# Patient Record
Sex: FEMALE | Race: WHITE | NOT HISPANIC OR LATINO | Employment: OTHER | URBAN - METROPOLITAN AREA
[De-identification: names, ages, dates, MRNs, and addresses within clinical notes are randomized per-mention and may not be internally consistent; named-entity substitution may affect disease eponyms.]

---

## 2017-12-12 ENCOUNTER — HOSPITAL ENCOUNTER (OUTPATIENT)
Dept: LAB | Facility: MEDICAL CENTER | Age: 67
End: 2017-12-12
Attending: PHYSICIAN ASSISTANT
Payer: MEDICARE

## 2017-12-12 PROCEDURE — 36415 COLL VENOUS BLD VENIPUNCTURE: CPT

## 2017-12-12 PROCEDURE — 86480 TB TEST CELL IMMUN MEASURE: CPT | Mod: GY

## 2017-12-13 LAB
M TB TUBERC IFN-G BLD QL: NEGATIVE
M TB TUBERC IFN-G/MITOGEN IGNF BLD: 0
M TB TUBERC IGNF/MITOGEN IGNF CONTROL: 41.81 [IU]/ML
MITOGEN IGNF BCKGRD COR BLD-ACNC: 0.05 [IU]/ML

## 2018-12-10 ENCOUNTER — HOSPITAL ENCOUNTER (OUTPATIENT)
Dept: LAB | Facility: MEDICAL CENTER | Age: 68
End: 2018-12-10
Attending: PHYSICIAN ASSISTANT
Payer: MEDICARE

## 2018-12-13 ENCOUNTER — HOSPITAL ENCOUNTER (OUTPATIENT)
Dept: LAB | Facility: MEDICAL CENTER | Age: 68
End: 2018-12-13
Attending: PHYSICIAN ASSISTANT
Payer: MEDICARE

## 2018-12-13 PROCEDURE — 86480 TB TEST CELL IMMUN MEASURE: CPT | Mod: GY

## 2018-12-13 PROCEDURE — 36415 COLL VENOUS BLD VENIPUNCTURE: CPT

## 2018-12-14 LAB
GAMMA INTERFERON BACKGROUND BLD IA-ACNC: 0.03 IU/ML
M TB IFN-G BLD-IMP: NEGATIVE
M TB IFN-G CD4+ BCKGRND COR BLD-ACNC: 0 IU/ML
MITOGEN IGNF BCKGRD COR BLD-ACNC: >10 IU/ML
QFT TB2 - NIL TBQ2: 0 IU/ML

## 2020-01-15 ENCOUNTER — HOSPITAL ENCOUNTER (OUTPATIENT)
Dept: LAB | Facility: MEDICAL CENTER | Age: 70
End: 2020-01-15
Attending: PHYSICIAN ASSISTANT
Payer: MEDICARE

## 2020-01-15 PROCEDURE — 36415 COLL VENOUS BLD VENIPUNCTURE: CPT

## 2020-01-15 PROCEDURE — 86480 TB TEST CELL IMMUN MEASURE: CPT

## 2020-01-17 LAB
GAMMA INTERFERON BACKGROUND BLD IA-ACNC: 0.03 IU/ML
M TB IFN-G BLD-IMP: NEGATIVE
M TB IFN-G CD4+ BCKGRND COR BLD-ACNC: 0 IU/ML
MITOGEN IGNF BCKGRD COR BLD-ACNC: >10 IU/ML
QFT TB2 - NIL TBQ2: -0.01 IU/ML

## 2020-10-31 ENCOUNTER — APPOINTMENT (OUTPATIENT)
Dept: RADIOLOGY | Facility: MEDICAL CENTER | Age: 70
End: 2020-10-31
Attending: EMERGENCY MEDICINE
Payer: MEDICARE

## 2020-10-31 ENCOUNTER — HOSPITAL ENCOUNTER (EMERGENCY)
Facility: MEDICAL CENTER | Age: 70
End: 2020-11-01
Attending: EMERGENCY MEDICINE
Payer: MEDICARE

## 2020-10-31 VITALS
RESPIRATION RATE: 16 BRPM | OXYGEN SATURATION: 93 % | WEIGHT: 149 LBS | SYSTOLIC BLOOD PRESSURE: 123 MMHG | BODY MASS INDEX: 22.07 KG/M2 | TEMPERATURE: 98.2 F | DIASTOLIC BLOOD PRESSURE: 71 MMHG | HEART RATE: 92 BPM | HEIGHT: 69 IN

## 2020-10-31 DIAGNOSIS — Z00.8 MEDICAL CLEARANCE FOR PSYCHIATRIC ADMISSION: ICD-10-CM

## 2020-10-31 DIAGNOSIS — F29 PSYCHOSIS, UNSPECIFIED PSYCHOSIS TYPE (HCC): ICD-10-CM

## 2020-10-31 DIAGNOSIS — E03.9 HYPOTHYROIDISM, UNSPECIFIED TYPE: ICD-10-CM

## 2020-10-31 LAB
ALBUMIN SERPL BCP-MCNC: 4.1 G/DL (ref 3.2–4.9)
ALBUMIN/GLOB SERPL: 1.3 G/DL
ALP SERPL-CCNC: 71 U/L (ref 30–99)
ALT SERPL-CCNC: 22 U/L (ref 2–50)
AMPHET UR QL SCN: NEGATIVE
ANION GAP SERPL CALC-SCNC: 10 MMOL/L (ref 7–16)
APPEARANCE UR: CLEAR
AST SERPL-CCNC: 21 U/L (ref 12–45)
BARBITURATES UR QL SCN: NEGATIVE
BASOPHILS # BLD AUTO: 0.4 % (ref 0–1.8)
BASOPHILS # BLD: 0.04 K/UL (ref 0–0.12)
BENZODIAZ UR QL SCN: NEGATIVE
BILIRUB SERPL-MCNC: 0.2 MG/DL (ref 0.1–1.5)
BILIRUB UR QL STRIP.AUTO: NEGATIVE
BUN SERPL-MCNC: 15 MG/DL (ref 8–22)
BZE UR QL SCN: NEGATIVE
CALCIUM SERPL-MCNC: 9.3 MG/DL (ref 8.5–10.5)
CANNABINOIDS UR QL SCN: NEGATIVE
CHLORIDE SERPL-SCNC: 103 MMOL/L (ref 96–112)
CO2 SERPL-SCNC: 24 MMOL/L (ref 20–33)
COLOR UR: YELLOW
COVID ORDER STATUS COVID19: NORMAL
CREAT SERPL-MCNC: 0.85 MG/DL (ref 0.5–1.4)
EOSINOPHIL # BLD AUTO: 0.02 K/UL (ref 0–0.51)
EOSINOPHIL NFR BLD: 0.2 % (ref 0–6.9)
ERYTHROCYTE [DISTWIDTH] IN BLOOD BY AUTOMATED COUNT: 44.9 FL (ref 35.9–50)
GLOBULIN SER CALC-MCNC: 3.2 G/DL (ref 1.9–3.5)
GLUCOSE SERPL-MCNC: 138 MG/DL (ref 65–99)
GLUCOSE UR STRIP.AUTO-MCNC: NEGATIVE MG/DL
HCT VFR BLD AUTO: 44.9 % (ref 37–47)
HGB BLD-MCNC: 14.5 G/DL (ref 12–16)
IMM GRANULOCYTES # BLD AUTO: 0.07 K/UL (ref 0–0.11)
IMM GRANULOCYTES NFR BLD AUTO: 0.7 % (ref 0–0.9)
KETONES UR STRIP.AUTO-MCNC: NEGATIVE MG/DL
LEUKOCYTE ESTERASE UR QL STRIP.AUTO: NEGATIVE
LYMPHOCYTES # BLD AUTO: 0.83 K/UL (ref 1–4.8)
LYMPHOCYTES NFR BLD: 7.9 % (ref 22–41)
MCH RBC QN AUTO: 30.3 PG (ref 27–33)
MCHC RBC AUTO-ENTMCNC: 32.3 G/DL (ref 33.6–35)
MCV RBC AUTO: 93.7 FL (ref 81.4–97.8)
METHADONE UR QL SCN: NEGATIVE
MICRO URNS: NORMAL
MONOCYTES # BLD AUTO: 0.64 K/UL (ref 0–0.85)
MONOCYTES NFR BLD AUTO: 6.1 % (ref 0–13.4)
NEUTROPHILS # BLD AUTO: 8.93 K/UL (ref 2–7.15)
NEUTROPHILS NFR BLD: 84.7 % (ref 44–72)
NITRITE UR QL STRIP.AUTO: NEGATIVE
NRBC # BLD AUTO: 0 K/UL
NRBC BLD-RTO: 0 /100 WBC
OPIATES UR QL SCN: NEGATIVE
OXYCODONE UR QL SCN: NEGATIVE
PCP UR QL SCN: NEGATIVE
PH UR STRIP.AUTO: 5.5 [PH] (ref 5–8)
PLATELET # BLD AUTO: 224 K/UL (ref 164–446)
PMV BLD AUTO: 11 FL (ref 9–12.9)
POC BREATHALIZER: 0 PERCENT (ref 0–0.01)
POTASSIUM SERPL-SCNC: 4 MMOL/L (ref 3.6–5.5)
PROPOXYPH UR QL SCN: NEGATIVE
PROT SERPL-MCNC: 7.3 G/DL (ref 6–8.2)
PROT UR QL STRIP: NEGATIVE MG/DL
RBC # BLD AUTO: 4.79 M/UL (ref 4.2–5.4)
RBC UR QL AUTO: NEGATIVE
SODIUM SERPL-SCNC: 137 MMOL/L (ref 135–145)
SP GR UR STRIP.AUTO: 1.01
TSH SERPL DL<=0.005 MIU/L-ACNC: 11.2 UIU/ML (ref 0.38–5.33)
UROBILINOGEN UR STRIP.AUTO-MCNC: 0.2 MG/DL
WBC # BLD AUTO: 10.5 K/UL (ref 4.8–10.8)

## 2020-10-31 PROCEDURE — 80307 DRUG TEST PRSMV CHEM ANLYZR: CPT

## 2020-10-31 PROCEDURE — 80053 COMPREHEN METABOLIC PANEL: CPT

## 2020-10-31 PROCEDURE — 85025 COMPLETE CBC W/AUTO DIFF WBC: CPT

## 2020-10-31 PROCEDURE — 700102 HCHG RX REV CODE 250 W/ 637 OVERRIDE(OP): Performed by: EMERGENCY MEDICINE

## 2020-10-31 PROCEDURE — U0003 INFECTIOUS AGENT DETECTION BY NUCLEIC ACID (DNA OR RNA); SEVERE ACUTE RESPIRATORY SYNDROME CORONAVIRUS 2 (SARS-COV-2) (CORONAVIRUS DISEASE [COVID-19]), AMPLIFIED PROBE TECHNIQUE, MAKING USE OF HIGH THROUGHPUT TECHNOLOGIES AS DESCRIBED BY CMS-2020-01-R: HCPCS

## 2020-10-31 PROCEDURE — 70450 CT HEAD/BRAIN W/O DYE: CPT

## 2020-10-31 PROCEDURE — 99285 EMERGENCY DEPT VISIT HI MDM: CPT

## 2020-10-31 PROCEDURE — 90791 PSYCH DIAGNOSTIC EVALUATION: CPT

## 2020-10-31 PROCEDURE — 302970 POC BREATHALIZER: Performed by: EMERGENCY MEDICINE

## 2020-10-31 PROCEDURE — 81003 URINALYSIS AUTO W/O SCOPE: CPT | Mod: XU

## 2020-10-31 PROCEDURE — C9803 HOPD COVID-19 SPEC COLLECT: HCPCS | Performed by: EMERGENCY MEDICINE

## 2020-10-31 PROCEDURE — A9270 NON-COVERED ITEM OR SERVICE: HCPCS | Performed by: EMERGENCY MEDICINE

## 2020-10-31 PROCEDURE — 84443 ASSAY THYROID STIM HORMONE: CPT

## 2020-10-31 PROCEDURE — 36415 COLL VENOUS BLD VENIPUNCTURE: CPT

## 2020-10-31 RX ORDER — LEVOTHYROXINE SODIUM 0.05 MG/1
50 TABLET ORAL DAILY
Status: DISCONTINUED | OUTPATIENT
Start: 2020-10-31 | End: 2020-11-01 | Stop reason: HOSPADM

## 2020-10-31 RX ADMIN — LEVOTHYROXINE SODIUM 50 MCG: 0.05 TABLET ORAL at 15:09

## 2020-10-31 ASSESSMENT — LIFESTYLE VARIABLES: DO YOU DRINK ALCOHOL: NO
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
## 2020-10-31 NOTE — ED PROVIDER NOTES
ED Provider Note    CHIEF COMPLAINT  Chief Complaint   Patient presents with   • Behavioral Problem       HPI  Zully Lin is a 70 y.o. female who presents with some agitation.  The patient apparently lives in a assisted living facility which has been closed down.  She was given notice to leave but did not end up bleeding.  After talking to her, it sounds as if she received the eviction notice and thought that in fact she now on the property.  She states that people came in today to make her leave, and she took offense of this as she believed that it was her property.  She apparently has a history of schizophrenia and has not taken medications for quite some time.  Is not clear why.  It sounds like things have been worsening of late, according to social work she just fired her rep payee because she did not want anyone else managing her money, which she states is in the range of $48 million.  She denies any headache, neck pain.  No recent cough or cold symptoms.  No chest pain or belly pain.  No fever chills.  From the chart, she appears to have a history of Synthroid use.  I see no recent laboratories to evaluate for this although I do see an elevated TSH 6 years ago.    PAST MEDICAL HISTORY  Past Medical History:   Diagnosis Date   • Psychiatric disorder    Hypo-thyroidism    FAMILY HISTORY  No family history on file.    SOCIAL HISTORY  Social History     Tobacco Use   • Smoking status: Never Smoker   Substance Use Topics   • Alcohol use: No   • Drug use: No         SURGICAL HISTORY  No past surgical history on file.    CURRENT MEDICATIONS  Home Medications     Reviewed by Jovon Rivera on 10/31/20 at 1249  Med List Status: Complete   Medication Last Dose Status   ascorbic acid (VITAMIN C) 500 MG tablet Not Taking Active   cyanocobalamin (VITAMIN B12) 1000 MCG TABS Not Taking Active   metoprolol (LOPRESSOR) 25 MG TABS Not Taking Active   therapeutic multivitamin-minerals (THERAGRAN-M) TABS Not Taking  "Active   trihexyphenidyl (ARTANE) 2 MG TABS Not Taking Active                I have reviewed the nurses notes and/or the list brought with the patient.    ALLERGIES  No Known Allergies    REVIEW OF SYSTEMS  See HPI for further details. Review of systems as above, otherwise all other systems are negative.     PHYSICAL EXAM  VITAL SIGNS: /80   Pulse (!) 112   Temp 36.3 °C (97.4 °F) (Temporal)   Resp 16   Ht 1.753 m (5' 9\")   Wt 67.6 kg (149 lb)   SpO2 98%   BMI 22.00 kg/m²     Constitutional: Well appearing patient in no acute distress.  Not toxic, nor ill in appearance.  HENT: Mucus membranes moist.  Oropharynx is clear.  Eyes: Pupils equally round.  No scleral icterus.   Neck: Full nontender range of motion.  Lymphatic: No cervical lymphadenopathy noted.   Cardiovascular: Regular heart rate and rhythm.  No murmurs, rubs, nor gallop appreciated.   Thorax & Lungs: Chest is nontender.  Lungs are clear to auscultation with good air movement bilaterally.  No wheeze, rhonchi, nor rales.   Abdomen: Soft, with no tenderness, rebound nor guarding.  No mass, pulsatile mass, nor hepatosplenomegaly appreciated.  Skin: No purpura nor petechia noted.  Extremities/Musculoskeletal: No sign of trauma.  Calves are nontender with no cords nor edema.  No Nato's sign.  Pulses are intact all around.   Neurologic: Alert & oriented.  Strength and sensation is intact all around.  Gait is normal.  Psychiatric: Affect is normal.  However she exhibits some grandiose thinking, but appear to be some fixed delusions, and certainly paranoid thinking she describes the events of today    LABS  Labs Reviewed   CBC WITH DIFFERENTIAL - Abnormal; Notable for the following components:       Result Value    MCHC 32.3 (*)     Neutrophils-Polys 84.70 (*)     Lymphocytes 7.90 (*)     Neutrophils (Absolute) 8.93 (*)     Lymphs (Absolute) 0.83 (*)     All other components within normal limits   COMP METABOLIC PANEL - Abnormal; Notable for the " following components:    Glucose 138 (*)     All other components within normal limits   TSH - Abnormal; Notable for the following components:    TSH 11.200 (*)     All other components within normal limits   POC BREATHALIZER - Normal   URINALYSIS,CULTURE IF INDICATED   ESTIMATED GFR   COVID/SARS COV-2   URINE DRUG SCREEN         RADIOLOGY/PROCEDURES  I have reviewed the patient's film interpretations myself, and they are read out by the radiologist as:   CT-HEAD W/O   Final Result      No acute intracranial abnormality        .    MEDICAL RECORD  I have reviewed patient's medical record and pertinent results are listed above.    COURSE & MEDICAL DECISION MAKING  I have reviewed any medical record information, laboratory studies and radiographic results as noted above.  This patient presents after situation today where she is being evicted from her assisted living facility.  She has a history of what sounds to be chronic mental illness off of her medications.  Right now she seems quite fine.  However, it certainly does seem that she has some delusions, and paranoia, consistent with some mild psychosis.  Isolated nothing that she can care for herself at this time.  Therefore I completed the involuntary hold with which she arrived on.  I did go ahead and check some basic laboratories which were unrevealing.  She is a mild hyperglycemia which will need to be followed up.  She has a elevated TSH.  I gone ahead and restarted the dose that she was on previously of her Synthroid, 50 mcg daily.  I did want to exclude intracranial tumor as the etiology of the symptoms, therefore obtain a CT scan which is negative.  I have talked with both social work as well of life skills, and at this point we will keep her here and to have a spot to send her for stabilization.  Ideally, I suspect that a geriatric psych unit would be most useful for her, [facility].  She is given the following instructions:Follow mental health  recommendations.  I have restarted that Synthroid 50 mcg daily which she had been on previously.  This will need to be followed up as an outpatient.  As well, your blood sugar is mildly elevated today as is your blood pressure, both of these will need to be followed up as well.    FINAL IMPRESSION  1. Psychosis, unspecified psychosis type (HCC)    2. Hypothyroidism, unspecified type    3. Medical clearance for psychiatric admission           This dictation was created using voice recognition software.    Electronically signed by: Todd Brown M.D., 10/31/2020 2:04 PM

## 2020-10-31 NOTE — ED NOTES
Med rec updated and complete. Pt has been refusing to take medications for > 1 year, according to the transferring facility.

## 2020-10-31 NOTE — DISCHARGE INSTRUCTIONS
Follow mental health recommendations.  I have restarted that Synthroid 50 mcg daily which she had been on previously.  This will need to be followed up as an outpatient.  As well, your blood sugar is mildly elevated today as is your blood pressure, both of these will need to be followed up as well.
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
## 2020-10-31 NOTE — ED TRIAGE NOTES
EMS reports pts assisted living facility is closing down and they attempted to move pt and she became upset because she did not understand why she had to move. Pt has a history of dementia and is oriented x 2 on arrival. Pt calm and cooperative here

## 2020-11-01 LAB
SARS-COV-2 RNA RESP QL NAA+PROBE: NOTDETECTED
SPECIMEN SOURCE: NORMAL

## 2020-11-01 NOTE — DISCHARGE PLANNING
Medical Social Work    Referral: Legal Hold    Intervention: Legal Hold Paperwork given to SW by Life Skills RN Mihir    Legal Hold Initiated: Date: 10/31/20 Time: 1040am    Patient’s Insurance Listed on Face Sheet: Medicare    Referrals sent to: .[facility] ,[facility]    This referral contains the following information:  1) Face sheet __X__  2) Page 1 and Page 2 of Legal Hold ___X_  3) Alert Team Assessment/Psych Assessment _X___  4) Head to toe physical exam _X___  5) Urine Drug Screen ___X_  6) Blood Alcohol __X__  7) Vital signs __X__  8) Pregnancy test when applicable _N/A__  9) Medications list _A___    Plan: Patient will transfer to mental health facility once acceptance is obtained    At this time pt is refusing COVID test at this time. Bedside RN will try again. Both [facility]  and [facility] ( 2 COVID Tests) requires them for admission.

## 2020-11-01 NOTE — DISCHARGE PLANNING
Medical Social Work     Pt has been accepted to [facility] by Dr. Hurt.  Sw called [facility] and set up transport through [facility] for 0000.  Sw updated  bedside rn, and completed transfer packet.

## 2020-11-01 NOTE — ED NOTES
Pt resting quietly in room with lights turned down. Pt lying right side, appears asleep at this time. Respirations even and unlabored. Pt  at bedside, pt in full view of nurses station.

## 2020-11-01 NOTE — ED NOTES
DOMINGUEZ arrived to take patient to [facility]. Vitals repeated and charted. Pt ambulated to ambulance bay. Pt's belongings taken from locker 14. Pt left with belongings and L2K paperwork.     Report called to Sandra ZENG at [facility].

## 2020-11-01 NOTE — ED NOTES
Report received from Zully Joshua RN. Assumed care of patient at this time. Pt calm and cooperative, sitting [place]. Sitter outside patient's room for safety. L2K paperwork in remains in chart.

## 2020-11-01 NOTE — DISCHARGE PLANNING
GOMEZ asked by YURI Gan to follow up with Yandy ([facility]) and let her know where this Pt was moved.  GOMEZ placed call to Yandy and provided her with an update. Yandy appreciative for the call and will follow up with Buddy Russell.

## 2020-11-01 NOTE — ED NOTES
Pt up to restroom with steady gait, no assistance required, back to room. Pt continues to deny SI/HI, still with delusions of owning nursing home and being kicked out of her property today. Sitter remains outside patients room for safety.

## 2021-03-03 DIAGNOSIS — Z23 NEED FOR VACCINATION: ICD-10-CM

## 2021-06-09 PROBLEM — F22 DELUSIONAL DISORDER (HCC): Status: ACTIVE | Noted: 2021-06-09

## 2021-06-09 PROBLEM — Z91.199 NONCOMPLIANCE WITH TREATMENT PLAN: Status: ACTIVE | Noted: 2021-06-09

## 2021-06-09 PROBLEM — E78.5 HYPERLIPIDEMIA: Status: ACTIVE | Noted: 2021-06-09

## 2021-06-11 ENCOUNTER — APPOINTMENT (OUTPATIENT)
Dept: RADIOLOGY | Facility: MEDICAL CENTER | Age: 71
End: 2021-06-11
Attending: EMERGENCY MEDICINE
Payer: MEDICARE

## 2021-06-11 ENCOUNTER — HOSPITAL ENCOUNTER (OUTPATIENT)
Facility: MEDICAL CENTER | Age: 71
End: 2022-03-15
Attending: EMERGENCY MEDICINE | Admitting: HOSPITALIST
Payer: MEDICARE

## 2021-06-11 DIAGNOSIS — F25.0 SCHIZOAFFECTIVE DISORDER, BIPOLAR TYPE (HCC): ICD-10-CM

## 2021-06-11 DIAGNOSIS — F22 DELUSIONAL DISORDER (HCC): Chronic | ICD-10-CM

## 2021-06-11 DIAGNOSIS — F03.911 AGITATION DUE TO DEMENTIA (HCC): ICD-10-CM

## 2021-06-11 DIAGNOSIS — F03.90 DEMENTIA WITHOUT BEHAVIORAL DISTURBANCE, UNSPECIFIED DEMENTIA TYPE: ICD-10-CM

## 2021-06-11 DIAGNOSIS — Z86.59 HISTORY OF DEMENTIA: ICD-10-CM

## 2021-06-11 LAB
ANION GAP SERPL CALC-SCNC: 10 MMOL/L (ref 7–16)
APPEARANCE UR: CLEAR
BASOPHILS # BLD AUTO: 0.9 % (ref 0–1.8)
BASOPHILS # BLD: 0.05 K/UL (ref 0–0.12)
BILIRUB UR QL STRIP.AUTO: NEGATIVE
BUN SERPL-MCNC: 16 MG/DL (ref 8–22)
CALCIUM SERPL-MCNC: 9.5 MG/DL (ref 8.5–10.5)
CHLORIDE SERPL-SCNC: 106 MMOL/L (ref 96–112)
CO2 SERPL-SCNC: 24 MMOL/L (ref 20–33)
COLOR UR: YELLOW
CREAT SERPL-MCNC: 0.81 MG/DL (ref 0.5–1.4)
EOSINOPHIL # BLD AUTO: 0.21 K/UL (ref 0–0.51)
EOSINOPHIL NFR BLD: 3.9 % (ref 0–6.9)
ERYTHROCYTE [DISTWIDTH] IN BLOOD BY AUTOMATED COUNT: 45.6 FL (ref 35.9–50)
GLUCOSE SERPL-MCNC: 138 MG/DL (ref 65–99)
GLUCOSE UR STRIP.AUTO-MCNC: NEGATIVE MG/DL
HCT VFR BLD AUTO: 44.7 % (ref 37–47)
HGB BLD-MCNC: 14.6 G/DL (ref 12–16)
IMM GRANULOCYTES # BLD AUTO: 0.03 K/UL (ref 0–0.11)
IMM GRANULOCYTES NFR BLD AUTO: 0.6 % (ref 0–0.9)
KETONES UR STRIP.AUTO-MCNC: NEGATIVE MG/DL
LEUKOCYTE ESTERASE UR QL STRIP.AUTO: NEGATIVE
LYMPHOCYTES # BLD AUTO: 1.54 K/UL (ref 1–4.8)
LYMPHOCYTES NFR BLD: 28.8 % (ref 22–41)
MCH RBC QN AUTO: 30.9 PG (ref 27–33)
MCHC RBC AUTO-ENTMCNC: 32.7 G/DL (ref 33.6–35)
MCV RBC AUTO: 94.5 FL (ref 81.4–97.8)
MICRO URNS: NORMAL
MONOCYTES # BLD AUTO: 0.58 K/UL (ref 0–0.85)
MONOCYTES NFR BLD AUTO: 10.8 % (ref 0–13.4)
NEUTROPHILS # BLD AUTO: 2.94 K/UL (ref 2–7.15)
NEUTROPHILS NFR BLD: 55 % (ref 44–72)
NITRITE UR QL STRIP.AUTO: NEGATIVE
NRBC # BLD AUTO: 0 K/UL
NRBC BLD-RTO: 0 /100 WBC
PH UR STRIP.AUTO: 6.5 [PH] (ref 5–8)
PLATELET # BLD AUTO: 205 K/UL (ref 164–446)
PMV BLD AUTO: 11.1 FL (ref 9–12.9)
POTASSIUM SERPL-SCNC: 3.9 MMOL/L (ref 3.6–5.5)
PROT UR QL STRIP: NEGATIVE MG/DL
RBC # BLD AUTO: 4.73 M/UL (ref 4.2–5.4)
RBC UR QL AUTO: NEGATIVE
SODIUM SERPL-SCNC: 140 MMOL/L (ref 135–145)
SP GR UR STRIP.AUTO: 1
UROBILINOGEN UR STRIP.AUTO-MCNC: 0.2 MG/DL
WBC # BLD AUTO: 5.4 K/UL (ref 4.8–10.8)

## 2021-06-11 PROCEDURE — 85025 COMPLETE CBC W/AUTO DIFF WBC: CPT

## 2021-06-11 PROCEDURE — 84443 ASSAY THYROID STIM HORMONE: CPT

## 2021-06-11 PROCEDURE — 80048 BASIC METABOLIC PNL TOTAL CA: CPT

## 2021-06-11 PROCEDURE — 99285 EMERGENCY DEPT VISIT HI MDM: CPT

## 2021-06-11 PROCEDURE — 81003 URINALYSIS AUTO W/O SCOPE: CPT

## 2021-06-11 PROCEDURE — 71045 X-RAY EXAM CHEST 1 VIEW: CPT

## 2021-06-11 ASSESSMENT — FIBROSIS 4 INDEX: FIB4 SCORE: 1.38

## 2021-06-12 PROBLEM — F03.90 DEMENTIA (HCC): Status: ACTIVE | Noted: 2021-06-12

## 2021-06-12 PROBLEM — E03.9 HYPOTHYROIDISM: Status: ACTIVE | Noted: 2021-06-12

## 2021-06-12 LAB — TSH SERPL DL<=0.005 MIU/L-ACNC: 15.6 UIU/ML (ref 0.38–5.33)

## 2021-06-12 PROCEDURE — U0003 INFECTIOUS AGENT DETECTION BY NUCLEIC ACID (DNA OR RNA); SEVERE ACUTE RESPIRATORY SYNDROME CORONAVIRUS 2 (SARS-COV-2) (CORONAVIRUS DISEASE [COVID-19]), AMPLIFIED PROBE TECHNIQUE, MAKING USE OF HIGH THROUGHPUT TECHNOLOGIES AS DESCRIBED BY CMS-2020-01-R: HCPCS

## 2021-06-12 PROCEDURE — 90791 PSYCH DIAGNOSTIC EVALUATION: CPT | Performed by: SOCIAL WORKER

## 2021-06-12 PROCEDURE — U0005 INFEC AGEN DETEC AMPLI PROBE: HCPCS

## 2021-06-12 PROCEDURE — G0378 HOSPITAL OBSERVATION PER HR: HCPCS

## 2021-06-12 PROCEDURE — 96372 THER/PROPH/DIAG INJ SC/IM: CPT

## 2021-06-12 PROCEDURE — 700111 HCHG RX REV CODE 636 W/ 250 OVERRIDE (IP): Performed by: STUDENT IN AN ORGANIZED HEALTH CARE EDUCATION/TRAINING PROGRAM

## 2021-06-12 PROCEDURE — 99218 PR INITIAL OBSERVATION CARE,LEVL I: CPT | Performed by: HOSPITALIST

## 2021-06-12 RX ORDER — RISPERIDONE 1 MG/1
1 TABLET ORAL NIGHTLY
Status: DISCONTINUED | OUTPATIENT
Start: 2021-06-12 | End: 2021-06-21

## 2021-06-12 RX ORDER — AMOXICILLIN 250 MG
2 CAPSULE ORAL 2 TIMES DAILY
Status: DISCONTINUED | OUTPATIENT
Start: 2021-06-12 | End: 2021-06-25

## 2021-06-12 RX ORDER — ACETAMINOPHEN 325 MG/1
650 TABLET ORAL EVERY 6 HOURS PRN
Status: DISCONTINUED | OUTPATIENT
Start: 2021-06-12 | End: 2021-07-15

## 2021-06-12 RX ORDER — POLYETHYLENE GLYCOL 3350 17 G/17G
1 POWDER, FOR SOLUTION ORAL
Status: DISCONTINUED | OUTPATIENT
Start: 2021-06-12 | End: 2021-06-25

## 2021-06-12 RX ORDER — LEVOTHYROXINE SODIUM 0.05 MG/1
50 TABLET ORAL
Status: DISCONTINUED | OUTPATIENT
Start: 2021-06-12 | End: 2021-12-03

## 2021-06-12 RX ORDER — BISACODYL 10 MG
10 SUPPOSITORY, RECTAL RECTAL
Status: DISCONTINUED | OUTPATIENT
Start: 2021-06-12 | End: 2021-06-25

## 2021-06-12 RX ADMIN — ENOXAPARIN SODIUM 40 MG: 40 INJECTION SUBCUTANEOUS at 15:00

## 2021-06-12 ASSESSMENT — ENCOUNTER SYMPTOMS
DOUBLE VISION: 0
FLANK PAIN: 0
WHEEZING: 0
SHORTNESS OF BREATH: 0
CONSTIPATION: 0
NAUSEA: 0
DIZZINESS: 0
DEPRESSION: 0
BLOOD IN STOOL: 0
CHILLS: 0
COUGH: 0
FALLS: 0
NECK PAIN: 0
PHOTOPHOBIA: 0
ABDOMINAL PAIN: 0
SPUTUM PRODUCTION: 0
DIARRHEA: 0
EYE PAIN: 0
MYALGIAS: 0
HALLUCINATIONS: 0
FEVER: 0
TINGLING: 0
WEIGHT LOSS: 0
BLURRED VISION: 0
BACK PAIN: 0
VOMITING: 0
HEADACHES: 0
HEMOPTYSIS: 0

## 2021-06-12 ASSESSMENT — PAIN DESCRIPTION - PAIN TYPE
TYPE: ACUTE PAIN
TYPE: ACUTE PAIN

## 2021-06-12 ASSESSMENT — LIFESTYLE VARIABLES: SUBSTANCE_ABUSE: 0

## 2021-06-12 NOTE — ED NOTES
Received report from Rasheeda ZENG. [place]; respirations equal bilateral and not labored. Pt has no needs at this time. RN will continue to monitor.

## 2021-06-12 NOTE — ED NOTES
Med rec complete per [facility] ([phone]). Per facility, Pt does not take any medications; Pt refuses all medications.

## 2021-06-12 NOTE — ASSESSMENT & PLAN NOTE
Patient brought here by EMS because the facility states that she was experiencing worsening agitation. On my exam patient appears cooperative and pleasant. No signs of agitation. Vitals stable. No leukocytosis. No electrolyte abnormalities. UA negative. Facility would like the patient to get evaluated by psychiatry before they will accept her back.   Unclear diagnosis, pt appears to be cognitively intact on assessment, score 30

## 2021-06-12 NOTE — ED NOTES
Report given to Deshawn SIMPSON RN. Upon shift change pt is sleeping in bed with even and unlabored breaths, in no apparent distress.

## 2021-06-12 NOTE — H&P
"History & Physical Note    Date of Admission: 6/12/2021  Admission Status: Observation-Outpatient  [facility] Team: [facility]  Attending: Zackary Haji M.D.   Senior Resident: Dr. Lan  Intern: Dr. Frias  Contact Number: [phone]    Chief Complaint: Agitation      History of Present Illness (HPI):     Patient is a 70 y.o female w/ PMH of dementia, who is a resident at [facility] for memory care, who was brought here by EMS. Her facility sent her here for presumed worsening \"agitation\" and behavioral changes, and requested that she be evaluated by a psychiatrist before they will allow for her to be admitted back to the facility. Patient was brought in by EMS on 6/11/2021 and has been in the ED since then. She has been cooperative, calm and pleasant the entire time. Patient is alert and oriented (time, place, self), however she is not clear on why she was brought to the hospital. Patient believes that she is the owner of the facility that she is in. On my exam, patient appeared calm and pleasant. She denies any psychiatric history. NO history of depression, schizophrenia, bipolar disorder. She describes herself as a \"nicanor person\" when I asked her about any history of agitation. Patient will be admitted her for placement reasons and for psychiatry evaluation as per request of the facility.     Review of Systems: Review of Systems   Constitutional: Negative for chills, fever, malaise/fatigue and weight loss.   HENT: Negative for hearing loss.    Eyes: Negative for blurred vision, double vision, photophobia and pain.   Respiratory: Negative for cough, hemoptysis, sputum production, shortness of breath and wheezing.    Gastrointestinal: Negative for abdominal pain, blood in stool, constipation, diarrhea, melena, nausea and vomiting.   Genitourinary: Negative for dysuria, flank pain, frequency, hematuria and urgency.   Musculoskeletal: Negative for back pain, falls, joint pain, myalgias and neck pain. "   Neurological: Negative for dizziness, tingling and headaches.   Psychiatric/Behavioral: Negative for depression, hallucinations, substance abuse and suicidal ideas.         Past Medical History:   Past Medical History was reviewed with patient.   has a past medical history of Dehydration (2/17/2014), Failure to thrive (2/28/2014), Leukocytosis (2/17/2014), Pneumonia (3/1/2014), Psychiatric disorder, and Sepsis (HCC) (3/4/2014).    Past Surgical History: Past Surgical History was reviewed with patient.   has no past surgical history on file.    Medications: Medications have been reviewed with patient.  None        Allergies: Allergies have been reviewed with patient.  No Known Allergies    Family History:   family history is not on file.     Social History:   Tobacco: None   Alcohol: None   Recreational drugs (illegal and prescription):  None   Employment: Unemployed   Activity Level: able to ambulate on her own    Living situation:  Lives in facility   Recent travel:  None  Primary Care Provider: reviewed No primary care provider on file.  Other (stressors, spirituality, exposures):  None  Physical Exam: Vitals:  Temp:  [36.4 °C (97.6 °F)-36.7 °C (98.1 °F)] 36.7 °C (98.1 °F)  Pulse:  [] 74  Resp:  [14-18] 17  BP: (122-172)/(61-83) 139/83  SpO2:  [91 %-96 %] 95 %    Physical Exam  Constitutional:       General: She is not in acute distress.     Appearance: Normal appearance. She is normal weight. She is not ill-appearing, toxic-appearing or diaphoretic.   HENT:      Head: Normocephalic and atraumatic.      Mouth/Throat:      Mouth: Mucous membranes are moist.   Eyes:      Extraocular Movements: Extraocular movements intact.      Pupils: Pupils are equal, round, and reactive to light.   Cardiovascular:      Rate and Rhythm: Normal rate and regular rhythm.      Pulses: Normal pulses.      Heart sounds: Normal heart sounds.   Pulmonary:      Effort: Pulmonary effort is normal. No respiratory distress.       Breath sounds: Normal breath sounds. No stridor. No wheezing, rhonchi or rales.   Chest:      Chest wall: No tenderness.   Abdominal:      General: There is no distension.      Palpations: There is no mass.      Tenderness: There is no abdominal tenderness. There is no right CVA tenderness, left CVA tenderness, guarding or rebound.      Hernia: No hernia is present.   Musculoskeletal:         General: No swelling, tenderness, deformity or signs of injury.      Right lower leg: No edema.      Left lower leg: No edema.   Skin:     Coloration: Skin is not jaundiced or pale.      Findings: No bruising, erythema, lesion or rash.   Neurological:      General: No focal deficit present.      Mental Status: She is alert.      Cranial Nerves: No cranial nerve deficit.      Sensory: No sensory deficit.      Motor: No weakness.      Coordination: Coordination normal.         Labs:   Reviewed     Imaging:   Reviewed     Previous Data Review: reviewed    Problem Representation:     * Dementia (HCC)  Assessment & Plan  Patient brought here by EMS because the facility states that she was experiencing worsening agitation. On my exam patient appears cooperative and pleasant. No signs of agitation. Vitals stable. No leukocytosis. No electrolyte abnormalities. UA negative. Facility would like the patient to get evaluated by psychiatry before they will accept her back.     Hypothyroidism  Assessment & Plan  Resume home dose synthroid  Previous TSH: 11  Follow up repeat TSH

## 2021-06-12 NOTE — ED NOTES
Pt sleeping in bed with even and unlabored breaths, in no acute distress. Will continue to monitor.

## 2021-06-12 NOTE — ED TRIAGE NOTES
"Chief Complaint   Patient presents with   • Agitation     pt resident of [facility] for memory care. pt has hx of demntia. per facility pt was more agitated and had acute behavioral changes. unknown baseline        Pt BIB EMS from above. Pt has been resident of this place for 8 months, not taking her meds, unknown what meds she takes. She is awarded of the state. Full code. Pt knows her name, place, stated it was 2011. Pt thinks people were taking things out of her room. Per EMS pt was \"threatening people to leave her room\". Pt was calm and cooperative once they left her facility. Pt denies any pain  "

## 2021-06-12 NOTE — ED NOTES
Pt sleeping in bed in view of RN station with even and unlabored breaths. No distress noted, will continue to monitor.

## 2021-06-12 NOTE — ED NOTES
"Patient on call light requesting \"something to eat other than crackers.\"  Warmed up left over meal in refrigerator, patient sitting up in bed to eat  "

## 2021-06-12 NOTE — ED PROVIDER NOTES
ED Provider Note    Patient resented last night for agitation, was sent in from her memory care facility.  The facility is unable to take her back.  Social work and staff has been trying to contact the facility to get the patient taken back to the facility but they have declined to take her back.  There is possibility she can go there after a formal psychiatry consultation.    This is not available for least 2 days through the emergency department, and given that she does not appear to be able to care for herself, and may need other placement even in spite of this consultation she will be admitted for further care    [place]

## 2021-06-12 NOTE — ED PROVIDER NOTES
"ED Provider Note    Scribed for Jatinder Johnson M.D. by Jatinder Johnson M.D.. 6/11/2021,  7:13 PM.    CHIEF COMPLAINT  Chief Complaint   Patient presents with   • Agitation     pt resident of [facility] for [facility] care. pt has hx of demntia. per facility pt was more agitated and had acute behavioral changes. unknown baseline        HPI  Zully Lin is a pleasant, calm and cooperative 70 y.o. female who presents to the Emergency Department brought in by EMS, with normal vital signs, because her memory care facility reports that she was more agitated and had acute behavioral changes.  When I greeted her at the bedside and asked what happened at her facility she reports \"they do not like me there.\"  She answers questions appropriately.  She is a coffey of the [facility], knows her name and where she is, but is disoriented to date.  She denies any recent illness including fevers or chills, nausea vomiting, chest pain or cough or shortness of breath.  EMS reported to us that she was yelling at people to leave her room, but once they left her facility, she became quickly calm and cooperative and pleasant for EMS.  She has no complaints on arrival.      REVIEW OF SYSTEMS  See HPI for further details. All other systems are negative.     PAST MEDICAL HISTORY   has a past medical history of Dehydration (2/17/2014), Failure to thrive (2/28/2014), Leukocytosis (2/17/2014), Pneumonia (3/1/2014), Psychiatric disorder, and Sepsis (HCC) (3/4/2014).    SOCIAL HISTORY  Social History     Tobacco Use   • Smoking status: Never Smoker   Substance and Sexual Activity   • Alcohol use: No   • Drug use: No   • Sexual activity: Not on file     Social History     Substance and Sexual Activity   Drug Use No       SURGICAL HISTORY  patient denies any surgical history    CURRENT MEDICATIONS  Home Medications    **Home medications have not yet been reviewed for this encounter**         ALLERGIES  No Known Allergies    PHYSICAL " "EXAM  VITAL SIGNS: /79   Pulse 83   Temp 36.4 °C (97.6 °F) (Temporal)   Resp 18   Ht 1.651 m (5' 5\")   Wt 65.8 kg (145 lb)   SpO2 94%   BMI 24.13 kg/m²   Pulse ox interpretation: I interpret this pulse ox as normal.  Constitutional: Alert in no apparent distress.  HENT: No signs of trauma, Bilateral external ears normal, Nose normal.   Eyes: Conjunctiva normal, Non-icteric.   Neck: Normal range of motion, Supple, No stridor.   Lymphatic: No lymphadenopathy noted.   Cardiovascular: Regular rate and rhythm, no murmurs.   Thorax & Lungs: Normal breath sounds, No respiratory distress, No wheezing, No chest tenderness.   Abdomen: Bowel sounds normal, Soft, No tenderness, No masses, No pulsatile masses. No peritoneal signs.  Skin: Warm, Dry, No erythema, No rash.   Extremities: Intact distal pulses, No edema, No cyanosis.  Musculoskeletal: Good range of motion in all major joints. No or major deformities noted.   Neurologic: Alert , Normal motor function, Normal sensory function, No focal deficits noted.   Psychiatric: Affect normal, Judgment normal, Mood normal.     DIAGNOSTIC STUDIES / PROCEDURES    LABS  Labs Reviewed   CBC WITH DIFFERENTIAL - Abnormal; Notable for the following components:       Result Value    MCHC 32.7 (*)     All other components within normal limits   BASIC METABOLIC PANEL - Abnormal; Notable for the following components:    Glucose 138 (*)     All other components within normal limits   URINALYSIS   ESTIMATED GFR     All labs reviewed by me.    RADIOLOGY  DX-CHEST-LIMITED (1 VIEW)   Final Result      Left basilar atelectasis. No focal consolidation. No effusions.        The radiologist's interpretation of all radiological studies have been reviewed by me.    COURSE & MEDICAL DECISION MAKING  Nursing notes, Sujey VU reviewed in chart.     7:13 PM Patient seen and examined at bedside.  This patient has normal vital signs, and is calm and pleasant and cooperative, oriented except to " date.  She has a known history of dementia, and because of this, is a coffey of the [facility].  She shows no signs of agitation or delirium.  Thus, her brief period of reported agitation is most likely a social issue, as a metabolic or infectious cause of that behavior would not spontaneously resolve.  Nonetheless, as it can be difficult to return these patients in their facilities without objective testing, she will be screened for any infection or metabolic derangement with a chest x-ray, urinalysis, CBC and a BMP.    8:36 PM this patient's tests were all reassuring.  No infectious or metabolic cause of any agitation at her facility were discovered.  Furthermore, no metabolic or infectious cause would have resolved spontaneously, and this patient was calm and cooperative from the time she encountered EMS throughout her emergency department visit.  She has been resting comfortably with normal vital signs and reassuring tests here.  She is safe and appropriate for discharge back to her facility, with a diagnosis of agitation likely due to dementia, common for this condition.    12:39 AM despite the unremarkable work-up in the patient's completely calm behavior from the moment she left her facility, [facility] refused to take this patient back.  I called and spoke with the on-, Felipe Baca, who gave me the name of the , G[facility] Pino.  My call to the  was not answered or returned.  This patient meets no criteria for a legal hold, and demonstrates no need of an emergent medical work-up, and in my opinion is simply being abandoned by her facility.  I have involved social work, and our emergency department medical director.  Since the patient will be here overnight, I have ordered her nighttime and morning medications.  She is now been here for well over 5 hours with absolutely no sign of any behavioral disturbance.     The patient will return for new or worsening symptoms and is  stable at the time of discharge.    The patient is referred to a primary physician for blood pressure management, diabetic screening, and for all other preventative health concerns.      DISPOSITION:  Patient will be discharged home in stable condition.    FOLLOW UP:  DEMETRI Espinoza  [address] [facility] [address]  [phone]    Schedule an appointment as soon as possible for a visit         OUTPATIENT MEDICATIONS:  New Prescriptions    No medications on file           FINAL IMPRESSION  1. Agitation due to dementia (HCC)

## 2021-06-12 NOTE — DISCHARGE PLANNING
Medical Social Work     SW called [facility] at [phone], and spoke with Felipe Baca. Felipe advised this MSW that they will not take the pt back until the pt gets a psych evaluation. Felipe Baca advised SW that the pt has had violent behavior towards other residents and staff. Felipe stated per G[facility] Hughes , the pt is not allowed back at there facility till the pt gets a psych evaluation by our psych MD due to behaviors.     SW updated the ERP on this information provided by Felipe Baca. The ERP also called Felipe Baca and Felipe Baca advised the ERP with the same information he provided SW with.     Felipe Baca [phone]    [facility] Hughes () [phone]    [facility]    [address] [place] [address]

## 2021-06-12 NOTE — ED NOTES
Pt sleeping in bed in no acute distress. Pt's breaths are even and unlabored. Will continue to monitor.

## 2021-06-12 NOTE — PROGRESS NOTES
"Pt arrived to unit via gurney at 1530. Pt oriented to room, unit, and plan of care.  Pt is cooperative and pleasant. When this RN asked pt why she was here pt stated \"I own that place I was staying at, they didn't want me there\". Pt was reoriented. Wanderguard placed on Left ankle. Pt is up self, steady and tolerates activity well. All questions answered at this time. Call light within reach; fall precautions in place.   "

## 2021-06-12 NOTE — ED NOTES
Pt resting in bed with eyes closed with even and unlabored breaths. No distress noted at this time. Will continue to monitor.

## 2021-06-12 NOTE — ED NOTES
"Attempted to give pt her nightly Risperdal but pt refused to take it. She said \"I'd really rather not take any of those schizophrenia medicines.\" Asked pt about her levothyroxine and pt stated \"I don't really want to take that either, my levels were high one time and they weren't really that high so I don't think I really need to take it.\"  "

## 2021-06-12 NOTE — ED NOTES
Assumed care of patient. Pt provided with breakfast tray. Resting comfortably in gurney. No other needs right now. Within view from nurses station.

## 2021-06-12 NOTE — ED NOTES
Pt resting comfortably in bed watching TV with even and unlabored breaths. No apparent distress noted at this time, will continue to monitor.

## 2021-06-12 NOTE — PROGRESS NOTES
4 Eyes Skin Assessment Completed by KARRI Lion and KARRI Erickson.    Head WDL  Ears WDL  Nose WDL  Mouth WDL  Neck WDL  Breast/Chest WDL  Shoulder Blades WDL  Spine WDL  (R) Arm/Elbow/Hand WDL  (L) Arm/Elbow/Hand WDL  Abdomen WDL  Groin WDL  Scrotum/Coccyx/Buttocks WDL  (R) Leg WDL  (L) Leg WDL  (R) Heel/Foot/Toe Redness and Blanching  (L) Heel/Foot/Toe Redness and Blanching          Devices In Places PIV Right wrist      Interventions In Place Pillows    Possible Skin Injury No    Pictures Uploaded Into Epic N/A  Wound Consult Placed N/A  RN Wound Prevention Protocol Ordered No

## 2021-06-12 NOTE — ED NOTES
Received report from KARRI Bartholomew. Pt currently resting in bed watching TV with even and unlabored breaths, in no apparent distress. Will continue to monitor pt while awaiting further information regarding disposition from [facility], [facility], and/or ALERT Team.

## 2021-06-12 NOTE — ED NOTES
[facility] is refusing to accept pt back to their facility until pt has a psych eval. ERP notified.

## 2021-06-12 NOTE — ED NOTES
Patient's home medications have been reviewed by the pharmacy team.     Past Medical History:   Diagnosis Date   • Dehydration 2/17/2014   • Failure to thrive 2/28/2014   • Leukocytosis 2/17/2014   • Pneumonia 3/1/2014   • Psychiatric disorder    • Sepsis (HCC) 3/4/2014       There are no discharge medications for this patient.      A:  Medications do not appear to be contributing to current complaints.     P:    No recommendations at this time.    Opal Oliver, PharmD, BCPS, BCCCP

## 2021-06-12 NOTE — ED NOTES
Pt ambulated up to bathroom with steady gait. Pt in good spirits. Pt offered snack and water; provided. No other needs at this time.

## 2021-06-13 PROBLEM — E03.9 HYPOTHYROIDISM: Chronic | Status: ACTIVE | Noted: 2021-06-12

## 2021-06-13 PROBLEM — Z91.199 NONCOMPLIANCE WITH TREATMENT PLAN: Chronic | Status: ACTIVE | Noted: 2021-06-09

## 2021-06-13 PROBLEM — F22 DELUSIONAL DISORDER (HCC): Chronic | Status: ACTIVE | Noted: 2021-06-09

## 2021-06-13 PROBLEM — E78.5 HYPERLIPIDEMIA: Chronic | Status: ACTIVE | Noted: 2021-06-09

## 2021-06-13 LAB
SARS-COV-2 RNA RESP QL NAA+PROBE: NOTDETECTED
SPECIMEN SOURCE: NORMAL

## 2021-06-13 PROCEDURE — G0378 HOSPITAL OBSERVATION PER HR: HCPCS

## 2021-06-13 PROCEDURE — 99225 PR SUBSEQUENT OBSERVATION CARE,LEVEL II: CPT | Performed by: HOSPITALIST

## 2021-06-13 ASSESSMENT — PAIN DESCRIPTION - PAIN TYPE
TYPE: ACUTE PAIN
TYPE: ACUTE PAIN

## 2021-06-13 NOTE — CONSULTS
"PSYCHOLOGICAL FOLLOW-UP:  Reason for admission: Dementia associated with other underlying disease with behavioral disturbance (HCC) [F02.81]  Length of Visit: 20min    Legal status: Legal Status: N/A    Chief Complaint: \"I own it!\"    HPI: Met with the patient to assess risk level and perform a capacity evaluation. Patient presented with a euthymic affect and reported a \"good\" mood. She was only oriented to name, year, and place. She was not oriented to why she is in the hospital or her diagnoses. Thought process was very tangential. Thought content was delusional and paranoid. She stated multiple times her belief that she owns the hospital and care facilities and that people are trying to take all her money. She also believes she is friends with Vicente Olivas Jr. The patient does not have insight into her fixed delusions nor does she know why she was living at the memory care facility. She does feel safe at [facility] as she believes she owns the hospital.     Per  note dated 10/2020: \"MSW spoke to Director at [facility] Homa ([phone]) and Yandy with the [facility] office ([phone]). They have arranged for pt to move into [facility] but pt refused this morning. According to both Homa and Yandy-pt has no documented diagnosis of dementia. Pt does have schizophrenia and has been off her medications for 1 year.Pt's psychiatrist was Dr. Deluca.  Pt was a pt of [facility].\"    Psychiatric Examination:  Vitals: Blood pressure 133/82, pulse 64, temperature 36 °C (96.8 °F), temperature source Temporal, resp. rate 16, height 1.651 m (5' 5\"), weight 65.8 kg (145 lb), SpO2 98 %, not currently breastfeeding.  Musculoskeletal: normal psychomotor activity, no tics or unusual mannerisms noted  Appearance and Eye Contact: appropriate dress and grooming. Behavior is calm, cooperative,  appropriate eye contact  Attention/Alertness: Alert  Thought Process: Tangential    Thought Content: delusions of " "grandeur and paranoia  Speech: Clear with normal rate and rhythm  Mood: \"good\"            Affect: euthymic         SI/HI: Denies    Memory: Recent and remote memory appear impaired   Orientation: alert, only oriented to:, person, place, time, not oriented to reason for hospitalization  Insight into symptoms: very poor  Judgement into symptoms:very poor    ASSESSMENT: The patient was previously living in a memory care unit of an assisted living facility indicating that she might have memory impairments. She does not have insight into her delusions. Apparently, the patient carries a diagnosis of Schizophrenia from [facility] and in 10/2020 did not have a documented diagnosis of dementia. Its unclear how she qualified for a memory care unit without some documentation of cognitive impairments. Therefore, will order a cognitive evaluation here in the hospital to evaluate possible cognitive impairments.     Nevertheless, the patient does not have capacity right now to make medical or discharge decisions including the decision to leave AMA.    If it is determined that the patient does not have cognitive impairments and her current presentation is primarily psychiatric, will consider placing her back on a legal hold for inability to care for self.      Will also refer the patient to team psychiatrist for medication evaluation.     DSM5 Diagnostic Considerations:   Schizophrenia     Major Neurocognitive Disorder (Provisional)    PLAN:  Legal status: N/A  Patient is INCAPACITATED to make medical and discharge decisions including the decision to leave AMA.  Ordering cognitive evaluation from SLP to determine any underlying cognitive impairments especially given her time spent in a memory care unit  Records reviewed: yes  Discussed patient with other provider: yes  Will continue to follow to assess cognitive impairments with SLP  Thank you for the consult.    Gabi Phan, Ph.D.      "

## 2021-06-13 NOTE — CONSULTS
BRIEF PSYCHIATRIC CONSULT NOTE: patient seen and assessed, Alert. Only oriented to name, year, and place. Not oriented to anything else. Thought process tangential and very delusional. Diagnosis of dementia however no cognitive evaluation can be found to support the diagnosis. Unclear given she did come from a memory care unit.     Legal hold discontinued as she does not meet criteria for it.    Patient is INCAPACITATED to make medical and discharge decisions including the decision to leave AMA.     Full note to follow.    -Legal Hold:dc'd     -tele-sitter for elopement risk as she does not know why she is in the hospital. She also has a recent history of aggression per memory care.     -Patient is INCAPACITATED to make medical and discharge decisions including the decision to leave AMA.     -Schizophrenia. Provisional diagnosis of Major Neurocognitive Disorder. Will order SLP cognitive evaluation.     -Will refer patient to team psychiatrist for management of psychosis     -Will follow to follow up on cognitive evaluation and referring to team psychiatrist

## 2021-06-13 NOTE — PROGRESS NOTES
Bedside report received. Pt is A&Ox2 to self and place only. Pt was reoriented. Pt is in a pleasant and cooperative mood. Pt is resting in bed watching T.V. Pt has wanderguard on Left ankle. Telesitter is in place since pt is on a legal hold. Pt is not a fall risk, pt is up self, steady and tolerates activity well. POC discussed with pt; all questions answered at this time.

## 2021-06-13 NOTE — PROGRESS NOTES
"Pt is resting in bed. Denied pain. Refused evening medication despite education by stating, 'No, I don't take any medications\". Legal hold initiated. Telesitter in place. Pt ambulates steady and independently.   "
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
## 2021-06-14 LAB
EKG IMPRESSION: NORMAL
T4 FREE SERPL-MCNC: 0.93 NG/DL (ref 0.93–1.7)

## 2021-06-14 PROCEDURE — G0378 HOSPITAL OBSERVATION PER HR: HCPCS

## 2021-06-14 PROCEDURE — 92523 SPEECH SOUND LANG COMPREHEN: CPT

## 2021-06-14 PROCEDURE — 36415 COLL VENOUS BLD VENIPUNCTURE: CPT

## 2021-06-14 PROCEDURE — 84439 ASSAY OF FREE THYROXINE: CPT

## 2021-06-14 PROCEDURE — 99225 PR SUBSEQUENT OBSERVATION CARE,LEVEL II: CPT | Mod: GC | Performed by: HOSPITALIST

## 2021-06-14 PROCEDURE — 99255 IP/OBS CONSLTJ NEW/EST HI 80: CPT | Performed by: PSYCHIATRY & NEUROLOGY

## 2021-06-14 PROCEDURE — 93005 ELECTROCARDIOGRAM TRACING: CPT | Performed by: PSYCHIATRY & NEUROLOGY

## 2021-06-14 PROCEDURE — 93010 ELECTROCARDIOGRAM REPORT: CPT | Performed by: INTERNAL MEDICINE

## 2021-06-14 RX ORDER — RISPERIDONE 0.5 MG/1
0.5 TABLET ORAL EVERY MORNING
Status: DISCONTINUED | OUTPATIENT
Start: 2021-06-14 | End: 2021-06-21

## 2021-06-14 ASSESSMENT — ENCOUNTER SYMPTOMS
VOMITING: 0
CONSTITUTIONAL NEGATIVE: 1
COUGH: 0
PHOTOPHOBIA: 0
ABDOMINAL PAIN: 0
NEUROLOGICAL NEGATIVE: 1
HALLUCINATIONS: 0
TINGLING: 0
BLURRED VISION: 0
HEMOPTYSIS: 0
DEPRESSION: 0
DOUBLE VISION: 0
CONSTIPATION: 0
DIARRHEA: 0
MYALGIAS: 0
NECK PAIN: 0
PSYCHIATRIC NEGATIVE: 1
FEVER: 0
WHEEZING: 0
DIZZINESS: 0
MUSCULOSKELETAL NEGATIVE: 1
WEIGHT LOSS: 0
CHILLS: 0
BLOOD IN STOOL: 0
NAUSEA: 0
EYE PAIN: 0
BACK PAIN: 0
SPUTUM PRODUCTION: 0
RESPIRATORY NEGATIVE: 1
CARDIOVASCULAR NEGATIVE: 1
GASTROINTESTINAL NEGATIVE: 1
SHORTNESS OF BREATH: 0
FALLS: 0
FLANK PAIN: 0
HEADACHES: 0

## 2021-06-14 ASSESSMENT — FIBROSIS 4 INDEX: FIB4 SCORE: 1.51

## 2021-06-14 ASSESSMENT — LIFESTYLE VARIABLES: SUBSTANCE_ABUSE: 0

## 2021-06-14 NOTE — PROGRESS NOTES
Assessment/description of ears? Intact  Which preventative measures are in place for the ears? N/A    Assessment/description of elbows? Pink, blanching, dry, intact  Which preventative measures are in place for the elbows? Pt turns self    Assessment/description of sacrum? Intact  Which preventative measures are in place for the sacrum? Pt turns self    Assessment/description of heels? Dry, intact, pink, blanching  Which preventative measures are in place for the heels? Pt turns self    Which devices are in place? PIV  Description of skin under devices: CDI  Which preventative measures are in place under devices?    Other:
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
## 2021-06-14 NOTE — PROGRESS NOTES
"Pt is resting in bed. Denied pain. Refused evening medication despite education, pt stated, \"No, I don't take any medications\". Pt denied further needs. Pt ambulates self and steadily.  "

## 2021-06-14 NOTE — PROGRESS NOTES
Received report from NOC RN and assumed care of pt. Pt is A&Ox3, disoriented to situation. Pt is resting in bed on room air. Pt reports no pain or discomfort. POC discussed with pt; all questions answered. No other needs at this time. Bed locked in lowest position, call light within reach, wanderguard to L ankle.

## 2021-06-14 NOTE — PROGRESS NOTES
ID:  Patient is a 71 yo female history of dementia unclear baseline, hypothyroidism (on synthroid 50mg), schizophrenia ( on trihexyphenidyl per  2014 chart) sent here from [facility] for behavioral changes on 6/11, who is medically cleared and pending psych clearance.     S:  No overnight events  Patient is calm and cooperative. Denies any complaints including headache, fever, chills, night sweats, abdominal pain, chest pain and palpitations.    O:  Vitals stable  Focused exam  Neuro: CN intact, 5/5 throughout, normal muscle tone. Normal reflexes. Sensation intact, no cerebellar signs.  Mental status: oriented to self and location. 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    Plan:  In summary, patient is medically cleared, no signs of infection including cough/sob to suggest pneumonia, no dysuria considering UTI, patient has no skin breakdown either, denies headahce and neuro exam is non focal. She appears well nourished and hydrated. Do note history of TSH elevation in the past to 11, so repeat TSH pending. At this point I do not have list of medication and she mentions she does not take any medications.     Addendum: TSH found to be elevated to 15. Will keep same dose of medication 50mcg since its unclear if patient is taking this med or any med at facility.     She was seen by Psych and is currently in a legal hold.    Zackary Haji MD  [facility] [employer]

## 2021-06-14 NOTE — H&P
"History & Physical Note    Date of Admission: 6/12/2021  Admission Status: Observation-Outpatient  [facility] Team: [facility] IM Yellow Team  Attending: Dr. Blake   Senior Resident: Dr. Cotto  Contact Number: [phone]    Chief Complaint: Agitation      Brief ID:  \"Patient is a 70 y.o female w/ PMH of dementia, who is a resident at [facility] for memory care, who was brought here by EMS. Her facility sent her here for presumed worsening \"agitation\" and behavioral changes, and requested that she be evaluated by a psychiatrist before they will allow for her to be admitted back to the facility.\"     Interval Update:  Pt resting in bed, A/Ox3. Able to tell relationship between two fruit, watch and rule, and explain what two birds with one stone means. Able to follow 2 step commands and recall past events. Main abnormality noted is her claiming to own the hospital and own other things in [place]. No reported hallucinations visual or auditory. She has been refusing her medications. Main lab abnormality is elevated thyroid, unclear if she has been compliant with her synthroid. Hypothyroidism could possibly explain the current behavioral changes. Psychiatry has been notified and will be seeing the patient for further recommendations. She is off legal hold.     Review of Systems: Review of Systems   Constitutional: Negative for chills, fever, malaise/fatigue and weight loss.   HENT: Negative for hearing loss.    Eyes: Negative for blurred vision, double vision, photophobia and pain.   Respiratory: Negative for cough, hemoptysis, sputum production, shortness of breath and wheezing.    Gastrointestinal: Negative for abdominal pain, blood in stool, constipation, diarrhea, melena, nausea and vomiting.   Genitourinary: Negative for dysuria, flank pain, frequency, hematuria and urgency.   Musculoskeletal: Negative for back pain, falls, joint pain, myalgias and neck pain.   Neurological: Negative for dizziness, tingling and headaches. "   Psychiatric/Behavioral: Negative for depression, hallucinations, substance abuse and suicidal ideas.       Physical Exam  Constitutional:       General: She is not in acute distress.     Appearance: Normal appearance. She is normal weight. She is not ill-appearing, toxic-appearing or diaphoretic.   HENT:      Head: Normocephalic and atraumatic.      Mouth/Throat:      Mouth: Mucous membranes are moist.   Eyes:      Extraocular Movements: Extraocular movements intact.      Pupils: Pupils are equal, round, and reactive to light.   Cardiovascular:      Rate and Rhythm: Normal rate and regular rhythm.      Pulses: Normal pulses.      Heart sounds: Normal heart sounds.   Pulmonary:      Effort: Pulmonary effort is normal. No respiratory distress.      Breath sounds: Normal breath sounds. No stridor. No wheezing, rhonchi or rales.   Chest:      Chest wall: No tenderness.   Abdominal:      General: There is no distension.      Palpations: There is no mass.      Tenderness: There is no abdominal tenderness. There is no right CVA tenderness, left CVA tenderness, guarding or rebound.      Hernia: No hernia is present.   Musculoskeletal:         General: No swelling, tenderness, deformity or signs of injury.      Right lower leg: No edema.      Left lower leg: No edema.   Skin:     Coloration: Skin is not jaundiced or pale.      Findings: No bruising, erythema, lesion or rash.   Neurological:      General: No focal deficit present.      Mental Status: She is alert.      Cranial Nerves: No cranial nerve deficit.      Sensory: No sensory deficit.      Motor: No weakness.      Coordination: Coordination normal.   Psychiatric:         Mood and Affect: Mood normal.         Behavior: Behavior normal.      Comments: A/Ox3, pleasant, she does show some delusional behavior claiming she is the owner of [employer] upon other places        Vitals:    06/14/21 0734   BP: 140/79   Pulse: 62   Resp: 16   Temp: 36.6 °C (97.9 °F)   SpO2: 99%        Labs:   Reviewed     Imaging:   Reviewed     Previous Data Review: reviewed    Problem Representation:     * Dementia (HCC)  Assessment & Plan  Patient brought here by EMS because the facility states that she was experiencing worsening agitation. On my exam patient appears cooperative and pleasant. No signs of agitation. Vitals stable. No leukocytosis. No electrolyte abnormalities. UA negative. She is hypothyroid and may not be compliant with medication. Facility would like the patient to get evaluated by psychiatry before they will accept her back.     Plan:  Check free T4  Educate on importance of medication compliance   TSH will need to be checked outpatient in 6-8 weeks   Psychiatry has been notified and their eval is pending further disposition     Hypothyroidism  Assessment & Plan  Resume home dose synthroid (refusing medications)  Previous TSH: 11, now 15.6  Will obtain a free T4  Follow up repeat TSH outpatient 6-8 weeks and medication compliance education

## 2021-06-14 NOTE — DISCHARGE PLANNING
Medical Social Work  PC to Cholo at [facility], stated he did work with patient from 8/20 until he closed her case out in 1/21.  Where the patient's rep-payee at one time, unsure if they still are as they closed her case out.  GOMEZ asked why her case was closed.  Cholo stated patient was very difficult to work with, delusional.  Cholo stated he attempted to assist patient in getting her stimulus check, but patient refused to work with him.  Initially called patient when [facility] was closing to offer her  Assistance in house.  Cholo stated patient told him that she isn't leaving and the police will need to drag her out. GOMEZ asked for patient's last diagnosis, told last psych assement is from 12/13/19.  Schizoaffective, Bipolar type F25.0  GOMEZ asked Cholo if he is aware if the patient has a guardian, Cholo stated unless she was obtained once after 1/21 then no.  GOMEZ asked if their records list an emergency contacts, GOMEZ told no    Cholo will call GOMEZ back with information on rep-payee once he obtains it.      PC to Felipe Hughes [phone]: message left to call GOMEZ

## 2021-06-14 NOTE — THERAPY
"Speech Language Pathology   Initial Assessment     Patient Name: Zully Lin  AGE:  70 y.o., SEX:  female  Medical Record #: [id number]  Today's Date: 6/14/2021     Precautions  Precautions: Fall Risk    Assessment    \"Patient is a 70 y.o female w/ PMH of dementia, who is a resident at [facility] for memory care, who was brought here by EMS. Her facility sent her here for presumed worsening \"agitation\" and behavioral changes, and requested that she be evaluated by a psychiatrist before they will allow for her to be admitted back to the facility.\"  She does have history of schizophrenia, but is not on any medications.  Currently, she has been deemed to incapacitated to make medical and discharge decisions per psych.    Patient seen for cognitive evaluation at the request of Psychiatrist Order read: \"Not sure if the patient actually has dementia given in 10/2020 SW was told that she was never formally diagnosed with it. Yet, she has been in a memory care unit? We need clarity on whether or not she actually has cognitive deficits. If she does, then I can make the dementia diagnosis official.\"    Patient awake, alert and oriented in all spheres with the exception of why she is in the hospital.  She states that she is here because she owns the place and had to kick the other people out. She was noted to be confabulatory and gave me a long story about how she had to kick a bunch of people out of here last night because they were trying to kick her out of here, and she owns the place.  She indicated that she finally got them to leave.  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  She was insistent that this was the case and that she is only here because she was the Governor and they sent her here.  Furthermore, there were multiple times during my evaluation that she referred to her job as Governor (\"I don't typically write my name, I use a " "stamp as the Governor\" and \"I haven't driven in years as I was the Governor.\").  When asked if she had family, she stated no, and then in the next sentence, when asked if she had children, she stated she has 8 of them, but could not tell me where they live.  The Cognistat as well as informal assessments were given.  On the Cognistat, results were as follows: Orientation:  (WFL), Attention / (mild), Language: Comprehension , Repetition , and Namin/8 (all WFL), Construction 3/ (Mild), Memory  (mild) and Similarities / (Mild) but when over the age of 65, the average range for construction, memory and similarities extends to the \"mild impairment\" range.  Judgement was 6/6 WFL.  During informal assessment, she was able to draw a clock with the correct number placement and time within less than 1 minute.  She was able to complete functional reading tasks, provide solutions to problems related to ADLS/IADLs with 100% accuracy and was able to complete generative naming tasks with >20 items per category.  She had some issues with complex reasoning, but with min cues, she was able to expand on her responses, and provide more solid answers.  She indicated that she does not take any medications and when she did, \"they were all wrong, and I should not have been on them.\"  When asked how she remembered to take them, she stated she was in a facility and they gave them to her. She did not want to discuss how to manage medications as she does not take them and does not need them.  She was able to write sentences to dictation without issue and writing was legible.      Given that she came from a memory facility, it is difficult to determine what her true baseline is.  Spoke with Gabi Phan, PhD. following my evaluation, as there is question of a diagnosis of dementia.  Discussed that SLP is not able to provide a dementia diagnosis which she indicated she knew, but wanted further assessment of cognitive " abilities.  Discussed with her that given findings above, patient has very mild deficits in memory and more complex attention and reasoning; however, it was difficult to fully discern as to what extent this is baseline as she was indeed in a memory care facility which indicates that there had to be some deficit for her to qualify.  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  Given her lack of capacity per psych and her current status, will defer POC and DC disposition to Psych and the primary medical team.  SLP can reassess if patient is treated with medications and there is a change in her overall functioning, but at this present time, do not feel as though she would benefit from therapy to address her very mild deficits in the areas mentioned above.  Dr. Phan was in agreement and will follow up with patient and Dr. Dacosta.  Also spoke with Dr. Blake who was in agreement with SLP's assessment.     Plan    SLP evaluation completed.  Patient is not being actively followed for therapy services at this time, however may be seen if requested by attending provider for 1 more visit within 30 days to address any discharge needs or if the patient has a change in status.  Anticipate that this is her baseline given the fact that she came from Memory Care Facility.  Will reassess if asked.      Discharge Recommendations:  (will defer to Psych--as patient's current issues appear to be driven more by underlying psych issues).      Objective     06/14/21 1548   Vitals   O2 (LPM) 0   O2 Delivery Device None - Room Air   Pain 0 - 10 Group   Therapist Pain Assessment 0;Nurse Notified;Post Activity Pain Same as Prior to Activity   Prior Living Situation   Prior Services    (memory care home--[facility])   Housing / Facility   (Memory Care)   Lives with - Patient's Self Care Capacity Unrelated Adult   Prior Level Of Function   Communication Within Functional Limits   Swallow Within Functional Limits   Dentition Intact   Dentures None   Hearing Within Functional Limits for Evaluation   Hearing Aid None   Vision Within Functional Limits for Evaluation   Patient's Primary Language English   Education Some College or Trade School   Education Years Completed 14  (reports some college)   Occupation (Pre-Hospital Vocational)   (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)   Verbal Expression   Vocal Quality Clear   Verbal Output Automatic Within Functional Limits (6-7)   Verbal Output: Phrases Within Functional Limits (6-7)   Verbal Output Conversation Within Functional Limits (6-7)   Verbal Output Functional Within Functional Limits (6-7)   Repetition: Single Words Within Functional Limits (6-7)   Repetition: Phrases Within Functional Limits (6-7)   Repetition: Sentences Within Functional Limits (6-7)   Naming Within Functional Limits (6-7)  (generative and confrontation)   Dysarthria Within Functional Limits (6-7)   Apraxia: Oral Within Functional Limits (6-7)   Apraxia: Verbal Within Functional Limits (6-7)   Jargon Within Functional Limits (6-7)   Confabulations Moderate (3)   Perseverations Minimal (4)   Word Finding Deficits Within Functional Limits (6-7)   Paraphasias Within Functional Limits (6-7)   Skilled Intervention Verbal Cueing   Comments tangential at times   Auditory Comprehension   Yes / No Questions: Personal Information Within Functional Limits (6-7)   Yes / No Questions: General Information Within Functional Limits (6-7)   Yes / No Questions: Abstract Within Functional Limits (6-7)   Identifies Objects Within Functional Limits (6-7)   Identifies Pictures Within Functional Limits (6-7)   Identifies Body Parts Within Functional Limits (6-7)   Follows One Unit Commands  Within Functional Limits (6-7)   Follows Two Unit Commands Within Functional Limits (6-7)   Follows Three Unit Commands Within Functional Limits (6-7)   Understands Paragraph Supervision (5)   Understands Simple, Structured Conversation  Within Functional Limits (6-7)   Understands Complex Conversation Minimal (4)  (due to memory vs psych issues)   Skilled Intervention Verbal Cueing   Reading Comprehension   Matches Letters Within Functional Limits (6-7)   Verbally or Gesturally Identifies Letters Within Functional Limits (6-7)   Matches Words Within Functional Limits (6-7)   Matches Words to Pictures / Objects Within Functional Limits (6-7)   Reading Words Within Functional Limits (6-7)   Reading Phrases Within Functional Limits (6-7)   Reading Sentences Within Functional Limits (6-7)   Reading Short Paragraphs  Within Functional Limits (6-7)   Reading Long / Multi Paragraph Material Within Functional Limits (6-7)   Skilled Intervention Verbal Cueing   Written Expression   Comments able to write name and sentence to dictation   Cognitive-Linguistic   Level of Consciousness Alert   Orientation Level Not Oriented to Reason   Sustained Attention Within Functional Limits (6-7)   Alternating Attention Within Functional Limits (6-7)   Divided Attention Supervision (5)   Selective Attention Within Functional Limits (6-7)   Visual Scanning / Cancellation Skills Within Functional Limits (6-7)   Short Term Memory Minimal (4)   Immediate Memory Minimal (4)   Long Term Memory / Reminiscing   (difficult to assess in the face of current psych issues)   Simple Reasoning / Problem Solving Within Functional Limits (6-7)   Complex Reasoning  / Problem Solving Supervision (5)   Leakey Reasoning Within Functional Limits (6-7)   Abstract Reasoning Minimal (4)   Insight into Deficits Moderate (3)   Executive Functioning / Organization Moderate (3)   Verbal Sequencing (Simple) Within Functional Limits (6-7)   Auditory Math Within  "Functional Limits (6-7)   Functional Math / Financial Management To Be Assessed   Medication Management  To Be Assessed  (states she does not take any and someone will give if needed)   Clock Drawing Within Functional Limits   Social / Pragmatic Communication   Comments tangential, but very nice and cooperative   Outcome Measures   Outcome Measures Utilized   (Cognistat)   Patient / Family Goals   Patient / Family Goal #1 \"I had to kick those people out as I own this place.\"   Anticipated Discharge Needs   Discharge Recommendations   (will defer to Psych--)   Therapy Recommendations Upon DC   (do not anticipate need for SLP services following DC)     "

## 2021-06-15 LAB
ANION GAP SERPL CALC-SCNC: 8 MMOL/L (ref 7–16)
BUN SERPL-MCNC: 15 MG/DL (ref 8–22)
CALCIUM SERPL-MCNC: 9 MG/DL (ref 8.5–10.5)
CHLORIDE SERPL-SCNC: 105 MMOL/L (ref 96–112)
CO2 SERPL-SCNC: 26 MMOL/L (ref 20–33)
CREAT SERPL-MCNC: 0.68 MG/DL (ref 0.5–1.4)
GLUCOSE SERPL-MCNC: 90 MG/DL (ref 65–99)
POTASSIUM SERPL-SCNC: 4.9 MMOL/L (ref 3.6–5.5)
SODIUM SERPL-SCNC: 139 MMOL/L (ref 135–145)

## 2021-06-15 PROCEDURE — 99233 SBSQ HOSP IP/OBS HIGH 50: CPT | Performed by: PSYCHIATRY & NEUROLOGY

## 2021-06-15 PROCEDURE — 80048 BASIC METABOLIC PNL TOTAL CA: CPT

## 2021-06-15 PROCEDURE — 99224 PR SUBSEQUENT OBSERVATION CARE,LEVEL I: CPT | Mod: GC | Performed by: HOSPITALIST

## 2021-06-15 PROCEDURE — G0378 HOSPITAL OBSERVATION PER HR: HCPCS

## 2021-06-15 PROCEDURE — 36415 COLL VENOUS BLD VENIPUNCTURE: CPT

## 2021-06-15 ASSESSMENT — ENCOUNTER SYMPTOMS
MYALGIAS: 0
BACK PAIN: 0
VOMITING: 0
TINGLING: 0
NECK PAIN: 0
SPUTUM PRODUCTION: 0
CONSTIPATION: 0
COUGH: 0
BLURRED VISION: 0
HEADACHES: 0
HALLUCINATIONS: 0
DEPRESSION: 0
SHORTNESS OF BREATH: 0
FEVER: 0
DOUBLE VISION: 0
FALLS: 0
NAUSEA: 0
DIZZINESS: 0
WHEEZING: 0
BLOOD IN STOOL: 0
ABDOMINAL PAIN: 0
DIARRHEA: 0
PHOTOPHOBIA: 0
WEIGHT LOSS: 0
FLANK PAIN: 0
EYE PAIN: 0
CHILLS: 0
HEMOPTYSIS: 0

## 2021-06-15 ASSESSMENT — LIFESTYLE VARIABLES: SUBSTANCE_ABUSE: 0

## 2021-06-15 NOTE — PROGRESS NOTES
"Received bedside report and assume pt care. Pt is A&Ox3; disorientated to event and has grandiose delusions such as being governor of [place] and owning the hospital. VSS on Ra. Pt denies any pain and shows no signs of discomfort at this time. Assessment complete. Pt is upself with no issues. Pt was cooperative and pleasant during interaction. 0940 Ade Osborne came to assess pt who responded with agitation and delusions when explained why she was admitted to the hospital. Legal hold was placed on pt which pt responded \"you cant keep me prisoned\". Pt unable to understand reason behind legal hold. POC discussed with pt. Pt request to be left alone to rest. Safety precautions in place. Hourly rounding in place.   "

## 2021-06-15 NOTE — PROGRESS NOTES
Assessment/description of ears? Intact and blanching  Which preventative measures are in place for the ears?  Qshift assessment     Assessment/description of elbows? Intact and blanching   Which preventative measures are in place for the elbows?  Pt turns self frequently, pt able to ambulate independently, and pillows available for positioning and support    Assessment/description of sacrum?  Intact and blanching   Which preventative measures are in place for the sacrum?  Pt turns self frequently, pt able to ambulate independently, pillows available for positioning and support, and pt is cont x2    Assessment/description of heels?  Intact and blanching   Which preventative measures are in place for the heels?  Pt turns self frequently, pt able to ambulate independently, and pillows available for positioning and support    Which devices are in place? PIV  Description of skin under devices: Intact and blanching   Which preventative measures are in place under devices?  qshift assessment     Other:  N/a

## 2021-06-15 NOTE — H&P
"History & Physical Note    Date of Admission: 6/12/2021  Admission Status: Observation-Outpatient  [facility] Team: [facility] IM Yellow Team  Attending: Dr. Blake   Senior Resident: Dr. Cotto  Contact Number: [phone]    Chief Complaint: Agitation      Brief ID:  \"Patient is a 70 y.o female w/ PMH of dementia, who is a resident at [facility] for memory care, who was brought here by EMS. Her facility sent her here for presumed worsening \"agitation\" and behavioral changes, and requested that she be evaluated by a psychiatrist before they will allow for her to be admitted back to the facility.\"     Interval Update:  Pt resting in bed, A/Ox3. Having grandiose delusions, refusing all medications despite repeated education on the reason they have been recommended. Spoke with Dr. Osborne from psychiatry and she recommended reinitiating a legal hold and working with case management for disposition plan for inpatient psychiatric facility. Patient denied any acute complaints. Having regular daily bowel movements, no issues with urination, and denied CP and SOB.     Review of Systems: Review of Systems   Constitutional: Negative for chills, fever, malaise/fatigue and weight loss.   HENT: Negative for hearing loss.    Eyes: Negative for blurred vision, double vision, photophobia and pain.   Respiratory: Negative for cough, hemoptysis, sputum production, shortness of breath and wheezing.    Gastrointestinal: Negative for abdominal pain, blood in stool, constipation, diarrhea, melena, nausea and vomiting.   Genitourinary: Negative for dysuria, flank pain, frequency, hematuria and urgency.   Musculoskeletal: Negative for back pain, falls, joint pain, myalgias and neck pain.   Neurological: Negative for dizziness, tingling and headaches.   Psychiatric/Behavioral: Negative for depression, hallucinations, substance abuse and suicidal ideas.       Physical Exam  Constitutional:       General: She is not in acute distress.     " Appearance: Normal appearance. She is normal weight. She is not ill-appearing, toxic-appearing or diaphoretic.   HENT:      Head: Normocephalic and atraumatic.      Mouth/Throat:      Mouth: Mucous membranes are moist.   Eyes:      Extraocular Movements: Extraocular movements intact.      Pupils: Pupils are equal, round, and reactive to light.   Cardiovascular:      Rate and Rhythm: Normal rate and regular rhythm.      Pulses: Normal pulses.      Heart sounds: Normal heart sounds.   Pulmonary:      Effort: Pulmonary effort is normal. No respiratory distress.      Breath sounds: Normal breath sounds. No stridor. No wheezing, rhonchi or rales.   Chest:      Chest wall: No tenderness.   Abdominal:      General: There is no distension.      Palpations: There is no mass.      Tenderness: There is no abdominal tenderness. There is no right CVA tenderness, left CVA tenderness, guarding or rebound.      Hernia: No hernia is present.   Musculoskeletal:         General: No swelling, tenderness, deformity or signs of injury.      Right lower leg: No edema.      Left lower leg: No edema.   Skin:     Coloration: Skin is not jaundiced or pale.      Findings: No bruising, erythema, lesion or rash.   Neurological:      General: No focal deficit present.      Mental Status: She is alert.      Cranial Nerves: No cranial nerve deficit.      Sensory: No sensory deficit.      Motor: No weakness.      Coordination: Coordination normal.   Psychiatric:         Mood and Affect: Mood normal.         Behavior: Behavior normal.      Comments: A/Ox3, pleasant, she does show some delusional behavior claiming she is the owner of [employer] upon other places        Vitals:    06/15/21 0400   BP: 110/74   Pulse: 70   Resp: 16   Temp: 36.4 °C (97.5 °F)   SpO2: 95%       Labs:   Reviewed     Imaging:   Reviewed     Previous Data Review: reviewed    Problem Representation:     * Dementia (HCC)  Assessment & Plan  Patient brought here by EMS because the  facility stated that she was experiencing worsening agitation. Vitals stable. No leukocytosis. No electrolyte abnormalities. UA negative. She is hypothyroid and not compliant with medication. Psychiatry evaluated and recommended an anti-psychotic regiment, but patient continues to refuse all meds     Plan:  Pt on legal hold per psychiatry and pending placement to a psych facility   She is refusing all medications, which includes an antipsychotic   Psychiatry following, appreciate recs     Hypothyroidism  Assessment & Plan  Resumed home dose synthroid, but patient refusing all medications despite continuous education   Previous TSH: 11, now 15.6   free T4 0.93  Follow up repeat TSH outpatient 6-8 weeks and continue medication compliance education

## 2021-06-15 NOTE — PROGRESS NOTES
Pt refused to take due morning medications. Pt educated about importance of taking medications, still, patient refused. Encouraged to sleep and rest.

## 2021-06-15 NOTE — PROGRESS NOTES
Ears: intact  Which preventative measures are in place for the ears?  n/a    Elbows: pink and blanching  Which preventative measures are in place for the elbows?  n/a    Sacrum: intact  Which preventative measures are in place for the sacrum?  Encouraged to turn to sides    Heels: pink and blanching  Which preventative measures are in place for the heels?  n/a    Which devices are in place? PIV  Description of skin under devices: intact  Which preventative measures are in place under devices? n/a  Other:

## 2021-06-15 NOTE — CONSULTS
"PSYCHIATRIC FOLLOW-UP:(established)  *Reason for admission:        *Legal Hold Status on Admission:            Chart reviewed.        Cognitive evaluation reviewed. \"Given that she came from a memory facility, it is difficult to determine what her true baseline is.  Spoke with Gabi Phan, PhD. following my evaluation, as there is question of a diagnosis of dementia.  Discussed that SLP is not able to provide a dementia diagnosis which she indicated she knew, but wanted further assessment of cognitive abilities.  Discussed with her that given findings above, patient has very mild deficits in memory and more complex attention and reasoning; however, it was difficult to fully discern as to what extent this is baseline as she was indeed in a memory care facility which indicates that there had to be some deficit for her to qualify.  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  Given her lack of capacity per psych and her current status, will defer POC and DC disposition to Psych and the primary medical team.  SLP can reassess if patient is treated with medications and there is a change in her overall functioning, but at this present time, do not feel as though she would benefit from therapy to address her very mild deficits in the areas mentioned above.\"    Patient has been refusing medications.    *HPI: Upon approach, patient was watching TV.  She was calm and cooperative initially.  Stated sleeping well and having a good appetite today.  Patient continues to have grandiose delusions, and is very preoccupied with them.  She is not able to rationally think or to participate in discussion of a safe discharge.  She denies having any psychiatric condition, or being " agitated or verbally abusive prior to admission.  She refused to take medication.  Stated that she will not go to another hospital because she owns [facility].  I informed patient that a legal hold was being started and that she will be transferred to a psychiatric hospital when accepted.          Medical ROS (as pertinent):     ROS unable to obtain      *Psychiatric Examination:  Vitals:   Vitals:    06/15/21 0800   BP: 148/74   Pulse: 72   Resp: 17   Temp: 36.3 °C (97.3 °F)   SpO2: 99%       General Appearance: Initially calm and cooperative with evaluation.  Became agitated once she was informed about legal hold  Abnormal Movements: None  Gait and Posture: Normal lying bed  Speech: Initially normal volume, became loud once she was informed about legal hold  Thought processes: Disorganized  Abnormal or Psychotic Thoughts: Grandiose delusions, very preoccupied with them  Judgement and Insight: Poor/poor  Orientation: Oriented to person and place, but not to situation  Recent and Remote Memory: Impaired   Attention Span and Concentration: Intact  Language: Fluid  Fund of Knowledge: Not tested  Mood and Affect: Irritable  SI/HI: Unable to assess         Assessment: Patient is grossly psychotic and unable to care for herself.  She was agitated and had acute change behavior prior to admission.  She is not compliant with psychotropic medications.  She needs further psychiatric stabilization for safe discharge.      Dx:  Schizoaffective disorder, bipolar type  History of dementia  Rule out psychotic disorder due to other medical condition (hypothyroidism)    Medical:  Hypothyroidism      Plan:  1- Legal hold: Initiated and extended  2- Psychotropic medications: Continue risperidone 0.5 mg p.o. every morning and 1 mg p.o. every afternoon to target psychosis  3- Please transfer pt to inpatient psychiatric hospital when bed is available  4- Discussed the case with: Elliott Cotto MD  5- Psychiatry will follow up.     Thank you  for the consult.       Sitter:no  Phone:yes  Visitors:yes  Personal belongings: yes    This note was created using voice recognition software (Dragon). The accuracy of the dictation is limited by the abilities of the software. I have reviewed the note prior to signing. However, error related to voice recognition software and /or scribes may still exist and should be interpreted within the appropriate context.

## 2021-06-15 NOTE — DISCHARGE PLANNING
Anticipated Discharge Disposition: Inpatient psychiatric hospital.     Action: This RN,  was informed that patient is back on a legal hold by psychiatry. Legal hold documents need to be signed by either psychiatry or attending that patient is either medically cleared or not. This RN,  asked CRN to have Provider sign. Cannot send to inpatient psychiatric hospital until Legal Hold docs are signed that patient is medically cleared.     Copy of extended Legal Hold e-mailed to Edna.     Barriers to Discharge: Provider to sign Legal Hold docs, stating patient is medically cleared, accepting Inpatient psychiatric hospital.    Plan: HCM to make a copy of signed Legal Document that patient is medically cleared, scan into , ask DPA to send to Inpatient Psychiatric Hospital.     Ladan Asif RN,

## 2021-06-16 PROCEDURE — G0378 HOSPITAL OBSERVATION PER HR: HCPCS

## 2021-06-16 PROCEDURE — 99224 PR SUBSEQUENT OBSERVATION CARE,LEVEL I: CPT | Mod: GC | Performed by: HOSPITALIST

## 2021-06-16 ASSESSMENT — PAIN DESCRIPTION - PAIN TYPE: TYPE: ACUTE PAIN

## 2021-06-16 NOTE — PROGRESS NOTES
"Daily Progress Note:     Date of Service: 6/16/2021  Primary Team: [facility] IM Yellow Team   Attending: Vignesh Blake M.D.   Senior Resident: Dr. Cotto  Contact:  [phone]    Chief Complaint:   Agitation     Brief ID:  \"Patient is a 70 y.o female w/ PMH of dementia, who is a resident at [facility] for memory care, who was brought here by EMS. Her facility sent her here for presumed worsening \"agitation\" and behavioral changes, and requested that she be evaluated by a psychiatrist before they will allow for her to be admitted back to the facility.\"     Interval update:  No events overnight  Patient has no new medical complaints or exam findings   Refused her medications, does not think she needs them   On legal hold per psychiatry  Medically cleared for transfer to an inpatient psych facility once accepted     Consultants/Specialty:  Psychiatry     Review of Systems:    Constitutional: Negative for chills, fever, malaise/fatigue and weight loss.   HENT: Negative for hearing loss.    Eyes: Negative for blurred vision, double vision, photophobia and pain.   Respiratory: Negative for cough, hemoptysis, sputum production, shortness of breath and wheezing.    Gastrointestinal: Negative for abdominal pain, blood in stool, constipation, diarrhea, melena, nausea and vomiting.   Genitourinary: Negative for dysuria, flank pain, frequency, hematuria and urgency.   Musculoskeletal: Negative for back pain, falls, joint pain, myalgias and neck pain.   Neurological: Negative for dizziness, tingling and headaches.   Psychiatric/Behavioral: Negative for depression, hallucinations, substance abuse     Objective Data:   Physical Exam:   Vitals:   Temp:  [36 °C (96.8 °F)-36.2 °C (97.1 °F)] 36 °C (96.8 °F)  Pulse:  [68-85] 70  Resp:  [17-18] 18  BP: (127-144)/(74-80) 139/80  SpO2:  [93 %-96 %] 93 %    Physical Exam     Constitutional:       General: She is not in acute distress.     Appearance: Normal appearance. She is normal " weight. She is not ill-appearing, toxic-appearing or diaphoretic.   HENT:      Head: Normocephalic and atraumatic.      Mouth/Throat:      Mouth: Mucous membranes are moist.   Eyes:      Extraocular Movements: Extraocular movements intact.      Pupils: Pupils are equal, round, and reactive to light.   Cardiovascular:      Rate and Rhythm: Normal rate and regular rhythm.      Pulses: Normal pulses.      Heart sounds: Normal heart sounds.   Pulmonary:      Effort: Pulmonary effort is normal. No respiratory distress.      Breath sounds: Normal breath sounds. No stridor. No wheezing, rhonchi or rales.   Chest:      Chest wall: No tenderness.   Abdominal:      General: There is no distension.      Palpations: There is no mass.      Tenderness: There is no abdominal tenderness. There is no right CVA tenderness, left CVA tenderness, guarding or rebound.      Hernia: No hernia is present.   Musculoskeletal:         General: No swelling, tenderness, deformity or signs of injury.      Right lower leg: No edema.      Left lower leg: No edema.   Skin:     Coloration: Skin is not jaundiced or pale.      Findings: No bruising, erythema, lesion or rash.   Neurological:      General: No focal deficit present.      Mental Status: She is alert.      Cranial Nerves: No cranial nerve deficit.      Sensory: No sensory deficit.      Motor: No weakness.      Coordination: Coordination normal.   Psychiatric:         Mood and Affect: Mood normal.         Behavior: Behavior normal.      Comments: A/Ox3, pleasant with grandiose delusions, no hallucinations     Labs:   No results for input(s): WBC, RBC, HEMOGLOBIN, HEMATOCRIT, MCV, MCH, RDW, PLATELETCT, MPV, NEUTSPOLYS, LYMPHOCYTES, MONOCYTES, EOSINOPHILS, BASOPHILS, RBCMORPHOLO in the last 72 hours.  Recent Labs     06/15/21  0501   SODIUM 139   POTASSIUM 4.9   CHLORIDE 105   CO2 26   GLUCOSE 90   BUN 15       Imaging:   DX-CHEST-LIMITED (1 VIEW)   Final Result      Left basilar atelectasis.  No focal consolidation. No effusions.          Problem Representation:     Dementia (HCC)  Assessment & Plan  Patient brought here by EMS because the facility stated that she was experiencing worsening agitation. Vitals stable. No leukocytosis. No electrolyte abnormalities. UA negative. She is hypothyroid and not compliant with medication. Psychiatry evaluated and recommended an anti-psychotic regiment, but patient continues to refuse all meds      Plan:  Pt on legal hold per psychiatry and cleared medically  pending placement to a psych facility   She is refusing all medications, which includes an antipsychotic   Psychiatry following, appreciate recs      Hypothyroidism  Assessment & Plan  Resumed home dose synthroid, but patient refusing all medications despite continuous education   Previous TSH: 11, now 15.6   free T4 0.93  Follow up repeat TSH outpatient 6-8 weeks if patient agrees to take synthroid and continue medication compliance education

## 2021-06-16 NOTE — DISCHARGE PLANNING
Agency/Facility Name: [facility]  Spoke To: Earnestine  Outcome: Declined.  Pt has dementia and they can't treat the pt.    Agency/Facility Name: [facility]  Outcome: Left a message for Katie.  Awaiting a call back.    Agency/Facility Name: [facility]  Spoke To: Riri  Outcome: Declined.  Dementia

## 2021-06-16 NOTE — DISCHARGE PLANNING
Anticipated Discharge Disposition: Inpatient psychiatric hospital    Action: Inpatient psychiatric hospital referral was faxed to [facility] as patient has dementia.     This RN,  spoke with  Cholo from [facility], Cholo stated he was assigned to patient for a short while, her award letter states that her monthly income is (1,768.66), 1,624.00 after her Medicaid premium of 144.60 is paid out. Per Cholo, patient's stimulus check of 2,600.00 has arrived at [facility]. Cholo is holding for patient.     Barriers to Discharge: Accepting inpatient psychiatric hospital, patient has dementia, referral sent to [facility].    Plan: HCM to follow up with referral for inpatient psychiatric hospital, [facility].    Ladan Asif RN,

## 2021-06-16 NOTE — PROGRESS NOTES
Assumed pt care at shift change.  Pt alert/oriented 3, disoriented to situation, resting in bed.  Pt is on RA, with no signs of distress or discomfort.  Discussed POC with pt; answered questions.   Safety precautions in place, bed locked and in lowest position, call light and personal belongings within reach.  No further needs at this time.      Assessment/description of ears? Bilateral pink, intact, and blanching  Which preventative measures are in place for the ears? n/a    Assessment/description of elbows? Bilateral pink, intact, and blanching     Which preventative measures are in place for the elbows?  Pressure redistribution mattress, encourage pt to reposition frequently and ambulate, pillows for support/positioning    Assessment/description of sacrum?  Pink, intact, and blanching    Which preventative measures are in place for the sacrum? Pressure redistribution mattress, encourage pt to reposition frequently and ambulate, pillows for support/positioning     Assessment/description of heels?  Bilateral pink, intact, and blanching    Which preventative measures are in place for the heels? Pressure redistribution mattress, encourage pt to reposition frequently and ambulate, pillows for support/positioning     Which devices are in place?  PIV  Description of skin under devices: CDI  Which preventative measures are in place under devices? Q shift assessment    Other:

## 2021-06-16 NOTE — DISCHARGE PLANNING
Filed petition to the court via [employer]. Waiting on verified petition.    Received verified petition from the court. Scanned copy of legal hold extension into pt's chart, sent copy to KARRI Pickering.

## 2021-06-16 NOTE — DISCHARGE PLANNING
Agency/Facility Name: [facility]  Spoke To: Katie  Outcome: Referral was not received.  Re-faxed at [phone].

## 2021-06-16 NOTE — DISCHARGE PLANNING
Behavioral Health referrals were sent to [facility], [facility], [facility], [facility], [facility], [facility] @6900.    @[phone]  Legal hold medical clearance faxed to the above facilities.

## 2021-06-17 LAB — VIT B12 SERPL-MCNC: 670 PG/ML (ref 211–911)

## 2021-06-17 PROCEDURE — 82607 VITAMIN B-12: CPT

## 2021-06-17 PROCEDURE — 36415 COLL VENOUS BLD VENIPUNCTURE: CPT

## 2021-06-17 PROCEDURE — 99224 PR SUBSEQUENT OBSERVATION CARE,LEVEL I: CPT | Mod: GC | Performed by: HOSPITALIST

## 2021-06-17 PROCEDURE — G0378 HOSPITAL OBSERVATION PER HR: HCPCS

## 2021-06-17 NOTE — DISCHARGE PLANNING
Anticipated Discharge Disposition: TBD    Action: SW spoke to patient about discharge planning.  Patient believes she was brought to [facility] as the ambulance drivers were concerned for her health.  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  When asked about extended family, patient stated she has about 3,000 of them.  That SW needs to go to the first light take a left and go about 41 miles.  Will find a long row of houses where he family lives.  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     [facility]  Swapnil Nassar [phone]; not in service   Celsa Beavers       [phone]: not in service  Chance Lin  [phone]: GOMEZ introduced himself to Chance and the purpose of the call.  Initially Chance was reluctant to give any information.  When he did open up, he stated the following.  That his aunt took off some time ago and no one knew where she was.  Patient had owned a home and was seeing things.  Patient has a brother, sister and one child  Chance requested GOMEZ name/number and will have his dad call GOMEZ.          Barriers to Discharge: placement    Plan: continue to monitor for discharge barriers

## 2021-06-17 NOTE — DISCHARGE PLANNING
Medical Social Work  Patient does not have Medicare B, only part A which does not pay for mental health facilities.

## 2021-06-17 NOTE — PROGRESS NOTES
"Daily Progress Note:     Date of Service: 6/17/2021  Primary Team: [facility] IM Yellow Team   Attending: Vignesh Blake M.D.   Senior Resident: Dr. Cotto  Contact:  [phone]    Chief Complaint:   Agitation     Brief ID:  \"Patient is a 70 y.o female w/ PMH of dementia, who is a resident at [facility] for memory care, who was brought here by EMS. Her facility sent her here for presumed worsening \"agitation\" and behavioral changes, and requested that she be evaluated by a psychiatrist before they will allow for her to be admitted back to the facility.\"     Interval update:  No events overnight  Patient has no new medical complaints or exam findings   Refused her medications  On legal hold per psychiatry  Will check B12 level as it has been low in the past   Medically cleared for transfer to an inpatient psych facility once accepted   Pt has limited insurance so placement has been difficult     Consultants/Specialty:  Psychiatry     Review of Systems:    Constitutional: Negative for chills, fever, malaise/fatigue and weight loss.   HENT: Negative for hearing loss.    Eyes: Negative for blurred vision, double vision, photophobia and pain.   Respiratory: Negative for cough, hemoptysis, sputum production, shortness of breath and wheezing.    Gastrointestinal: Negative for abdominal pain, blood in stool, constipation, diarrhea, melena, nausea and vomiting.   Genitourinary: Negative for dysuria, flank pain, frequency, hematuria and urgency.   Musculoskeletal: Negative for back pain, falls, joint pain, myalgias and neck pain.   Neurological: Negative for dizziness, tingling and headaches.   Psychiatric/Behavioral: Negative for depression, hallucinations, substance abuse     Objective Data:   Physical Exam:   Vitals:   Temp:  [36 °C (96.8 °F)-36.5 °C (97.7 °F)] 36.4 °C (97.6 °F)  Pulse:  [67-99] 68  Resp:  [17-18] 18  BP: (120-129)/(65-84) 120/65  SpO2:  [93 %-96 %] 95 %    Physical Exam     Constitutional:       General: " She is not in acute distress.     Appearance: Normal appearance. She is normal weight. She is not ill-appearing, toxic-appearing or diaphoretic.   HENT:      Head: Normocephalic and atraumatic.      Mouth/Throat:      Mouth: Mucous membranes are moist.   Eyes:      Extraocular Movements: Extraocular movements intact.      Pupils: Pupils are equal, round, and reactive to light.   Cardiovascular:      Rate and Rhythm: Normal rate and regular rhythm.      Pulses: Normal pulses.      Heart sounds: Normal heart sounds.   Pulmonary:      Effort: Pulmonary effort is normal. No respiratory distress.      Breath sounds: Normal breath sounds. No stridor. No wheezing, rhonchi or rales.   Chest:      Chest wall: No tenderness.   Abdominal:      General: There is no distension.      Palpations: There is no mass.      Tenderness: There is no abdominal tenderness. There is no right CVA tenderness, left CVA tenderness, guarding or rebound.      Hernia: No hernia is present.   Musculoskeletal:         General: No swelling, tenderness, deformity or signs of injury.      Right lower leg: No edema.      Left lower leg: No edema.   Skin:     Coloration: Skin is not jaundiced or pale.      Findings: No bruising, erythema, lesion or rash.   Neurological:      General: No focal deficit present.      Mental Status: She is alert.      Cranial Nerves: No cranial nerve deficit.      Sensory: No sensory deficit.      Motor: No weakness.      Coordination: Coordination normal.   Psychiatric:         Mood and Affect: Mood normal.         Behavior: Behavior normal.      Comments: A/Ox3, pleasant with grandiose delusions, no hallucinations     Labs:   No results for input(s): WBC, RBC, HEMOGLOBIN, HEMATOCRIT, MCV, MCH, RDW, PLATELETCT, MPV, NEUTSPOLYS, LYMPHOCYTES, MONOCYTES, EOSINOPHILS, BASOPHILS, RBCMORPHOLO in the last 72 hours.  Recent Labs     06/15/21  0501   SODIUM 139   POTASSIUM 4.9   CHLORIDE 105   CO2 26   GLUCOSE 90   BUN 15        Imaging:   DX-CHEST-LIMITED (1 VIEW)   Final Result      Left basilar atelectasis. No focal consolidation. No effusions.          Problem Representation:     Schizoaffective disorder, bipolar type- present on admission   Dementia (HCC)   Assessment & Plan  Patient brought here by EMS because the facility stated that she was experiencing worsening agitation. Vitals stable. No leukocytosis. No electrolyte abnormalities. UA negative. She is hypothyroid and not compliant with medication. Psychiatry evaluated and recommended an anti-psychotic regiment, but patient continues to refuse all meds      Plan:  Pt on legal hold per psychiatry and cleared medically  pending placement to a psych facility   She is refusing all medications, which includes an antipsychotic   Psychiatry following, appreciate recs   Placement difficult as she has limited insurance      Hypothyroidism  Assessment & Plan  Resumed home dose synthroid, but patient refusing all medications despite continuous education   Previous TSH: 11, now 15.6   free T4 0.93  Follow up repeat TSH outpatient 6-8 weeks if patient agrees to take synthroid and continue medication compliance education

## 2021-06-17 NOTE — DISCHARGE PLANNING
Medical Social Work  PC to [facility], [phone], message left for Felipe Baca to call  back, requesting background information/family etc on patient.

## 2021-06-17 NOTE — DISCHARGE PLANNING
Agency/Facility Name: [facility]  Outcome: Declined.  Faxed response.  Pt has Medicare Part A only and [facility] unable to provide letter of agreement to accept patient back.    Agency/Facility Name: [facility]  Spoke To:   Outcome: DPA received a vmail from Katie.  Decline.  Medicare Part A only.

## 2021-06-17 NOTE — PROGRESS NOTES
Assessment/description of ears? Intact/blanching  Which preventative measures are in place for the ears? n/a    Assessment/description of elbows? Intact/pink/blanching  Which preventative measures are in place for the elbows? Pillows in use for support/positioning, encourage repositioning    Assessment/description of sacrum? Intact/blanching  Which preventative measures are in place for the sacrum? Pillows in use for support/positioning, encourage repositioning    Assessment/description of heels? Intact/pink/blanching/slightly boggy  Which preventative measures are in place for the heels? Pillows in use for support/positioning, encourage repositioning    Which devices are in place? PIV  Description of skin under devices: CDI  Which preventative measures are in place under devices? Dressing changes per protocol/PRN

## 2021-06-17 NOTE — PROGRESS NOTES
Assessment/description of ears? Pink, intact   Which preventative measures are in place for the ears?  N/A  Assessment/description of elbows? Bilateral pink, intact   Which preventative measures are in place for the elbows?  Support pillows, encourage self turn .ambulation   Assessment/description of sacrum? Pink, intact   Which preventative measures are in place for the sacrum?   support pillows, encourage self turn .ambulation   Assessment/description of heels?  Pink. Intact, mild boggy  Which preventative measures are in place for the heels?   support pillows, encourage self turn .ambulation   Which devices are in place? PIV  Description of skin under devices: clean dry  Which preventative measures are in place under devices?  q shift assessment, dressing change weekly   Other:

## 2021-06-17 NOTE — PROGRESS NOTES
Pt axo x3, disoriented to situation.  Pt denied pain.  Pt on RA no s/s of SOB.  Pt pleasant and cooperative, resting in bed.  wanderguard to L ankle.   Awaiting placement.  Pt is on a legal hold until 0953 on 6/18/21, hourly rounding in place, provider OK'd pt to have personal belongings/phone at bedside.  Bed locked and in lowest position, treaded socks on pt.

## 2021-06-17 NOTE — PROGRESS NOTES
"Pt is A&Ox3, disoriented to situation. Pt is resting in bed, no signs of labored breathing or pain. Pt on RA. Call light & personal belongings within reach, bed in lowest position & locked. Pt is independent and calls appropriately. Pt updated on plan of care for the shift. Pt refused medication this evening, states \" I don't take any medication\". Pt declines any additional needs at this time.    "

## 2021-06-18 PROCEDURE — G0378 HOSPITAL OBSERVATION PER HR: HCPCS

## 2021-06-18 PROCEDURE — 99224 PR SUBSEQUENT OBSERVATION CARE,LEVEL I: CPT | Mod: GC | Performed by: HOSPITALIST

## 2021-06-18 NOTE — DISCHARGE PLANNING
Medical Social Work  Patient was admitted under Observation, due to this patient's face sheet is showing patient has no insurance. If patient is turned to inpatient, then the face sheet will show patient having Medicare

## 2021-06-18 NOTE — PROGRESS NOTES
"Daily Progress Note:     Date of Service: 6/18/2021  Primary Team: [facility] Yellow Team   Attending: Vignesh Blake M.D.   Senior Resident: Dr. Cotto  Contact:  [phone]    Chief Complaint:   Agitation     Brief ID:  \"Patient is a 70 y.o female w/ PMH of dementia, who is a resident at [facility] for memory care, who was brought here by EMS. Her facility sent her here for presumed worsening \"agitation\" and behavioral changes, and requested that she be evaluated by a psychiatrist before they will allow for her to be admitted back to the facility.\"     Interval update:  No events overnight  Patient has no new medical complaints or exam findings   Refusing her medications  On legal hold per psychiatry  B12 level within acceptable range   Medically cleared for transfer to an inpatient psych facility once accepted   Pt has limited insurance so placement has been difficult   CM is working in reaching out to family and looking into placement options vs guardianship   No new recommendations from medicine standpoint   Will reach out to long term hospital services for transfer of care while inpatient     Consultants/Specialty:  Psychiatry     Review of Systems:    Constitutional: Negative for chills, fever, malaise/fatigue and weight loss.   HENT: Negative for hearing loss.    Eyes: Negative for blurred vision, double vision, photophobia and pain.   Respiratory: Negative for cough, hemoptysis, sputum production, shortness of breath and wheezing.    Gastrointestinal: Negative for abdominal pain, blood in stool, constipation, diarrhea, melena, nausea and vomiting.   Genitourinary: Negative for dysuria, flank pain, frequency, hematuria and urgency.   Musculoskeletal: Negative for back pain, falls, joint pain, myalgias and neck pain.   Neurological: Negative for dizziness, tingling and headaches.   Psychiatric/Behavioral: Negative for depression, hallucinations, substance abuse     Objective Data:   Physical Exam:   Vitals: "   Temp:  [36.2 °C (97.1 °F)-36.6 °C (97.9 °F)] 36.2 °C (97.1 °F)  Pulse:  [74-79] 74  Resp:  [15-18] 15  BP: (110-129)/(72-82) 122/74  SpO2:  [91 %-97 %] 96 %    Physical Exam     Constitutional:       General: She is not in acute distress.     Appearance: Normal appearance. She is normal weight. She is not ill-appearing, toxic-appearing or diaphoretic.   HENT:      Head: Normocephalic and atraumatic.      Mouth/Throat:      Mouth: Mucous membranes are moist.   Eyes:      Extraocular Movements: Extraocular movements intact.      Pupils: Pupils are equal, round, and reactive to light.   Cardiovascular:      Rate and Rhythm: Normal rate and regular rhythm.      Pulses: Normal pulses.      Heart sounds: Normal heart sounds.   Pulmonary:      Effort: Pulmonary effort is normal. No respiratory distress.      Breath sounds: Normal breath sounds. No stridor. No wheezing, rhonchi or rales.   Chest:      Chest wall: No tenderness.   Abdominal:      General: There is no distension.      Palpations: There is no mass.      Tenderness: There is no abdominal tenderness. There is no right CVA tenderness, left CVA tenderness, guarding or rebound.      Hernia: No hernia is present.   Musculoskeletal:         General: No swelling, tenderness, deformity or signs of injury.      Right lower leg: No edema.      Left lower leg: No edema.   Skin:     Coloration: Skin is not jaundiced or pale.      Findings: No bruising, erythema, lesion or rash.   Neurological:      General: No focal deficit present.      Mental Status: She is alert.      Cranial Nerves: No cranial nerve deficit.      Sensory: No sensory deficit.      Motor: No weakness.      Coordination: Coordination normal.   Psychiatric:         Mood and Affect: Mood normal.         Behavior: Behavior normal.      Comments: A/Ox3, pleasant with grandiose delusions, no hallucinations     Labs:   No results for input(s): WBC, RBC, HEMOGLOBIN, HEMATOCRIT, MCV, MCH, RDW, PLATELETCT, MPV,  NEUTSPOLYS, LYMPHOCYTES, MONOCYTES, EOSINOPHILS, BASOPHILS, RBCMORPHOLO in the last 72 hours.  No results for input(s): SODIUM, POTASSIUM, CHLORIDE, CO2, GLUCOSE, BUN, CPKTOTAL in the last 72 hours.    Imaging:   DX-CHEST-LIMITED (1 VIEW)   Final Result      Left basilar atelectasis. No focal consolidation. No effusions.          Problem Representation:     Schizoaffective disorder, bipolar type- present on admission   Dementia (HCC)   Assessment & Plan  Patient brought here by EMS because the facility stated that she was experiencing worsening agitation. Vitals stable. No leukocytosis. No electrolyte abnormalities. UA negative. She is hypothyroid and not compliant with medication. Psychiatry evaluated and recommended an anti-psychotic regiment, but patient continues to refuse all meds      Plan:  Pt on legal hold per psychiatry and cleared medically  pending placement to a psych facility   She is refusing all medications, which includes an antipsychotic   Psychiatry following, appreciate recs   Placement difficult as she has limited insurance   Will speak with long term hospital services for continued inpatient care, pt does not have acute medical needs at this time (still refusing her thyroid medication)      Hypothyroidism  Assessment & Plan  Resumed home dose synthroid, but patient refusing all medications despite continuous education   Previous TSH: 11, now 15.6   free T4 0.93  Follow up repeat TSH outpatient 6-8 weeks if patient agrees to take synthroid and continue medication compliance education

## 2021-06-18 NOTE — PROGRESS NOTES
Assessment/description of ears? Intact, blanching  Which preventative measures are in place for the ears? n/a    Assessment/description of elbows? Intact, blanching  Which preventative measures are in place for the elbows? n/a    Assessment/description of sacrum? Intact, blanching  Which preventative measures are in place for the sacrum? N/a, pt turns self    Assessment/description of heels? Pink, blanching  Which preventative measures are in place for the heels? N/a, pt is ambulatory, turns appropriately    Which devices are in place? PIV  Description of skin under devices: clean, dry, intact  Which preventative measures are in place under devices? Dressing changes per protocol    Other:

## 2021-06-18 NOTE — PROGRESS NOTES
Assessment/description of ears? Intact, pink, blanching.   Which preventative measures are in place for the ears? N/A.    Assessment/description of elbows? Intact, pink, blanching.   Which preventative measures are in place for the elbows?  Patient ambulates independently, patient turns self in bed, pillows for support.    Assessment/description of sacrum? Intact, pink, blanching.   Which preventative measures are in place for the sacrum?  Patient ambulates independently, patient turns self in bed, pillows for support.     Assessment/description of heels? Intact, pink, boggy, blanching.   Which preventative measures are in place for the heels?  Patient ambulates independently, patient turns self in bed, pillows for support.    Which devices are in place? PIV.   Description of skin under devices: Clean, dry, intact.  Which preventative measures are in place under devices?  Dressing changes per protocol/PRN.

## 2021-06-18 NOTE — PROGRESS NOTES
Bedside report received at change of shift. Pt is A&Ox3, disoriented to event. Pt is up-self. Ambulated with steady gait. Non-slip socks on. All pt needs met at this time.

## 2021-06-18 NOTE — PROGRESS NOTES
"Received report from day shift RN and assumed care of patient.   Pt is resting in bed, A&Ox4, on RA, VSS.   Pt is currently on legal hold until 0953 on 6/18/21.   Wanderguard on left ankle.   Assessment completed, POC discussed.   Denies pain or discomfort at this time.  Refusing scheduled Risperidone at this time stating \"I don't take any medications, but thank you.\"  Bed is in lowest, locked position, call bell and belongings are in reach.   Hourly rounding and safety precautions in place.   No further needs at this time.  "

## 2021-06-19 PROCEDURE — G0378 HOSPITAL OBSERVATION PER HR: HCPCS

## 2021-06-19 PROCEDURE — 99224 PR SUBSEQUENT OBSERVATION CARE,LEVEL I: CPT | Mod: GC | Performed by: HOSPITALIST

## 2021-06-19 ASSESSMENT — PAIN DESCRIPTION - PAIN TYPE
TYPE: ACUTE PAIN
TYPE: ACUTE PAIN

## 2021-06-19 NOTE — PROGRESS NOTES
Received report from day shift RN and assumed care of patient.   Pt is sitting up in bed, A&Ox3 (disoriented to event), on RA, VSS.   Wanderguard on left ankle, clean, dry, intact.   Assessment completed, POC discussed.   Denies pain or discomfort at this time.    Pt refusing scheduled Risperidone this evening.   Bed is in lowest, locked position, call bell and belongings are in reach.   Hourly rounding and safety precautions in place.   No further needs at this time.

## 2021-06-19 NOTE — PROGRESS NOTES
"Received bedside report from night shift RN.   Assumed care of patient at change of shift.   Assessment complete and POC discussed.   Patient is A&Ox3 (disoriented to event), VSS, on RA   Patient denies pain, no apparent signs of distress or discomfort.   Last BM 6/16 - patient refusing medication despite education.  Patient states \"I do not take any meds, I haven't taken meds in years.\"  Offered prune juice.  Patient is sitting up in bed for breakfast.   Bed is in lowest/locked position.   Call light and belongings are within reach.   No further needs at this time.    Assessment/description of ears? Intact, pink, blanching.   Which preventative measures are in place for the ears? N/A.     Assessment/description of elbows? Intact, pink, blanching.   Which preventative measures are in place for the elbows?  Patient ambulates independently, patient turns self in bed, pillows for support.     Assessment/description of sacrum? Intact, pink, blanching.   Which preventative measures are in place for the sacrum?  Patient ambulates independently, patient turns self in bed, pillows for support.      Assessment/description of heels? Intact, pink, blanching.   Which preventative measures are in place for the heels?   Patient ambulates independently, patient turns self in bed, pillows for support.     Which devices are in place? PIV.   Description of skin under devices: Clean, dry, intact.  Which preventative measures are in place under devices?  Dressing changes per protocol and PRN.  "

## 2021-06-19 NOTE — PROGRESS NOTES
Assessment/description of ears? Intact, pink, blanching.   Which preventative measures are in place for the ears? N/A.     Assessment/description of elbows? Intact, pink, blanching.   Which preventative measures are in place for the elbows?  Patient ambulates independently, patient turns self in bed, pillows for support.     Assessment/description of sacrum? Intact, pink, blanching.   Which preventative measures are in place for the sacrum?  Patient ambulates independently, patient turns self in bed, pillows for support.      Assessment/description of heels? Intact, pink, blanching.   Which preventative measures are in place for the heels?   Patient ambulates independently, patient turns self in bed, pillows for support.     Which devices are in place? PIV.   Description of skin under devices: Clean, dry, intact.  Which preventative measures are in place under devices?  Dressing changes per protocol and PRN.

## 2021-06-19 NOTE — PROGRESS NOTES
"Daily Progress Note:     Date of Service: 6/19/2021  Primary Team: [facility] Yellow Team   Attending: Vignesh Blake M.D.   Senior Resident: Dr. Cotto  Contact:  [phone]    Chief Complaint:   Agitation     Brief ID:  \"Patient is a 70 y.o female w/ PMH of dementia, who is a resident at [facility] for memory care, who was brought here by EMS. Her facility sent her here for presumed worsening \"agitation\" and behavioral changes, and requested that she be evaluated by a psychiatrist before they will allow for her to be admitted back to the facility.\"     Interval update:  No events overnight  Patient has no new medical complaints or exam findings   Continues to refuse her medications  On legal hold per psychiatry  B12 level within acceptable range   Medically cleared for transfer to an inpatient psych facility once accepted   Pt has limited insurance so placement has been difficult   CM is working in reaching out to family and looking into placement options vs guardianship   No new recommendations from medicine standpoint   Pending on waiting list for long-term APRN service    Consultants/Specialty:  Psychiatry     Review of Systems:    Constitutional: Negative for chills, fever, malaise/fatigue and weight loss.   HENT: Negative for hearing loss.    Eyes: Negative for blurred vision, double vision, photophobia and pain.   Respiratory: Negative for cough, hemoptysis, sputum production, shortness of breath and wheezing.    Gastrointestinal: Negative for abdominal pain, blood in stool, constipation, diarrhea, melena, nausea and vomiting.   Genitourinary: Negative for dysuria, flank pain, frequency, hematuria and urgency.   Musculoskeletal: Negative for back pain, falls, joint pain, myalgias and neck pain.   Neurological: Negative for dizziness, tingling and headaches.   Psychiatric/Behavioral: Negative for depression, hallucinations, substance abuse     Objective Data:   Physical Exam:   Vitals:   Temp:  [36 °C (96.8 " °F)-36.4 °C (97.6 °F)] 36 °C (96.8 °F)  Pulse:  [70-78] 70  Resp:  [16] 16  BP: (112-137)/(69-79) 137/79  SpO2:  [94 %-95 %] 95 %    Physical Exam     Constitutional:       General: She is not in acute distress.     Appearance: Normal appearance. She is normal weight. She is not ill-appearing, toxic-appearing or diaphoretic.   HENT:      Head: Normocephalic and atraumatic.      Mouth/Throat:      Mouth: Mucous membranes are moist.   Eyes:      Extraocular Movements: Extraocular movements intact.      Pupils: Pupils are equal, round, and reactive to light.   Cardiovascular:      Rate and Rhythm: Normal rate and regular rhythm.      Pulses: Normal pulses.      Heart sounds: Normal heart sounds.   Pulmonary:      Effort: Pulmonary effort is normal. No respiratory distress.      Breath sounds: Normal breath sounds. No stridor. No wheezing, rhonchi or rales.   Chest:      Chest wall: No tenderness.   Abdominal:      General: There is no distension.      Palpations: There is no mass.      Tenderness: There is no abdominal tenderness. There is no right CVA tenderness, left CVA tenderness, guarding or rebound.      Hernia: No hernia is present.   Musculoskeletal:         General: No swelling, tenderness, deformity or signs of injury.      Right lower leg: No edema.      Left lower leg: No edema.   Skin:     Coloration: Skin is not jaundiced or pale.      Findings: No bruising, erythema, lesion or rash.   Neurological:      General: No focal deficit present.      Mental Status: She is alert.      Cranial Nerves: No cranial nerve deficit.      Sensory: No sensory deficit.      Motor: No weakness.      Coordination: Coordination normal.   Psychiatric:         Mood and Affect: Mood normal.         Behavior: Behavior normal.      Comments: A/Ox3, pleasant with grandiose delusions, no hallucinations     Labs:   No results for input(s): WBC, RBC, HEMOGLOBIN, HEMATOCRIT, MCV, MCH, RDW, PLATELETCT, MPV, NEUTSPOLYS, LYMPHOCYTES,  MONOCYTES, EOSINOPHILS, BASOPHILS, RBCMORPHOLO in the last 72 hours.  No results for input(s): SODIUM, POTASSIUM, CHLORIDE, CO2, GLUCOSE, BUN, CPKTOTAL in the last 72 hours.    Imaging:   DX-CHEST-LIMITED (1 VIEW)   Final Result      Left basilar atelectasis. No focal consolidation. No effusions.          Problem Representation:     Schizoaffective disorder, bipolar type- present on admission   Dementia (HCC)   Assessment & Plan  Patient brought here by EMS because the facility stated that she was experiencing worsening agitation. Vitals stable. No leukocytosis. No electrolyte abnormalities. UA negative. She is hypothyroid and not compliant with medication. Psychiatry evaluated and recommended an anti-psychotic regiment, but patient continues to refuse all meds      Plan:  Pt on legal hold per psychiatry and stable medically  pending placement to a psych facility   She is refusing all medications, which includes an antipsychotic   Psychiatry following, appreciate recs   Placement difficult as she has limited insurance   Will speak with long term hospital services for continued inpatient care, pt does not have acute medical needs at this time (still refusing her thyroid medication)      Hypothyroidism  Assessment & Plan  Resumed home dose synthroid, but patient refusing all medications despite continuous education   Previous TSH: 11, now 15.6   free T4 0.93  Follow up repeat TSH outpatient 6-8 weeks if patient agrees to take synthroid and continue medication compliance education

## 2021-06-20 PROCEDURE — G0378 HOSPITAL OBSERVATION PER HR: HCPCS

## 2021-06-20 PROCEDURE — 99224 PR SUBSEQUENT OBSERVATION CARE,LEVEL I: CPT | Mod: GC | Performed by: HOSPITALIST

## 2021-06-20 RX ORDER — MIDAZOLAM HYDROCHLORIDE 1 MG/ML
INJECTION INTRAMUSCULAR; INTRAVENOUS
Status: COMPLETED
Start: 2021-06-20 | End: 2021-06-20

## 2021-06-20 ASSESSMENT — PAIN DESCRIPTION - PAIN TYPE: TYPE: ACUTE PAIN

## 2021-06-20 NOTE — PROGRESS NOTES
"Daily Progress Note:     Date of Service: 6/20/2021  Primary Team: [facility] Yellow Team   Attending: Vignesh Blake M.D.   Senior Resident: Dr. Cotto  Contact:  [phone]    Chief Complaint:   Agitation     Brief ID:  \"Patient is a 70 y.o female w/ PMH of dementia, who is a resident at [facility] for memory care, who was brought here by EMS. Her facility sent her here for presumed worsening \"agitation\" and behavioral changes, and requested that she be evaluated by a psychiatrist before they will allow for her to be admitted back to the facility.\"     Interval update:  No events overnight  Patient has no new medical complaints or exam findings   Continues to refuse her medications  On legal hold per psychiatry  B12 level within acceptable range   Medically cleared for transfer to an inpatient psych facility once accepted   Pt has limited insurance so placement has been difficult   CM is working in reaching out to family and looking into placement options vs guardianship   No new recommendations from medicine standpoint   Pending on waiting list for long-term APRN service  Please ambulate patient daily and have her up for meals with daily skin checks     Consultants/Specialty:  Psychiatry     Review of Systems:    Constitutional: Negative for chills, fever, malaise/fatigue and weight loss.   HENT: Negative for hearing loss.    Eyes: Negative for blurred vision, double vision, photophobia and pain.   Respiratory: Negative for cough, hemoptysis, sputum production, shortness of breath and wheezing.    Gastrointestinal: Negative for abdominal pain, blood in stool, constipation, diarrhea, melena, nausea and vomiting.   Genitourinary: Negative for dysuria, flank pain, frequency, hematuria and urgency.   Musculoskeletal: Negative for back pain, falls, joint pain, myalgias and neck pain.   Neurological: Negative for dizziness, tingling and headaches.   Psychiatric/Behavioral: Negative for depression, hallucinations, " substance abuse     Objective Data:   Physical Exam:   Vitals:   Temp:  [36 °C (96.8 °F)-36.4 °C (97.6 °F)] 36.4 °C (97.6 °F)  Pulse:  [60-80] 60  Resp:  [16] 16  BP: (108-137)/(52-79) 108/52  SpO2:  [94 %-96 %] 96 %    Physical Exam     Constitutional:       General: She is not in acute distress.     Appearance: Normal appearance. She is normal weight. She is not ill-appearing, toxic-appearing or diaphoretic.   HENT:      Head: Normocephalic and atraumatic.      Mouth/Throat:      Mouth: Mucous membranes are moist.   Eyes:      Extraocular Movements: Extraocular movements intact.      Pupils: Pupils are equal, round, and reactive to light.   Cardiovascular:      Rate and Rhythm: Normal rate and regular rhythm.      Pulses: Normal pulses.      Heart sounds: Normal heart sounds.   Pulmonary:      Effort: Pulmonary effort is normal. No respiratory distress.      Breath sounds: Normal breath sounds. No stridor. No wheezing, rhonchi or rales.   Chest:      Chest wall: No tenderness.   Abdominal:      General: There is no distension.      Palpations: There is no mass.      Tenderness: There is no abdominal tenderness. There is no right CVA tenderness, left CVA tenderness, guarding or rebound.      Hernia: No hernia is present.   Musculoskeletal:         General: No swelling, tenderness, deformity or signs of injury.      Right lower leg: No edema.      Left lower leg: No edema.   Skin:     Coloration: Skin is not jaundiced or pale.      Findings: No bruising, erythema, lesion or rash.   Neurological:      General: No focal deficit present.      Mental Status: She is alert.      Cranial Nerves: No cranial nerve deficit.      Sensory: No sensory deficit.      Motor: No weakness.      Coordination: Coordination normal.   Psychiatric:         Mood and Affect: Mood normal.         Behavior: Behavior normal.      Comments: A/Ox3, pleasant with grandiose delusions, no hallucinations     Labs:   No results for input(s): WBC,  RBC, HEMOGLOBIN, HEMATOCRIT, MCV, MCH, RDW, PLATELETCT, MPV, NEUTSPOLYS, LYMPHOCYTES, MONOCYTES, EOSINOPHILS, BASOPHILS, RBCMORPHOLO in the last 72 hours.  No results for input(s): SODIUM, POTASSIUM, CHLORIDE, CO2, GLUCOSE, BUN, CPKTOTAL in the last 72 hours.    Imaging:   DX-CHEST-LIMITED (1 VIEW)   Final Result      Left basilar atelectasis. No focal consolidation. No effusions.          Problem Representation:     Schizoaffective disorder, bipolar type- present on admission   Dementia (HCC)   Assessment & Plan  Patient brought here by EMS because the facility stated that she was experiencing worsening agitation. Vitals stable. No leukocytosis. No electrolyte abnormalities. UA negative. She is hypothyroid and not compliant with medication. Psychiatry evaluated and recommended an anti-psychotic regiment, but patient continues to refuse all meds      Plan:  Pt on legal hold per psychiatry and stable medically  pending placement to a psych facility   She is refusing all medications, which includes an antipsychotic   Psychiatry following, appreciate recs   Placement difficult as she has limited insurance   Will speak with long term hospital services for continued inpatient care, pt does not have acute medical needs at this time (still refusing her thyroid medication)   Please ambulate patient daily and have her up for meals with daily skin checks      Hypothyroidism  Assessment & Plan  Resumed home dose synthroid, but patient refusing all medications despite continuous education   Previous TSH: 11, now 15.6   free T4 0.93  Follow up repeat TSH outpatient 6-8 weeks if patient agrees to take synthroid and continue medication compliance education

## 2021-06-20 NOTE — PROGRESS NOTES
Pt is A&O x1, oriented to self.  Pt is pleasant but delusional, stating  She owns this place and they can't keep her locked up in here. Pt is on RA, VSS. Pt is able to ambulate by self. She has a wander guard on left ankle. Hourly rounds are in place,

## 2021-06-20 NOTE — PROGRESS NOTES
"Received bedside report from night shift RN.   Assumed care of patient at change of shift.   Assessment complete and POC discussed.   Patient is A&Ox3 (disoriented to event), VSS, on RA   Patient denies pain, no apparent signs of distress or discomfort.   Patient is sitting up in bed for breakfast.   Bed is in lowest/locked position.   Call light and belongings are within reach.   No further needs at this time.    Orders in to ambulate patient 3x per shift. Offered to walk with patient and encouraged to ambulate. Patient refused, states \"I'm not going anywhere. I'm not leaving my belongings in here.\" Encouraged patient to at least sit in chair for meals.    Assessment/description of ears? Intact, pink, blanching.   Which preventative measures are in place for the ears? N/A.     Assessment/description of elbows? Intact, blanching.   Which preventative measures are in place for the elbows?  Patient ambulates independently, patient turns self in bed, pillows for support.     Assessment/description of sacrum? Intact, blanching.   Which preventative measures are in place for the sacrum?  Patient ambulates independently, patient turns self in bed, pillows for support.      Assessment/description of heels? Intact, pink, blanching, boggy  Which preventative measures are in place for the heels?   Patient ambulates independently, patient turns self in bed, pillows for support.     Which devices are in place? PIV, WG L ankle  Description of skin under devices: Clean, dry, intact.  Which preventative measures are in place under devices?  Dressing changes per protocol and PRN  "

## 2021-06-20 NOTE — PROGRESS NOTES
Assessment/description of ears? Pink, blanching, intact  Which preventative measures are in place for the ears?  Q shift skin check    Assessment/description of elbows? Pink, blanching, intact  Which preventative measures are in place for the elbows?  Pr repositions frequently, ambulates frequently    Assessment/description of sacrum? Pink, blanching, intact  Which preventative measures are in place for the sacrum?  Pr repositions frequently, ambulates frequently    Assessment/description of heels? Pink, blanching, intact  Which preventative measures are in place for the heels?  Pr repositions frequently, ambulates frequently    Which devices are in place? PIV  Description of skin under devices: CDI  Which preventative measures are in place under devices?    Other:

## 2021-06-21 PROBLEM — F03.90 DEMENTIA (HCC): Status: RESOLVED | Noted: 2021-06-12 | Resolved: 2021-06-21

## 2021-06-21 PROCEDURE — 99225 PR SUBSEQUENT OBSERVATION CARE,LEVEL II: CPT | Mod: GC | Performed by: HOSPITALIST

## 2021-06-21 PROCEDURE — G0378 HOSPITAL OBSERVATION PER HR: HCPCS

## 2021-06-21 ASSESSMENT — ENCOUNTER SYMPTOMS
CHILLS: 0
ABDOMINAL PAIN: 0
BACK PAIN: 0
HEADACHES: 0
DIZZINESS: 0
COUGH: 0
NECK PAIN: 0
ORTHOPNEA: 0
MYALGIAS: 0
DEPRESSION: 0
WEIGHT LOSS: 0
VOMITING: 0
SPUTUM PRODUCTION: 0
PALPITATIONS: 0
FEVER: 0
HEMOPTYSIS: 0
DOUBLE VISION: 0
NAUSEA: 0
TINGLING: 0
PHOTOPHOBIA: 0
BLURRED VISION: 0

## 2021-06-21 ASSESSMENT — PAIN DESCRIPTION - PAIN TYPE
TYPE: ACUTE PAIN

## 2021-06-21 NOTE — PROGRESS NOTES
Bedside report received at change of shift. Pt is A&Ox3, disoriented to situation. Pt is refusing labs this AM. MD aware. Pt is up-self. Wander guard on L ankle. All pt needs met at this time.

## 2021-06-21 NOTE — PROGRESS NOTES
Assessment/description of ears? Intact, blanching  Which preventative measures are in place for the ears? n/a    Assessment/description of elbows? Intact, blanching  Which preventative measures are in place for the elbows? n/a    Assessment/description of sacrum? Intact, blanching  Which preventative measures are in place for the sacrum? N/a, pt turns self appropriately    Assessment/description of heels? Pink, intact, blanching  Which preventative measures are in place for the heels? n/a    Which devices are in place? PIV, wanderguard- L ankle  Description of skin under devices: intact, blanching  Which preventative measures are in place under devices? Skin assessments    Other:
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
## 2021-06-21 NOTE — PROGRESS NOTES
Assumed care of patient at 1845. Bedside report received. Assessment complete.  AA&Ox3, disoriented to situation. Denies CP/SOB.  Reporting 0/10 pain. Declined intervention at this time.  Skin intact.  Wanderguard on L ankle  Tolerating regular diet. Denies N/V.  + void. Last BM: 5/19  Pt ambulates independently, gait is steady.  All needs met at this time. Call light within reach. Pt calls appropriately. Bed low and locked, non skid socks in place. Hourly rounding in place.      1 RN Skin Assessment  Assessment/description of ears? Intact, pink, blanching.   Which preventative measures are in place for the ears? N/A.     Assessment/description of elbows? Intact, blanching, pink  Which preventative measures are in place for the elbows?  Patient ambulates independently, patient turns self in bed independently, extra pillows and blankets for support.     Assessment/description of sacrum? Intact, blanching.   Which preventative measures are in place for the sacrum?  Patient ambulates independently, patient turns self in bed independently, extra pillows and blankets for support.     Assessment/description of heels? Intact, pink, blanching  Which preventative measures are in place for the heels?   Patient ambulates independently, patient turns self in bed independently, extra pillows and blankets for support.     Which devices are in place? PIV, Wanderguard L ankle  Description of skin under devices: Clean, dry, intact.  Which preventative measures are in place under devices?  Dressing changes per protocol and PRN

## 2021-06-21 NOTE — ASSESSMENT & PLAN NOTE
-Patient was evaluated by psychiatry, her medications were adjusted, patient was deemed incapacitated to make medical decisions. Patient cleared for discharge 6/2021.    -Patient currently has guardian, has an accepting group home. Plan to D/C on Tues 3/15.  -Patient compliant with PO Abilify, and now taking medications with mixed with food/drinks. Continue abilify to 5mg daily, may need to further increase depending on persistence of delusions. Can transition to Abilify ER IM once stable on oral medications for 2 weeks.

## 2021-06-21 NOTE — DISCHARGE PLANNING
Medical Social Work  PC from patient's daughter Sharonda Rodriguez, [phone].  Stated that she got SW number from [facility].  Sharonda stated she is patient's only child, patient has two sisters and one brother, all but a sister live in [place].  Sharonda stated he last contact with patient was about three years ago, by phone.  Sharonda stated that she is aware of her mother's behaviors/actions.  That she was a good parent to her. Did not see behaviors until late 1998 to early 2000.  That to her knowledge her mother has never been consistent on taking any kind of medications.  That she has tried to get her mother into facilities in [place]. That if she went it was for a short time, and would leave the facility.  Sharonda stated that her mother will want to have involvement with her and then stop.  That this is why she hasn't had any contact with her for so long.  Sharonda described her mother as being paranoid and having mood swings.  When SW went over why patient was admitted, Sharonda stated that she can see her mother having violent behaviors.     SW went over issues related to discharging patient.  That she will need guardian.  Sharonda stated that she would like to be her mothers guardian, provided SW her email address to forward link to [place] [employer].

## 2021-06-22 PROCEDURE — 99231 SBSQ HOSP IP/OBS SF/LOW 25: CPT | Performed by: PSYCHIATRY & NEUROLOGY

## 2021-06-22 PROCEDURE — G0378 HOSPITAL OBSERVATION PER HR: HCPCS

## 2021-06-22 PROCEDURE — 99224 PR SUBSEQUENT OBSERVATION CARE,LEVEL I: CPT | Mod: GC | Performed by: HOSPITALIST

## 2021-06-22 ASSESSMENT — COGNITIVE AND FUNCTIONAL STATUS - GENERAL
DAILY ACTIVITIY SCORE: 24
SUGGESTED CMS G CODE MODIFIER DAILY ACTIVITY: CH
SUGGESTED CMS G CODE MODIFIER MOBILITY: CH
MOBILITY SCORE: 24

## 2021-06-22 ASSESSMENT — ENCOUNTER SYMPTOMS
HEMOPTYSIS: 0
RESPIRATORY NEGATIVE: 1
CHILLS: 0
CARDIOVASCULAR NEGATIVE: 1
ABDOMINAL PAIN: 0
COUGH: 0
HALLUCINATIONS: 0
NERVOUS/ANXIOUS: 0
PHOTOPHOBIA: 0
MUSCULOSKELETAL NEGATIVE: 1
NECK PAIN: 0
NAUSEA: 0
BLURRED VISION: 0
ORTHOPNEA: 0
HEADACHES: 0
EYES NEGATIVE: 1
CONSTITUTIONAL NEGATIVE: 1
SPUTUM PRODUCTION: 0
DOUBLE VISION: 0
BACK PAIN: 0
FEVER: 0
GASTROINTESTINAL NEGATIVE: 1
WEIGHT LOSS: 0
INSOMNIA: 0
NEUROLOGICAL NEGATIVE: 1
MYALGIAS: 0
DIZZINESS: 0
PALPITATIONS: 0
DEPRESSION: 0
VOMITING: 0

## 2021-06-22 ASSESSMENT — PAIN DESCRIPTION - PAIN TYPE
TYPE: ACUTE PAIN

## 2021-06-22 NOTE — PROGRESS NOTES
Pt is A&Ox3, disoriented to situation. Pt is resting in bed, no signs of labored breathing or pain. Pt on RA. Call light & personal belongings within reach, bed in lowest position & locked. Fall precautions in place and education provided on how to use call light. Pt updated on plan of care for the shift. Pt declines any additional needs at this time.

## 2021-06-22 NOTE — DISCHARGE PLANNING
Legal Hold    Referral: Legal Hold Court     Intervention: Pt presented for legal hold meeting with  via video conferencing.  advised pt will meet with court MD's via telemedicine monitor to contest the legal hold.      Plan: Pt will present to telemedicine mental health to meet with court physicians 6/23. Will call bedside RN once time has been determined.

## 2021-06-22 NOTE — PROGRESS NOTES
Daily Progress Note:     Date of Service: 6/22/2021  Primary Team: [facility] Yellow Team   Attending: Treasure Abad M.D.   Senior Resident: Dr. Argueta  Contact:  [phone]    ID: 70 year old female admitted from outside facility for agitation and behavioral changes with delusional disorder.    Subjective:   Patient remains stable and pleasant with no acute concerns.   She is calm and not agitated.     Consultants/Specialty:  Psychiatry    Review of Systems:    Review of Systems   Constitutional: Negative for chills, fever and weight loss.   HENT: Negative for ear pain, hearing loss and tinnitus.    Eyes: Negative for blurred vision, double vision and photophobia.   Respiratory: Negative for cough, hemoptysis and sputum production.    Cardiovascular: Negative for chest pain, palpitations and orthopnea.   Gastrointestinal: Negative for abdominal pain, nausea and vomiting.   Genitourinary: Negative for dysuria, frequency and urgency.   Musculoskeletal: Negative for back pain, myalgias and neck pain.   Skin: Negative for rash.   Neurological: Negative for dizziness and headaches.   Psychiatric/Behavioral: Negative for depression and suicidal ideas.       Objective Data:   Physical Exam:   Vitals:   Temp:  [36 °C (96.8 °F)-36.7 °C (98.1 °F)] 36 °C (96.8 °F)  Pulse:  [63-83] 74  Resp:  [15-18] 15  BP: (118-127)/(72-82) 127/74  SpO2:  [94 %-98 %] 96 %    Physical Exam  Constitutional:       Appearance: Normal appearance.   HENT:      Head: Normocephalic and atraumatic.      Nose: Nose normal.      Mouth/Throat:      Mouth: Mucous membranes are moist.   Eyes:      Extraocular Movements: Extraocular movements intact.      Pupils: Pupils are equal, round, and reactive to light.   Cardiovascular:      Rate and Rhythm: Normal rate and regular rhythm.      Pulses: Normal pulses.   Pulmonary:      Effort: Pulmonary effort is normal.      Breath sounds: Normal breath sounds.   Abdominal:      General: Bowel sounds are normal.  There is no distension.      Palpations: Abdomen is soft.      Tenderness: There is no abdominal tenderness.   Musculoskeletal:         General: Normal range of motion.      Cervical back: Normal range of motion.   Skin:     General: Skin is warm.   Neurological:      General: No focal deficit present.      Mental Status: She is alert and oriented to person, place, and time.   Psychiatric:         Mood and Affect: Mood normal.         Behavior: Behavior normal.           Labs:   None     Imaging:   None     Delusional disorder (HCC)  Assessment & Plan  -Patient was brought from memory care facility for agitation and behavioral changes.   -Psychiatry evaluated the patient and put her on legal hold as she does not have capacity to make medical decisions   -Neurocognitive eval done on 06/14 by speech showed mild deficits  -Psych recommended Risperidone daily, however patient refuses it   -work up from medical standpoint did not show any reversible cause  -Chart review indicates H/o dementia. MMSE test done today showed score of 30 indicating less likely dementia  -Patient has been pleasant and has not showed any agitation since admission  -Psych re evaluated the patient and discontinued legal hold and felt safe for discharge  -Will talk to  in terms of disposition      Hypothyroidism  Assessment & Plan  -chronic history   -Repeat TSH is 15   -On levothyroxine 50 mcg. However patient refuses to take the medication

## 2021-06-22 NOTE — CONSULTS
"PSYCHIATRIC FOLLOW-UP:(established)  *Reason for admission:      Agitation  *Legal Hold Status on Admission:   On hold         Chart reviewed.         *HPI:   Patient was found having breakfast.  She was calm and cooperative.  Patient is very disoriented to situation, and remains delusional.  Delusional themes are fixated.  Patient reports good mood, denies depressed mood or anhedonia.  Denied acute anxiety.  No hallucinations.  Reports good sleep and appetite and energy.        Per nurse, patient has been calm.  No aggression    Per chart, patient has been refusing medications.  Risperidone was discontinued.    Medical ROS (as pertinent):     Review of Systems   Constitutional: Negative.    HENT: Negative.    Eyes: Negative.    Respiratory: Negative.    Cardiovascular: Negative.    Gastrointestinal: Negative.    Genitourinary: Negative.    Musculoskeletal: Negative.    Skin: Negative.    Neurological: Negative.    Psychiatric/Behavioral: Negative for depression, hallucinations and suicidal ideas. The patient is not nervous/anxious and does not have insomnia.            *Psychiatric Examination:  Vitals:   Vitals:    06/22/21 0722   BP: 127/74   Pulse: 74   Resp: 15   Temp: 36 °C (96.8 °F)   SpO2: 96%     Appearance: appears stated age, fair grooming and hygiene, calm, cooperative, good eye contact  Abnormal movements: none  Gait/posture: normal  Speech: normal volume, tone and rhythm  Though process: linear and goal oriented  Associations: no loose associations  Thought content: denies AVH, grandiose delusions  Judgement and Insight: Poor/poor  Orientation:oriented to person, and place only  Recent and Remote Memory: Impaired  Attention Span and Concentration: intact  Language: fluid   Fund of Knowledge: not tested   Mood and Affect:\" Good\" euthymic, appropriate   SI/HI:denies any active or passive SI/HI                Assessment: Patient has fixated delusions.  Although she has delusions, she has remained calm, " without any aggressive behavior since admission.  She has not required as PRNs agitation/aggression.  She has been refusing medications.  Patient denies any SI/HI.  At this time, appears that she is at her baseline.  Patient psychiatrically cleared for discharge      Dx:  Schizoaffective disorder, bipolar type  History of dementia  Rule out psychotic disorder due to other medical condition (hypothyroidism)        Plan:  1- Legal hold: Discontinued  2- Psychotropic medications: Patient refusing medications  3- Psychiatry signing off.     Thank you for the consult.     This note was created using voice recognition software (Dragon). The accuracy of the dictation is limited by the abilities of the software. I have reviewed the note prior to signing. However, error related to voice recognition software and /or scribes may still exist and should be interpreted within the appropriate context.

## 2021-06-22 NOTE — PROGRESS NOTES
Assumed care of patient at 1845. Bedside report received. Assessment complete.  AA&Ox3, disoriented to situation. Denies CP/SOB.  Reporting 0/10 pain. Declined intervention at this time.  Skin intact.  Wanderguard on left ankle  Tolerating regular diet. Denies N/V.  + void  Pt ambulates independently, gait is steady.  All needs met at this time. Call light within reach. Pt calls appropriately. Bed low and locked, non skid socks in place. Hourly rounding in place.    1 RN Skin Assessment   Assessment/description of ears? Intact, blanching.   Which preventative measures are in place for the ears? N/A.     Assessment/description of elbows? Intact, blanching, pink  Which preventative measures are in place for the elbows?  Patient ambulates independently, patient turns self in bed independently, extra pillows and blankets for support.     Assessment/description of sacrum? Intact, blanching.   Which preventative measures are in place for the sacrum?  Patient ambulates independently, patient turns self in bed independently, extra pillows and blankets for support.     Assessment/description of heels? Intact, blanching  Which preventative measures are in place for the heels?   Patient ambulates independently, patient turns self in bed independently, extra pillows and blankets for support.     Which devices are in place? PIV, Wanderguard left ankle  Description of skin under devices: Clean, dry, intact.  Which preventative measures are in place under devices?  Dressing changes per protocol and PRN, qshift assessment under devices

## 2021-06-22 NOTE — PROGRESS NOTES
Assessment/description of ears? Intact blanching  Which preventative measures are in place for the ears? n/a    Assessment/description of elbows? Intact, blanching  Which preventative measures are in place for the elbows? Pt turns self     Assessment/description of sacrum? Intact blanching  Which preventative measures are in place for the sacrum? Pt turns self side to side, offloads appropriately     Assessment/description of heels? Pink, intact, blanching  Which preventative measures are in place for the heels? Offloading, pt ambulates     Which devices are in place? wandermichaelard L ankle  Description of skin under devices: intact, blanching  Which preventative measures are in place under devices? Assessment q shift    Other:

## 2021-06-23 PROCEDURE — G0378 HOSPITAL OBSERVATION PER HR: HCPCS

## 2021-06-23 PROCEDURE — 99224 PR SUBSEQUENT OBSERVATION CARE,LEVEL I: CPT | Mod: GC | Performed by: HOSPITALIST

## 2021-06-23 ASSESSMENT — ENCOUNTER SYMPTOMS
NAUSEA: 0
FEVER: 0
WEIGHT LOSS: 0
ABDOMINAL PAIN: 0
PALPITATIONS: 0
CHILLS: 0
PHOTOPHOBIA: 0
MYALGIAS: 0
BACK PAIN: 0
DOUBLE VISION: 0
NECK PAIN: 0
DEPRESSION: 0
COUGH: 0
SPUTUM PRODUCTION: 0
DIZZINESS: 0
VOMITING: 0
HEADACHES: 0
ORTHOPNEA: 0
TINGLING: 0
BLURRED VISION: 0
HEMOPTYSIS: 0

## 2021-06-23 ASSESSMENT — PAIN DESCRIPTION - PAIN TYPE
TYPE: ACUTE PAIN
TYPE: ACUTE PAIN
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
## 2021-06-23 NOTE — PROGRESS NOTES
Daily Progress Note:     Date of Service: 6/23/2021  Primary Team: [facility] Yellow Team   Attending: Treasure Abad M.D.   Senior Resident: Dr. Argueta  Contact:  [phone]    ID: 70 year old female admitted from outside facility for agitation and behavioral changes with delusional disorder    Subjective:   Patient has no acute concerns, is pleasant and calm   She is medically stable    Consultants/Specialty:  Psychiatry     Review of Systems:    Review of Systems   Constitutional: Negative for chills, fever and weight loss.   HENT: Negative for ear pain, hearing loss and tinnitus.    Eyes: Negative for blurred vision, double vision and photophobia.   Respiratory: Negative for cough, hemoptysis and sputum production.    Cardiovascular: Negative for chest pain, palpitations and orthopnea.   Gastrointestinal: Negative for abdominal pain, nausea and vomiting.   Genitourinary: Negative for dysuria, frequency and urgency.   Musculoskeletal: Negative for back pain, myalgias and neck pain.   Skin: Negative for itching and rash.   Neurological: Negative for dizziness, tingling and headaches.   Psychiatric/Behavioral: Negative for depression and suicidal ideas.       Objective Data:   Physical Exam:   Vitals:   Temp:  [36.1 °C (96.9 °F)-36.7 °C (98.1 °F)] 36.1 °C (96.9 °F)  Pulse:  [68-78] 71  Resp:  [15-18] 17  BP: (123-129)/(68-77) 127/77  SpO2:  [94 %-96 %] 94 %     Physical Exam  Constitutional:       Appearance: Normal appearance.   HENT:      Head: Normocephalic and atraumatic.      Nose: Nose normal.      Mouth/Throat:      Mouth: Mucous membranes are moist.   Eyes:      Extraocular Movements: Extraocular movements intact.      Pupils: Pupils are equal, round, and reactive to light.   Cardiovascular:      Rate and Rhythm: Normal rate and regular rhythm.      Pulses: Normal pulses.      Heart sounds: Normal heart sounds.   Pulmonary:      Effort: Pulmonary effort is normal.      Breath sounds: Normal breath sounds.    Abdominal:      General: Bowel sounds are normal. There is no distension.      Palpations: Abdomen is soft.      Tenderness: There is no abdominal tenderness.   Musculoskeletal:         General: Normal range of motion.      Cervical back: Normal range of motion.   Skin:     General: Skin is warm.   Neurological:      General: No focal deficit present.      Mental Status: She is alert and oriented to person, place, and time.   Psychiatric:         Mood and Affect: Mood normal.         Behavior: Behavior normal.           Labs:   None     Imaging:   None       Delusional disorder (HCC)  Assessment & Plan  -Patient was brought from memory care facility for agitation and behavioral changes.   -Psychiatry evaluated the patient and put her on legal hold as she does not have capacity to make medical decisions   -Neurocognitive eval done on 06/14 by speech showed mild deficits  -Psych recommended Risperidone daily, however patient refuses it   -work up from medical standpoint did not show any reversible cause  -Chart review indicates H/o dementia. MMSE test done today showed score of 30 indicating less likely dementia  -Patient has been pleasant and has not showed any agitation since admission  -Psych re evaluated the patient and discontinued legal hold and felt safe for discharge  - working on disposition       Hypothyroidism  Assessment & Plan  -chronic history   -Repeat TSH is 15   -On levothyroxine 50 mcg. However patient refuses to take the medication

## 2021-06-23 NOTE — PROGRESS NOTES
Assessment/description of ears? Intact, pink.  Which preventative measures are in place for the ears? n/a    Assessment/description of elbows? Pink, intact  Which preventative measures are in place for the elbows? Encouraged turning and repositioning    Assessment/description of sacrum? Intact, blanching.  Which preventative measures are in place for the sacrum? Encouraged turning and repositioning    Assessment/description of heels? Dry, pink, intact  Which preventative measures are in place for the heels? Encouraged turning and repositioning    Which devices are in place? wanderguard to LLE  Description of skin under devices: pink, intact  Which preventative measures are in place under devices? Frequent assessment.    Other:

## 2021-06-24 PROCEDURE — G0378 HOSPITAL OBSERVATION PER HR: HCPCS

## 2021-06-24 PROCEDURE — 99225 PR SUBSEQUENT OBSERVATION CARE,LEVEL II: CPT | Mod: GC | Performed by: HOSPITALIST

## 2021-06-24 ASSESSMENT — ENCOUNTER SYMPTOMS
NAUSEA: 0
COUGH: 0
CHILLS: 0
HEMOPTYSIS: 0
DOUBLE VISION: 0
WEIGHT LOSS: 0
DEPRESSION: 0
DIZZINESS: 0
MYALGIAS: 0
HEADACHES: 0
PALPITATIONS: 0
SPUTUM PRODUCTION: 0
ORTHOPNEA: 0
NECK PAIN: 0
BLURRED VISION: 0
TINGLING: 0
PHOTOPHOBIA: 0
BACK PAIN: 0
ABDOMINAL PAIN: 0
FEVER: 0
VOMITING: 0

## 2021-06-24 ASSESSMENT — PAIN DESCRIPTION - PAIN TYPE
TYPE: ACUTE PAIN
TYPE: ACUTE PAIN

## 2021-06-24 NOTE — PROGRESS NOTES
Pt is A&Ox3, disoriented to situation. Pt is resting in bed, no signs of labored breathing or pain. Pt on RA. Call light & personal belongings within reach, bed in lowest position & locked. Fall precautions in place and education provided on how to use call light. Pt updated on plan of care for the shift. Pt declines any additional needs at this time.       Assessment/description of ears? Intact blanching  Which preventative measures are in place for the ears? n/a     Assessment/description of elbows? Intact, blanching  Which preventative measures are in place for the elbows? Pt turns self     Assessment/description of sacrum? Intact blanching  Which preventative measures are in place for the sacrum? Pt turns self side to side, offloads appropriately      Assessment/description of heels? Pink, intact, blanching  Which preventative measures are in place for the heels? Offloading, pt ambulates      Which devices are in place? wanderguard L ankle  Description of skin under devices: intact, blanching  Which preventative measures are in place under devices? Assessment q shift

## 2021-06-24 NOTE — PROGRESS NOTES
Daily Progress Note:     Date of Service: 6/24/2021  Primary Team: [facility] IM Yellow Team   Attending: Treasure Abad M.D.   Senior Resident: Dr. Manzano  Contact:  [phone]    ID: 70 year old female admitted from outside facility for agitation and behavioral changes with delusional disorder    Subjective:   Patient has no acute concerns, is pleasant and calm   She is medically stable    Patient is aware that she is in a hospital. Patient plan to go home is as stated:  She would walk out of the hospital turn left then right and then walk 47 miles. That is where her home is. She purchased it. Her cousin is keeping the house for her there for now. Her cousin name is LEATHA JUAREZ and she works for [employer] and she is Lt. Governor. She works with her for the [employer]. Patient stated that nothing would go wrong and she could be discharged to home now if she leaves.  Based on this information patient does not have capacity to leave the hospital    Consultants/Specialty:  Psychiatry     Review of Systems:    Review of Systems   Constitutional: Negative for chills, fever and weight loss.   HENT: Negative for ear pain, hearing loss and tinnitus.    Eyes: Negative for blurred vision, double vision and photophobia.   Respiratory: Negative for cough, hemoptysis and sputum production.    Cardiovascular: Negative for chest pain, palpitations and orthopnea.   Gastrointestinal: Negative for abdominal pain, nausea and vomiting.   Genitourinary: Negative for dysuria, frequency and urgency.   Musculoskeletal: Negative for back pain, myalgias and neck pain.   Skin: Negative for itching and rash.   Neurological: Negative for dizziness, tingling and headaches.   Psychiatric/Behavioral: Negative for depression and suicidal ideas.       Objective Data:   Physical Exam:   Vitals:   Temp:  [36 °C (96.8 °F)-36.3 °C (97.4 °F)] 36.3 °C (97.4 °F)  Pulse:  [61-91] 61  Resp:  [17-18] 17  BP: (123-148)/(69-84) 147/79  SpO2:  [93 %-96 %]  95 %     Physical Exam  Constitutional:       Appearance: Normal appearance.   HENT:      Head: Normocephalic and atraumatic.      Nose: Nose normal.      Mouth/Throat:      Mouth: Mucous membranes are moist.   Eyes:      Extraocular Movements: Extraocular movements intact.      Pupils: Pupils are equal, round, and reactive to light.   Cardiovascular:      Rate and Rhythm: Normal rate and regular rhythm.      Pulses: Normal pulses.      Heart sounds: Normal heart sounds.   Pulmonary:      Effort: Pulmonary effort is normal.      Breath sounds: Normal breath sounds.   Abdominal:      General: Bowel sounds are normal. There is no distension.      Palpations: Abdomen is soft.      Tenderness: There is no abdominal tenderness.   Musculoskeletal:         General: Normal range of motion.      Cervical back: Normal range of motion.   Skin:     General: Skin is warm.   Neurological:      General: No focal deficit present.      Mental Status: She is alert and oriented to person, place, and time.   Psychiatric:         Mood and Affect: Mood normal.         Behavior: Behavior normal.       Labs:   None     Imaging:   None       Hypothyroidism  Assessment & Plan  -chronic history   -Repeat TSH is 15   -On levothyroxine 50 mcg. However patient refuses to take the medication    Delusional disorder (HCC)  Assessment & Plan  -Patient was brought from memory care facility for agitation and behavioral changes.   -Psychiatry evaluated the patient and put her on legal hold as she does not have capacity to make medical decisions   -Neurocognitive eval done on 06/14 by speech showed mild deficits  -Psych recommended Risperidone daily, however patient refuses it   -work up from medical standpoint did not show any reversible cause  -Chart review indicates H/o dementia. MMSE test done today showed score of 30 indicating less likely dementia  -Patient has been pleasant and has not showed any agitation since admission  -Psych re evaluated the  patient and discontinued legal hold  - working on disposition     When asked about being discharge from hospital, patient states:  She would walk out of the hospital turn left then right and then walk 47 miles. That is where her home is. She purchased it. Her cousin is keeping the house for her there for now. Her cousin name is LEATHA JUAREZ and she works for [employer] and she is Lt. Governor. 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 Patient stated that nothing would go wrong and she could be discharged to home now if she leaves.  Based on this information patient does not have the capacity to leave the hospital

## 2021-06-24 NOTE — PROGRESS NOTES
Assessment/description of ears? Pink, intact  Which preventative measures are in place for the ears?    Assessment/description of elbows? Intact, pink  Which preventative measures are in place for the elbows?    Assessment/description of sacrum? Intact, blanching  Which preventative measures are in place for the sacrum?    Assessment/description of heels? Dryness, pink, intact  Which preventative measures are in place for the heels?    Which devices are in place? wanderguard to LLE  Description of skin under devices: Intact, pink, blanching  Which preventative measures are in place under devices? Frequent assessment.    Other:

## 2021-06-24 NOTE — PROGRESS NOTES
Assumed care of pt at shift change, report received. Pt sleeping, easy to arouse. A&Ox3, disoriented to event. Pleasant and cooperative. Denies pain or discomfort. Wanderguard to left ankle. Safety precautions in place.

## 2021-06-25 PROCEDURE — 99224 PR SUBSEQUENT OBSERVATION CARE,LEVEL I: CPT | Mod: GC | Performed by: HOSPITALIST

## 2021-06-25 PROCEDURE — G0378 HOSPITAL OBSERVATION PER HR: HCPCS

## 2021-06-25 PROCEDURE — 97129 THER IVNTJ 1ST 15 MIN: CPT

## 2021-06-25 RX ORDER — POLYETHYLENE GLYCOL 3350 17 G/17G
1 POWDER, FOR SOLUTION ORAL
Status: DISCONTINUED | OUTPATIENT
Start: 2021-06-25 | End: 2022-03-15 | Stop reason: HOSPADM

## 2021-06-25 RX ORDER — AMOXICILLIN 250 MG
2 CAPSULE ORAL 2 TIMES DAILY PRN
Status: DISCONTINUED | OUTPATIENT
Start: 2021-06-25 | End: 2022-03-15 | Stop reason: HOSPADM

## 2021-06-25 RX ORDER — BISACODYL 10 MG
10 SUPPOSITORY, RECTAL RECTAL
Status: DISCONTINUED | OUTPATIENT
Start: 2021-06-25 | End: 2022-01-25

## 2021-06-25 ASSESSMENT — ENCOUNTER SYMPTOMS
FEVER: 0
BLURRED VISION: 0
PALPITATIONS: 0
PHOTOPHOBIA: 0
WEIGHT LOSS: 0
HEARTBURN: 0
SPUTUM PRODUCTION: 0
ORTHOPNEA: 0
CHILLS: 0
COUGH: 0
DOUBLE VISION: 0
NAUSEA: 0
VOMITING: 0
HEMOPTYSIS: 0

## 2021-06-25 ASSESSMENT — PAIN DESCRIPTION - PAIN TYPE: TYPE: ACUTE PAIN

## 2021-06-25 NOTE — THERAPY
"Speech Language Pathology  Daily Treatment     Patient Name: Zully Lin  Age:  70 y.o., Sex:  female  Medical Record #: [id number]  Today's Date: 6/25/2021     Precautions  Precautions: Fall Risk    Assessment    Per MD notes-Has persistent delusions of being govenror and states will walk ~ 40 miles home. Does not have capacity to d/c. Capacity eval.     Pt seen for 15 min with rapid rate of speech with severe confabulations. Pt able to state correct place which was the main meaningful communication during the brief session. Pt briefly redirected to simple orientation, such as SLP asking what major events and celebrations are happening at then end of June and beginning of July. Pt able to state \"rodeo and the 4th\" and then followed with severely confused speech. SLP collaborated with nurs that SLP to d/c at this time. Cog eval completed on the 14th.     Plan  No further SLP cognitive tx.   Discharge secondary to no likely benefit.    Discharge Recommendations: (P)  (dependent on POC and pt status)       06/25/21 1346   Cognitive-Linguistic   Cognitive-Linguistic (WDL) X   Level of Consciousness Alert   Orientation Level Not Oriented to Month;Not Oriented to Day;Not Oriented to Reason   Sustained Attention Severe (2)   Patient / Family Goals   Patient / Family Goal #1 \"I had to kick those people out as I own this place.\"   Goal #1 Outcome Goal not met   Short Term Goals   Short Term Goal # 1 Pt will sustain attention for greater than 30 min during SLP task with min cues.   Goal Outcome # 1 Goal not met   Session Information   Priority 0  (no active tx, severe confabulatory speech)         "

## 2021-06-25 NOTE — PROGRESS NOTES
Assumed care of patient this am, pt a/ox2-3. Pt calm and cooperative in bed at this time. Denies pain, states no needs currently. VSS.    Assessment/description of ears? Intact, blanching   Which preventative measures are in place for the ears? N/A    Assessment/description of elbows? Intact/blanching   Which preventative measures are in place for the elbows? Encourage turning and ambulation     Assessment/description of sacrum? Pink and blanching   Which preventative measures are in place for the sacrum? Encourage ambulation     Assessment/description of heels? Pink and blanching   Which preventative measures are in place for the heels? Encourage offloading, OOB to chair for meals. Walking throughout room.     Which devices are in place? Jack on L ankle   Description of skin under devices: intact/blanching   Which preventative measures are in place under devices? N/A

## 2021-06-25 NOTE — PROGRESS NOTES
Daily Progress Note:     Date of Service: 6/25/2021  Primary Team: [facility] Yellow Team   Attending: Treasure Abad M.D.   Senior Resident: Dr. Argueta  Contact:  [phone]    ID: 70 year old female admitted from outside facility for agitation and behavioral changes with delusional disorder    Subjective:   Patient has no acute concerns, medically stable   However she does not have capacity to make medical decision or leave hospital     Consultants/Specialty:  Psychiatry    Review of Systems:    Review of Systems   Constitutional: Negative for chills, fever and weight loss.   HENT: Negative for ear pain, hearing loss and tinnitus.    Eyes: Negative for blurred vision, double vision and photophobia.   Respiratory: Negative for cough, hemoptysis and sputum production.    Cardiovascular: Negative for chest pain, palpitations and orthopnea.   Gastrointestinal: Negative for heartburn, nausea and vomiting.   Genitourinary: Negative for dysuria, frequency and urgency.   Skin: Negative for itching and rash.       Objective Data:   Physical Exam:   Vitals:   Temp:  [36.1 °C (96.9 °F)-36.5 °C (97.7 °F)] 36.4 °C (97.6 °F)  Pulse:  [70-80] 70  Resp:  [16-17] 16  BP: (122-140)/(65-84) 140/76  SpO2:  [93 %-97 %] 96 %     Physical Exam  Constitutional:       Appearance: Normal appearance.   HENT:      Head: Normocephalic and atraumatic.      Right Ear: Tympanic membrane normal.      Left Ear: Tympanic membrane normal.      Nose: Nose normal.      Mouth/Throat:      Mouth: Mucous membranes are moist.   Eyes:      Extraocular Movements: Extraocular movements intact.      Pupils: Pupils are equal, round, and reactive to light.   Cardiovascular:      Rate and Rhythm: Normal rate and regular rhythm.      Pulses: Normal pulses.      Heart sounds: Normal heart sounds.   Pulmonary:      Effort: Pulmonary effort is normal.      Breath sounds: Normal breath sounds.   Abdominal:      General: Bowel sounds are normal.      Palpations: Abdomen  is soft.   Musculoskeletal:         General: Normal range of motion.      Cervical back: Normal range of motion.   Skin:     General: Skin is warm.   Neurological:      General: No focal deficit present.      Mental Status: She is alert and oriented to person, place, and time.           Labs:   None     Imaging:   None       Delusional disorder (HCC)  Assessment & Plan  -Patient was brought from memory care facility for agitation and behavioral changes.   -Psychiatry evaluated the patient and put her on legal hold as she does not have capacity to make medical decisions   -Neurocognitive eval done on 06/14 by speech showed mild deficits  -Psych recommended Risperidone daily, however patient refuses it   -work up from medical standpoint did not show any reversible cause  -Chart review indicates H/o dementia. MMSE test done today showed score of 30 indicating less likely dementia  -Patient has been pleasant and has not showed any agitation since admission  -Psych re evaluated the patient and discontinued legal hold    When asked about being discharge from hospital, patient states:  She would walk out of the hospital turn left then right and then walk 47 miles. That is where her home is. She purchased it. Her cousin is keeping the house for her there for now. Her cousin name is LEATHA JUAREZ and she works for [employer] and she is Lt. Governor. 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 Patient stated that nothing would go wrong and she could be discharged to home now if she leaves.  Based on this information patient does not have the capacity to leave the hospital  - working on sending her to memory care facility vs transferring her to [facility] service as guardianship takes long time     Hypothyroidism  Assessment & Plan  -chronic history   -Repeat TSH is 15   -On levothyroxine 50 mcg. However patient refuses to take the medication

## 2021-06-25 NOTE — PROGRESS NOTES
Pt is lying in bed A&Ox3, disoriented to event. Pt denies any pain at this time. Wanderguard on left ankle.

## 2021-06-26 PROCEDURE — 99224 PR SUBSEQUENT OBSERVATION CARE,LEVEL I: CPT | Mod: GC | Performed by: HOSPITALIST

## 2021-06-26 PROCEDURE — G0378 HOSPITAL OBSERVATION PER HR: HCPCS

## 2021-06-26 ASSESSMENT — ENCOUNTER SYMPTOMS
ORTHOPNEA: 0
ABDOMINAL PAIN: 0
DOUBLE VISION: 0
NECK PAIN: 0
FEVER: 0
BLURRED VISION: 0
DEPRESSION: 0
TINGLING: 0
PALPITATIONS: 0
VOMITING: 0
MYALGIAS: 0
CHILLS: 0
WEIGHT LOSS: 0
COUGH: 0
SPUTUM PRODUCTION: 0
NAUSEA: 0
BACK PAIN: 0
HEMOPTYSIS: 0
HEADACHES: 0
PHOTOPHOBIA: 0
DIZZINESS: 0

## 2021-06-26 ASSESSMENT — FIBROSIS 4 INDEX: FIB4 SCORE: 1.51

## 2021-06-26 NOTE — PROGRESS NOTES
"Assumed care of patient this am, pt a/ox2, pleasant, states \"everything is great\". Resting in bed. Encourage ambulation. VSS.   "

## 2021-06-26 NOTE — PROGRESS NOTES
Report received by dayshift  RN. Assumed care of pt. Assessment complete. Pt A&Ox3 to event, VSS and on RA. Pt in no apparent signs of distress, denies pain. No signs of agitation observed this shift, in a pleasant mood. Pt is upself and independent, able to verbalize needs. Wanderguard remains on L ankle for safety. Plan of care discussed. Call light within reach, bed locked and in lowest position, and pt has no further questions at this time.         Assessment/description of ears? Intact, blanching   Which preventative measures are in place for the ears? N/A     Assessment/description of elbows? Intact/blanching   Which preventative measures are in place for the elbows? Encourage turning and ambulation      Assessment/description of sacrum? Pink and blanching   Which preventative measures are in place for the sacrum? Encourage ambulation      Assessment/description of heels? Pink and blanching   Which preventative measures are in place for the heels? Encourage frequent turns and ambulation.      Which devices are in place? Jack on L ankle   Description of skin under devices: intact/blanching   Which preventative measures are in place under devices? N/A

## 2021-06-26 NOTE — PROGRESS NOTES
Daily Progress Note:     Date of Service: 6/26/2021  Primary Team: [facility] Yellow Team   Attending: Treasure Abad M.D.   Senior Resident: Dr. Argueta  Contact:  [phone]    ID: 70 year old female admitted from outside facility for agitation and behavioral changes with delusional disorder    Subjective:   No acute concerns, patient does not have capacity to make medical decision, pending placement.     Consultants/Specialty:  Psychiatry     Review of Systems:    Review of Systems   Constitutional: Negative for chills, fever and weight loss.   HENT: Negative for ear pain, hearing loss and tinnitus.    Eyes: Negative for blurred vision, double vision and photophobia.   Respiratory: Negative for cough, hemoptysis and sputum production.    Cardiovascular: Negative for chest pain, palpitations and orthopnea.   Gastrointestinal: Negative for abdominal pain, nausea and vomiting.   Genitourinary: Negative for dysuria, frequency and urgency.   Musculoskeletal: Negative for back pain, myalgias and neck pain.   Skin: Negative for itching and rash.   Neurological: Negative for dizziness, tingling and headaches.   Psychiatric/Behavioral: Negative for depression and suicidal ideas.       Objective Data:   Physical Exam:   Vitals:   Temp:  [36.2 °C (97.1 °F)-36.5 °C (97.7 °F)] 36.2 °C (97.2 °F)  Pulse:  [64-76] 64  Resp:  [16-18] 16  BP: (119-146)/(70-78) 146/78  SpO2:  [95 %-96 %] 96 %     Physical Exam  Constitutional:       Appearance: Normal appearance.   HENT:      Head: Normocephalic and atraumatic.      Nose: Nose normal.      Mouth/Throat:      Mouth: Mucous membranes are moist.   Eyes:      Extraocular Movements: Extraocular movements intact.      Pupils: Pupils are equal, round, and reactive to light.   Cardiovascular:      Rate and Rhythm: Normal rate and regular rhythm.      Pulses: Normal pulses.      Heart sounds: Normal heart sounds.   Pulmonary:      Effort: Pulmonary effort is normal.      Breath sounds: Normal  breath sounds.   Abdominal:      General: Bowel sounds are normal.      Palpations: Abdomen is soft.   Musculoskeletal:         General: Normal range of motion.      Cervical back: Normal range of motion.   Skin:     General: Skin is warm.   Neurological:      General: No focal deficit present.      Mental Status: She is alert and oriented to person, place, and time.   Psychiatric:         Mood and Affect: Mood normal.         Behavior: Behavior normal.           Labs:   None     Imaging:   None       Delusional disorder (HCC)  Assessment & Plan  -Patient was brought from memory care facility for agitation and behavioral changes.   -Psychiatry evaluated the patient and put her on legal hold as she does not have capacity to make medical decisions   -Neurocognitive eval done on 06/14 by speech showed mild deficits  -Psych recommended Risperidone daily, however patient refuses it   -work up from medical standpoint did not show any reversible cause  -Chart review indicates H/o dementia. MMSE test done today showed score of 30 indicating less likely dementia  -Patient has been pleasant and has not showed any agitation since admission  -Psych re evaluated the patient and discontinued legal hold    When asked about being discharge from hospital, patient states:  She would walk out of the hospital turn left then right and then walk 47 miles. That is where her home is. She purchased it. Her cousin is keeping the house for her there for now. Her cousin name is LEATHA JUAREZ and she works for [employer] and she is Lt. Governor. 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 Patient stated that nothing would go wrong and she could be discharged to home now if she leaves.  Based on this information patient does not have the capacity to leave the hospital  - working on sending her to memory care facility vs transferring her to [facility] service as guardianship takes long time      Hypothyroidism  Assessment & Plan  -chronic history   -Repeat TSH is 15   -On levothyroxine 50 mcg. However patient refuses to take the medication

## 2021-06-27 PROCEDURE — 99224 PR SUBSEQUENT OBSERVATION CARE,LEVEL I: CPT | Mod: GC | Performed by: HOSPITALIST

## 2021-06-27 PROCEDURE — G0378 HOSPITAL OBSERVATION PER HR: HCPCS

## 2021-06-27 ASSESSMENT — ENCOUNTER SYMPTOMS
BACK PAIN: 0
COUGH: 0
WEIGHT LOSS: 0
BLURRED VISION: 0
HEMOPTYSIS: 0
TINGLING: 0
ORTHOPNEA: 0
VOMITING: 0
NAUSEA: 0
DEPRESSION: 0
HEADACHES: 0
CHILLS: 0
ABDOMINAL PAIN: 0
SPUTUM PRODUCTION: 0
DIZZINESS: 0
MYALGIAS: 0
PALPITATIONS: 0
FEVER: 0
DOUBLE VISION: 0
PHOTOPHOBIA: 0
NECK PAIN: 0

## 2021-06-27 NOTE — PROGRESS NOTES
Assumed care of pt. Pt is A&Ox4, disoriented to event. VSS and on RA. Pt denies pain or discomfort. Pleasant, but still refusing to take medications. Wanderguard remains on L ankle for safety. All needs met at this time. All safety precautions in place. Call light within reach, bed locked and low, and hourly rounding in place.       Assessment/description of ears? Intact, blanching   Which preventative measures are in place for the ears? N/A     Assessment/description of elbows? Intact/blanching   Which preventative measures are in place for the elbows? Encourage turning and ambulation      Assessment/description of sacrum? Pink and blanching   Which preventative measures are in place for the sacrum? Encourage ambulation      Assessment/description of heels? Pink and blanching   Which preventative measures are in place for the heels? Encourage frequent turns and ambulation.      Which devices are in place? Jack on L ankle   Description of skin under devices: intact/blanching   Which preventative measures are in place under devices? N/A

## 2021-06-27 NOTE — PROGRESS NOTES
Pt is A&Ox3, VSS on RA.  Pt is able to ambulate with SBA.  Pt reports 0/10 pain, medicated per MAR.       Assessment/description of ears? Intact, blanching   Which preventative measures are in place for the ears? N/A     Assessment/description of elbows? Intact/blanching   Which preventative measures are in place for the elbows? Encourage turning and ambulation      Assessment/description of sacrum? Pink and blanching   Which preventative measures are in place for the sacrum? Encourage ambulation      Assessment/description of heels? Pink and blanching   Which preventative measures are in place for the heels? Encourage offloading, OOB to chair for meals. Walking throughout room.      Which devices are in place? Jack on L ankle   Description of skin under devices: intact/blanching   Which preventative measures are in place under devices? N/A

## 2021-06-27 NOTE — PROGRESS NOTES
Daily Progress Note:     Date of Service: 6/27/2021  Primary Team: [facility] IM Yellow Team   Attending: Treasure Abad M.D.   Senior Resident: Dr. Argueta  Contact:  [phone]    ID: 70 year old female admitted from outside facility for agitation and behavioral changes with delusional disorder    Subjective:   Patient is medically stable, pending placement   No acute concerns    Consultants/Specialty:  Psychiatry     Review of Systems:    Review of Systems   Constitutional: Negative for chills, fever and weight loss.   HENT: Negative for ear pain, hearing loss and tinnitus.    Eyes: Negative for blurred vision, double vision and photophobia.   Respiratory: Negative for cough, hemoptysis and sputum production.    Cardiovascular: Negative for chest pain, palpitations and orthopnea.   Gastrointestinal: Negative for abdominal pain, nausea and vomiting.   Genitourinary: Negative for dysuria, frequency and urgency.   Musculoskeletal: Negative for back pain, myalgias and neck pain.   Skin: Negative for rash.   Neurological: Negative for dizziness, tingling and headaches.   Psychiatric/Behavioral: Negative for depression and suicidal ideas.       Objective Data:   Physical Exam:   Vitals:   Temp:  [36 °C (96.8 °F)-36.3 °C (97.4 °F)] 36.3 °C (97.4 °F)  Pulse:  [62-76] 67  Resp:  [16-18] 16  BP: (120-134)/(64-77) 126/64  SpO2:  [93 %-97 %] 96 %     Physical Exam  Constitutional:       Appearance: Normal appearance.   HENT:      Head: Normocephalic and atraumatic.      Nose: Nose normal.      Mouth/Throat:      Mouth: Mucous membranes are moist.   Eyes:      Extraocular Movements: Extraocular movements intact.      Pupils: Pupils are equal, round, and reactive to light.   Cardiovascular:      Rate and Rhythm: Normal rate and regular rhythm.      Pulses: Normal pulses.      Heart sounds: Normal heart sounds.   Pulmonary:      Effort: Pulmonary effort is normal.      Breath sounds: Normal breath sounds.   Abdominal:      General:  Bowel sounds are normal. There is no distension.      Palpations: Abdomen is soft.      Tenderness: There is no abdominal tenderness.   Musculoskeletal:         General: Normal range of motion.      Cervical back: Normal range of motion.   Skin:     General: Skin is warm.   Neurological:      General: No focal deficit present.      Mental Status: She is alert and oriented to person, place, and time.   Psychiatric:         Mood and Affect: Mood normal.           Labs:   None     Imaging:   None       Delusional disorder (HCC)  Assessment & Plan  -Patient was brought from memory care facility for agitation and behavioral changes.   -Psychiatry evaluated the patient and put her on legal hold as she does not have capacity to make medical decisions   -Neurocognitive eval done on 06/14 by speech showed mild deficits  -Psych recommended Risperidone daily, however patient refuses it   -work up from medical standpoint did not show any reversible cause  -Chart review indicates H/o dementia. MMSE test done today showed score of 30 indicating less likely dementia  -Patient has been pleasant and has not showed any agitation since admission  -Psych re evaluated the patient and discontinued legal hold    When asked about being discharge from hospital, patient states:  She would walk out of the hospital turn left then right and then walk 47 miles. That is where her home is. She purchased it. Her cousin is keeping the house for her there for now. Her cousin name is LEATHA JUAREZ and she works for [employer] and she is Lt. Governor. 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 Patient stated that nothing would go wrong and she could be discharged to home now if she leaves.  Based on this information patient does not have the capacity to leave the hospital  - working on sending her to memory care facility vs transferring her to [facility] service as guardianship takes long time      Hypothyroidism  Assessment & Plan  -chronic history   -Repeat TSH is 15   -On levothyroxine 50 mcg. However patient refuses to take the medication

## 2021-06-27 NOTE — PROGRESS NOTES
Assessment/description of ears? Intact, blanching   Which preventative measures are in place for the ears? N/A     Assessment/description of elbows? Intact/blanching   Which preventative measures are in place for the elbows? Encourage turning and ambulation      Assessment/description of sacrum? Pink and blanching   Which preventative measures are in place for the sacrum? Encourage ambulation      Assessment/description of heels? Pink and blanching   Which preventative measures are in place for the heels? Encourage offloading, OOB to chair for meals. Walking throughout room.      Which devices are in place? Jack on L ankle   Description of skin under devices: intact/blanching   Which preventative measures are in place under devices? N/A

## 2021-06-28 PROCEDURE — G0378 HOSPITAL OBSERVATION PER HR: HCPCS

## 2021-06-28 PROCEDURE — 99224 PR SUBSEQUENT OBSERVATION CARE,LEVEL I: CPT | Mod: GC | Performed by: HOSPITALIST

## 2021-06-28 ASSESSMENT — ENCOUNTER SYMPTOMS
NECK PAIN: 0
BACK PAIN: 0
VOMITING: 0
PHOTOPHOBIA: 0
ORTHOPNEA: 0
WEIGHT LOSS: 0
SPUTUM PRODUCTION: 0
NAUSEA: 0
FEVER: 0
CHILLS: 0
DEPRESSION: 0
DOUBLE VISION: 0
TINGLING: 0
MYALGIAS: 0
HEMOPTYSIS: 0
PALPITATIONS: 0
DIZZINESS: 0
ABDOMINAL PAIN: 0
BLURRED VISION: 0
COUGH: 0
HEADACHES: 0

## 2021-06-28 ASSESSMENT — PAIN DESCRIPTION - PAIN TYPE: TYPE: ACUTE PAIN

## 2021-06-28 NOTE — DISCHARGE PLANNING
Per AMBER Hoff's request, DPA resent behavioral health referral to [facility].  Resent at 1210.

## 2021-06-28 NOTE — PROGRESS NOTES
Assumed care of pt. Pt is A&Ox3, disoriented to event and delusional at times, but pleasant. Refuses medications. VSS and on RA. Pt denies pain. Rigoberto remains on L ankle for safety. All needs met at this time. All safety precautions in place. Call light within reach, bed locked and low, and hourly rounding in place.         Assessment/description of ears? Intact, blanching   Which preventative measures are in place for the ears? N/A     Assessment/description of elbows? Intact/blanching   Which preventative measures are in place for the elbows? Encourage turning and ambulation      Assessment/description of sacrum? Pink and blanching   Which preventative measures are in place for the sacrum? Encourage ambulation      Assessment/description of heels? Pink and blanching   Which preventative measures are in place for the heels? Encourage frequent turns and ambulation.      Which devices are in place? Jack on L ankle   Description of skin under devices: intact/blanching   Which preventative measures are in place under devices? N/A

## 2021-06-28 NOTE — PROGRESS NOTES
[facility] [employer]   Internal Medicine Daily Progress Note      Date of Service: 6/28/2021  Primary Team: [facility] Yellow Team   Attending Physician: Treasure Abad M.D.   Resident: Mayelin Lan M.D.   Team Contact: [phone]  Code Status: Full Code      Chief Complaint  Chief Complaint   Patient presents with   • Agitation     Patient is a resident of [facility] for memory care, has hx of dementia. Per facility, patient was more agitated and had acute behavioral changes; unknown baseline        ID  70-year-old female with neuropsychiatric disorder who was sent to the ED on 6/11/2021 by her Memory Care Facility, [facility], with worsening agitation and behavioral changes for Psychiatric evaluation. Medically stable to be discharged back to a Memory Care Facility, refusing all medications, lacks capacity to leave the hospital.      Subjective  Sitting up in bed in NAD, cooperative, calm, pleasant. Oriented to person, place, and time, but states that she owns [employer]. Denies any complaints.       Review of Systems   Constitutional: Negative for chills, fever and weight loss.   HENT: Negative for ear pain, hearing loss and tinnitus.    Eyes: Negative for blurred vision, double vision and photophobia.   Respiratory: Negative for cough, hemoptysis and sputum production.    Cardiovascular: Negative for chest pain, palpitations and orthopnea.   Gastrointestinal: Negative for abdominal pain, nausea and vomiting.   Genitourinary: Negative for dysuria, frequency and urgency.   Musculoskeletal: Negative for back pain, myalgias and neck pain.   Skin: Negative for rash.   Neurological: Negative for dizziness, tingling and headaches.   Psychiatric/Behavioral: Negative for depression and suicidal ideas.       Objective    Vitals:    06/27/21 0720 06/27/21 1524 06/27/21 2000 06/28/21 0408   BP: 126/64 126/72 121/76 111/71   Pulse: 67 74 66 66   Resp: 16 17 20 20   Temp: 36.3 °C (97.4 °F) 36.3 °C (97.4 °F) 36.5 °C (97.7 °F)  36.4 °C (97.5 °F)   TempSrc: Temporal Temporal Temporal Temporal   SpO2: 96% 92% 93% 93%   Weight:       Height:         Intake/Output Summary (Last 24 hours) at 6/28/2021 0810  Last data filed at 6/27/2021 2211  Gross per 24 hour   Intake 2410 ml   Output --   Net 2410 ml     Physical Exam  Constitutional:       Appearance: Normal appearance.   HENT:      Head: Normocephalic and atraumatic.      Nose: Nose normal.      Mouth/Throat:      Mouth: Mucous membranes are moist.   Eyes:      Extraocular Movements: Extraocular movements intact.      Pupils: Pupils are equal, round, and reactive to light.   Cardiovascular:      Rate and Rhythm: Normal rate and regular rhythm.      Pulses: Normal pulses.      Heart sounds: Normal heart sounds.   Pulmonary:      Effort: Pulmonary effort is normal.      Breath sounds: Normal breath sounds.   Abdominal:      General: Bowel sounds are normal. There is no distension.      Palpations: Abdomen is soft.      Tenderness: There is no abdominal tenderness.   Musculoskeletal:         General: Normal range of motion.      Cervical back: Normal range of motion.   Skin:     General: Skin is warm.   Neurological:      General: No focal deficit present.      Mental Status: She is alert and oriented to person, place, and time.   Psychiatric:         Mood and Affect: Mood normal.        Labs    No results for input(s): SODIUM, POTASSIUM, CHLORIDE, CO2, BUN, CREATININE, MAGNESIUM, PHOSPHORUS, CALCIUM in the last 72 hours.  No results for input(s): ALTSGPT, ASTSGOT, ALKPHOSPHAT, TBILIRUBIN, DBILIRUBIN, GAMMAGT, AMYLASE, LIPASE, ALB, PREALBUMIN, GLUCOSE in the last 72 hours.  No results for input(s): RBC, HEMOGLOBIN, HEMATOCRIT, PLATELETCT, PROTHROMBTM, APTT, INR, IRON, FERRITIN, TOTIRONBC in the last 72 hours.           Imaging  No new imaging 6/28/21      Consultants  NA      Assessment and Plan  70-year-old female with schizoaffective disorder, bipolar type who was sent to the ED on 6/11/2021  by her Memory Care Facility, [facility], with worsening agitation and behavioral changes for Psychiatric evaluation. Medically stable to be discharged back to a Memory Care Facility, refusing all medications, lacks capacity to leave the hospital.    Delusional disorder  Assessment & Plan  Sent to the ED from her memory care facility for worsening agitation and behavioral changes.   Psychiatry evaluated the patient. Diagnosed by Psychiatry with schizoaffective disorder, bipolar type. The patient was initially put on a legal hold by Psych for lacking the capacity to make medical decisions.   Cognitive evaluation done by SLP on 6/14/21 showed mild deficits.  Psych recommended risperidone daily, however patient refuses it.  Medical workup did not show any organic etiology or reversible cause.  Chart review indicates history of dementia, but MMSE 30 during this hospitalization.  Patient has been pleasant and has not showed any agitation since admission, legal hold discontinued per Psych re-evaluation, last seen by Psych on 6/22/21 per chart review.     When asked about being discharged from the hospital, patient states:  She would walk out of the hospital turn left then right and then walk 47 miles. That is where her home is. She purchased it. Her cousin is keeping the house for her there for now. Her cousin's name is LEATHA JUAREZ and she works for [employer] and she is a . 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 The patient stated that nothing would go wrong and she could be discharged home now. Based on this information, the patient lacks capacity to leave the hospital.      working on sending her to a memory care facility versus transferring her to the [facility] service if guardianship becomes necessary.     Hypothyroidism  Assessment & Plan  Chronic, TSH 15.600 on 6/11/21, FT4 0.93 on 6/14/21.  Is supposed to be on levothyroxine 50 mcg daily but the patient refuses  to take the medication.

## 2021-06-28 NOTE — PROGRESS NOTES
Pt is A&Ox3, VSS on RA.  Pt is able to ambulate with SBA.  Pt reports 0/10 pain, medicated per MAR.   Wanderguard in place on pt's L ankle    Assessment/description of ears? Intact, blanching   Which preventative measures are in place for the ears? N/A     Assessment/description of elbows? Intact/blanching   Which preventative measures are in place for the elbows? Encourage turning and ambulation      Assessment/description of sacrum? Pink and blanching   Which preventative measures are in place for the sacrum? Encourage ambulation      Assessment/description of heels? Pink and blanching   Which preventative measures are in place for the heels? Encourage offloading, OOB to chair for meals. Walking throughout room.      Which devices are in place? Jack on L ankle   Description of skin under devices: intact/blanching   Which preventative measures are in place under devices? N/A

## 2021-06-29 PROCEDURE — 99224 PR SUBSEQUENT OBSERVATION CARE,LEVEL I: CPT | Mod: GC | Performed by: HOSPITALIST

## 2021-06-29 PROCEDURE — G0378 HOSPITAL OBSERVATION PER HR: HCPCS

## 2021-06-29 ASSESSMENT — ENCOUNTER SYMPTOMS
FEVER: 0
NECK PAIN: 0
SPUTUM PRODUCTION: 0
NAUSEA: 0
CHILLS: 0
MYALGIAS: 0
DIZZINESS: 0
PHOTOPHOBIA: 0
HEADACHES: 0
BLURRED VISION: 0
TINGLING: 0
PALPITATIONS: 0
BACK PAIN: 0
DEPRESSION: 0
COUGH: 0
DOUBLE VISION: 0
WEIGHT LOSS: 0
HEMOPTYSIS: 0
ORTHOPNEA: 0
ABDOMINAL PAIN: 0
VOMITING: 0

## 2021-06-29 ASSESSMENT — PAIN DESCRIPTION - PAIN TYPE
TYPE: ACUTE PAIN
TYPE: ACUTE PAIN

## 2021-06-29 NOTE — PROGRESS NOTES
Assessment/description of ears? Intact, pink, blanching   Which preventative measures are in place for the ears? N/A    Assessment/description of elbows? Intact, pink, blanching   Which preventative measures are in place for the elbows?  - pt turns self side to side, pillows for support     Assessment/description of sacrum? Intact, pink, blanching   Which preventative measures are in place for the sacrum?  -pt repositions frequently, pt ambulatory     Assessment/description of heels? Intact, pink, blanching   Which preventative measures are in place for the heels?  -encouraged use of pillows for offloading     Which devices are in place? wanderguard to left ankle  Description of skin under devices: CDI  Which preventative measures are in place under devices? Site assessment

## 2021-06-29 NOTE — PROGRESS NOTES
Pt is A&Ox2, disoriented to time and place. Wanderguard in place to pt's left ankle. Pt is resting in bed, no signs of labored breathing or pain. Pt on RA. Call light & personal belongings within reach, bed in lowest position & locked. Pt refuses bed alarm.  Fall precautions in place and education provided on how to use call light. Pt updated on plan of care for the shift. Pt declines any additional needs at this time. Hourly rounding in place.

## 2021-06-29 NOTE — PROGRESS NOTES
Bedside report received. Pt is A&Ox2, disoriented to time and place. Pt was reoriented. Pt is resting in bed this morning. Pt has no complaints of pain. Wanderguard in place on Left ankle. POC discussed with pt; all questions answered at this time.

## 2021-06-29 NOTE — PROGRESS NOTES
Daily Progress Note:     Date of Service: 6/29/2021  Primary Team: [facility] IM Yellow Team   Attending: Treasure Abad M.D.   Senior Resident: Dr. Argueta  Contact:  [phone]    ID: 70 year old female admitted from outside facility for agitation and behavioral changes with delusional disorder    Subjective:   No acute concerns, patient is medically stable   Pending placement     Consultants/Specialty:  Psychiatry     Review of Systems:    Review of Systems   Constitutional: Negative for chills, fever and weight loss.   HENT: Negative for ear pain, hearing loss and tinnitus.    Eyes: Negative for blurred vision, double vision and photophobia.   Respiratory: Negative for cough, hemoptysis and sputum production.    Cardiovascular: Negative for chest pain, palpitations and orthopnea.   Gastrointestinal: Negative for abdominal pain, nausea and vomiting.   Genitourinary: Negative for dysuria, frequency and urgency.   Musculoskeletal: Negative for back pain, myalgias and neck pain.   Skin: Negative for itching and rash.   Neurological: Negative for dizziness, tingling and headaches.   Psychiatric/Behavioral: Negative for depression and suicidal ideas.       Objective Data:   Physical Exam:   Vitals:   Temp:  [36 °C (96.8 °F)-36.6 °C (97.8 °F)] 36 °C (96.8 °F)  Pulse:  [63-75] 75  Resp:  [18-19] 18  BP: (128-148)/(71-86) 132/71  SpO2:  [93 %-97 %] 95 %    Physical Exam  Constitutional:       Appearance: Normal appearance.   HENT:      Head: Normocephalic.      Nose: Nose normal.      Mouth/Throat:      Mouth: Mucous membranes are moist.   Eyes:      Extraocular Movements: Extraocular movements intact.      Pupils: Pupils are equal, round, and reactive to light.   Cardiovascular:      Rate and Rhythm: Normal rate and regular rhythm.      Pulses: Normal pulses.      Heart sounds: Normal heart sounds.   Pulmonary:      Effort: Pulmonary effort is normal.      Breath sounds: Normal breath sounds.   Abdominal:      General: Bowel  sounds are normal. There is no distension.      Palpations: Abdomen is soft.      Tenderness: There is no abdominal tenderness.   Musculoskeletal:         General: Normal range of motion.      Cervical back: Normal range of motion.   Neurological:      General: No focal deficit present.      Mental Status: She is alert and oriented to person, place, and time.           Labs:   None     Imaging:   None       Delusional disorder- (present on admission)  Assessment & Plan  Sent to the ED from her memory care facility for worsening agitation and behavioral changes.   Psychiatry evaluated the patient. Diagnosed by Psychiatry with schizoaffective disorder, bipolar type. The patient was initially put on a legal hold by Psych for lacking the capacity to make medical decisions.   Cognitive evaluation done by SLP on 6/14/21 showed mild deficits.  Psych recommended risperidone daily, however patient refuses it.  Medical workup did not show any organic etiology or reversible cause.  Chart review indicates history of dementia, but MMSE 30 during this hospitalization.  Patient has been pleasant and has not showed any agitation since admission, legal hold discontinued per Psych re-evaluation, last seen by Psych on 6/22/21 per chart review.     When asked about being discharged from the hospital, patient states:  She would walk out of the hospital turn left then right and then walk 47 miles. That is where her home is. She purchased it. Her cousin is keeping the house for her there for now. Her cousin's name is LEATHA JUAREZ and she works for [employer] and she is a . 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 The patient stated that nothing would go wrong and she could be discharged home now. Based on this information, the patient lacks capacity to leave the hospital.      working on sending her to a memory care facility versus transferring her to the [facility] service if guardianship  becomes necessary.     Hypothyroidism  Assessment & Plan  Chronic, TSH 15.600 on 6/11/21, FT4 0.93 on 6/14/21.  Is supposed to be on levothyroxine 50 mcg daily but the patient refuses to take the medication.

## 2021-06-30 PROCEDURE — 99224 PR SUBSEQUENT OBSERVATION CARE,LEVEL I: CPT | Mod: GC | Performed by: HOSPITALIST

## 2021-06-30 PROCEDURE — G0378 HOSPITAL OBSERVATION PER HR: HCPCS

## 2021-06-30 ASSESSMENT — ENCOUNTER SYMPTOMS
NECK PAIN: 0
TINGLING: 0
DOUBLE VISION: 0
COUGH: 0
VOMITING: 0
MYALGIAS: 0
NAUSEA: 0
PALPITATIONS: 0
BACK PAIN: 0
PHOTOPHOBIA: 0
BLURRED VISION: 0
HEMOPTYSIS: 0
CHILLS: 0
SPUTUM PRODUCTION: 0
ORTHOPNEA: 0
ABDOMINAL PAIN: 0
HEADACHES: 0
WEIGHT LOSS: 0
DIZZINESS: 0
FEVER: 0
DEPRESSION: 0

## 2021-06-30 ASSESSMENT — LIFESTYLE VARIABLES: SUBSTANCE_ABUSE: 0

## 2021-06-30 ASSESSMENT — PAIN DESCRIPTION - PAIN TYPE
TYPE: ACUTE PAIN
TYPE: ACUTE PAIN

## 2021-06-30 NOTE — PROGRESS NOTES
Daily Progress Note:     Date of Service: 6/30/2021  Primary Team: [facility] IM Yellow Team   Attending: Treasure Abad M.D.   Senior Resident: Dr. Argueta  Contact:  [phone]    ID: 70 year old female admitted from outside facility for agitation and behavioral changes with delusional disorder    Subjective:  No acute concerns, patient is medically stable   Pending placement     Consultants/Specialty:  Psychiatry     Review of Systems:    Review of Systems   Constitutional: Negative for chills, fever and weight loss.   HENT: Negative for ear pain, hearing loss and tinnitus.    Eyes: Negative for blurred vision, double vision and photophobia.   Respiratory: Negative for cough, hemoptysis and sputum production.    Cardiovascular: Negative for chest pain, palpitations and orthopnea.   Gastrointestinal: Negative for abdominal pain, nausea and vomiting.   Genitourinary: Negative for dysuria, frequency and urgency.   Musculoskeletal: Negative for back pain, myalgias and neck pain.   Skin: Negative for itching and rash.   Neurological: Negative for dizziness, tingling and headaches.   Psychiatric/Behavioral: Negative for depression, substance abuse and suicidal ideas.       Objective Data:   Physical Exam:   Vitals:   Temp:  [36 °C (96.8 °F)-36.3 °C (97.4 °F)] 36.1 °C (97 °F)  Pulse:  [65-75] 65  Resp:  [16-18] 18  BP: (116-137)/(65-95) 137/95  SpO2:  [94 %-95 %] 94 %     Physical Exam  HENT:      Head: Normocephalic.      Right Ear: Tympanic membrane normal.      Nose: Nose normal.      Mouth/Throat:      Mouth: Mucous membranes are moist.   Eyes:      Extraocular Movements: Extraocular movements intact.      Pupils: Pupils are equal, round, and reactive to light.   Cardiovascular:      Rate and Rhythm: Normal rate and regular rhythm.      Pulses: Normal pulses.      Heart sounds: Normal heart sounds.   Pulmonary:      Effort: Pulmonary effort is normal.      Breath sounds: Normal breath sounds.   Abdominal:      General:  Bowel sounds are normal.      Palpations: Abdomen is soft.   Musculoskeletal:         General: Normal range of motion.      Cervical back: Normal range of motion.   Skin:     General: Skin is warm.   Neurological:      General: No focal deficit present.      Mental Status: She is alert and oriented to person, place, and time.   Psychiatric:         Mood and Affect: Mood normal.         Behavior: Behavior normal.           Labs:   None     Imaging:   None       Delusional disorder- (present on admission)  Assessment & Plan  Sent to the ED from her memory care facility for worsening agitation and behavioral changes.   Psychiatry evaluated the patient. Diagnosed by Psychiatry with schizoaffective disorder, bipolar type. The patient was initially put on a legal hold by Psych for lacking the capacity to make medical decisions.   Cognitive evaluation done by SLP on 6/14/21 showed mild deficits.  Psych recommended risperidone daily, however patient refuses it.  Medical workup did not show any organic etiology or reversible cause.  Chart review indicates history of dementia, but MMSE 30 during this hospitalization.  Patient has been pleasant and has not showed any agitation since admission, legal hold discontinued per Psych re-evaluation, last seen by Psych on 6/22/21 per chart review.     When asked about being discharged from the hospital, patient states:  She would walk out of the hospital turn left then right and then walk 47 miles. That is where her home is. She purchased it. Her cousin is keeping the house for her there for now. Her cousin's name is LEATHA JUAREZ and she works for [employer] and she is a . 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 The patient stated that nothing would go wrong and she could be discharged home now. Based on this information, the patient lacks capacity to leave the hospital.      working on sending her to a memory care facility versus  transferring her to the [facility] service if guardianship becomes necessary.     Hypothyroidism  Assessment & Plan  Chronic, TSH 15.600 on 6/11/21, FT4 0.93 on 6/14/21.  Is supposed to be on levothyroxine 50 mcg daily but the patient refuses to take the medication.

## 2021-06-30 NOTE — PROGRESS NOTES
Assessment/description of ears? Intact, pink, blanching   Which preventative measures are in place for the ears? N/A    Assessment/description of elbows? Intact, pink, blanching   Which preventative measures are in place for the elbows?  -pt repositions self frequently; offered preventative mepilex, pt refuses     Assessment/description of sacrum? Intact, pink, blanching   Which preventative measures are in place for the sacrum?  -pt repositions frequently     Assessment/description of heels? Intact, pink, blanching   Which preventative measures are in place for the heels?  - pillows for support; pt refuses preventative mepilex     Which devices are in place? Wanderguard to left ankle  Description of skin under devices: CDI  Which preventative measures are in place under devices?   -q shift assessment

## 2021-06-30 NOTE — PROGRESS NOTES
Assumed care of pt at 1140 from Ayad ZENG. Pt istting in bed watching tv at this time states no needs. WG in tact and working on left ankle. Updated on POC for rest of shift and pt states no questions at this time.

## 2021-06-30 NOTE — PROGRESS NOTES
Received report from Chitra ZENG and assumed care of pt.  Pt resting in bed, denied any needs, wanderguard to L ankle.  Frequent rounding in place.

## 2021-06-30 NOTE — PROGRESS NOTES
Bedside report received. Pt is A&Ox3 not to situation. Pt was reoriented. Pt is resting in bed most of the day watching T.V. Wanderguard in place on Left ankle.  POC discussed with pt; all questions answered at this time.

## 2021-07-01 PROCEDURE — 99224 PR SUBSEQUENT OBSERVATION CARE,LEVEL I: CPT | Performed by: STUDENT IN AN ORGANIZED HEALTH CARE EDUCATION/TRAINING PROGRAM

## 2021-07-01 PROCEDURE — G0378 HOSPITAL OBSERVATION PER HR: HCPCS

## 2021-07-01 ASSESSMENT — ENCOUNTER SYMPTOMS
ABDOMINAL PAIN: 0
DIARRHEA: 0
FEVER: 0
CHILLS: 0
NERVOUS/ANXIOUS: 0
VOMITING: 0
NAUSEA: 0
SHORTNESS OF BREATH: 0

## 2021-07-01 ASSESSMENT — PAIN DESCRIPTION - PAIN TYPE: TYPE: ACUTE PAIN

## 2021-07-01 NOTE — PROGRESS NOTES
Pt axo x3 at time of assessment and denied any pain.  Pt on RA no s/s of distress.  Pt up to bathroom and ambulates independently, wanderguard to L ankle.  Pt pleasant and cooperative, denied any needs.  Call light within reach, bed locked and in lowest position, treaded socks on pt.

## 2021-07-01 NOTE — PROGRESS NOTES
"[facility] Medicine Daily Progress Note    Date of Service  7/1/2021    Chief Complaint  70 y.o. female admitted 6/11/2021 with agitation and behavioral changes    Hospital Course  \"Patient is a 70 y.o female w/ PMH of dementia, who is a resident at [facility] for memory care, who was brought here by EMS. Her facility sent her here for presumed worsening \"agitation\" and behavioral changes, and requested that she be evaluated by a psychiatrist before they will allow for her to be admitted back to the facility.\"     Interval Problem Update  Patient seen examined at bedside.  No overnight events.  Pending placement.  T-max 97.9, /87.  No new labs.    Consultants/Specialty  Psychiatry    Code Status  Full Code    Disposition  Pending placement    Review of Systems  Review of Systems   Constitutional: Negative for chills and fever.   Respiratory: Negative for shortness of breath.    Cardiovascular: Negative for chest pain.   Gastrointestinal: Negative for abdominal pain, diarrhea, nausea and vomiting.   Genitourinary: Negative for dysuria.   Psychiatric/Behavioral: The patient is not nervous/anxious.    All other systems reviewed and are negative.       Physical Exam  Temp:  [35.9 °C (96.7 °F)-36.6 °C (97.9 °F)] 36.6 °C (97.9 °F)  Pulse:  [64-78] 64  Resp:  [16-18] 16  BP: (117-140)/(64-87) 133/87  SpO2:  [98 %] 98 %    Physical Exam  Vitals and nursing note reviewed.   Constitutional:       General: She is not in acute distress.     Appearance: Normal appearance.   HENT:      Head: Normocephalic and atraumatic.   Eyes:      General: No scleral icterus.        Right eye: No discharge.         Left eye: No discharge.   Cardiovascular:      Rate and Rhythm: Normal rate and regular rhythm.      Heart sounds: Normal heart sounds. No murmur heard.     Pulmonary:      Effort: No respiratory distress.      Breath sounds: No wheezing or rales.   Abdominal:      General: There is no distension.      Tenderness: There is no " abdominal tenderness. There is no guarding.   Musculoskeletal:         General: No tenderness.      Right lower leg: No edema.      Left lower leg: No edema.   Skin:     General: Skin is warm and dry.      Coloration: Skin is not jaundiced.   Neurological:      Mental Status: She is alert and oriented to person, place, and time.      Cranial Nerves: No cranial nerve deficit.   Psychiatric:         Mood and Affect: Affect is flat.         Behavior: Behavior is cooperative.         Fluids  No intake or output data in the 24 hours ending 07/01/21 1511    Laboratory                        Imaging  DX-CHEST-LIMITED (1 VIEW)   Final Result      Left basilar atelectasis. No focal consolidation. No effusions.           Assessment/Plan  Hypothyroidism  Assessment & Plan  Chronic, TSH 15.600 on 6/11/21, FT4 0.93 on 6/14/21.  Is supposed to be on levothyroxine 50 mcg daily but the patient refuses to take the medication.    Delusional disorder- (present on admission)  Assessment & Plan  Sent to the ED from her memory care facility for worsening agitation and behavioral changes.   Psychiatry evaluated the patient. Diagnosed by Psychiatry with schizoaffective disorder, bipolar type. The patient was initially put on a legal hold by Psych for lacking the capacity to make medical decisions.   Cognitive evaluation done by SLP on 6/14/21 showed mild deficits.  Psych recommended risperidone daily, however patient refuses it.  Medical workup did not show any organic etiology or reversible cause.  Chart review indicates history of dementia, but MMSE 30 during this hospitalization.  Patient has been pleasant and has not showed any agitation since admission, legal hold discontinued per Psych re-evaluation, last seen by Psych on 6/22/21 per chart review.     When asked about being discharged from the hospital, patient states:  She would walk out of the hospital turn left then right and then walk 47 miles. That is where her home is. She  purchased it. Her cousin is keeping the house for her there for now. Her cousin's name is LEATHA JUAREZ and she works for [employer] and she is a . 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 The patient stated that nothing would go wrong and she could be discharged home now. Based on this information, the patient lacks capacity to leave the hospital.      working on sending her to a memory care facility versus transferring her to the APRN service if guardianship becomes necessary.          VTE prophylaxis: Lovenox

## 2021-07-01 NOTE — PROGRESS NOTES
1 RN Skin Assessment completed.   Patient's risk of skin breakdown: Low    Devices in place and skin assessed under:   [] Pulse Ox  [] PIV [] Central Line [] SCDs []Purewick  [] Mendosa  []Condom Cath   [] BMS        []  Cortrak   []  Oxymask   [] Bipap    [] Nasal Canula   [x] N/A   [] Other(specify):     Right Ear  [x] WDL                or           [] Skin issue present (please provide assessment/description below)    Left Ear  [x] WDL                or           [] Skin issue present (please provide assessment/description below)    Right Elbow:  [x] WDL               or           [] Skin issue present (please provide assessment/description below)    Left Elbow:   [x] WDL                or           [] Skin issue present (please provide assessment/description below)    Coccyx/Buttocks:  [x] WDL                or           [] Skin issue present (please provide assessment/description below)    Right Heel/Foot/Toes:  [x] WDL                or           [] Skin issue present (please provide assessment/description below)    Left Heel/Foot/Toes:   [x] WDL              or           [] Skin issue present (please provide assessment/description below)      **Describe any other skin issues in areas not already listed from above list:   [x] N/A    Interventions In Place: Pillows    **If any new or digressing skin issues are found, fill out the selection below.    Possible Skin Injury No  Pictures Uploaded Into Epic: N/A  Wound Consult Placed: N/A  RN Wound Prevention Protocol Ordered: No    What new preventative interventions did you add? N/A

## 2021-07-01 NOTE — PROGRESS NOTES
Assessment/description of ears? Intact, pink, blanching   Which preventative measures are in place for the ears?  N/A    Assessment/description of elbows? Intact, pink, blanching   Which preventative measures are in place for the elbows?  - pt turns self side to side frequently, pillows in use for support     Assessment/description of sacrum? Pt refuses assessment at this time  Which preventative measures are in place for the sacrum?  -pt repositions self frequently, pt is ambulatory     Assessment/description of heels? Intact, pink, blanching   Which preventative measures are in place for the heels?  -encouraged use of preventative mepilex, extra pillows to float heels    Which devices are in place? Wanderguard to left ankle  Description of skin under devices: CDI  Which preventative measures are in place under devices?  q shift assessment

## 2021-07-01 NOTE — PROGRESS NOTES
Pt is A&Ox2, disoriented to place and situation. Pt is resting in bed, no signs of labored breathing on RA. Pt denies pain. Wanderguard in place to left ankle. Call light & personal belongings within reach, bed in lowest position & locked. Fall precautions in place and education provided on how to use call light. Pt updated on plan of care for the shift.Hourly rounding in place.

## 2021-07-01 NOTE — DISCHARGE PLANNING
Medical Social Work  PC to patient's daughter Sharonda, [phone], provided her email address again  Sharonda <[email or web address]>  GOMEZ emailed the following information to her  1. Guardianship packet  2. Link to [facility], forms/procedures for filling for guardianship  3. ID/Medicare copy    GOMEZ will have attending complete the physicians statement, once completed will forward to Sharonda.

## 2021-07-02 PROCEDURE — 99224 PR SUBSEQUENT OBSERVATION CARE,LEVEL I: CPT | Performed by: STUDENT IN AN ORGANIZED HEALTH CARE EDUCATION/TRAINING PROGRAM

## 2021-07-02 PROCEDURE — G0378 HOSPITAL OBSERVATION PER HR: HCPCS

## 2021-07-02 ASSESSMENT — ENCOUNTER SYMPTOMS
SHORTNESS OF BREATH: 0
NERVOUS/ANXIOUS: 0
NAUSEA: 0
CHILLS: 0
DIARRHEA: 0
VOMITING: 0
FEVER: 0
ABDOMINAL PAIN: 0

## 2021-07-02 ASSESSMENT — PAIN DESCRIPTION - PAIN TYPE: TYPE: ACUTE PAIN

## 2021-07-02 NOTE — PROGRESS NOTES
1 RN Skin Assessment completed.   Patient's risk of skin breakdown: Low     Devices in place and skin assessed under:   []? Pulse Ox  []? PIV []? Central Line []? SCDs []?Purewick  []? Mendosa  []?Condom Cath   []? BMS        []?  Cortrak   []?  Oxymask   []? Bipap    []? Nasal Canula   [x]? N/A   []? Other(specify):      Right Ear  [x]? WDL                or           []? Skin issue present (please provide assessment/description below)     Left Ear  [x]? WDL                or           []? Skin issue present (please provide assessment/description below)     Right Elbow:  [x]? WDL               or           []? Skin issue present (please provide assessment/description below)     Left Elbow:   [x]? WDL                or           []? Skin issue present (please provide assessment/description below)     Coccyx/Buttocks:  [x]? WDL                or           []? Skin issue present (please provide assessment/description below)     Right Heel/Foot/Toes:  [x]? WDL                or           []? Skin issue present (please provide assessment/description below)     Left Heel/Foot/Toes:   [x]? WDL              or           []? Skin issue present (please provide assessment/description below)        **Describe any other skin issues in areas not already listed from above list:   [x]? N/A     Interventions In Place: Pillows     **If any new or digressing skin issues are found, fill out the selection below.     Possible Skin Injury No  Pictures Uploaded Into Epic: N/A  Wound Consult Placed: N/A  RN Wound Prevention Protocol Ordered: No    What new preventative interventions did you add? N/A

## 2021-07-02 NOTE — PROGRESS NOTES
"[facility] Medicine Daily Progress Note    Date of Service  7/2/2021    Chief Complaint  70 y.o. female admitted 6/11/2021 with agitation and behavioral changes    Hospital Course  \"Patient is a 70 y.o female w/ PMH of dementia, who is a resident at [facility] for memory care, who was brought here by EMS. Her facility sent her here for presumed worsening \"agitation\" and behavioral changes, and requested that she be evaluated by a psychiatrist before they will allow for her to be admitted back to the facility.\"     Interval Problem Update  Patient seen examined at bedside.  No overnight events.  Pending placement.  T-max 97.9, /87.  No new labs.  7/2: Patient seen examined at bedside.  She has no complaints and is awake and alert watching TV.  T-max 97.6, /70.  Continue to await placement.    Consultants/Specialty  Psychiatry    Code Status  Full Code    Disposition  Pending placement    Review of Systems  Review of Systems   Constitutional: Negative for chills and fever.   Respiratory: Negative for shortness of breath.    Cardiovascular: Negative for chest pain and leg swelling.   Gastrointestinal: Negative for abdominal pain, diarrhea, nausea and vomiting.   Genitourinary: Negative for dysuria.   Psychiatric/Behavioral: The patient is not nervous/anxious.    All other systems reviewed and are negative.       Physical Exam  Temp:  [36.3 °C (97.4 °F)-36.4 °C (97.6 °F)] 36.4 °C (97.6 °F)  Pulse:  [65-75] 71  Resp:  [17-20] 20  BP: (117-140)/(58-75) 140/70  SpO2:  [93 %-97 %] 97 %    Physical Exam  Vitals and nursing note reviewed.   Constitutional:       General: She is not in acute distress.     Appearance: Normal appearance.   HENT:      Head: Normocephalic and atraumatic.   Eyes:      General: No scleral icterus.        Right eye: No discharge.         Left eye: No discharge.   Cardiovascular:      Rate and Rhythm: Normal rate and regular rhythm.      Heart sounds: Normal heart sounds. No murmur heard. "     Pulmonary:      Effort: No respiratory distress.      Breath sounds: No wheezing or rales.   Abdominal:      General: There is no distension.      Tenderness: There is no abdominal tenderness. There is no guarding.   Musculoskeletal:         General: No tenderness.      Right lower leg: No edema.      Left lower leg: No edema.   Skin:     General: Skin is warm and dry.      Coloration: Skin is not jaundiced.   Neurological:      Mental Status: She is alert and oriented to person, place, and time.      Cranial Nerves: No cranial nerve deficit.   Psychiatric:         Mood and Affect: Affect is flat.         Behavior: Behavior is cooperative.         Fluids    Intake/Output Summary (Last 24 hours) at 7/2/2021 1519  Last data filed at 7/2/2021 1115  Gross per 24 hour   Intake 240 ml   Output --   Net 240 ml       Laboratory                        Imaging  DX-CHEST-LIMITED (1 VIEW)   Final Result      Left basilar atelectasis. No focal consolidation. No effusions.           Assessment/Plan  Hypothyroidism  Assessment & Plan  Chronic, TSH 15.600 on 6/11/21, FT4 0.93 on 6/14/21.  Is supposed to be on levothyroxine 50 mcg daily but the patient refuses to take the medication.    Delusional disorder- (present on admission)  Assessment & Plan  Sent to the ED from her memory care facility for worsening agitation and behavioral changes.   Psychiatry evaluated the patient. Diagnosed by Psychiatry with schizoaffective disorder, bipolar type. The patient was initially put on a legal hold by Psych for lacking the capacity to make medical decisions.   Cognitive evaluation done by SLP on 6/14/21 showed mild deficits.  Psych recommended risperidone daily, however patient refuses it.  Medical workup did not show any organic etiology or reversible cause.  Chart review indicates history of dementia, but MMSE 30 during this hospitalization.  Patient has been pleasant and has not showed any agitation since admission, legal hold  discontinued per Psych re-evaluation, last seen by Psych on 6/22/21 per chart review.     When asked about being discharged from the hospital, patient states:  She would walk out of the hospital turn left then right and then walk 47 miles. That is where her home is. She purchased it. Her cousin is keeping the house for her there for now. Her cousin's name is LEATHA JUAREZ and she works for [employer] and she is a . 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 The patient stated that nothing would go wrong and she could be discharged home now. Based on this information, the patient lacks capacity to leave the hospital.      working on sending her to a memory care facility versus transferring her to the APRN service if guardianship becomes necessary.   7/1: Guardianship paperwork started.  No episodes of agitation last night.       VTE prophylaxis: Lovenox    I have performed a physical exam and reviewed and updated ROS and Plan today (7/2/2021). In review of yesterday's note (7/1/2021), there are no changes except as documented above.

## 2021-07-02 NOTE — PROGRESS NOTES
Pt is resting in bed. Denied pain. Pt denied further needs. Pt ambulates self and steadily. Call light and belongings within reach.      1 RN Skin Assessment completed.   Patient's risk of skin breakdown: Low     Devices in place and skin assessed under:   []? Pulse Ox  []? PIV []? Central Line []? SCDs []?Purewick  []? Mendosa  []?Condom Cath   []? BMS        []?  Cortrak   []?  Oxymask   []? Bipap    []? Nasal Canula   [x]? N/A   []? Other(specify):      Right Ear  [x]? WDL                or           []? Skin issue present (please provide assessment/description below)     Left Ear  [x]? WDL                or           []? Skin issue present (please provide assessment/description below)     Right Elbow:  [x]? WDL               or           []? Skin issue present (please provide assessment/description below)     Left Elbow:   [x]? WDL                or           []? Skin issue present (please provide assessment/description below)     Coccyx/Buttocks:  [x]? WDL                or           []? Skin issue present (please provide assessment/description below)     Right Heel/Foot/Toes:  [x]? WDL                or           []? Skin issue present (please provide assessment/description below)     Left Heel/Foot/Toes:   [x]? WDL              or           []? Skin issue present (please provide assessment/description below)        **Describe any other skin issues in areas not already listed from above list:   [x]? N/A     Interventions In Place: Pillows     **If any new or digressing skin issues are found, fill out the selection below.     Possible Skin Injury No  Pictures Uploaded Into Epic: N/A  Wound Consult Placed: N/A  RN Wound Prevention Protocol Ordered: No    What new preventative interventions did you add? N/A

## 2021-07-02 NOTE — PROGRESS NOTES
Pt axo x3 at time of assessment, disoriented to situation.  Pt denied pain, sitting up in bed watching TV.  Pt denied any needs.  Educated on use of call light, treaded socks on pt, educated on fall risk.  Wanderguard to L ankle, frequent rounding in place.

## 2021-07-03 PROCEDURE — 99224 PR SUBSEQUENT OBSERVATION CARE,LEVEL I: CPT | Performed by: STUDENT IN AN ORGANIZED HEALTH CARE EDUCATION/TRAINING PROGRAM

## 2021-07-03 PROCEDURE — G0378 HOSPITAL OBSERVATION PER HR: HCPCS

## 2021-07-03 ASSESSMENT — ENCOUNTER SYMPTOMS
FEVER: 0
ABDOMINAL PAIN: 0
NERVOUS/ANXIOUS: 0
SHORTNESS OF BREATH: 0
DIARRHEA: 0
VOMITING: 0
NAUSEA: 0
CHILLS: 0

## 2021-07-03 ASSESSMENT — PAIN DESCRIPTION - PAIN TYPE
TYPE: ACUTE PAIN
TYPE: ACUTE PAIN

## 2021-07-03 NOTE — PROGRESS NOTES
"Hospital Medicine Daily Progress Note    Date of Service  7/3/2021    Chief Complaint  70 y.o. female admitted 6/11/2021 with agitation and behavioral changes    Hospital Course  \"Patient is a 70 y.o female w/ PMH of dementia, who is a resident at [facility] for memory care, who was brought here by EMS. Her facility sent her here for presumed worsening \"agitation\" and behavioral changes, and requested that she be evaluated by a psychiatrist before they will allow for her to be admitted back to the facility.\"     Interval Problem Update  Patient seen examined at bedside.  No overnight events.  Pending placement.  T-max 97.9, /87.  No new labs.  7/2: Patient seen examined at bedside.  She has no complaints and is awake and alert watching TV.  T-max 97.6, /70.  Continue to await placement.  7/3: Patient has no complaints.  She is watching how to cook chicken today.  She denies any anxiety, depression, pain, shortness of breath, chest pain.  Vitals stable.    Consultants/Specialty  Psychiatry    Code Status  Full Code    Disposition  Pending placement    Review of Systems  Review of Systems   Constitutional: Negative for chills and fever.   Respiratory: Negative for shortness of breath.    Cardiovascular: Negative for chest pain and leg swelling.   Gastrointestinal: Negative for abdominal pain, diarrhea, nausea and vomiting.   Genitourinary: Negative for dysuria.   Psychiatric/Behavioral: The patient is not nervous/anxious.    All other systems reviewed and are negative.       Physical Exam  Temp:  [36.5 °C (97.7 °F)-36.8 °C (98.2 °F)] 36.8 °C (98.2 °F)  Pulse:  [65-90] 69  Resp:  [17-20] 17  BP: (122-144)/(72-85) 133/85  SpO2:  [95 %-96 %] 95 %    Physical Exam  Vitals and nursing note reviewed.   Constitutional:       General: She is not in acute distress.     Appearance: Normal appearance.   HENT:      Head: Normocephalic and atraumatic.   Eyes:      General: No scleral icterus.        Right eye: No " discharge.         Left eye: No discharge.   Cardiovascular:      Rate and Rhythm: Normal rate and regular rhythm.      Heart sounds: Normal heart sounds. No murmur heard.     Pulmonary:      Effort: No respiratory distress.      Breath sounds: No wheezing or rales.   Abdominal:      General: There is no distension.      Tenderness: There is no abdominal tenderness. There is no guarding.   Musculoskeletal:         General: No tenderness.      Right lower leg: No edema.      Left lower leg: No edema.   Skin:     General: Skin is warm and dry.      Coloration: Skin is not jaundiced.   Neurological:      Mental Status: She is alert and oriented to person, place, and time.      Cranial Nerves: No cranial nerve deficit.   Psychiatric:         Mood and Affect: Affect is flat.         Behavior: Behavior is cooperative.         Fluids    Intake/Output Summary (Last 24 hours) at 7/3/2021 1156  Last data filed at 7/2/2021 2030  Gross per 24 hour   Intake 150 ml   Output --   Net 150 ml       Laboratory                        Imaging  DX-CHEST-LIMITED (1 VIEW)   Final Result      Left basilar atelectasis. No focal consolidation. No effusions.           Assessment/Plan  Hypothyroidism  Assessment & Plan  Chronic, TSH 15.600 on 6/11/21, FT4 0.93 on 6/14/21.  Is supposed to be on levothyroxine 50 mcg daily but the patient refuses to take the medication.    Delusional disorder- (present on admission)  Assessment & Plan  Sent to the ED from her memory care facility for worsening agitation and behavioral changes.   Psychiatry evaluated the patient. Diagnosed by Psychiatry with schizoaffective disorder, bipolar type. The patient was initially put on a legal hold by Psych for lacking the capacity to make medical decisions.   Cognitive evaluation done by SLP on 6/14/21 showed mild deficits.  Psych recommended risperidone daily, however patient refuses it.  Medical workup did not show any organic etiology or reversible cause.  Chart  review indicates history of dementia, but MMSE 30 during this hospitalization.  Patient has been pleasant and has not showed any agitation since admission, legal hold discontinued per Psych re-evaluation, last seen by Psych on 6/22/21 per chart review.     When asked about being discharged from the hospital, patient states:  She would walk out of the hospital turn left then right and then walk 47 miles. That is where her home is. She purchased it. Her cousin is keeping the house for her there for now. Her cousin's name is LEATHA JUAREZ and she works for [employer] and she is a dscovered. 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 The patient stated that nothing would go wrong and she could be discharged home now. Based on this information, the patient lacks capacity to leave the hospital.      working on sending her to a memory care facility versus transferring her to the APRN service if guardianship becomes necessary.   7/1: Guardianship paperwork started.  No episodes of agitation last night.       VTE prophylaxis: Lovenox    I have performed a physical exam and reviewed and updated ROS and Plan today (7/3/2021). In review of yesterday's note (7/2/2021), there are no changes except as documented above.

## 2021-07-03 NOTE — PROGRESS NOTES
Pt alert and oriented x3, on room air, sitting up in bed. Dinner tray consumed 90%. Educated on Enoxaparin administration, importance and benefits. Pt able to ambulate with steady gait. Linen changed and hygiene needs provided. Safety precautions in place.       1 RN Skin Assessment completed.   Patient's risk of skin breakdown: Low    Devices in place and skin assessed under:   [] Pulse Ox  [] PIV [] Central Line [] SCDs []Purewick  [] Mendosa  []Condom Cath   [] BMS        []  Cortrak   []  Oxymask   [] Bipap    [] Nasal Canula   [x] N/A   [] Other(specify):     Right Ear  [x] WDL                or           [] Skin issue present (please provide assessment/description below)    Left Ear  [x] WDL                or           [] Skin issue present (please provide assessment/description below)    Right Elbow:  [x] WDL               or           [] Skin issue present (please provide assessment/description below)    Left Elbow:   [x] WDL                or           [] Skin issue present (please provide assessment/description below)    Coccyx/Buttocks:  [x] WDL                or           [] Skin issue present (please provide assessment/description below)    Right Heel/Foot/Toes:  [x] WDL                or           [] Skin issue present (please provide assessment/description below)    Left Heel/Foot/Toes:   [x] WDL              or           [] Skin issue present (please provide assessment/description below)      **Describe any other skin issues in areas not already listed from above list:   [x] N/A

## 2021-07-03 NOTE — PROGRESS NOTES
Assumed care of patient at bedside report from RN. Updated on POC. Patient currently A & O x 3, disoriented to time; on room air; up self; without complaints of acute pain. Wandergaurd in place. Call light within reach. Whiteboard updated. Fall precautions in place. Bed locked and in lowest position. All questions answered. No other needs indicated at this time.

## 2021-07-03 NOTE — PROGRESS NOTES
AAOx3, disoriented to situation - pleasant this AM. Currently sitting up in bed, watching TV - asking for ice water. C/o 0/10 pain, declining intervention at this time. -N/V. -N/T. Denies new onset of chest pain/SOB. +BS in all 4 quadrants, last BM yesterday per pt. Room air, satting > 90%. POC discussed, denies further needs at this time. Call light within reach & hourly rounding in place.

## 2021-07-04 PROCEDURE — 99224 PR SUBSEQUENT OBSERVATION CARE,LEVEL I: CPT | Performed by: STUDENT IN AN ORGANIZED HEALTH CARE EDUCATION/TRAINING PROGRAM

## 2021-07-04 PROCEDURE — G0378 HOSPITAL OBSERVATION PER HR: HCPCS

## 2021-07-04 ASSESSMENT — ENCOUNTER SYMPTOMS
NAUSEA: 0
DIARRHEA: 0
SHORTNESS OF BREATH: 0
VOMITING: 0
FEVER: 0
ABDOMINAL PAIN: 0
NERVOUS/ANXIOUS: 0
CHILLS: 0

## 2021-07-04 ASSESSMENT — PAIN DESCRIPTION - PAIN TYPE: TYPE: ACUTE PAIN
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
## 2021-07-05 PROBLEM — F25.0 SCHIZOAFFECTIVE DISORDER, BIPOLAR TYPE (HCC): Status: ACTIVE | Noted: 2021-07-05

## 2021-07-05 PROBLEM — Z86.59 HISTORY OF DEMENTIA: Status: ACTIVE | Noted: 2021-07-05

## 2021-07-05 PROCEDURE — G0378 HOSPITAL OBSERVATION PER HR: HCPCS

## 2021-07-05 PROCEDURE — 99224 PR SUBSEQUENT OBSERVATION CARE,LEVEL I: CPT | Performed by: STUDENT IN AN ORGANIZED HEALTH CARE EDUCATION/TRAINING PROGRAM

## 2021-07-05 ASSESSMENT — ENCOUNTER SYMPTOMS
SHORTNESS OF BREATH: 0
DIARRHEA: 0
CHILLS: 0
NAUSEA: 0
VOMITING: 0
NERVOUS/ANXIOUS: 0
FEVER: 0
ABDOMINAL PAIN: 0

## 2021-07-05 ASSESSMENT — PAIN DESCRIPTION - PAIN TYPE
TYPE: ACUTE PAIN
TYPE: ACUTE PAIN

## 2021-07-05 NOTE — PROGRESS NOTES
1 RN Skin Assessment completed.   Patient's risk of skin breakdown: Low    Devices in place and skin assessed under:   [] Pulse Ox  [] PIV [] Central Line [] SCDs []Purewick  [] Mendosa  []Condom Cath   [] BMS        []  Cortrak   []  Oxymask   [] Bipap    [] Nasal Canula   [] N/A   [x] Other(specify): left anle wanderguard  Right Ear  [x] WDL                or           [] Skin issue present (please provide assessment/description below)    Left Ear  [x] WDL                or           [] Skin issue present (please provide assessment/description below)    Right Elbow:  [x] WDL               or           [] Skin issue present (please provide assessment/description below)    Left Elbow:   [x] WDL                or           [] Skin issue present (please provide assessment/description below)    Coccyx/Buttocks:  [x] WDL                or           [] Skin issue present (please provide assessment/description below)    Right Heel/Foot/Toes:  [x] WDL                or           [] Skin issue present (please provide assessment/description below)    Left Heel/Foot/Toes:   [x] WDL              or           [] Skin issue present (please provide assessment/description below)      **Describe any other skin issues in areas not already listed from above list:   [x] N/A    Skin intact, patient t/p independently     Interventions In Place: Pillows    **If any new or digressing skin issues are found, fill out the selection below.    Possible Skin Injury No  Pictures Uploaded Into Epic: N/A  Wound Consult Placed: N/A  RN Wound Prevention Protocol Ordered: No    What new preventative interventions did you add? Pillows

## 2021-07-05 NOTE — ASSESSMENT & PLAN NOTE
-Patient is now taking abilify with foods/drinks.  Transition to long-acting medications once stable on orals for 2 weeks.

## 2021-07-05 NOTE — PROGRESS NOTES
Patient received at start of shift. Patient AxOx3, confused to situation, claiming they took her here because she bought a property and they wouldn't give her the keys. Vss, no complaints of pain. Fall and safety precautions in place, patient out of bed independently, refusing to ambulate for staff. Frequent rounding in place, patient pleasantly resting in bed, all needs met, no s./s of discomfort or distress. Will continue to monitor

## 2021-07-05 NOTE — ASSESSMENT & PLAN NOTE
-At baseline  -Patient currently has guardian, has an accepting group home. Plan to D/C on Tues 3/15.  -Now fully vaccinated.   -Has guardian.

## 2021-07-05 NOTE — ASSESSMENT & PLAN NOTE
-Patient has been refusing medications and blood draw due to paranoia.    -Actively delusional  -Patient was evaluated by psychiatry, her medications were adjusted, patient was deemed incapacitated to make medical decisions. Patient cleared for discharge 6/2021.  Patient currently has guardian, has an accepting group home. Plan to D/C on Tues 3/15.  -Patient taking Abilify. With increased/persistent delusions increased abilify to 5mg daily, may need to further adjust based on improvement. Plan to transition to Abilify ER IM once stable and oral medications for 2 weeks.
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
## 2021-07-05 NOTE — PROGRESS NOTES
"Hospital Medicine Daily Progress Note    Date of Service  7/5/2021    Chief Complaint  70 y.o. female admitted 6/11/2021 with agitation and behavioral changes    Hospital Course  \"Patient is a 70 y.o female w/ PMH of dementia, who is a resident at [facility] for memory care, who was brought here by EMS. Her facility sent her here for presumed worsening \"agitation\" and behavioral changes, and requested that she be evaluated by a psychiatrist before they will allow for her to be admitted back to the facility.\"     Interval Problem Update  Patient seen examined at bedside.  No overnight events.  Pending placement.  T-max 97.9, /87.  No new labs.  7/2: Patient seen examined at bedside.  She has no complaints and is awake and alert watching TV.  T-max 97.6, /70.  Continue to await placement.  7/3: Patient has no complaints.  She is watching how to cook chicken today.  She denies any anxiety, depression, pain, shortness of breath, chest pain.  Vitals stable.  7/4: No overnight events.  Vital signs stable.  Patient has no complaints today.  7/5: Paperwork for guardianship filled out today.  Patient has no complaints today.  Patient continues to refuse antipsychotic medication initiation.    Consultants/Specialty  Psychiatry    Code Status  Full Code    Disposition  Pending placement    Review of Systems  Review of Systems   Constitutional: Negative for chills and fever.   Respiratory: Negative for shortness of breath.    Cardiovascular: Negative for chest pain and leg swelling.   Gastrointestinal: Negative for abdominal pain, diarrhea, nausea and vomiting.   Genitourinary: Negative for dysuria.   Psychiatric/Behavioral: The patient is not nervous/anxious.    All other systems reviewed and are negative.       Physical Exam  Temp:  [36.1 °C (96.9 °F)-36.3 °C (97.4 °F)] 36.1 °C (96.9 °F)  Pulse:  [62-76] 62  Resp:  [16-17] 16  BP: (117-133)/(66-80) 133/80  SpO2:  [95 %-98 %] 97 %    Physical Exam  Vitals and " nursing note reviewed.   Constitutional:       General: She is not in acute distress.     Appearance: Normal appearance.   HENT:      Head: Normocephalic and atraumatic.   Eyes:      General: No scleral icterus.        Right eye: No discharge.         Left eye: No discharge.   Cardiovascular:      Rate and Rhythm: Normal rate and regular rhythm.      Heart sounds: Normal heart sounds. No murmur heard.     Pulmonary:      Effort: No respiratory distress.      Breath sounds: No wheezing or rales.   Abdominal:      General: There is no distension.      Tenderness: There is no abdominal tenderness. There is no guarding.   Musculoskeletal:         General: No tenderness.      Right lower leg: No edema.      Left lower leg: No edema.   Skin:     General: Skin is warm and dry.      Coloration: Skin is not jaundiced.   Neurological:      Mental Status: She is alert and oriented to person, place, and time.      Cranial Nerves: No cranial nerve deficit.   Psychiatric:         Mood and Affect: Affect is flat.         Behavior: Behavior is cooperative.         Fluids    Intake/Output Summary (Last 24 hours) at 7/5/2021 1044  Last data filed at 7/4/2021 1416  Gross per 24 hour   Intake 480 ml   Output --   Net 480 ml       Laboratory                        Imaging  DX-CHEST-LIMITED (1 VIEW)   Final Result      Left basilar atelectasis. No focal consolidation. No effusions.           Assessment/Plan  History of dementia  Assessment & Plan  Has a reported history of dementia.  Applying for guardianship    Schizoaffective disorder, bipolar type (HCC)  Assessment & Plan  History of schizoaffective disorder bipolar type.  Seen by psychiatry  Patient refusing medications, therefore none prescribed at this time    Hypothyroidism  Assessment & Plan  Chronic, TSH 15.600 on 6/11/21, FT4 0.93 on 6/14/21.  Is supposed to be on levothyroxine 50 mcg daily but the patient refuses to take the medication.    Noncompliance with treatment plan-  (present on admission)  Assessment & Plan  Patient refusing antipsychotic medications.    Delusional disorder- (present on admission)  Assessment & Plan  Sent to the ED from her memory care facility for worsening agitation and behavioral changes.   Psychiatry evaluated the patient. Diagnosed by Psychiatry with schizoaffective disorder, bipolar type. The patient was initially put on a legal hold by Psych for lacking the capacity to make medical decisions.   Cognitive evaluation done by SLP on 6/14/21 showed mild deficits.  Psych recommended risperidone daily, however patient refuses it.  Medical workup did not show any organic etiology or reversible cause.  Chart review indicates history of dementia, but MMSE 30 during this hospitalization.  Patient has been pleasant and has not showed any agitation since admission, legal hold discontinued per Psych re-evaluation, last seen by Psych on 6/22/21 per chart review.     When asked about being discharged from the hospital, patient states:  She would walk out of the hospital turn left then right and then walk 47 miles. That is where her home is. She purchased it. Her cousin is keeping the house for her there for now. Her cousin's name is LEATHA JUAREZ and she works for [place] [employer] and she is a . 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 The patient stated that nothing would go wrong and she could be discharged home now. Based on this information, the patient lacks capacity to leave the hospital.      working on sending her to a memory care facility versus transferring her to the APRN service if guardianship becomes necessary.   7/1: Guardianship paperwork started.  No episodes of agitation last night.       VTE prophylaxis: Lovenox    I have performed a physical exam and reviewed and updated ROS and Plan today (7/5/2021). In review of yesterday's note (7/4/2021), there are no changes except as documented above.

## 2021-07-06 PROCEDURE — 99225 PR SUBSEQUENT OBSERVATION CARE,LEVEL II: CPT | Performed by: STUDENT IN AN ORGANIZED HEALTH CARE EDUCATION/TRAINING PROGRAM

## 2021-07-06 PROCEDURE — G0378 HOSPITAL OBSERVATION PER HR: HCPCS

## 2021-07-06 ASSESSMENT — ENCOUNTER SYMPTOMS
NAUSEA: 0
HEADACHES: 0
EYES NEGATIVE: 1
MYALGIAS: 0
SHORTNESS OF BREATH: 0
CHILLS: 0
ABDOMINAL PAIN: 0
MEMORY LOSS: 1
FEVER: 0
COUGH: 0
WEAKNESS: 0
VOMITING: 0

## 2021-07-06 ASSESSMENT — PAIN DESCRIPTION - PAIN TYPE
TYPE: ACUTE PAIN
TYPE: ACUTE PAIN

## 2021-07-06 NOTE — PROGRESS NOTES
Assumed care of pt at shift change, report received. Pt sleeping, easy to arouse. A&&Ox3, disoriented to situation. Pleasant and cooperative. Denies pain or discomfort. Declines any needs at this time. Safety precautions in place.

## 2021-07-06 NOTE — PROGRESS NOTES
Hospital Medicine Daily Progress Note    Date of Service  7/6/2021    Chief Complaint  Zully Lin is a 70 y.o. female admitted 6/11/2021 with agitation and behavioral changes    Hospital Course  A 70-year-old woman with h/o dementia, resident of [facility] presented with worsening agitation, behavioral changes.    Interval Problem Update    I have personally seen and examined the patient at bedside. I discussed the plan of care with bedside RN, charge RN,  and pharmacy.    7/2: Patient seen examined at bedside.  She has no complaints and is awake and alert watching TV.  T-max 97.6, /70.  Continue to await placement.  7/3: Patient has no complaints.  She is watching how to cook chicken today.  She denies any anxiety, depression, pain, shortness of breath, chest pain.  Vitals stable.  7/4: No overnight events.  Vital signs stable.  Patient has no complaints today.  7/5: Paperwork for guardianship filled out today.  Patient has no complaints today.  Patient continues to refuse antipsychotic medication initiation.  7/6: Calm and pleasant. Afebrile, hemodynamically stable.Refusing medications.    Consultants/Specialty  psychiatry    Code Status  Full Code    Disposition  Patient is medically cleared.   Anticipate discharge to to skilled nursing facility.  I have placed the appropriate orders for post-discharge needs.  Guardianship is pending    Review of Systems  Review of Systems   Constitutional: Negative for chills and fever.   HENT: Negative.    Eyes: Negative.    Respiratory: Negative for cough and shortness of breath.    Cardiovascular: Negative for chest pain and leg swelling.   Gastrointestinal: Negative for abdominal pain, nausea and vomiting.   Genitourinary: Negative for dysuria.   Musculoskeletal: Negative for myalgias.   Skin: Negative.    Neurological: Negative for weakness and headaches.   Psychiatric/Behavioral: Positive for memory loss.        Physical  Exam  Temp:  [36.1 °C (96.9 °F)-36.6 °C (97.9 °F)] 36.1 °C (97 °F)  Pulse:  [65-76] 65  Resp:  [17] 17  BP: (107-131)/(74-80) 131/74  SpO2:  [94 %-96 %] 96 %    Physical Exam  HENT:      Head: Normocephalic.      Mouth/Throat:      Mouth: Mucous membranes are moist.      Pharynx: Oropharynx is clear.   Eyes:      Pupils: Pupils are equal, round, and reactive to light.   Cardiovascular:      Rate and Rhythm: Normal rate and regular rhythm.      Heart sounds: Normal heart sounds.   Pulmonary:      Effort: Pulmonary effort is normal. No respiratory distress.      Breath sounds: Normal breath sounds. No wheezing.   Abdominal:      General: Abdomen is flat. Bowel sounds are normal.      Tenderness: There is no abdominal tenderness.   Musculoskeletal:         General: Normal range of motion.      Cervical back: Normal range of motion.   Skin:     General: Skin is warm.   Neurological:      General: No focal deficit present.      Mental Status: She is alert.         Fluids    Intake/Output Summary (Last 24 hours) at 7/6/2021 1203  Last data filed at 7/5/2021 1920  Gross per 24 hour   Intake 720 ml   Output --   Net 720 ml       Laboratory                        Imaging  DX-CHEST-LIMITED (1 VIEW)   Final Result      Left basilar atelectasis. No focal consolidation. No effusions.           Assessment/Plan  * Delusional disorder- (present on admission)  Assessment & Plan  Sent to the ED from her memory care facility for worsening agitation and behavioral changes.   Psychiatry evaluated the patient. Diagnosed by Psychiatry with schizoaffective disorder, bipolar type. The patient was initially put on a legal hold by Psych for lacking the capacity to make medical decisions.   Cognitive evaluation done by SLP on 6/14/21 showed mild deficits.  Psych recommended risperidone daily, however patient refuses it.  Medical workup did not show any organic etiology or reversible cause.  Chart review indicates history of dementia, but MMSE  30 during this hospitalization.  Patient has been pleasant and has not showed any agitation since admission, legal hold discontinued per Psych re-evaluation, last seen by Psych on 6/22/21 per chart review.     When asked about being discharged from the hospital, patient states:  She would walk out of the hospital turn left then right and then walk 47 miles. That is where her home is. She purchased it. Her cousin is keeping the house for her there for now. Her cousin's name is LEATHA JUAREZ and she works for [employer] and she is a Lt. 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 The patient stated that nothing would go wrong and she could be discharged home now. Based on this information, the patient lacks capacity to leave the hospital.      working on sending her to a memory care facility versus transferring her to the APR service if guardianship becomes necessary.   7/1: Guardianship paperwork started    History of dementia  Assessment & Plan  Has a reported history of dementia.  Applied for guardianship    Schizoaffective disorder, bipolar type ([facility])  Assessment & Plan  History of schizoaffective disorder bipolar type.  Seen by psychiatry  Patient refusing medications, therefore none prescribed at this time    Hypothyroidism  Assessment & Plan  Chronic, TSH 15.600 on 6/11/21, FT4 0.93 on 6/14/21.  Is supposed to be on levothyroxine 50 mcg daily but the patient refuses to take the medication.    Noncompliance with treatment plan- (present on admission)  Assessment & Plan  Patient refusing antipsychotic medications.       VTE prophylaxis: enoxaparin ppx    I have performed a physical exam and reviewed and updated ROS and Plan today (7/6/2021). In review of yesterday's note (7/5/2021), there are no changes except as documented above.

## 2021-07-06 NOTE — PROGRESS NOTES
1 RN Skin Assessment completed.   Patient's risk of skin breakdown: Low     Devices in place and skin assessed under:   []? Pulse Ox  []? PIV []? Central Line []? SCDs []?Purewick  []? Mendosa  []?Condom Cath   []? BMS        []?  Cortrak   []?  Oxymask   []? Bipap    []? Nasal Canula   []? N/A   [x]? Other(specify): left anle wanderguard  Right Ear  [x]? WDL                or           []? Skin issue present (please provide assessment/description below)     Left Ear  [x]? WDL                or           []? Skin issue present (please provide assessment/description below)     Right Elbow:  [x]? WDL               or           []? Skin issue present (please provide assessment/description below)     Left Elbow:   [x]? WDL                or           []? Skin issue present (please provide assessment/description below)     Coccyx/Buttocks:  [x]? WDL                or           []? Skin issue present (please provide assessment/description below)     Right Heel/Foot/Toes:  [x]? WDL                or           []? Skin issue present (please provide assessment/description below)     Left Heel/Foot/Toes:   [x]? WDL              or           []? Skin issue present (please provide assessment/description below)        **Describe any other skin issues in areas not already listed from above list:   [x]? N/A     Skin intact, patient t/p independently. Patient declining to ambulate for this RN, does so prn in room.      Interventions In Place: Pillows     **If any new or digressing skin issues are found, fill out the selection below.     Possible Skin Injury No  Pictures Uploaded Into Epic: N/A  Wound Consult Placed: N/A  RN Wound Prevention Protocol Ordered: No    What new preventative interventions did you add? Pillows
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
## 2021-07-07 PROCEDURE — 99225 PR SUBSEQUENT OBSERVATION CARE,LEVEL II: CPT | Performed by: STUDENT IN AN ORGANIZED HEALTH CARE EDUCATION/TRAINING PROGRAM

## 2021-07-07 PROCEDURE — G0378 HOSPITAL OBSERVATION PER HR: HCPCS

## 2021-07-07 ASSESSMENT — PAIN DESCRIPTION - PAIN TYPE: TYPE: ACUTE PAIN

## 2021-07-07 NOTE — PROGRESS NOTES
Assumed care of patient this shift. Patient is alert and oriented x 3. Able to make her needs known. Denied complaints of pain this shift when asked. Up independently in room and to bathroom.  Fall prevention tactics in place, bed locked and in lowest position and non-skid footwear in use. Plan of care discussed with patient and call light is within reach.

## 2021-07-07 NOTE — PROGRESS NOTES
Pt alert and oriented x3, on room air. Pt on bed, declined any pain/sob. Continent of bowel and bladder, able to ambulate up to bathroom with steady gait. Pt sleeping with no distress noted. All needs attended.      1 RN Skin Assessment completed.   Patient's risk of skin breakdown: Low    Devices in place and skin assessed under:   [] Pulse Ox  [] PIV [] Central Line [] SCDs []Purewick  [] Mendosa  []Condom Cath   [] BMS        []  Cortrak   []  Oxymask   [] Bipap    [] Nasal Canula   [x] N/A   [] Other(specify):     Right Ear  [x] WDL                or           [] Skin issue present (please provide assessment/description below)    Left Ear  [x] WDL                or           [] Skin issue present (please provide assessment/description below)    Right Elbow:  [x] WDL               or           [] Skin issue present (please provide assessment/description below)    Left Elbow:   [x] WDL                or           [] Skin issue present (please provide assessment/description below)    Coccyx/Buttocks:  [x] WDL                or           [] Skin issue present (please provide assessment/description below)    Right Heel/Foot/Toes:  [x] WDL                or           [] Skin issue present (please provide assessment/description below)    Left Heel/Foot/Toes:   [x] WDL              or           [] Skin issue present (please provide assessment/description below)      **Describe any other skin issues in areas not already listed from above list:   [x] N/A    Interventions In Place: Waffle Overlay, Pillows and Pressure Redistribution Mattress, encouraged to turn frequently on bed and ambulate up to bathroom

## 2021-07-07 NOTE — PROGRESS NOTES
Hospital Medicine Daily Progress Note    Date of Service  7/7/2021    Chief Complaint  Zully Lin is a 70 y.o. female admitted 6/11/2021 with agitation and behavioral changes    Hospital Course  A 70-year-old woman with h/o dementia, resident of [facility] presented with worsening agitation, behavioral changes.    Interval Problem Update    I have personally seen and examined the patient at bedside. I discussed the plan of care with bedside RN, charge RN,  and pharmacy.    7/2: Patient seen examined at bedside.  She has no complaints and is awake and alert watching TV.  T-max 97.6, /70.  Continue to await placement.  7/3: Patient has no complaints.  She is watching how to cook chicken today.  She denies any anxiety, depression, pain, shortness of breath, chest pain.  Vitals stable.  7/4: No overnight events.  Vital signs stable.  Patient has no complaints today.  7/5: Paperwork for guardianship filled out today.  Patient has no complaints today.  Patient continues to refuse antipsychotic medication initiation.  7/6: Calm and pleasant. Afebrile, hemodynamically stable.Refusing medications.  7/7: Afebrile, hemodynamically stable. Asked long term care team to evaluate patient and accept. Per long term care team, patient is on waiting list for long term care.    Consultants/Specialty  psychiatry    Code Status  Full Code    Disposition  Patient is medically cleared.   Anticipate discharge to to skilled nursing facility.  I have placed the appropriate orders for post-discharge needs.    Review of Systems  Review of Systems   Constitutional: Negative for chills and fever.   HENT: Negative.    Eyes: Negative.    Respiratory: Negative for cough and shortness of breath.    Cardiovascular: Negative for chest pain and leg swelling.   Gastrointestinal: Negative for abdominal pain, nausea and vomiting.   Genitourinary: Negative for dysuria.   Musculoskeletal: Negative for myalgias.    Skin: Negative.    Neurological: Negative for weakness and headaches.   Psychiatric/Behavioral: Positive for memory loss.     Physical Exam  Temp:  [36.1 °C (97 °F)-36.8 °C (98.2 °F)] 36.5 °C (97.7 °F)  Pulse:  [65-79] 69  Resp:  [16-18] 17  BP: (117-139)/(68-74) 126/74  SpO2:  [94 %-97 %] 95 %    Physical Exam  HENT:      Head: Normocephalic.      Mouth/Throat:      Mouth: Mucous membranes are moist.      Pharynx: Oropharynx is clear.   Eyes:      Pupils: Pupils are equal, round, and reactive to light.   Cardiovascular:      Rate and Rhythm: Normal rate and regular rhythm.      Heart sounds: Normal heart sounds.   Pulmonary:      Effort: Pulmonary effort is normal. No respiratory distress.      Breath sounds: Normal breath sounds. No wheezing.   Abdominal:      General: Abdomen is flat. Bowel sounds are normal.      Tenderness: There is no abdominal tenderness.   Musculoskeletal:         General: Normal range of motion.      Cervical back: Normal range of motion.   Skin:     General: Skin is warm.   Neurological:      General: No focal deficit present.      Mental Status: She is alert.     Fluids    Intake/Output Summary (Last 24 hours) at 7/7/2021 1151  Last data filed at 7/6/2021 2000  Gross per 24 hour   Intake 740 ml   Output --   Net 740 ml       Laboratory                        Imaging  DX-CHEST-LIMITED (1 VIEW)   Final Result      Left basilar atelectasis. No focal consolidation. No effusions.           Assessment/Plan  * Delusional disorder- (present on admission)  Assessment & Plan  Sent to the ED from her memory care facility for worsening agitation and behavioral changes.   Psychiatry evaluated the patient. Diagnosed by Psychiatry with schizoaffective disorder, bipolar type. The patient was initially put on a legal hold by Psych for lacking the capacity to make medical decisions.   Cognitive evaluation done by SLP on 6/14/21 showed mild deficits.  Psych recommended risperidone daily, however patient  refuses it.  Medical workup did not show any organic etiology or reversible cause.  Chart review indicates history of dementia, but MMSE 30 during this hospitalization.  Patient has been pleasant and has not showed any agitation since admission, legal hold discontinued per Psych re-evaluation, last seen by Psych on 6/22/21 per chart review.     When asked about being discharged from the hospital, patient states:  She would walk out of the hospital turn left then right and then walk 47 miles. That is where her home is. She purchased it. Her cousin is keeping the house for her there for now. Her cousin's name is LEATHA JUAREZ and she works for [employer] and she is a Lt. 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 The patient stated that nothing would go wrong and she could be discharged home now. Based on this information, the patient lacks capacity to leave the hospital.      working on sending her to a memory care facility versus transferring her to the APRN service if guardianship becomes necessary.   7/1: Guardianship paperwork started    History of dementia  Assessment & Plan  Has a reported history of dementia.  Applied for guardianship    Schizoaffective disorder, bipolar type ([facility])  Assessment & Plan  History of schizoaffective disorder bipolar type.  Seen by psychiatry  Patient refusing medications, therefore none prescribed at this time    Hypothyroidism  Assessment & Plan  Chronic, TSH 15.600 on 6/11/21, FT4 0.93 on 6/14/21.  Is supposed to be on levothyroxine 50 mcg daily but the patient refuses to take the medication.    Noncompliance with treatment plan- (present on admission)  Assessment & Plan  Patient refusing antipsychotic medications.         VTE prophylaxis: enoxaparin ppx    I have performed a physical exam and reviewed and updated ROS and Plan today (7/7/2021). In review of yesterday's note (7/6/2021), there are no changes except as documented  above.

## 2021-07-07 NOTE — PROGRESS NOTES
1 RN Skin Assessment completed.   Patient's risk of skin breakdown: Low     Devices in place and skin assessed under:   []? Pulse Ox  []? PIV []? Central Line []? SCDs []?Purewick  []? Mendosa  []?Condom Cath   []? BMS        []?  Cortrak   []?  Oxymask   []? Bipap    []? Nasal Canula   [x]? N/A   []? Other(specify):      Right Ear  [x]? WDL                or           []? Skin issue present (please provide assessment/description below)     Left Ear  [x]? WDL                or           []? Skin issue present (please provide assessment/description below)     Right Elbow:  [x]? WDL               or           []? Skin issue present (please provide assessment/description below)     Left Elbow:   [x]? WDL                or           []? Skin issue present (please provide assessment/description below)     Coccyx/Buttocks:  [x]? WDL                or           []? Skin issue present (please provide assessment/description below)     Right Heel/Foot/Toes:  [x]? WDL                or           []? Skin issue present (please provide assessment/description below)     Left Heel/Foot/Toes:   [x]? WDL              or           []? Skin issue present (please provide assessment/description below)        **Describe any other skin issues in areas not already listed from above list:   [x]? N/A     Interventions In Place: Waffle mattress overlay in place, pillows for support and positioning. Patient is able to turn and reposition independently in bed, encouraged weight shifting.

## 2021-07-08 PROCEDURE — G0378 HOSPITAL OBSERVATION PER HR: HCPCS

## 2021-07-08 PROCEDURE — 99224 PR SUBSEQUENT OBSERVATION CARE,LEVEL I: CPT | Performed by: STUDENT IN AN ORGANIZED HEALTH CARE EDUCATION/TRAINING PROGRAM

## 2021-07-08 ASSESSMENT — PAIN DESCRIPTION - PAIN TYPE
TYPE: ACUTE PAIN
TYPE: ACUTE PAIN

## 2021-07-08 NOTE — PROGRESS NOTES
Assumed care of pt this am, pt a/ox1-self only. Pt VSS at this time, pleasant sitting up in bed, denies pain, no further needs.       1 RN Skin Assessment completed.   Patient's risk of skin breakdown: Low    Devices in place and skin assessed under:   [] Pulse Ox  [] PIV [] Central Line [] SCDs []Purewick  [] Mendosa  []Condom Cath   [] BMS        []  Cortrak   []  Oxymask   [] Bipap    [] Nasal Canula   [x] N/A   [] Other(specify):     Right Ear  [x] WDL                or           [] Skin issue present (please provide assessment/description below)    Left Ear  [x] WDL                or           [] Skin issue present (please provide assessment/description below)    Right Elbow:  [x] WDL               or           [] Skin issue present (please provide assessment/description below)    Left Elbow:   [x] WDL                or           [] Skin issue present (please provide assessment/description below)    Coccyx/Buttocks:  [x] WDL                or           [] Skin issue present (please provide assessment/description below)    Right Heel/Foot/Toes:  [x] WDL                or           [] Skin issue present (please provide assessment/description below)    Left Heel/Foot/Toes:   [x] WDL              or           [] Skin issue present (please provide assessment/description below)      **Describe any other skin issues in areas not already listed from above list:   [x] N/A    Interventions In Place: Waffle Overlay and Pillows    **If any new or digressing skin issues are found, fill out the selection below.    Possible Skin Injury No  Pictures Uploaded Into Epic: N/A  Wound Consult Placed: N/A  RN Wound Prevention Protocol Ordered: No    What new preventative interventions did you add? Waffle Overlay and Pillows

## 2021-07-08 NOTE — PROGRESS NOTES
[facility] Medicine Daily Progress Note    Date of Service  7/8/2021    Chief Complaint  Zully Lin is a 70 y.o. female admitted 6/11/2021 with agitation and behavioral changes    Hospital Course  A 70-year-old woman with h/o dementia, resident of [facility] presented with worsening agitation, behavioral changes    Interval Problem Update    I have personally seen and examined the patient at bedside. I discussed the plan of care with bedside RN, charge RN,  and pharmacy.  7/2: Patient seen examined at bedside.  She has no complaints and is awake and alert watching TV.  T-max 97.6, /70.  Continue to await placement.  7/3: Patient has no complaints.  She is watching how to cook chicken today.  She denies any anxiety, depression, pain, shortness of breath, chest pain.  Vitals stable.  7/4: No overnight events.  Vital signs stable.  Patient has no complaints today.  7/5: Paperwork for guardianship filled out today.  Patient has no complaints today.  Patient continues to refuse antipsychotic medication initiation.  7/6: Calm and pleasant. Afebrile, hemodynamically stable.Refusing medications.  7/7: Afebrile, hemodynamically stable. Asked long term care team to evaluate patient and accept. Per long term care team, patient is on waiting list for long term care.  7/8: No issues overnight. Awaiting for guardianship.     Consultants/Specialty  psychiatry    Code Status  Full Code    Disposition  Patient is medically cleared.   Anticipate discharge to to skilled nursing facility.  I have placed the appropriate orders for post-discharge needs.    Review of Systems  Review of Systems   Constitutional: Negative for chills and fever.   HENT: Negative.    Eyes: Negative.    Respiratory: Negative for cough and shortness of breath.    Cardiovascular: Negative for chest pain and leg swelling.   Gastrointestinal: Negative for abdominal pain, nausea and vomiting.   Genitourinary: Negative  for dysuria.   Musculoskeletal: Negative for myalgias.   Skin: Negative.    Neurological: Negative for weakness and headaches.   Psychiatric/Behavioral: Positive for memory loss.     Physical Exam  Temp:  [36.1 °C (97 °F)-36.9 °C (98.4 °F)] 36.2 °C (97.2 °F)  Pulse:  [70-91] 75  Resp:  [17-20] 18  BP: (121-145)/(71-86) 127/82  SpO2:  [94 %-96 %] 94 %    Physical Exam  HENT:      Head: Normocephalic.      Mouth/Throat:      Mouth: Mucous membranes are moist.      Pharynx: Oropharynx is clear.   Eyes:      Pupils: Pupils are equal, round, and reactive to light.   Cardiovascular:      Rate and Rhythm: Normal rate and regular rhythm.      Heart sounds: Normal heart sounds.   Pulmonary:      Effort: Pulmonary effort is normal. No respiratory distress.      Breath sounds: Normal breath sounds. No wheezing.   Abdominal:      General: Abdomen is flat. Bowel sounds are normal.      Tenderness: There is no abdominal tenderness.   Musculoskeletal:         General: Normal range of motion.      Cervical back: Normal range of motion.   Skin:     General: Skin is warm.   Neurological:      General: No focal deficit present.      Mental Status: She is alert.     Fluids    Intake/Output Summary (Last 24 hours) at 7/8/2021 1425  Last data filed at 7/8/2021 1030  Gross per 24 hour   Intake 240 ml   Output --   Net 240 ml       Laboratory                        Imaging  DX-CHEST-LIMITED (1 VIEW)   Final Result      Left basilar atelectasis. No focal consolidation. No effusions.           Assessment/Plan  * Delusional disorder- (present on admission)  Assessment & Plan  Sent to the ED from her memory care facility for worsening agitation and behavioral changes.   Psychiatry evaluated the patient. Diagnosed by Psychiatry with schizoaffective disorder, bipolar type. The patient was initially put on a legal hold by Psych for lacking the capacity to make medical decisions.   Cognitive evaluation done by SLP on 6/14/21 showed mild  deficits.  Psych recommended risperidone daily, however patient refuses it.  Medical workup did not show any organic etiology or reversible cause.  Chart review indicates history of dementia, but MMSE 30 during this hospitalization.  Patient has been pleasant and has not showed any agitation since admission, legal hold discontinued per Psych re-evaluation, last seen by Psych on 6/22/21 per chart review.     When asked about being discharged from the hospital, patient states:  She would walk out of the hospital turn left then right and then walk 47 miles. That is where her home is. She purchased it. Her cousin is keeping the house for her there for now. Her cousin's name is LEATHA JUAREZ and she works for [employer] and she is a . 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 The patient stated that nothing would go wrong and she could be discharged home now. Based on this information, the patient lacks capacity to leave the hospital.      working on sending her to a memory care facility versus transferring her to the APRN service if guardianship becomes necessary.   7/1: Guardianship paperwork started    History of dementia  Assessment & Plan  Has a reported history of dementia.  Applied for guardianship    Schizoaffective disorder, bipolar type (HCC)  Assessment & Plan  History of schizoaffective disorder bipolar type.  Seen by psychiatry  Patient refusing medications, therefore none prescribed at this time    Hypothyroidism  Assessment & Plan  Chronic, TSH 15.600 on 6/11/21, FT4 0.93 on 6/14/21.  Is supposed to be on levothyroxine 50 mcg daily but the patient refuses to take the medication.    Noncompliance with treatment plan- (present on admission)  Assessment & Plan  Patient refusing antipsychotic medications.         VTE prophylaxis: enoxaparin ppx    I have performed a physical exam and reviewed and updated ROS and Plan today (7/8/2021). In review of yesterday's  note (7/7/2021), there are no changes except as documented above.

## 2021-07-09 PROCEDURE — 99224 PR SUBSEQUENT OBSERVATION CARE,LEVEL I: CPT | Performed by: STUDENT IN AN ORGANIZED HEALTH CARE EDUCATION/TRAINING PROGRAM

## 2021-07-09 PROCEDURE — G0378 HOSPITAL OBSERVATION PER HR: HCPCS

## 2021-07-09 NOTE — PROGRESS NOTES
"Hospital Medicine Daily Progress Note    Date of Service  7/9/2021    Chief Complaint  Zully Lin is a 70 y.o. female admitted 6/11/2021 with agitation and behavioral changes*    Hospital Course  A 70-year-old woman with h/o dementia, resident of [facility] presented with worsening agitation, behavioral changes    Interval Problem Update    I have personally seen and examined the patient at bedside. I discussed the plan of care with bedside RN, charge RN,  and pharmacy.    7/2: Patient seen examined at bedside.  She has no complaints and is awake and alert watching TV.  T-max 97.6, /70.  Continue to await placement.  7/3: Patient has no complaints.  She is watching how to cook chicken today.  She denies any anxiety, depression, pain, shortness of breath, chest pain.  Vitals stable.  7/4: No overnight events.  Vital signs stable.  Patient has no complaints today.  7/5: Paperwork for guardianship filled out today.  Patient has no complaints today.  Patient continues to refuse antipsychotic medication initiation.  7/6: Calm and pleasant. Afebrile, hemodynamically stable.Refusing medications.  7/7: Afebrile, hemodynamically stable. Asked long term care team to evaluate patient and accept. Per long term care team, patient is on waiting list for long term care.  7/8: No issues overnight. Awaiting for guardianship.     7/9: Patient is delusional. Requesting to remove ankle bracelet. \"I own [employer], I will mahesh you if you don't remove bracelet\".     Consultants/Specialty  psychiatry    Code Status  Full Code    Disposition  Patient is medically cleared.   Anticipate discharge to to skilled nursing facility.  I have placed the appropriate orders for post-discharge needs.    Review of Systems  Review of Systems   Constitutional: Negative for chills and fever.   HENT: Negative.    Eyes: Negative.    Respiratory: Negative for cough and shortness of breath.    Cardiovascular: " Negative for chest pain and leg swelling.   Gastrointestinal: Negative for abdominal pain, nausea and vomiting.   Genitourinary: Negative for dysuria.   Musculoskeletal: Negative for myalgias.   Skin: Negative.    Neurological: Negative for weakness and headaches.   Psychiatric/Behavioral: Positive for memory loss.     Physical Exam  Temp:  [36.5 °C (97.7 °F)-36.8 °C (98.2 °F)] 36.5 °C (97.7 °F)  Pulse:  [64-75] 67  Resp:  [16-18] 18  BP: (117-140)/(71-83) 140/71  SpO2:  [94 %-97 %] 97 %    Physical Exam  HENT:      Head: Normocephalic.      Mouth/Throat:      Mouth: Mucous membranes are moist.      Pharynx: Oropharynx is clear.   Eyes:      Pupils: Pupils are equal, round, and reactive to light.   Cardiovascular:      Rate and Rhythm: Normal rate and regular rhythm.      Heart sounds: Normal heart sounds.   Pulmonary:      Effort: Pulmonary effort is normal. No respiratory distress.      Breath sounds: Normal breath sounds. No wheezing.   Abdominal:      General: Abdomen is flat. Bowel sounds are normal.      Tenderness: There is no abdominal tenderness.   Musculoskeletal:         General: Normal range of motion.      Cervical back: Normal range of motion.   Skin:     General: Skin is warm.   Neurological:      General: No focal deficit present.      Mental Status: She is alert.     Fluids    Intake/Output Summary (Last 24 hours) at 7/9/2021 1540  Last data filed at 7/8/2021 2000  Gross per 24 hour   Intake 150 ml   Output --   Net 150 ml       Laboratory      Imaging  DX-CHEST-LIMITED (1 VIEW)   Final Result      Left basilar atelectasis. No focal consolidation. No effusions.           Assessment/Plan  * Delusional disorder- (present on admission)  Assessment & Plan  Sent to the ED from her memory care facility for worsening agitation and behavioral changes.   Psychiatry evaluated the patient. Diagnosed by Psychiatry with schizoaffective disorder, bipolar type. The patient was initially put on a legal hold by  Psych for lacking the capacity to make medical decisions.   Cognitive evaluation done by SLP on 6/14/21 showed mild deficits.  Psych recommended risperidone daily, however patient refuses it.  Medical workup did not show any organic etiology or reversible cause.  Chart review indicates history of dementia, but MMSE 30 during this hospitalization.  Patient has been pleasant and has not showed any agitation since admission, legal hold discontinued per Psych re-evaluation, last seen by Psych on 6/22/21 per chart review.     When asked about being discharged from the hospital, patient states:  She would walk out of the hospital turn left then right and then walk 47 miles. That is where her home is. She purchased it. Her cousin is keeping the house for her there for now. Her cousin's name is LEATHA JUAREZ and she works for [employer] and she is a . 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 The patient stated that nothing would go wrong and she could be discharged home now. Based on this information, the patient lacks capacity to leave the hospital.      working on sending her to a memory care facility versus transferring her to the APRN service if guardianship becomes necessary.   7/1: Guardianship paperwork started    History of dementia  Assessment & Plan  Has a reported history of dementia.  Applied for guardianship    Schizoaffective disorder, bipolar type (HCC)  Assessment & Plan  History of schizoaffective disorder bipolar type.  Seen by psychiatry  Patient refusing medications, therefore none prescribed at this time    Hypothyroidism  Assessment & Plan  Chronic, TSH 15.600 on 6/11/21, FT4 0.93 on 6/14/21.  Is supposed to be on levothyroxine 50 mcg daily but the patient refuses to take the medication.    Noncompliance with treatment plan- (present on admission)  Assessment & Plan  Patient refusing antipsychotic medications.         VTE prophylaxis: enoxaparin  ppx    I have performed a physical exam and reviewed and updated ROS and Plan today (7/9/2021). In review of yesterday's note (7/8/2021), there are no changes except as documented above.

## 2021-07-09 NOTE — PROGRESS NOTES
Received bedside report and assumed care of pt at change of shift. Pt is sitting in bed with no complaints of pain at this time. VSS on RA. Discussed POC and goals for the day. Pt is up self with steady gait and wanderguard on left ankle intact and on due to lack of capacity. No other needs at this time. Bed is locked and in lowest position with water and call light in reach.         1 RN Skin Assessment completed.   Patient's risk of skin breakdown: Low    Devices in place and skin assessed under:   [] Pulse Ox  [] PIV [] Central Line [] SCDs []Purewick  [] Mendosa  []Condom Cath   [] BMS        []  Cortrak   []  Oxymask   [] Bipap    [] Nasal Canula   [x] N/A   [] Other(specify):     Right Ear  [x] WDL                or           [] Skin issue present (please provide assessment/description below)    Left Ear  [x] WDL                or           [] Skin issue present (please provide assessment/description below)    Right Elbow:  [x] WDL               or           [] Skin issue present (please provide assessment/description below)    Left Elbow:   [x] WDL                or           [] Skin issue present (please provide assessment/description below)    Coccyx/Buttocks:  [x] WDL                or           [] Skin issue present (please provide assessment/description below)    Right Heel/Foot/Toes:  [x] WDL                or           [] Skin issue present (please provide assessment/description below)    Left Heel/Foot/Toes:   [x] WDL              or           [] Skin issue present (please provide assessment/description below)      **Describe any other skin issues in areas not already listed from above list:   [] N/A    Interventions In Place: Waffle Overlay, Pillows and Pressure Redistribution Mattress, pt encouraged to ambulate and change positions frequently     **If any new or digressing skin issues are found, fill out the selection below.    Possible Skin Injury No  Pictures Uploaded Into Epic: N/A  Wound Consult  Placed: N/A  RN Wound Prevention Protocol Ordered: No    What new preventative interventions did you add? N/A

## 2021-07-09 NOTE — PROGRESS NOTES
Pt resting on bed, on room air. Education provided on medication administrations, pt continues on med refusals. Able to ambulate with steady gait up to bathroom. Continent of bowel and bladder. Safety precautions in place.     Devices in place and skin assessed under:   []? Pulse Ox  []? PIV []? Central Line []? SCDs []?Purewick  []? Mendosa  []?Condom Cath   []? BMS        []?  Cortrak   []?  Oxymask   []? Bipap    []? Nasal Canula   [x]? N/A   []? Other(specify):      Right Ear  [x]? WDL                or           []? Skin issue present (please provide assessment/description below)     Left Ear  [x]? WDL                or           []? Skin issue present (please provide assessment/description below)     Right Elbow:  [x]? WDL               or           []? Skin issue present (please provide assessment/description below)     Left Elbow:   [x]? WDL                or           []? Skin issue present (please provide assessment/description below)     Coccyx/Buttocks:  [x]? WDL                or           []? Skin issue present (please provide assessment/description below)     Right Heel/Foot/Toes:  [x]? WDL                or           []? Skin issue present (please provide assessment/description below)     Left Heel/Foot/Toes:   [x]? WDL              or           []? Skin issue present (please provide assessment/description below)        **Describe any other skin issues in areas not already listed from above list:   [x]? N/A     Interventions In Place: Waffle Overlay, Pillows and Pressure Redistribution Mattress, encouraged to turn frequently on bed and ambulate up to bathroom    Mepilexes offerd, pt refused

## 2021-07-09 NOTE — PROGRESS NOTES
Pt alert and oriented x3, speech clear, able to make needs known. Refusals to medications continued. Updated on current plan of care. Continent of bowel and bladder. All needs attended. Safety precautions in place.    Devices in place and skin assessed under:   []?? Pulse Ox  []?? PIV []?? Central Line []?? SCDs []??Purewick  []?? Mendosa  []??Condom Cath   []?? BMS        []??  Cortrak   []??  Oxymask   []?? Bipap    []?? Nasal Canula   [x]?? N/A   []?? Other(specify):      Right Ear  [x]?? WDL                or           []?? Skin issue present (please provide assessment/description below)     Left Ear  [x]?? WDL                or           []?? Skin issue present (please provide assessment/description below)     Right Elbow:  [x]?? WDL               or           []?? Skin issue present (please provide assessment/description below)     Left Elbow:   [x]?? WDL                or           []?? Skin issue present (please provide assessment/description below)     Coccyx/Buttocks:  [x]?? WDL                or           []?? Skin issue present (please provide assessment/description below)     Right Heel/Foot/Toes:  [x]?? WDL                or           []?? Skin issue present (please provide assessment/description below)     Left Heel/Foot/Toes:   [x]?? WDL              or           []?? Skin issue present (please provide assessment/description below)        **Describe any other skin issues in areas not already listed from above list:   [x]?? N/A     Interventions In Place: Waffle Overlay, Pillows and Pressure Redistribution Mattress, encouraged to turn frequently on bed and ambulate up to bathroom

## 2021-07-10 PROCEDURE — G0378 HOSPITAL OBSERVATION PER HR: HCPCS

## 2021-07-10 PROCEDURE — 99224 PR SUBSEQUENT OBSERVATION CARE,LEVEL I: CPT | Performed by: STUDENT IN AN ORGANIZED HEALTH CARE EDUCATION/TRAINING PROGRAM

## 2021-07-10 NOTE — PROGRESS NOTES
"Hospital Medicine Daily Progress Note    Date of Service  7/10/2021    Chief Complaint  Zully Lin is a 70 y.o. female admitted 6/11/2021 with agitation and behavioral changes    Hospital Course  A 70-year-old woman with h/o dementia, resident of [facility] presented with worsening agitation, behavioral changes    Interval Problem Update    I have personally seen and examined the patient at bedside. I discussed the plan of care with patient.  7/2: Patient seen examined at bedside.  She has no complaints and is awake and alert watching TV.  T-max 97.6, /70.  Continue to await placement.  7/3: Patient has no complaints.  She is watching how to cook chicken today.  She denies any anxiety, depression, pain, shortness of breath, chest pain.  Vitals stable.  7/4: No overnight events.  Vital signs stable.  Patient has no complaints today.  7/5: Paperwork for guardianship filled out today.  Patient has no complaints today.  Patient continues to refuse antipsychotic medication initiation.  7/6: Calm and pleasant. Afebrile, hemodynamically stable.Refusing medications.  7/7: Afebrile, hemodynamically stable. Asked long term care team to evaluate patient and accept. Per long term care team, patient is on waiting list for long term care.  7/8: No issues overnight. Awaiting for guardianship.      7/9: Patient is delusional. Requesting to remove ankle bracelet. \"I own [employer], I will mahesh you if you don't remove bracelet\". Refused blood draws.  7/10: Calm and cooperative, no issues overnight.    Consultants/Specialty  psychiatry    Code Status  Full Code    Disposition  Patient is medically cleared.   Anticipate discharge to to skilled nursing facility.  I have placed the appropriate orders for post-discharge needs.    Review of Systems  Review of Systems   Constitutional: Negative for chills and fever.   HENT: Negative.    Eyes: Negative.    Respiratory: Negative for cough and shortness of " breath.    Cardiovascular: Negative for chest pain and leg swelling.   Gastrointestinal: Negative for abdominal pain, nausea and vomiting.   Genitourinary: Negative for dysuria.   Musculoskeletal: Negative for myalgias.   Skin: Negative.    Neurological: Negative for weakness and headaches.   Psychiatric/Behavioral: Positive for memory loss.     Physical Exam  Temp:  [36.3 °C (97.4 °F)-36.6 °C (97.9 °F)] 36.4 °C (97.5 °F)  Pulse:  [73-77] 73  Resp:  [17-18] 17  BP: (116-135)/(69-83) 122/83  SpO2:  [94 %-98 %] 94 %    Physical Exam  HENT:      Head: Normocephalic.      Mouth/Throat:      Mouth: Mucous membranes are moist.      Pharynx: Oropharynx is clear.   Eyes:      Pupils: Pupils are equal, round, and reactive to light.   Cardiovascular:      Rate and Rhythm: Normal rate and regular rhythm.      Heart sounds: Normal heart sounds.   Pulmonary:      Effort: Pulmonary effort is normal. No respiratory distress.      Breath sounds: Normal breath sounds. No wheezing.   Abdominal:      General: Abdomen is flat. Bowel sounds are normal.      Tenderness: There is no abdominal tenderness.   Musculoskeletal:         General: Normal range of motion.      Cervical back: Normal range of motion.   Skin:     General: Skin is warm.   Neurological:      General: No focal deficit present.      Mental Status: She is alert.      Fluids    Intake/Output Summary (Last 24 hours) at 7/10/2021 1156  Last data filed at 7/9/2021 1959  Gross per 24 hour   Intake 350 ml   Output --   Net 350 ml       Laboratory    Imaging  DX-CHEST-LIMITED (1 VIEW)   Final Result      Left basilar atelectasis. No focal consolidation. No effusions.           Assessment/Plan  * Delusional disorder- (present on admission)  Assessment & Plan  Sent to the ED from her memory care facility for worsening agitation and behavioral changes.   Psychiatry evaluated the patient. Diagnosed by Psychiatry with schizoaffective disorder, bipolar type. The patient was initially  put on a legal hold by Psych for lacking the capacity to make medical decisions.   Cognitive evaluation done by SLP on 6/14/21 showed mild deficits.  Psych recommended risperidone daily, however patient refuses it.  Medical workup did not show any organic etiology or reversible cause.  Chart review indicates history of dementia, but MMSE 30 during this hospitalization.  Patient has been pleasant and has not showed any agitation since admission, legal hold discontinued per Psych re-evaluation, last seen by Psych on 6/22/21 per chart review.     When asked about being discharged from the hospital, patient states:  She would walk out of the hospital turn left then right and then walk 47 miles. That is where her home is. She purchased it. Her cousin is keeping the house for her there for now. Her cousin's name is LEATHA JUAREZ and she works for [employer] and she is a . 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 The patient stated that nothing would go wrong and she could be discharged home now. Based on this information, the patient lacks capacity to leave the hospital.      working on sending her to a memory care facility versus transferring her to the APRN service if guardianship becomes necessary.   7/1: Guardianship paperwork started    History of dementia  Assessment & Plan  Has a reported history of dementia.  Applied for guardianship    Schizoaffective disorder, bipolar type (HCC)  Assessment & Plan  History of schizoaffective disorder bipolar type.  Seen by psychiatry  Patient refusing medications, therefore none prescribed at this time    Hypothyroidism  Assessment & Plan  Chronic, TSH 15.600 on 6/11/21, FT4 0.93 on 6/14/21.  Is supposed to be on levothyroxine 50 mcg daily but the patient refuses to take the medication.    Noncompliance with treatment plan- (present on admission)  Assessment & Plan  Patient refusing antipsychotic medications.       VTE  prophylaxis: enoxaparin ppx    I have performed a physical exam and reviewed and updated ROS and Plan today (7/10/2021). In review of yesterday's note (7/9/2021), there are no changes except as documented above.

## 2021-07-10 NOTE — PROGRESS NOTES
Disoriented to situation.  Denied any pain or any needs.        1 RN Skin Assessment completed.   Patient's risk of skin breakdown: Low    Devices in place and skin assessed under:   [] Pulse Ox  [] PIV [] Central Line [] SCDs []Purewick  [] Mendosa  []Condom Cath   [] BMS        []  Cortrak   []  Oxymask   [] Bipap    [] Nasal Canula   [x] N/A   [] Other(specify):     Right Ear  [x] WDL                or           [] Skin issue present (please provide assessment/description below)    Left Ear  [x] WDL                or           [] Skin issue present (please provide assessment/description below)    Right Elbow:  [x] WDL               or           [] Skin issue present (please provide assessment/description below)    Left Elbow:   [x] WDL                or           [] Skin issue present (please provide assessment/description below)    Coccyx/Buttocks:  [x] WDL                or           [] Skin issue present (please provide assessment/description below)    Right Heel/Foot/Toes:  [x] WDL                or           [] Skin issue present (please provide assessment/description below)    Left Heel/Foot/Toes:   [x] WDL              or           [] Skin issue present (please provide assessment/description below)

## 2021-07-10 NOTE — PROGRESS NOTES
Pt has refused lab work 4x today. Communicated to MD Mccarthy at 1730. Will attempt lab work again tomorrow

## 2021-07-11 PROCEDURE — G0378 HOSPITAL OBSERVATION PER HR: HCPCS

## 2021-07-11 PROCEDURE — 99224 PR SUBSEQUENT OBSERVATION CARE,LEVEL I: CPT | Performed by: STUDENT IN AN ORGANIZED HEALTH CARE EDUCATION/TRAINING PROGRAM

## 2021-07-11 NOTE — PROGRESS NOTES
"Hospital Medicine Daily Progress Note    Date of Service  7/11/2021    Chief Complaint  Zully Lin is a 70 y.o. female admitted 6/11/2021 with agitation and delusion    Hospital Course  A 70-year-old woman with h/o dementia, resident of [facility], [place] presented with worsening agitation, delusions    Interval Problem Update    I have personally seen and examined the patient at bedside. I discussed the plan of care with patient and bedside RN.  7/2: Patient seen examined at bedside.  She has no complaints and is awake and alert watching TV.  T-max 97.6, /70.  Continue to await placement.  7/3: Patient has no complaints.  She is watching how to cook chicken today.  She denies any anxiety, depression, pain, shortness of breath, chest pain.  Vitals stable.  7/4: No overnight events.  Vital signs stable.  Patient has no complaints today.  7/5: Paperwork for guardianship filled out today.  Patient has no complaints today.  Patient continues to refuse antipsychotic medication initiation.  7/6: Calm and pleasant. Afebrile, hemodynamically stable.Refusing medications.  7/7: Afebrile, hemodynamically stable. Asked long term care team to evaluate patient and accept. Per long term care team, patient is on waiting list for long term care.  7/8: No issues overnight. Awaiting for guardianship.   7/9: Patient is delusional. Requesting to remove ankle bracelet. \"I own [employer] and [email or web address], I will mahesh you if you don't remove bracelet\". Refused blood draws.  7/10: Calm and cooperative, no issues overnight.  7/11: denies any symptoms, Afebrile, hemodynamically stable.    Consultants/Specialty  psychiatry    Code Status  Full Code    Disposition  Patient is medically cleared.   Anticipate discharge to to skilled nursing facility.  I have placed the appropriate orders for post-discharge needs.    Review of Systems  Review of Systems   Constitutional: Negative for chills and fever.   HENT: Negative. "    Eyes: Negative.    Respiratory: Negative for cough and shortness of breath.    Cardiovascular: Negative for chest pain and leg swelling.   Gastrointestinal: Negative for abdominal pain, nausea and vomiting.   Genitourinary: Negative for dysuria.   Musculoskeletal: Negative for myalgias.   Skin: Negative.    Neurological: Negative for weakness and headaches.   Psychiatric/Behavioral: Positive for memory loss, delusions.     Physical Exam  Temp:  [36.1 °C (96.9 °F)-36.9 °C (98.5 °F)] 36.1 °C (96.9 °F)  Pulse:  [64-75] 64  Resp:  [16-17] 17  BP: (123-133)/(75-80) 133/80  SpO2:  [92 %-95 %] 95 %    Physical Exam  HENT:      Head: Normocephalic.      Mouth/Throat:      Mouth: Mucous membranes are moist.      Pharynx: Oropharynx is clear.   Eyes:      Pupils: Pupils are equal, round, and reactive to light.   Cardiovascular:      Rate and Rhythm: Normal rate and regular rhythm.      Heart sounds: Normal heart sounds.   Pulmonary:      Effort: Pulmonary effort is normal. No respiratory distress.      Breath sounds: Normal breath sounds. No wheezing.   Abdominal:      General: Abdomen is flat. Bowel sounds are normal.      Tenderness: There is no abdominal tenderness.   Musculoskeletal:         General: Normal range of motion.      Cervical back: Normal range of motion.   Skin:     General: Skin is warm.   Neurological:      General: No focal deficit present.      Mental Status: She is alert.     Fluids    Intake/Output Summary (Last 24 hours) at 7/11/2021 0910  Last data filed at 7/10/2021 1925  Gross per 24 hour   Intake 720 ml   Output --   Net 720 ml       Laboratory                        Imaging  DX-CHEST-LIMITED (1 VIEW)   Final Result      Left basilar atelectasis. No focal consolidation. No effusions.           Assessment/Plan  * Delusional disorder- (present on admission)  Assessment & Plan  Sent to the ED from her memory care facility for worsening agitation and behavioral changes.   Psychiatry evaluated the  patient. Diagnosed by Psychiatry with schizoaffective disorder, bipolar type. The patient was initially put on a legal hold by Psych for lacking the capacity to make medical decisions.   Cognitive evaluation done by SLP on 6/14/21 showed mild deficits.  Psych recommended risperidone daily, however patient refuses it.  Medical workup did not show any organic etiology or reversible cause.  Chart review indicates history of dementia, but MMSE 30 during this hospitalization.  Patient has been pleasant and has not showed any agitation since admission, legal hold discontinued per Psych re-evaluation, last seen by Psych on 6/22/21 per chart review.     When asked about being discharged from the hospital, patient states:  She would walk out of the hospital turn left then right and then walk 47 miles. That is where her home is. She purchased it. Her cousin is keeping the house for her there for now. Her cousin's name is LEATHA JUAREZ and she works for [employer] and she is a Lt. 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 The patient stated that nothing would go wrong and she could be discharged home now. Based on this information, the patient lacks capacity to leave the hospital.      working on sending her to a memory care facility versus transferring her to the APRN service if guardianship becomes necessary.   7/1: Guardianship paperwork started    History of dementia  Assessment & Plan  Has a reported history of dementia.  Applied for guardianship    Schizoaffective disorder, bipolar type (HCC)  Assessment & Plan  History of schizoaffective disorder bipolar type.  Seen by psychiatry  Patient refusing medications, therefore none prescribed at this time    Hypothyroidism  Assessment & Plan  Chronic, TSH 15.600 on 6/11/21, FT4 0.93 on 6/14/21.  Is supposed to be on levothyroxine 50 mcg daily but the patient refuses to take the medication.    Noncompliance with treatment plan-  (present on admission)  Assessment & Plan  Patient refusing antipsychotic medications.       VTE prophylaxis: enoxaparin ppx    I have performed a physical exam and reviewed and updated ROS and Plan today (7/11/2021). In review of yesterday's note (7/10/2021), there are no changes except as documented above.

## 2021-07-11 NOTE — PROGRESS NOTES
1 RN Skin Assessment completed.   Patient's risk of skin breakdown: Low    Devices in place and skin assessed under:   [] Pulse Ox  [] PIV [] Central Line [] SCDs []Purewick  [] Mendosa  []Condom Cath   [] BMS        []  Cortrak   []  Oxymask   [] Bipap    [] Nasal Canula   [x] N/A   [] Other(specify):     Right Ear  [x] WDL                or           [] Skin issue present (please provide assessment/description below)    Left Ear  [x] WDL                or           [] Skin issue present (please provide assessment/description below)    Right Elbow:  [x] WDL               or           [] Skin issue present (please provide assessment/description below)    Left Elbow:   [x] WDL                or           [] Skin issue present (please provide assessment/description below)    Coccyx/Buttocks:  [x] WDL                or           [] Skin issue present (please provide assessment/description below)    Right Heel/Foot/Toes:  [x] WDL                or           [] Skin issue present (please provide assessment/description below)    Left Heel/Foot/Toes:   [x] WDL              or           [] Skin issue present (please provide assessment/description below)      **Describe any other skin issues in areas not already listed from above list:   [x] N/A    Interventions In Place: Pillows and Pressure Redistribution Mattress    **If any new or digressing skin issues are found, fill out the selection below.    Possible Skin Injury No  Pictures Uploaded Into Epic: N/A  Wound Consult Placed: N/A  RN Wound Prevention Protocol Ordered: No    What new preventative interventions did you add? N/A

## 2021-07-11 NOTE — PROGRESS NOTES
"Received bedside report and assumed pt care. Pt is A&Ox3; disorientated to situation. Pt was noted to be delusional when asked what brought her to the hospital. Pt was stating \" I bought 3 million worth of property and people didn't like that. They called the police on me but actually called the ambulance. They threatened to shoot if I didn't leave so I was taken to the hospital for my safety\". Attempted to reorient pt but pt politely refused reorientation. Assessment complete. Wandergaurd in place on left ankle. VSS on RA. Pt denies any pain and shows no signs of distress at this time. Pt is upself with steady gait. Skin is intact and blanching. Pt denies any needs at this time. Pt currently resting in bed waiting for breakfast. Call light within reach. Hourly rounding in place.   "

## 2021-07-12 PROCEDURE — 99224 PR SUBSEQUENT OBSERVATION CARE,LEVEL I: CPT | Performed by: STUDENT IN AN ORGANIZED HEALTH CARE EDUCATION/TRAINING PROGRAM

## 2021-07-12 PROCEDURE — G0378 HOSPITAL OBSERVATION PER HR: HCPCS

## 2021-07-12 ASSESSMENT — PAIN DESCRIPTION - PAIN TYPE: TYPE: ACUTE PAIN

## 2021-07-12 NOTE — PROGRESS NOTES
Received report an assumed care of pt at change of shift. Pt is resting in bed states no pain at this time. A&O3, disoriented to situation. Pt is up self and on RA. Wanderguard intact on left ankle for elopement risk. All needs met at this time. Bed is locked and in lowest position with water and call light within reach.       1 RN Skin Assessment completed.   Patient's risk of skin breakdown: Low    Devices in place and skin assessed under:   [] Pulse Ox  [] PIV [] Central Line [] SCDs []Purewick  [] Mendosa  []Condom Cath   [] BMS        []  Cortrak   []  Oxymask   [] Bipap    [] Nasal Canula   [x] N/A   [] Other(specify):     Right Ear  [x] WDL                or           [] Skin issue present (please provide assessment/description below)    Left Ear  [x] WDL                or           [] Skin issue present (please provide assessment/description below)    Right Elbow:  [x] WDL               or           [] Skin issue present (please provide assessment/description below)    Left Elbow:   [x] WDL                or           [] Skin issue present (please provide assessment/description below)    Coccyx/Buttocks:  [x] WDL                or           [] Skin issue present (please provide assessment/description below)    Right Heel/Foot/Toes:  [x] WDL                or           [] Skin issue present (please provide assessment/description below)    Left Heel/Foot/Toes:   [x] WDL              or           [] Skin issue present (please provide assessment/description below)      **Describe any other skin issues in areas not already listed from above list:   [x] N/A    Interventions In Place: Waffle Overlay, Pillows and Pressure Redistribution Mattress    **If any new or digressing skin issues are found, fill out the selection below.    Possible Skin Injury No  Pictures Uploaded Into Epic: N/A  Wound Consult Placed: N/A  RN Wound Prevention Protocol Ordered: No    What new preventative interventions did you add? N/A

## 2021-07-12 NOTE — PROGRESS NOTES
Report received by dayshift RN. Assumed care of pt. Pt A&Ox3, delusional to situation, VSS and on RA. Pt in no apparent signs of distress, denies any pain. Pt refuses medications, is cont x2 and is upself. Wanderguard on L ankle for safety. Plan of care discussed. Call light within reach, bed in lowest position, and pt has no further questions at this time.

## 2021-07-12 NOTE — PROGRESS NOTES
1 RN Skin Assessment completed.   Patient's risk of skin breakdown: Low    Devices in place and skin assessed under:   [] Pulse Ox  [] PIV [] Central Line [] SCDs []Purewick  [] Mendosa  []Condom Cath   [] BMS        []  Cortrak   []  Oxymask   [] Bipap    [] Nasal Canula   [] N/A   [x] Other(specify):   -Wanderguard on L ankle      Right Ear  [x] WDL                or           [] Skin issue present (please provide assessment/description below)    Left Ear  [x] WDL                or           [] Skin issue present (please provide assessment/description below)    Right Elbow:  [x] WDL               or           [] Skin issue present (please provide assessment/description below)    Left Elbow:   [x] WDL                or           [] Skin issue present (please provide assessment/description below)    Coccyx/Buttocks:  [x] WDL                or           [] Skin issue present (please provide assessment/description below)    Right Heel/Foot/Toes:  [x] WDL                or           [] Skin issue present (please provide assessment/description below)    Left Heel/Foot/Toes:   [x] WDL              or           [] Skin issue present (please provide assessment/description below)      **Describe any other skin issues in areas not already listed from above list:   [x] N/A    Interventions In Place: Pillows and Pressure Redistribution Mattress    **If any new or digressing skin issues are found, fill out the selection below.    Possible Skin Injury No  Pictures Uploaded Into Epic: N/A  Wound Consult Placed: N/A  RN Wound Prevention Protocol Ordered: No    What new preventative interventions did you add? N/A

## 2021-07-12 NOTE — PROGRESS NOTES
"Hospital Medicine Daily Progress Note    Date of Service  7/12/2021    Chief Complaint  Zully Lin is a 70 y.o. female admitted 6/11/2021 with agitation and delusion    Hospital Course  A 70-year-old woman with h/o dementia, resident of [facility] presented with worsening agitation, delusions    Interval Problem Update    I have personally seen and examined the patient at bedside. I discussed the plan of care with patient, bedside RN, charge RN,  and pharmacy.  7/2: Patient seen examined at bedside.  She has no complaints and is awake and alert watching TV.  T-max 97.6, /70.  Continue to await placement.  7/3: Patient has no complaints.  She is watching how to cook chicken today.  She denies any anxiety, depression, pain, shortness of breath, chest pain.  Vitals stable.  7/4: No overnight events.  Vital signs stable.  Patient has no complaints today.  7/5: Paperwork for guardianship filled out today.  Patient has no complaints today.  Patient continues to refuse antipsychotic medication initiation.  7/6: Calm and pleasant. Afebrile, hemodynamically stable.Refusing medications.  7/7: Afebrile, hemodynamically stable. Asked long term care team to evaluate patient and accept. Per long term care team, patient is on waiting list for long term care.  7/8: No issues overnight. Awaiting for guardianship.   7/9: Patient is delusional. Requesting to remove ankle bracelet. \"I own [employer] and [employer], I will mahesh you if you don't remove bracelet\". Refused blood draws.  7/10: Calm and cooperative, no issues overnight.  7/11: denies any symptoms, Afebrile, hemodynamically stable.  7/12: Delusional, \"I am kidnapped, and held against my will, I will mahesh you\"    Consultants/Specialty  psychiatry    Code Status  Full Code    Disposition  Patient is not medically cleared.   Anticipate discharge to to skilled nursing facility.  I have placed the appropriate orders for post-discharge " needs.    Review of Systems  Review of Systems   Constitutional: Negative for chills and fever.   HENT: Negative.    Eyes: Negative.    Respiratory: Negative for cough and shortness of breath.    Cardiovascular: Negative for chest pain and leg swelling.   Gastrointestinal: Negative for abdominal pain, nausea and vomiting.   Genitourinary: Negative for dysuria.   Musculoskeletal: Negative for myalgias.   Skin: Negative.    Neurological: Negative for weakness and headaches.   Psychiatric/Behavioral: Positive for memory loss, delusions.     Physical Exam  Temp:  [36.5 °C (97.7 °F)-36.8 °C (98.3 °F)] 36.5 °C (97.7 °F)  Pulse:  [70-85] 70  Resp:  [16] 16  BP: (113-138)/(74-82) 138/77  SpO2:  [93 %-96 %] 96 %    Physical Exam  HENT:      Head: Normocephalic.      Mouth/Throat:      Mouth: Mucous membranes are moist.      Pharynx: Oropharynx is clear.   Eyes:      Pupils: Pupils are equal, round, and reactive to light.   Cardiovascular:      Rate and Rhythm: Normal rate and regular rhythm.      Heart sounds: Normal heart sounds.   Pulmonary:      Effort: Pulmonary effort is normal. No respiratory distress.      Breath sounds: Normal breath sounds. No wheezing.   Abdominal:      General: Abdomen is flat. Bowel sounds are normal.      Tenderness: There is no abdominal tenderness.   Musculoskeletal:         General: Normal range of motion.      Cervical back: Normal range of motion.   Skin:     General: Skin is warm.   Neurological:      General: No focal deficit present.      Mental Status: She is alert.      Fluids    Intake/Output Summary (Last 24 hours) at 7/12/2021 1132  Last data filed at 7/12/2021 1102  Gross per 24 hour   Intake 1460 ml   Output --   Net 1460 ml       Laboratory                        Imaging  DX-CHEST-LIMITED (1 VIEW)   Final Result      Left basilar atelectasis. No focal consolidation. No effusions.           Assessment/Plan  * Delusional disorder- (present on admission)  Assessment & Plan  Sent to  the ED from her memory care facility for worsening agitation and behavioral changes.   Psychiatry evaluated the patient. Diagnosed by Psychiatry with schizoaffective disorder, bipolar type. The patient was initially put on a legal hold by Psych for lacking the capacity to make medical decisions.   Cognitive evaluation done by SLP on 6/14/21 showed mild deficits.  Psych recommended risperidone daily, however patient refuses it.  Medical workup did not show any organic etiology or reversible cause.  Chart review indicates history of dementia, but MMSE 30 during this hospitalization.  Patient has been pleasant and has not showed any agitation since admission, legal hold discontinued per Psych re-evaluation, last seen by Psych on 6/22/21 per chart review.     When asked about being discharged from the hospital, patient states:  She would walk out of the hospital turn left then right and then walk 47 miles. That is where her home is. She purchased it. Her cousin is keeping the house for her there for now. Her cousin's name is LEATHA JUAREZ and she works for [employer] and she is a Lt. 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 The patient stated that nothing would go wrong and she could be discharged home now. Based on this information, the patient lacks capacity to leave the hospital.      working on sending her to a memory care facility versus transferring her to the APRN service if guardianship becomes necessary.   7/1: Guardianship paperwork started    History of dementia  Assessment & Plan  Has a reported history of dementia.  Applied for guardianship    Schizoaffective disorder, bipolar type (HCC)  Assessment & Plan  History of schizoaffective disorder bipolar type.  Seen by psychiatry  Patient refusing medications, therefore none prescribed at this time    Hypothyroidism  Assessment & Plan  Chronic, TSH 15.600 on 6/11/21, FT4 0.93 on 6/14/21.  Is supposed to be on  levothyroxine 50 mcg daily but the patient refuses to take the medication.    Noncompliance with treatment plan- (present on admission)  Assessment & Plan  Patient refusing antipsychotic medications.       VTE prophylaxis: enoxaparin ppx    I have performed a physical exam and reviewed and updated ROS and Plan today (7/12/2021). In review of yesterday's note (7/11/2021), there are no changes except as documented above.

## 2021-07-13 PROCEDURE — G0378 HOSPITAL OBSERVATION PER HR: HCPCS

## 2021-07-13 PROCEDURE — 99225 PR SUBSEQUENT OBSERVATION CARE,LEVEL II: CPT | Performed by: STUDENT IN AN ORGANIZED HEALTH CARE EDUCATION/TRAINING PROGRAM

## 2021-07-13 ASSESSMENT — PAIN DESCRIPTION - PAIN TYPE
TYPE: ACUTE PAIN
TYPE: ACUTE PAIN

## 2021-07-13 NOTE — PROGRESS NOTES
Pt A&Ox3, disoriented to situation/delusional. Pt denies pain or discomfort. No signs of acute distress observed at this time. Bed locked in lowest position, upper rails raised call light within reach, hourly rounding in place.     SKIN ASSESSMENT  1 RN Skin Assessment completed.   Patient's risk of skin breakdown: Low     Devices in place and skin assessed under:   []? Pulse Ox  []? PIV []? Central Line []? SCDs []?Purewick  []? Mendosa  []?Condom Cath   []? BMS        []?  Cortrak   []?  Oxymask   []? Bipap    []? Nasal Canula   []? N/A   [x]? Other(specify):  WG to left ankle     Right Ear  [x]? WDL                or           []? Skin issue present (please provide assessment/description below)     Left Ear  [x]? WDL                or           []? Skin issue present (please provide assessment/description below)     Right Elbow:  [x]? WDL               or           []? Skin issue present (please provide assessment/description below)     Left Elbow:   [x]? WDL                or           []? Skin issue present (please provide assessment/description below)     Coccyx/Buttocks:  [x]? WDL                or           []? Skin issue present (please provide assessment/description below)     Right Heel/Foot/Toes:  [x]? WDL                or           []? Skin issue present (please provide assessment/description below)     Left Heel/Foot/Toes:   [x]? WDL              or           []? Skin issue present (please provide assessment/description below)        **Describe any other skin issues in areas not already listed from above list:   [x]? N/A     Interventions In Place: Waffle Overlay, Pillows, and Pressure Redistribution Mattress     **If any new or digressing skin issues are found, fill out the selection below.     Possible Skin Injury No  Pictures Uploaded Into Epic: N/A  Wound Consult Placed: N/A  RN Wound Prevention Protocol Ordered: No    What new preventative interventions did you add? N/A

## 2021-07-13 NOTE — PROGRESS NOTES
Hospital Medicine Daily Progress Note    Date of Service  7/13/2021    Chief Complaint  Zully Lin is a 70 y.o. female admitted 6/11/2021 with agitation and delusion    Hospital Course  Per HPI and Hospitalist on service interval updates    A 70-year-old woman with h/o dementia (unclear baseline), hypothyrodism, schizophrenia, a resident of [facility] presented 6/11/2021 with worsening agitation, delusions. Exam on admission was unremarkable and neuro exam is non focal, with the exception of mental status (AOx1, tangential thoughts and delusion). And Labs not suggestive of infection. CBC not suggestive of infection, BMP  normal, UA clean, and patient denies anything on ROS. Pt was evaluated by Psychiatry and Psychology on admission - considered incapacitated to make medical and discharge decisions; initially placed on legal hold. Psychiatrist medications adjusted. Pt was considered medically cleared as off 6/13/2021 - initially planned for Inpatient Psych facility which has been difficult due to limited insurance.     While on psych meds and upon follow up eval by Psychiatry, Pt appeared to be at her baseline. Patient was Psychiatrically cleared for discharge on 6/22. Plan has been for a long term placement and evaluation for guardianship as Pt cannot make decisions. Care was eventually transferred from [facility] to Hospitalist services on 7/1/2021.     7/1 - 7/12: no acute issues. Stable vitals with benign exams. Occasional refuses medication; delusional but not agitated (re-directable).     Interval Problem Update  7/13  Vitals reviewed; afebrile.  The rest of the vitals within normal parameters.  Pain scale reported - none    At bedside, no acute complain from patient. Reports being bothered by her ankle bracelet. I explained the purpose of it.     I have personally seen and examined the patient at bedside. I discussed the plan of care with patient, bedside RN, charge RN,  and  pharmacy.    Consultants/Specialty  psychiatry  Psychology     Code Status  Full Code    Disposition  Patient is medically cleared.   Anticipate discharge to to skilled nursing facility with memory care.   I have placed the appropriate orders for post-discharge needs.    Review of Systems  Review of Systems   Constitutional: Negative for chills and fever.   HENT: Negative.    Eyes: Negative.    Respiratory: Negative for cough and shortness of breath.    Cardiovascular: Negative for chest pain and leg swelling.   Gastrointestinal: Negative for abdominal pain, nausea and vomiting.   Genitourinary: Negative for dysuria.   Musculoskeletal: Negative for myalgias.   Skin: Negative.    Neurological: Negative for weakness and headaches.   Psychiatric/Behavioral: Positive for memory loss, delusions.     Physical Exam  Temp:  [35.9 °C (96.6 °F)-36.3 °C (97.4 °F)] 35.9 °C (96.7 °F)  Pulse:  [64-81] 64  Resp:  [15-16] 16  BP: (122-145)/(71-80) 145/80  SpO2:  [93 %-97 %] 97 %    Physical Exam  HENT:      Head: Normocephalic.      Mouth/Throat:      Mouth: Mucous membranes are moist.      Pharynx: Oropharynx is clear.   Eyes:      Pupils: Pupils are equal, round, and reactive to light.   Cardiovascular:      Rate and Rhythm: Normal rate and regular rhythm.      Heart sounds: Normal heart sounds.   Pulmonary:      Effort: Pulmonary effort is normal. No respiratory distress.      Breath sounds: Normal breath sounds. No wheezing.   Abdominal:      General: Abdomen is flat. Bowel sounds are normal.      Tenderness: There is no abdominal tenderness.   Musculoskeletal:         General: Normal range of motion.      Cervical back: Normal range of motion.   Skin:     General: Skin is warm.   Neurological:      General: No focal deficit present.      Mental Status: She is alert.      Fluids    Intake/Output Summary (Last 24 hours) at 7/13/2021 1330  Last data filed at 7/13/2021 1000  Gross per 24 hour   Intake 720 ml   Output --   Net 720  ml       Laboratory                        Imaging  DX-CHEST-LIMITED (1 VIEW)   Final Result      Left basilar atelectasis. No focal consolidation. No effusions.           Assessment/Plan  * Delusional disorder- (present on admission)  Assessment & Plan  Sent to the ED from her memory care facility for worsening agitation and behavioral changes.   Psychiatry evaluated the patient. Diagnosed by Psychiatry with schizoaffective disorder, bipolar type. The patient was initially put on a legal hold by Psych for lacking the capacity to make medical decisions.   Cognitive evaluation done by SLP on 6/14/21 showed mild deficits.  Psych recommended risperidone daily, however patient refuses it.  Medical workup did not show any organic etiology or reversible cause.  Chart review indicates history of dementia, but MMSE 30 during this hospitalization.  Patient has been pleasant and has not showed any agitation since admission, legal hold discontinued per Psych re-evaluation, last seen by Psych on 6/22/21 per chart review.     When asked about being discharged from the hospital, patient states:  She would walk out of the hospital turn left then right and then walk 47 miles. That is where her home is. She purchased it. Her cousin is keeping the house for her there for now. Her cousin's name is LEATHA JUAREZ and she works for [employer] and she is a Lt. 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 The patient stated that nothing would go wrong and she could be discharged home now. Based on this information, the patient lacks capacity to leave the hospital.      working on sending her to a memory care facility versus transferring her to the APR service if guardianship becomes necessary.   7/1: Guardianship paperwork started    History of dementia  Assessment & Plan  Has a reported history of dementia.  Applied for guardianship    Schizoaffective disorder, bipolar type (HCC)  Assessment  & Plan  History of schizoaffective disorder bipolar type.  Seen by psychiatry  Patient refusing medications, therefore none prescribed at this time    Hypothyroidism  Assessment & Plan  Chronic, TSH 15.600 on 6/11/21, FT4 0.93 on 6/14/21.  Is supposed to be on levothyroxine 50 mcg daily but the patient refuses to take the medication.    Noncompliance with treatment plan- (present on admission)  Assessment & Plan  Patient refusing antipsychotic medications.       VTE prophylaxis: enoxaparin ppx    I have performed a physical exam and reviewed and updated ROS and Plan today (7/13/2021).

## 2021-07-14 PROCEDURE — G0378 HOSPITAL OBSERVATION PER HR: HCPCS

## 2021-07-14 ASSESSMENT — PAIN DESCRIPTION - PAIN TYPE: TYPE: ACUTE PAIN

## 2021-07-14 NOTE — PROGRESS NOTES
Pt relaxing in bed, AO x3 with confusion, responds appropriately. Denies pain and SOB.  Plan of care discussed.  Ambulates on her on knows when to call for help pt verbalizes understanding. pt oriented to room, call light and belongings within reach, treaded slipper socks on, bed in lowest position.

## 2021-07-14 NOTE — PROGRESS NOTES
Patient to be taken over by APRN on long term list. Patient with no active medical needs. Patient will be seen twice weekly.

## 2021-07-14 NOTE — PROGRESS NOTES
Pharmacy Pharmacotherapy Consult for LOS >30 days    Admit Date: 6/11/2021      Medications were reviewed for appropriateness and ongoing need.     Current Facility-Administered Medications   Medication Dose Route Frequency Provider Last Rate Last Admin   • enoxaparin (LOVENOX) inj 40 mg  40 mg Subcutaneous DAILY Good Kaufman D.O.       • senna-docusate (PERICOLACE or SENOKOT S) 8.6-50 MG per tablet 2 tablet  2 tablet Oral BID PRN Beba Argueta M.D.        And   • polyethylene glycol/lytes (MIRALAX) PACKET 1 Packet  1 Packet Oral QDAY PRN Beba Argueta M.D.        And   • magnesium hydroxide (MILK OF MAGNESIA) suspension 30 mL  30 mL Oral QDAY PRN Beba Argueta M.D.        And   • bisacodyl (DULCOLAX) suppository 10 mg  10 mg Rectal QDAY PRN Beba Argueta M.D.       • levothyroxine (SYNTHROID) tablet 50 mcg  50 mcg Oral AM ES Jatinder Johnson M.D.       • acetaminophen (Tylenol) tablet 650 mg  650 mg Oral Q6HRS PRN Abran Frias M.D.           Recommendations:    -Recommend discontinuing due to patient refusing medications since admission date of 06/11/2021     Acetaminophen 650mg     All other current medications appropriate.

## 2021-07-14 NOTE — PROGRESS NOTES
Assumed care of patient this shift. Report received from KARRI Buenrostro. Patient is alert and oriented x 3, disoriented to situation, able to make her needs known. Denied complaints of pain this shift when asked. Up independently in room and to bathroom. Plan of care discussed with patient and call light is within reach.

## 2021-07-14 NOTE — PROGRESS NOTES
Pt A&Ox3, disoriented to situation/delusional. Pt denies pain or discomfort. No signs of acute distress observed at this time, pt currently sleeping. Bed locked in lowest position, upper rails raised call light within reach, hourly rounding in place.      SKIN ASSESSMENT  1 RN Skin Assessment completed.   Patient's risk of skin breakdown: Low     Devices in place and skin assessed under:   []?? Pulse Ox  []?? PIV []?? Central Line []?? SCDs []??Purewick  []?? Mendosa  []??Condom Cath   []?? BMS        []??  Cortrak   []??  Oxymask   []?? Bipap    []?? Nasal Canula   []?? N/A   [x]?? Other(specify):  WG to left ankle     Right Ear  [x]?? WDL                or           []?? Skin issue present (please provide assessment/description below)     Left Ear  [x]?? WDL                or           []?? Skin issue present (please provide assessment/description below)     Right Elbow:  [x]?? WDL               or           []?? Skin issue present (please provide assessment/description below)     Left Elbow:   [x]?? WDL                or           []?? Skin issue present (please provide assessment/description below)     Coccyx/Buttocks:  [x]?? WDL                or           []?? Skin issue present (please provide assessment/description below)     Right Heel/Foot/Toes:  [x]?? WDL                or           []?? Skin issue present (please provide assessment/description below)     Left Heel/Foot/Toes:   [x]?? WDL              or           []?? Skin issue present (please provide assessment/description below)        **Describe any other skin issues in areas not already listed from above list:   [x]?? N/A     Interventions In Place: Waffle Overlay, Pillows, and Pressure Redistribution Mattress     **If any new or digressing skin issues are found, fill out the selection below.     Possible Skin Injury No  Pictures Uploaded Into Epic: N/A  Wound Consult Placed: N/A  RN Wound Prevention Protocol Ordered: No    What new preventative  interventions did you add? N/A

## 2021-07-14 NOTE — PROGRESS NOTES
Received bedside report and assumed care of pt at change of shift.Pt resting in bed with no complaints of pain. A&O3 disoriented to situation. Pt states having no needs at time of assessment. Discussed POC and goals for the day. Bed is locked and in lowest position with water and call light within reach. WG on left ankle intact.       1 RN Skin Assessment completed.   Patient's risk of skin breakdown: Low    Devices in place and skin assessed under:   [] Pulse Ox  [] PIV [] Central Line [] SCDs []Purewick  [] Mendosa  []Condom Cath   [] BMS        []  Cortrak   []  Oxymask   [] Bipap    [] Nasal Canula   [] N/A   [x] Other(specify):     Right Ear  [x] WDL                or           [] Skin issue present (please provide assessment/description below)    Left Ear  [x] WDL                or           [] Skin issue present (please provide assessment/description below)    Right Elbow:  [x] WDL               or           [] Skin issue present (please provide assessment/description below)    Left Elbow:   [x] WDL                or           [] Skin issue present (please provide assessment/description below)    Coccyx/Buttocks:  [x] WDL                or           [] Skin issue present (please provide assessment/description below)    Right Heel/Foot/Toes:  [x] WDL                or           [] Skin issue present (please provide assessment/description below)    Left Heel/Foot/Toes:   [x] WDL              or           [] Skin issue present (please provide assessment/description below)      **Describe any other skin issues in areas not already listed from above list:   [x] N/A    Interventions In Place: Waffle Overlay, Pillows and Pressure Redistribution Mattress    **If any new or digressing skin issues are found, fill out the selection below.    Possible Skin Injury No  Pictures Uploaded Into Epic: N/A  Wound Consult Placed: N/A  RN Wound Prevention Protocol Ordered: No    What new preventative interventions did you add?  N/A

## 2021-07-15 PROCEDURE — G0378 HOSPITAL OBSERVATION PER HR: HCPCS

## 2021-07-15 ASSESSMENT — PAIN DESCRIPTION - PAIN TYPE: TYPE: ACUTE PAIN

## 2021-07-15 NOTE — PROGRESS NOTES
Pt alert and oriented x3, on room air. Pt able to ambulate up to bathroom with steady gait. Reminded and reinforced education on importance of medications. Continent of bowel and bladder. All needs attended.      1 RN Skin Assessment completed.   Patient's risk of skin breakdown: Low    Devices in place and skin assessed under:   [] Pulse Ox  [] PIV [] Central Line [] SCDs []Purewick  [] Mendosa  []Condom Cath   [] BMS        []  Cortrak   []  Oxymask   [] Bipap    [] Nasal Canula   [] N/A   [x] Other(specify):     Right Ear  [x] WDL                or           [] Skin issue present (please provide assessment/description below)    Left Ear  [x] WDL                or           [] Skin issue present (please provide assessment/description below)    Right Elbow:  [x] WDL               or           [] Skin issue present (please provide assessment/description below)    Left Elbow:   [x] WDL                or           [] Skin issue present (please provide assessment/description below)    Coccyx/Buttocks:  [x] WDL                or           [] Skin issue present (please provide assessment/description below)    Right Heel/Foot/Toes:  [x] WDL                or           [] Skin issue present (please provide assessment/description below)    Left Heel/Foot/Toes:   [x] WDL              or           [] Skin issue present (please provide assessment/description below)      **Describe any other skin issues in areas not already listed from above list:   [x] N/A    Interventions In Place: Waffle Overlay, Pillows, Heels Loaded W/Pillows and Pressure Redistribution Mattress    Mepilex offered, pt refused. Pt ambulatory.

## 2021-07-15 NOTE — PROGRESS NOTES
Received bedside report and assumed care of pt at change of shift. Pt is resting in bed no signs of distress and no complaints of pain. VSS on RA. Pt Is up self with steady gait. Continues to refuse morning medications. Discussed importance of medications with pt however no learning evidenced. Discussed POC for the day as well as shift goals and pt states having no other needs at this time. Bed is locked and in lowest position with water and call light within reach.       1 RN Skin Assessment completed.   Patient's risk of skin breakdown: Low    Devices in place and skin assessed under:   [] Pulse Ox  [] PIV [] Central Line [] SCDs []Purewick  [] Mendosa  []Condom Cath   [] BMS        []  Cortrak   []  Oxymask   [] Bipap    [] Nasal Canula   [x] N/A   [] Other(specify):     Right Ear  [x] WDL                or           [] Skin issue present (please provide assessment/description below)    Left Ear  [x] WDL                or           [] Skin issue present (please provide assessment/description below)    Right Elbow:  [x] WDL               or           [] Skin issue present (please provide assessment/description below)    Left Elbow:   [x] WDL                or           [] Skin issue present (please provide assessment/description below)    Coccyx/Buttocks:  [x] WDL                or           [] Skin issue present (please provide assessment/description below)    Right Heel/Foot/Toes:  [x] WDL                or           [] Skin issue present (please provide assessment/description below)    Left Heel/Foot/Toes:   [x] WDL              or           [] Skin issue present (please provide assessment/description below)      **Describe any other skin issues in areas not already listed from above list:   [x] N/A    Interventions In Place: Chair Waffle, Waffle Overlay, Pillows and Pressure Redistribution Mattress    **If any new or digressing skin issues are found, fill out the selection below.    Possible Skin Injury  No  Pictures Uploaded Into Epic: N/A  Wound Consult Placed: N/A  RN Wound Prevention Protocol Ordered: No    What new preventative interventions did you add? N/A

## 2021-07-16 PROCEDURE — G0378 HOSPITAL OBSERVATION PER HR: HCPCS

## 2021-07-16 ASSESSMENT — PATIENT HEALTH QUESTIONNAIRE - PHQ9
1. LITTLE INTEREST OR PLEASURE IN DOING THINGS: NOT AT ALL
2. FEELING DOWN, DEPRESSED, IRRITABLE, OR HOPELESS: NOT AT ALL
SUM OF ALL RESPONSES TO PHQ9 QUESTIONS 1 AND 2: 0

## 2021-07-16 ASSESSMENT — PAIN DESCRIPTION - PAIN TYPE: TYPE: ACUTE PAIN

## 2021-07-16 NOTE — PROGRESS NOTES
Patient is Alert and oriented x 3. Disoriented to situation. Pleasant and cooperative, no complaint of pain. Not in any form of distress. Call light and personal belongings within reach. Wanderguard on Left ankle for elopement risk as patient wanders at times. No untoward behavior noted. Kept safe and monitored.

## 2021-07-16 NOTE — PROGRESS NOTES
1 RN Skin Assessment completed.   Patient's risk of skin breakdown: Medium    Devices in place and skin assessed under:   [] Pulse Ox  [] PIV [] Central Line [] SCDs []Purewick  [] Mendosa  []Condom Cath   [] BMS        []  Cortrak   []  Oxymask   [] Bipap    [] Nasal Canula   [x] N/A   [] Other(specify):     Right Ear  [x] WDL                or           [] Skin issue present (please provide assessment/description below)    Left Ear  [x] WDL                or           [] Skin issue present (please provide assessment/description below)    Right Elbow:  [x] WDL               or           [] Skin issue present (please provide assessment/description below)    Left Elbow:   [x] WDL                or           [] Skin issue present (please provide assessment/description below)    Coccyx/Buttocks:  [x] WDL                or           [] Skin issue present (please provide assessment/description below)    Right Heel/Foot/Toes:  [x] WDL                or           [] Skin issue present (please provide assessment/description below)    Left Heel/Foot/Toes:   [x] WDL              or           [] Skin issue present (please provide assessment/description below)      **Describe any other skin issues in areas not already listed from above list:   [] N/A    Interventions In Place: Pillows    **If any new or digressing skin issues are found, fill out the selection below.    Possible Skin Injury No  Pictures Uploaded Into Epic: N/A  Wound Consult Placed: N/A  RN Wound Prevention Protocol Ordered: No    What new preventative interventions did you add? N/A

## 2021-07-17 PROCEDURE — 99224 PR SUBSEQUENT OBSERVATION CARE,LEVEL I: CPT | Performed by: NURSE PRACTITIONER

## 2021-07-17 PROCEDURE — G0378 HOSPITAL OBSERVATION PER HR: HCPCS

## 2021-07-17 NOTE — PROGRESS NOTES
Late entry 0900. Patient is AAO x 3 at this time, not to Situation. Discussed POC and encouraged her to come and socialize out at the nurses station. Asked if she would get OOB in order to assess mobility. Patient cooperative with this and steady on her feet. Continues to refuse morning medications. Have discussed use of call light and patient is able to demonstrate how to use it. No needs at this time.

## 2021-07-17 NOTE — PROGRESS NOTES
"Hospital Medicine Bi-Weekly Progress Note    Date of Service  7/17/2021    Chief Complaint  Zully Lin is a 70 y.o. female admitted 6/11/2021 with agitation and delusion    Hospital Course  Per HPI and Hospitalist on service interval updates    A 70-year-old woman with h/o dementia (unclear baseline), hypothyrodism, schizophrenia, a resident of [facility] presented 6/11/2021 with worsening agitation, delusions. Exam on admission was unremarkable and neuro exam is non focal, with the exception of mental status (AOx1, tangential thoughts and delusion). And Labs not suggestive of infection. CBC not suggestive of infection, BMP  normal, UA clean, and patient denies anything on ROS. Pt was evaluated by Psychiatry and Psychology on admission - considered incapacitated to make medical and discharge decisions; initially placed on legal hold. Psychiatrist medications adjusted. Pt was considered medically cleared as off 6/13/2021 - initially planned for Inpatient Psych facility which has been difficult due to limited insurance.     While on psych meds and upon follow up eval by Psychiatry, Pt appeared to be at her baseline. Patient was Psychiatrically cleared for discharge on 6/22. Plan has been for a long term placement and evaluation for guardianship as Pt cannot make decisions. Care was eventually transferred from [facility] to Hospitalist services on 7/1/2021.         Interval Problem Update  7/17- Acosta sitting in bed in no acute distress. States she has no medical needs but is very hungry. \"I could miss a meal and be fine but I am super hungry today\", as she pats her stomach. Continue to work on placement.     Consultants/Specialty  psychiatry  Psychology     Code Status  Full Code    Disposition  Patient is medically cleared.   Anticipate discharge to to skilled nursing facility with memory care.       Review of Systems  Review of Systems   Constitutional: Negative for chills and fever. "   HENT: Negative.    Eyes: Negative.    Respiratory: Negative for cough and shortness of breath.    Cardiovascular: Negative for chest pain and leg swelling.   Gastrointestinal: Negative for abdominal pain, nausea and vomiting.   Genitourinary: Negative for dysuria.   Musculoskeletal: Negative for myalgias.   Skin: Negative.    Neurological: Negative for weakness and headaches.   Psychiatric/Behavioral: Positive for memory loss, delusions.     Physical Exam  Temp:  [35.8 °C (96.5 °F)-36.7 °C (98 °F)] 36.4 °C (97.6 °F)  Pulse:  [70-80] 70  Resp:  [16-17] 16  BP: (116-137)/(71-85) 137/75  SpO2:  [94 %-97 %] 97 %    Physical Exam  Vitals and nursing note reviewed.   Constitutional:       General: She is awake.      Appearance: She is obese.   HENT:      Head: Normocephalic.      Mouth/Throat:      Lips: Pink.      Mouth: Mucous membranes are moist.   Eyes:      General: Lids are normal.   Cardiovascular:      Rate and Rhythm: Normal rate.      Heart sounds: Normal heart sounds.   Pulmonary:      Effort: Pulmonary effort is normal. No respiratory distress.      Breath sounds: Normal breath sounds.   Abdominal:      General: Bowel sounds are normal.      Palpations: Abdomen is soft.      Tenderness: There is no abdominal tenderness.   Musculoskeletal:      Cervical back: Neck supple.      Comments: KINCAID    Skin:     General: Skin is warm and dry.   Neurological:      Mental Status: She is alert.   Psychiatric:         Behavior: Behavior is cooperative.      Comments: Patient cooperative today             Fluids    Intake/Output Summary (Last 24 hours) at 7/17/2021 0828  Last data filed at 7/16/2021 2015  Gross per 24 hour   Intake 720 ml   Output --   Net 720 ml       Laboratory                        Imaging  DX-CHEST-LIMITED (1 VIEW)   Final Result      Left basilar atelectasis. No focal consolidation. No effusions.           Assessment/Plan  * Delusional disorder- (present on admission)  Assessment & Plan  Sent to the  ED from her memory care facility for worsening agitation and behavioral changes.   Psychiatry evaluated the patient. Diagnosed by Psychiatry with schizoaffective disorder, bipolar type. The patient was initially put on a legal hold by Psych for lacking the capacity to make medical decisions.   Cognitive evaluation done by SLP on 6/14/21 showed mild deficits.  Psych recommended risperidone daily, however patient refuses it.  Medical workup did not show any organic etiology or reversible cause.  Chart review indicates history of dementia, but MMSE 30 during this hospitalization.  Patient has been pleasant and has not showed any agitation since admission, legal hold discontinued per Psych re-evaluation, last seen by Psych on 6/22/21 per chart review.     When asked about being discharged from the hospital, patient states:  She would walk out of the hospital turn left then right and then walk 47 miles. That is where her home is. She purchased it. Her cousin is keeping the house for her there for now. Her cousin's name is LEATHA JUAREZ and she works for [employer] and she is a [employer]. 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 The patient stated that nothing would go wrong and she could be discharged home now. Based on this information, the patient lacks capacity to leave the hospital.      working on sending her to a memory care facility   7/1: Guardianship paperwork started    History of dementia  Assessment & Plan  Has a reported history of dementia.  Applied for guardianship    Schizoaffective disorder, bipolar type (HCC)  Assessment & Plan  History of schizoaffective disorder bipolar type.  Seen by psychiatry  Patient refusing medications, therefore none prescribed at this time    Hypothyroidism  Assessment & Plan  Chronic, TSH 15.600 on 6/11/21, FT4 0.93 on 6/14/21.  Is supposed to be on levothyroxine 50 mcg daily but the patient refuses to take the  medication.    Noncompliance with treatment plan- (present on admission)  Assessment & Plan  Patient refusing antipsychotic medications.       VTE prophylaxis: enoxaparin ppx

## 2021-07-17 NOTE — PROGRESS NOTES
Received report from NOC RN and assumed care of pt. Pt is A&Ox2, oriented to self and time. Pt is resting in bed on room air. Pt reports no pain or discomfort. POC discussed with pt; all questions answered. No other needs at this time. Bed locked in lowest position, call light within reach, pt educated to use call light.

## 2021-07-18 PROCEDURE — G0378 HOSPITAL OBSERVATION PER HR: HCPCS

## 2021-07-18 NOTE — PROGRESS NOTES
1 RN Skin Assessment completed.   Patient's risk of skin breakdown: Low     Devices in place and skin assessed under:   []? Pulse Ox  []? PIV []? Central Line []? SCDs []?Purewick  []? Mendosa  []?Condom Cath   []? BMS        []?  Cortrak   []?  Oxymask   []? Bipap    []? Nasal Canula   [x]? N/A   []? Other(specify):      Right Ear  [x]? WDL                or           []? Skin issue present (please provide assessment/description below)     Left Ear  [x]? WDL                or           []? Skin issue present (please provide assessment/description below)     Right Elbow:  [x]? WDL               or           []? Skin issue present (please provide assessment/description below)     Left Elbow:   [x]? WDL                or           []? Skin issue present (please provide assessment/description below)     Coccyx/Buttocks:  [x]? WDL                or           []? Skin issue present (please provide assessment/description below)     Right Heel/Foot/Toes:  [x]? WDL                or           []? Skin issue present (please provide assessment/description below)     Left Heel/Foot/Toes:   [x]? WDL              or           []? Skin issue present (please provide assessment/description below)

## 2021-07-18 NOTE — PROGRESS NOTES
Assumed care of patient this shift. Patient is alert and oriented x 3, disoriented to situation. Patient stating that she owns both [facility] [place] and [facility] and that she would like to leave today.  Denied complaints of pain this shift when asked.  Up independently in room and to bathroom. Plan of care discussed with patient, attempted to reorient patient to situation. Call light is within reach.

## 2021-07-18 NOTE — PROGRESS NOTES
Disoriented to situation.  Denied any pain or needs.  Good appetite.  Ate everything on dinner tray except salad.      1 RN Skin Assessment completed.   Patient's risk of skin breakdown: Low    Devices in place and skin assessed under:   [] Pulse Ox  [] PIV [] Central Line [] SCDs []Purewick  [] Mendosa  []Condom Cath   [] BMS        []  Cortrak   []  Oxymask   [] Bipap    [] Nasal Canula   [x] N/A   [] Other(specify):     Right Ear  [x] WDL                or           [] Skin issue present (please provide assessment/description below)    Left Ear  [x] WDL                or           [] Skin issue present (please provide assessment/description below)    Right Elbow:  [x] WDL               or           [] Skin issue present (please provide assessment/description below)    Left Elbow:   [x] WDL                or           [] Skin issue present (please provide assessment/description below)    Coccyx/Buttocks:  [x] WDL                or           [] Skin issue present (please provide assessment/description below)    Right Heel/Foot/Toes:  [x] WDL                or           [] Skin issue present (please provide assessment/description below)    Left Heel/Foot/Toes:   [x] WDL              or           [] Skin issue present (please provide assessment/description below)

## 2021-07-19 PROCEDURE — G0378 HOSPITAL OBSERVATION PER HR: HCPCS

## 2021-07-19 NOTE — PROGRESS NOTES
1 RN Skin Assessment completed.   Patient's risk of skin breakdown: Low     Devices in place and skin assessed under:   []?? Pulse Ox  []?? PIV []?? Central Line []?? SCDs []??Purewick  []?? Mendosa  []??Condom Cath   []?? BMS        []??  Cortrak   []??  Oxymask   []?? Bipap    []?? Nasal Canula   [x]?? N/A   []?? Other(specify):      Right Ear  [x]?? WDL                or           []?? Skin issue present (please provide assessment/description below)     Left Ear  [x]?? WDL                or           []?? Skin issue present (please provide assessment/description below)     Right Elbow:  [x]?? WDL               or           []?? Skin issue present (please provide assessment/description below)     Left Elbow:   [x]?? WDL                or           []?? Skin issue present (please provide assessment/description below)     Coccyx/Buttocks:  [x]?? WDL                or           []?? Skin issue present (please provide assessment/description below)     Right Heel/Foot/Toes:  [x]?? WDL                or           []?? Skin issue present (please provide assessment/description below)     Left Heel/Foot/Toes:   [x]?? WDL              or           []?? Skin issue present (please provide assessment/description below)

## 2021-07-19 NOTE — PROGRESS NOTES
"Assumed care of patient this shift. Patient is alert and oriented x 3, disoriented to situation. Denied complaints of pain this shift when asked.  Up independently in room and to bathroom. Plan of care discussed with patient, attempted to reorient patient to situation that her daughter Sharonda is working with the  and that we are looking for a safe discharge plan. Patient states that her daughter \"has been dead for 50 years\" and that she \"doesn't need a .\"    "

## 2021-07-19 NOTE — PROGRESS NOTES
Pt is A&Ox2- disoriented to situation and time. Pt is resting in bed, no signs of labored breathing or pain. Pt on RA. Call light & personal belongings within reach, bed in lowest position & locked. Fall precautions in place and education provided on how to use call light. Pt updated on plan of care for the shift. Pt declines any additional needs at this time.        1 RN Skin Assessment completed.   Patient's risk of skin breakdown: Low    Devices in place and skin assessed under:   [] Pulse Ox  [] PIV [] Central Line [] SCDs []Purewick  [] Mendosa  []Condom Cath   [] BMS        []  Cortrak   []  Oxymask   [] Bipap    [] Nasal Canula   [] N/A   [] Other(specify):     Right Ear  [x] WDL                or           [] Skin issue present (please provide assessment/description below)    Left Ear  [x] WDL                or           [] Skin issue present (please provide assessment/description below)    Right Elbow:  [x] WDL               or           [] Skin issue present (please provide assessment/description below)    Left Elbow:   [x] WDL                or           [] Skin issue present (please provide assessment/description below)    Coccyx/Buttocks:  [x] WDL                or           [] Skin issue present (please provide assessment/description below)    Right Heel/Foot/Toes:  [x] WDL                or           [] Skin issue present (please provide assessment/description below)    Left Heel/Foot/Toes:   [x] WDL              or           [] Skin issue present (please provide assessment/description below)      **Describe any other skin issues in areas not already listed from above list:   [x] N/A    Interventions In Place: Pillows    **If any new or digressing skin issues are found, fill out the selection below.    Possible Skin Injury No  Pictures Uploaded Into Epic: N/A  Wound Consult Placed: N/A  RN Wound Prevention Protocol Ordered: No    What new preventative interventions did you add? N/A

## 2021-07-20 PROCEDURE — G0378 HOSPITAL OBSERVATION PER HR: HCPCS

## 2021-07-20 ASSESSMENT — PAIN DESCRIPTION - PAIN TYPE: TYPE: ACUTE PAIN

## 2021-07-20 NOTE — PROGRESS NOTES
Assumed care of patient this shift. Patient is alert and oriented x 3, disoriented to situation. Denied complaints of pain this shift when asked.  Up independently in room and to bathroom. Plan of care discussed with patient and call light is within reach.

## 2021-07-20 NOTE — PROGRESS NOTES
1 RN Skin Assessment completed.   Patient's risk of skin breakdown: Low     Devices in place and skin assessed under:   []??? Pulse Ox  []??? PIV []??? Central Line []??? SCDs []???Purewick  []??? Mendosa  []???Condom Cath   []??? BMS        []???  Cortrak   []???  Oxymask   []??? Bipap    []??? Nasal Canula   [x]??? N/A   []??? Other(specify):      Right Ear  [x]??? WDL                or           []??? Skin issue present (please provide assessment/description below)     Left Ear  [x]??? WDL                or           []??? Skin issue present (please provide assessment/description below)     Right Elbow:  [x]??? WDL               or           []??? Skin issue present (please provide assessment/description below)     Left Elbow:   [x]??? WDL                or           []??? Skin issue present (please provide assessment/description below)     Coccyx/Buttocks:  [x]??? WDL                or           []??? Skin issue present (please provide assessment/description below)     Right Heel/Foot/Toes:  [x]??? WDL                or           []??? Skin issue present (please provide assessment/description below)     Left Heel/Foot/Toes:   [x]??? WDL              or           []??? Skin issue present (please provide assessment/description below)

## 2021-07-20 NOTE — PROGRESS NOTES
Pt is A&Ox2- disoriented to time and situation. Pt is resting in bed, no signs of labored breathing or pain. Pt on RA. Call light & personal belongings within reach, bed in lowest position & locked. Pt is independent to the bathroom and calls for assistance when needed. Pt updated on plan of care for the shift. Pt declines any additional needs at this time.        1 RN Skin Assessment completed.   Patient's risk of skin breakdown: Low    Devices in place and skin assessed under:   [] Pulse Ox  [] PIV [] Central Line [] SCDs []Purewick  [] Mendosa  []Condom Cath   [] BMS        []  Cortrak   []  Oxymask   [] Bipap    [] Nasal Canula   [x] N/A   [] Other(specify):     Right Ear  [x] WDL                or           [] Skin issue present (please provide assessment/description below)    Left Ear  [x] WDL                or           [] Skin issue present (please provide assessment/description below)    Right Elbow:  [x] WDL               or           [] Skin issue present (please provide assessment/description below)    Left Elbow:   [x] WDL                or           [] Skin issue present (please provide assessment/description below)    Coccyx/Buttocks:  [x] WDL                or           [] Skin issue present (please provide assessment/description below)    Right Heel/Foot/Toes:  [x] WDL                or           [] Skin issue present (please provide assessment/description below)    Left Heel/Foot/Toes:   [x] WDL              or           [] Skin issue present (please provide assessment/description below)      **Describe any other skin issues in areas not already listed from above list:   [x] N/A    Interventions In Place: Pillows and Pressure Redistribution Mattress    **If any new or digressing skin issues are found, fill out the selection below.    Possible Skin Injury No  Pictures Uploaded Into Epic: N/A  Wound Consult Placed: N/A  RN Wound Prevention Protocol Ordered: No    What new preventative interventions did  you add? N/A

## 2021-07-21 PROCEDURE — G0378 HOSPITAL OBSERVATION PER HR: HCPCS

## 2021-07-21 PROCEDURE — 94760 N-INVAS EAR/PLS OXIMETRY 1: CPT

## 2021-07-21 ASSESSMENT — PAIN DESCRIPTION - PAIN TYPE: TYPE: ACUTE PAIN

## 2021-07-21 NOTE — PROGRESS NOTES
Pt axo x3 at time of assessment, disoriented to situation.  Pt denied pain.  Pt pleasantly confused and resting in bed and denied any further needs.  wanderguard to L ankle.  Pt would benefit from a podiatrist visit to properly trim pt's outgrown toenails.  Call light within reach, bed locked and in lowest position, frequent rounding in place.

## 2021-07-21 NOTE — PROGRESS NOTES
1 RN Skin Assessment completed.   Patient's risk of skin breakdown: Low    Devices in place and skin assessed under:   [] Pulse Ox  [] PIV [] Central Line [] SCDs []Purewick  [] Mendosa  []Condom Cath   [] BMS        []  Cortrak   []  Oxymask   [] Bipap    [] Nasal Canula   [] N/A   [] Other(specify):     Right Ear  [x] WDL                or           [] Skin issue present (please provide assessment/description below)    Left Ear  [x] WDL                or           [] Skin issue present (please provide assessment/description below)    Right Elbow:  [x] WDL               or           [] Skin issue present (please provide assessment/description below)    Left Elbow:   [x] WDL                or           [] Skin issue present (please provide assessment/description below)    Coccyx/Buttocks:  [x] WDL                or           [] Skin issue present (please provide assessment/description below)    Right Heel/Foot/Toes:  [] WDL                or           [x] Skin issue present (please provide assessment/description below) - needs toenails trimmed    Left Heel/Foot/Toes:   [] WDL              or           [x] Skin issue present (please provide assessment/description below) - needs toenails trimmed       **Describe any other skin issues in areas not already listed from above list:   [x] N/A    Interventions In Place: Pillows and Pressure Redistribution Mattress    **If any new or digressing skin issues are found, fill out the selection below.    Possible Skin Injury No  Pictures Uploaded Into Epic: N/A  Wound Consult Placed: N/A  RN Wound Prevention Protocol Ordered: No    What new preventative interventions did you add? N/A

## 2021-07-21 NOTE — PROGRESS NOTES
Assumed care of pt at shift change, report received. Pt resting in bed comfortably. A&Ox3, disoriented to situation, delusional. Denies pain or discomfort. Refuses full skin assessment. Declines any needs at this time. Safety precautions in place.

## 2021-07-22 PROCEDURE — 99226 PR SUBSEQUENT OBSERVATION CARE,LEVEL III: CPT | Performed by: NURSE PRACTITIONER

## 2021-07-22 PROCEDURE — 94760 N-INVAS EAR/PLS OXIMETRY 1: CPT

## 2021-07-22 PROCEDURE — G0378 HOSPITAL OBSERVATION PER HR: HCPCS

## 2021-07-22 ASSESSMENT — PAIN DESCRIPTION - PAIN TYPE: TYPE: ACUTE PAIN

## 2021-07-22 NOTE — PROGRESS NOTES
Received bedside report from night shift RN.   Assumed care of patient at change of shift.   Assessment complete and POC discussed.   Patient is A&Ox3 - disoriented to situation, VSS, on RA.   WanderGuard on left ankle.  Denies pain, no apparent signs of distress or discomfort.   Patient is sitting up in bed for breakfast.   Bed is in lowest/locked position.   Call light and belongings are within reach.   No further needs at this time.        1 RN Skin Assessment completed.   Patient's risk of skin breakdown: Low    Devices in place and skin assessed under:   [] Pulse Ox  [] PIV [] Central Line [] SCDs []Purewick  [] Mendosa  []Condom Cath   [] BMS        []  Cortrak   []  Oxymask   [] Bipap    [] Nasal Canula   [] N/A   [] Other(specify):     Right Ear  [x] WDL                or           [] Skin issue present (please provide assessment/description below)    Left Ear  [x] WDL                or           [] Skin issue present (please provide assessment/description below)    Right Elbow:  [x] WDL               or           [] Skin issue present (please provide assessment/description below)    Left Elbow:   [x] WDL                or           [] Skin issue present (please provide assessment/description below)    Coccyx/Buttocks:  [x] WDL                or           [] Skin issue present (please provide assessment/description below)    Right Heel/Foot/Toes:  [x] WDL                or           [] Skin issue present (please provide assessment/description below)    Left Heel/Foot/Toes:   [x] WDL              or           [] Skin issue present (please provide assessment/description below)      **Describe any other skin issues in areas not already listed from above list:   [x] N/A    Interventions In Place: Pillows and Pressure Redistribution Mattress    **If any new or digressing skin issues are found, fill out the selection below.    Possible Skin Injury No  Pictures Uploaded Into Epic: N/A  Wound Consult Placed: N/A  RN  Wound Prevention Protocol Ordered: No    What new preventative interventions did you add? N/A

## 2021-07-22 NOTE — PROGRESS NOTES
"Hospital Medicine Bi-Weekly Progress Note    Date of Service  7/22/2021    Chief Complaint  Zully Lin is a 70 y.o. female admitted 6/11/2021 with agitation and delusion    Hospital Course  Per HPI and Hospitalist on service interval updates    A 70-year-old woman with h/o dementia (unclear baseline), hypothyrodism, schizophrenia, a resident of [facility] presented 6/11/2021 with worsening agitation, delusions. Exam on admission was unremarkable and neuro exam is non focal, with the exception of mental status (AOx1, tangential thoughts and delusion). And Labs not suggestive of infection. CBC not suggestive of infection, BMP  normal, UA clean, and patient denies anything on ROS. Pt was evaluated by Psychiatry and Psychology on admission - considered incapacitated to make medical and discharge decisions; initially placed on legal hold. Psychiatrist medications adjusted. Pt was considered medically cleared as off 6/13/2021 - initially planned for Inpatient Psych facility which has been difficult due to limited insurance.     While on psych meds and upon follow up eval by Psychiatry, Pt appeared to be at her baseline. Patient was Psychiatrically cleared for discharge on 6/22. Plan has been for a long term placement and evaluation for guardianship as Pt cannot make decisions. Care was eventually transferred from [facility] to Hospitalist services on 7/1/2021.         Interval Problem Update  7/22:  Patient resting in bed.  She is asking when she will get out of the hospital.  Explained we are working with her daughter to find safe placement.  The patient states all of her children were murdered as well as her  because \"she has money\".  She then moves on to unrelated conversation.  She does not appear to have any acute needs at this time.     Consultants/Specialty  psychiatry  Psychology     Code Status  Full Code    Disposition  Patient is medically cleared.   Anticipate discharge to to [facility] " nursing facility with memory care.       Review of Systems  Review of Systems   Constitutional: Negative for chills and fever.   HENT: Negative.    Eyes: Negative.    Respiratory: Negative for cough and shortness of breath.    Cardiovascular: Negative for chest pain and leg swelling.   Gastrointestinal: Negative for abdominal pain, nausea and vomiting.   Genitourinary: Negative for dysuria.   Musculoskeletal: Negative for myalgias.   Skin: Negative.    Neurological: Negative for weakness and headaches.   Psychiatric/Behavioral: Positive for memory loss, delusions.     Physical Exam  Temp:  [36.2 °C (97.1 °F)-36.7 °C (98.1 °F)] 36.2 °C (97.1 °F)  Pulse:  [66-74] 66  Resp:  [16-18] 16  BP: (123-135)/(61-76) 134/76  SpO2:  [93 %-97 %] 96 %    Physical Exam  Vitals and nursing note reviewed.   Constitutional:       General: She is awake.      Appearance: She is obese.   HENT:      Head: Normocephalic.      Mouth/Throat:      Lips: Pink.      Mouth: Mucous membranes are moist.   Eyes:      General: Lids are normal.   Cardiovascular:      Rate and Rhythm: Normal rate.      Heart sounds: Normal heart sounds.   Pulmonary:      Effort: Pulmonary effort is normal. No respiratory distress.      Breath sounds: Normal breath sounds.   Abdominal:      General: Bowel sounds are normal.      Palpations: Abdomen is soft.      Tenderness: There is no abdominal tenderness.   Musculoskeletal:      Cervical back: Neck supple.      Comments: KINCAID    Skin:     General: Skin is warm and dry.   Neurological:      Mental Status: She is alert.   Psychiatric:         Behavior: Behavior is cooperative.      Comments: Patient cooperative today             Fluids  No intake or output data in the 24 hours ending 07/22/21 0921    Laboratory                        Imaging  DX-CHEST-LIMITED (1 VIEW)   Final Result      Left basilar atelectasis. No focal consolidation. No effusions.           Assessment/Plan  * Delusional disorder- (present on  admission)  Assessment & Plan  Sent to the ED from her memory care facility for worsening agitation and behavioral changes.   Psychiatry evaluated the patient. Diagnosed by Psychiatry with schizoaffective disorder, bipolar type. The patient was initially put on a legal hold by Psych for lacking the capacity to make medical decisions.   Cognitive evaluation done by SLP on 6/14/21 showed mild deficits.  Psych recommended risperidone daily, however patient refuses it.  Medical workup did not show any organic etiology or reversible cause.  Chart review indicates history of dementia, but MMSE 30 during this hospitalization.  Patient has been pleasant and has not showed any agitation since admission, legal hold discontinued per Psych re-evaluation, last seen by Psych on 6/22/21 per chart review.     When asked about being discharged from the hospital, patient states:  She would walk out of the hospital turn left then right and then walk 47 miles. That is where her home is. She purchased it. Her cousin is keeping the house for her there for now. Her cousin's name is LEATHA JUAREZ and she works for [employer] and she is a Lt. 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 The patient stated that nothing would go wrong and she could be discharged home now. Based on this information, the patient lacks capacity to leave the hospital.      working on sending her to a memory care facility   7/1: Guardianship paperwork started    History of dementia  Assessment & Plan  Has a reported history of dementia.  Applied for guardianship    Schizoaffective disorder, bipolar type (HCC)  Assessment & Plan  History of schizoaffective disorder bipolar type.  Seen by psychiatry  Patient refusing medications, therefore none prescribed at this time    Hypothyroidism  Assessment & Plan  Chronic, TSH 15.600 on 6/11/21, FT4 0.93 on 6/14/21.  Is supposed to be on levothyroxine 50 mcg daily but the  patient refuses to take the medication.    Noncompliance with treatment plan- (present on admission)  Assessment & Plan  Patient refusing antipsychotic medications and all other medications  Encourage compliance.        VTE prophylaxis: Lovenox d/c'd secondary to patient's refusal.  SCDs.

## 2021-07-22 NOTE — PROGRESS NOTES
Pt is resting in bed. Denied pain. Pt denied further needs. Refused full skin check. Pt ambulates self and steadily. Call light and belongings within reach.

## 2021-07-23 PROCEDURE — G0378 HOSPITAL OBSERVATION PER HR: HCPCS

## 2021-07-23 NOTE — PROGRESS NOTES
Pt is resting in bed watching TV. Denied pain. Pt denied further needs. Refused full skin check. Pt ambulates self and steadily. Call light and belongings within reach. L wanderguard in place.

## 2021-07-24 PROCEDURE — G0378 HOSPITAL OBSERVATION PER HR: HCPCS

## 2021-07-24 PROCEDURE — 94760 N-INVAS EAR/PLS OXIMETRY 1: CPT

## 2021-07-24 ASSESSMENT — PAIN DESCRIPTION - PAIN TYPE: TYPE: ACUTE PAIN

## 2021-07-24 NOTE — PROGRESS NOTES
"Received bedside report from night shift RN.   Assumed care of patient at change of shift.   Assessment complete and POC discussed.   Patient is A&Ox3 - disoriented to situation, VSS, on RA.   WanderGuard on left ankle.  Patient asking when she is going to \"get out of here.\"   This RN told patient SW is working on a safe discharge plan.  Patient then proceeds to state \"I own this place, I can leave if I want.\"  Patient was re-oriented and is now sitting up in bed eating breakfast.  Denies pain, no apparent signs of distress or discomfort.   Bed is in lowest/locked position.   Call light and belongings are within reach.   No further needs at this time    1400: Patient refused skin check.  "

## 2021-07-24 NOTE — PROGRESS NOTES
Report received by sky RN. Assumed care of pt. Assessment complete. Pt A&Ox3, disoriented to event. VSS and on RA. Pt in no apparent signs of distress, denies pain. Wanderguard to L ankle for safety. Refuses medications. All needs met at this time. Call light within reach, bed locked and in lowest position, and pt has no further questions at this time.         1 RN Skin Assessment completed.   Patient's risk of skin breakdown: Low    Devices in place and skin assessed under:   [] Pulse Ox  [] PIV [] Central Line [] SCDs []Purewick  [] Mendosa  []Condom Cath   [] BMS        []  Cortrak   []  Oxymask   [] Bipap    [] Nasal Canula   [x] N/A   [] Other(specify):     Right Ear  [x] WDL                or           [] Skin issue present (please provide assessment/description below)    Left Ear  [x] WDL                or           [] Skin issue present (please provide assessment/description below)    Right Elbow:  [x] WDL               or           [] Skin issue present (please provide assessment/description below)    Left Elbow:   [x] WDL                or           [] Skin issue present (please provide assessment/description below)    Coccyx/Buttocks:  [x] WDL                or           [] Skin issue present (please provide assessment/description below)    Right Heel/Foot/Toes:  [x] WDL                or           [] Skin issue present (please provide assessment/description below)    Left Heel/Foot/Toes:   [x] WDL              or           [] Skin issue present (please provide assessment/description below)      **Describe any other skin issues in areas not already listed from above list:   [x] N/A    Interventions In Place: Pillows and Pressure Redistribution Mattress    **If any new or digressing skin issues are found, fill out the selection below.    Possible Skin Injury No  Pictures Uploaded Into Epic: N/A  Wound Consult Placed: N/A  RN Wound Prevention Protocol Ordered: No    What new preventative interventions  did you add? N/A

## 2021-07-25 PROCEDURE — G0378 HOSPITAL OBSERVATION PER HR: HCPCS

## 2021-07-25 PROCEDURE — 99226 PR SUBSEQUENT OBSERVATION CARE,LEVEL III: CPT | Performed by: NURSE PRACTITIONER

## 2021-07-25 ASSESSMENT — PAIN DESCRIPTION - PAIN TYPE: TYPE: ACUTE PAIN

## 2021-07-25 NOTE — PROGRESS NOTES
1 RN Skin Assessment completed.   Patient's risk of skin breakdown: Low    Devices in place and skin assessed under:   [] Pulse Ox  [] PIV [] Central Line [] SCDs []Purewick  [] Mendosa  []Condom Cath   [] BMS        []  Cortrak   []  Oxymask   [] Bipap    [] Nasal Canula   [x] N/A   [] Other(specify):     Right Ear  [x] WDL                or           [] Skin issue present (please provide assessment/description below)    Left Ear  [x] WDL                or           [] Skin issue present (please provide assessment/description below)    Right Elbow:  [x] WDL               or           [] Skin issue present (please provide assessment/description below)    Left Elbow:   [x] WDL                or           [] Skin issue present (please provide assessment/description below)    Coccyx/Buttocks:  [x] WDL                or           [] Skin issue present (please provide assessment/description below)    Right Heel/Foot/Toes:  [x] WDL                or           [] Skin issue present (please provide assessment/description below)    Left Heel/Foot/Toes:   [x] WDL              or           [] Skin issue present (please provide assessment/description below)      **Describe any other skin issues in areas not already listed from above list:   [x] N/A    Interventions In Place: Pillows and Low Air Loss Mattress    **If any new or digressing skin issues are found, fill out the selection below.    Possible Skin Injury No  Pictures Uploaded Into Epic: N/A  Wound Consult Placed: N/A  RN Wound Prevention Protocol Ordered: No    What new preventative interventions did you add? N/A     VSS. Pt resting in bed @ this time, A&Ox3. Calm & cooperative with care @ this time. Bed locked in lowest position, call light in reach. Wander guard on L ankle.

## 2021-07-25 NOTE — DISCHARGE PLANNING
"Anticipated Discharge Disposition: Memory Care    Action: SW spoke to patient about her discharge plan.  Patient asked SW why I was asking her about where she will be going.  Patient stated she has a home she can go to, \"it is down 50 homes and it is on the left side.\"  SW asked patient if she has family that lives with her.  Patient stated no, but she has family that lives by her. Patient began rambling names of family members.  Patient said her cousin Olive Mccracken who lives by her was the [employer] Governor.  SW asked patient if she has children, patient stated 8 but no daughters.  SW asked why someone would say they are her daughter.  Patient was silent for a short time, then stated that when she lived in [place] she had a neighbor who she called her daughter. Sw asked patient why this individual would be calling and saying she is her daughter if only a friend.  Patient stated she isn't sure, but doesn't want anything to do with her.  SW asked patient if she would go back to [place], patient stated no.  \"I don't have any reason to go there, I don't know any one I have a home here.\"    PC to patient's daughter, Sharonda [phone]; SW told Sharonda that the guardianship packet was received.  Sharonda stated she filled it out the best she could as she has limited information.  SW asked Sharonda if the form is completed will she be able to submit the [facility] and [facility]  Sharonda stated that is why she is happy SW called, \"I will need help in this process.\"  SW stated that the [facility] does offer some assistance.  That she may need to hire an /para legal to help her.     SW asked Sharonda if she is open to a referral being made to [facility] Guardians Office, and after they obtain guardianship.  Working with them to have it transferred to her.  Sharonda asked questions about a guardian again what the responsibilities would be, etc.  SW went over Sharonda's questions.      SW asked " "Sharonda about bringing patient back to [place] now and then applying for guardianship.  Sharonda said that while she would like her mother to live in [place] to be closer.  She does not believe her mother would go willingly, \"what am I suppose to do if I stop of gas and she runs off.\"      Barriers to Discharge: guardianship/placement/needs Medicaid/only has Medicare A    Plan: follow up [facility] Guardians Office.  submit guardianship packet    "

## 2021-07-25 NOTE — PROGRESS NOTES
1 RN Skin Assessment completed.   Patient's risk of skin breakdown: Low    Devices in place and skin assessed under:   [] Pulse Ox  [] PIV [] Central Line [] SCDs []Purewick  [] Mendosa  []Condom Cath   [] BMS        []  Cortrak   []  Oxymask   [] Bipap    [] Nasal Canula   [x] N/A   [] Other(specify):     Right Ear  [x] WDL                or           [] Skin issue present (please provide assessment/description below)    Left Ear  [x] WDL                or           [] Skin issue present (please provide assessment/description below)    Right Elbow:  [x] WDL               or           [] Skin issue present (please provide assessment/description below)    Left Elbow:   [x] WDL                or           [] Skin issue present (please provide assessment/description below)    Coccyx/Buttocks:  [x] WDL                or           [] Skin issue present (please provide assessment/description below)    Right Heel/Foot/Toes:  [x] WDL                or           [] Skin issue present (please provide assessment/description below)    Left Heel/Foot/Toes:   [x] WDL              or           [] Skin issue present (please provide assessment/description below)      **Describe any other skin issues in areas not already listed from above list:   [x] N/A    Interventions In Place: Pillows, Heels Loaded W/Pillows and Pressure Redistribution Mattress    **If any new or digressing skin issues are found, fill out the selection below.    Possible Skin Injury No  Pictures Uploaded Into Epic: N/A  Wound Consult Placed: N/A  RN Wound Prevention Protocol Ordered: No    What new preventative interventions did you add? N/A

## 2021-07-25 NOTE — PROGRESS NOTES
Pt is A&Ox2, + delusions. VSS on RA.  Pt is able to ambulate independently with steady gait.  Pt reports 0/10 pain.

## 2021-07-25 NOTE — PROGRESS NOTES
"Hospital Medicine Bi-Weekly Progress Note    Date of Service  7/25/2021    Chief Complaint  Zully Lin is a 70 y.o. female admitted 6/11/2021 with agitation and delusion    Hospital Course  Per HPI and Hospitalist on service interval updates    A 70-year-old woman with h/o dementia (unclear baseline), hypothyrodism, schizophrenia, a resident of [facility] presented 6/11/2021 with worsening agitation, delusions. Exam on admission was unremarkable and neuro exam is non focal, with the exception of mental status (AOx1, tangential thoughts and delusion). And Labs not suggestive of infection. CBC not suggestive of infection, BMP  normal, UA clean, and patient denies anything on ROS. Pt was evaluated by Psychiatry and Psychology on admission - considered incapacitated to make medical and discharge decisions; initially placed on legal hold. Psychiatrist medications adjusted. Pt was considered medically cleared as off 6/13/2021 - initially planned for Inpatient Psych facility which has been difficult due to limited insurance.     While on psych meds and upon follow up eval by Psychiatry, Pt appeared to be at her baseline. Patient was Psychiatrically cleared for discharge on 6/22. Plan has been for a long term placement and evaluation for guardianship as Pt cannot make decisions. Care was eventually transferred from [facility] to Hospitalist services on 7/1/2021.         Interval Problem Update  7/22:  Patient resting in bed.  She is asking when she will get out of the hospital.  Explained we are working with her daughter to find safe placement.  The patient states all of her children were murdered as well as her  because \"she has money\".  She then moves on to unrelated conversation.  She does not appear to have any acute needs at this time.   7/25:  Patient mood improved today.  She has less delusions.  Her only request is to have her wanderguard removed as she feels like she's in care home.  No acute " complaints of pain or discomfort.     Consultants/Specialty  psychiatry  Psychology     Code Status  Full Code    Disposition  Patient is medically cleared.   Anticipate discharge to to skilled nursing facility with memory care.       Review of Systems  Review of Systems   Constitutional: Negative for chills and fever.   HENT: Negative.    Eyes: Negative.    Respiratory: Negative for cough and shortness of breath.    Cardiovascular: Negative for chest pain and leg swelling.   Gastrointestinal: Negative for abdominal pain, nausea and vomiting.   Genitourinary: Negative for dysuria.   Musculoskeletal: Negative for myalgias.   Skin: Negative.    Neurological: Negative for weakness and headaches.   Psychiatric/Behavioral: Positive for memory loss, delusions.     Physical Exam  Temp:  [36.1 °C (97 °F)-36.5 °C (97.7 °F)] 36.4 °C (97.5 °F)  Pulse:  [60-72] 62  Resp:  [16-18] 16  BP: (125-133)/(66-72) 133/68  SpO2:  [95 %-98 %] 98 %    Physical Exam  Vitals and nursing note reviewed.   Constitutional:       General: She is awake.      Appearance: She is obese.   HENT:      Head: Normocephalic.      Mouth/Throat:      Lips: Pink.      Mouth: Mucous membranes are moist.   Eyes:      General: Lids are normal.   Cardiovascular:      Rate and Rhythm: Normal rate.      Heart sounds: Normal heart sounds.   Pulmonary:      Effort: Pulmonary effort is normal. No respiratory distress.      Breath sounds: Normal breath sounds.   Abdominal:      General: Bowel sounds are normal.      Palpations: Abdomen is soft.      Tenderness: There is no abdominal tenderness.   Musculoskeletal:      Cervical back: Neck supple.      Comments: KINCAID    Skin:     General: Skin is warm and dry.   Neurological:      Mental Status: She is alert.   Psychiatric:         Behavior: Behavior is cooperative.      Comments: Patient cooperative today             Fluids    Intake/Output Summary (Last 24 hours) at 7/25/2021 0953  Last data filed at 7/24/2021  2000  Gross per 24 hour   Intake 1360 ml   Output --   Net 1360 ml       Laboratory                        Imaging  DX-CHEST-LIMITED (1 VIEW)   Final Result      Left basilar atelectasis. No focal consolidation. No effusions.           Assessment/Plan  * Delusional disorder- (present on admission)  Assessment & Plan  Sent to the ED from her memory care facility for worsening agitation and behavioral changes.   Psychiatry evaluated the patient. Diagnosed by Psychiatry with schizoaffective disorder, bipolar type. The patient was initially put on a legal hold by Psych for lacking the capacity to make medical decisions.   Cognitive evaluation done by SLP on 6/14/21 showed mild deficits.  Psych recommended risperidone daily, however patient refuses it.  Medical workup did not show any organic etiology or reversible cause.  Chart review indicates history of dementia, but MMSE 30 during this hospitalization.  Patient has been pleasant and has not showed any agitation since admission, legal hold discontinued per Psych re-evaluation, last seen by Psych on 6/22/21 per chart review.     When asked about being discharged from the hospital, patient states:  She would walk out of the hospital turn left then right and then walk 47 miles. That is where her home is. She purchased it. Her cousin is keeping the house for her there for now. Her cousin's name is LEATHA JUAREZ and she works for [employer] and she is a . 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 The patient stated that nothing would go wrong and she could be discharged home now. Based on this information, the patient lacks capacity to leave the hospital.      working on sending her to a memory care facility   7/1: Guardianship paperwork started    History of dementia  Assessment & Plan  Has a reported history of dementia.  Applied for guardianship    Schizoaffective disorder, bipolar type (HCC)  Assessment &  Plan  History of schizoaffective disorder bipolar type.  Seen by psychiatry  Patient refusing medications, therefore none prescribed at this time    Hypothyroidism  Assessment & Plan  Chronic, TSH 15.600 on 6/11/21, FT4 0.93 on 6/14/21.  Is supposed to be on levothyroxine 50 mcg daily but the patient refuses to take the medication.    Noncompliance with treatment plan- (present on admission)  Assessment & Plan  Patient refusing antipsychotic medications and all other medications  Encourage compliance.        VTE prophylaxis: Lovenox d/c'd secondary to patient's refusal.  SCDs.

## 2021-07-26 PROCEDURE — G0378 HOSPITAL OBSERVATION PER HR: HCPCS

## 2021-07-26 ASSESSMENT — FIBROSIS 4 INDEX: FIB4 SCORE: 1.51

## 2021-07-26 ASSESSMENT — PAIN DESCRIPTION - PAIN TYPE: TYPE: ACUTE PAIN

## 2021-07-26 NOTE — DISCHARGE PLANNING
Medical Social Work  PC to Fuentes Guanmariela [facility] Guardians Office, [phone].  Fuentes stated that the daughter can call the [facility] at the [address] [place] for direction on completing the application an how to submit.  SW asked how difficult it is to transfer guardianship to her.  Fuentes stated there has to be a reason, like the patient going to [place].  Fuentes stated that SW needs to let the daughter know they will need someone to provide oversight of patient if she does go forward with guardianship.       PC to Sharonda, [phone]; SW went over above conversation with Fuentes.  Sharonda stated that at this time she feels it would be best for someone to go forward on guardianship until she can get things set up in [place], seeing what facilities they have, insurance, etc  G[facility]Z stated [facility] can forward the referral to the  that represents us.

## 2021-07-26 NOTE — PROGRESS NOTES
1 RN Skin Assessment completed.   Patient's risk of skin breakdown: Low    Devices in place and skin assessed under:   [] Pulse Ox  [] PIV [] Central Line [] SCDs []Purewick  [] Mendosa  []Condom Cath   [] BMS        []  Cortrak   []  Oxymask   [] Bipap    [] Nasal Canula   [x] N/A   [] Other(specify):     Right Ear  [x] WDL                or           [] Skin issue present (please provide assessment/description below)    Left Ear  [x] WDL                or           [] Skin issue present (please provide assessment/description below)    Right Elbow:  [x] WDL               or           [] Skin issue present (please provide assessment/description below)    Left Elbow:   [x] WDL                or           [] Skin issue present (please provide assessment/description below)    Coccyx/Buttocks:  [x] WDL                or           [] Skin issue present (please provide assessment/description below)    Right Heel/Foot/Toes:  [x] WDL                or           [] Skin issue present (please provide assessment/description below)    Left Heel/Foot/Toes:   [x] WDL              or           [] Skin issue present (please provide assessment/description below)      **Describe any other skin issues in areas not already listed from above list:   [x] N/A    Interventions In Place: Pillows and Pressure Redistribution Mattress    **If any new or digressing skin issues are found, fill out the selection below.    Possible Skin Injury No  Pictures Uploaded Into Epic: N/A  Wound Consult Placed: N/A  RN Wound Prevention Protocol Ordered: No    What new preventative interventions did you add? N/A     Pt A&Ox2, VSS. Pt delusional, pleasant with staff. Refuses any interventions from staff @ this time. Pt able to ambulate independently, reports she had a BM this evening. Denies any pain/discomfort. Denies any SOB/difficulty breathing. Currently resting in bed @ this time, call light in reach. Bed locked in lowest position.

## 2021-07-26 NOTE — PROGRESS NOTES
Assumed care of pt after receiving report from night shift RN. Pt is A&O x3 - disoriented to situation, requires reorienting. Pt denies pain at this time. Pt was cooperative with this RN and assessment is charted. Bed is locked and in lowest position, call light is within reach, all needs met at this time.           1 RN Skin Assessment completed.   Patient's risk of skin breakdown: Low    Devices in place and skin assessed under:   [] Pulse Ox  [] PIV [] Central Line [] SCDs []Purewick  [] Mendosa  []Condom Cath   [] BMS        []  Cortrak   []  Oxymask   [] Bipap    [] Nasal Canula   [x] N/A   [] Other(specify):     Right Ear  [] WDL                or           [x] Skin issue present (please provide assessment/description below)  Pink, intact, blanching      Left Ear  [] WDL                or           [x] Skin issue present (please provide assessment/description below)  Pink, intact, blanching      Right Elbow:  [] WDL               or           [x] Skin issue present (please provide assessment/description below)   Pink,dry, intact, blanching      Left Elbow:   [] WDL                or           [x] Skin issue present (please provide assessment/description below)    Pink,dry, intact, blanching     Coccyx/Buttocks:  [x] WDL                or           [] Skin issue present (please provide assessment/description below)    Right Heel/Foot/Toes:  [] WDL                or           [x] Skin issue present (please provide assessment/description below)    Pink,dry, intact, blanching     Left Heel/Foot/Toes:   [] WDL              or           [x] Skin issue present (please provide assessment/description below)    Pink,dry, intact, blanching     **Describe any other skin issues in areas not already listed from above list:   [x] N/A    Interventions In Place: Pillows and Pressure Redistribution Mattress    **If any new or digressing skin issues are found, fill out the selection below.    Possible Skin Injury No  Pictures  Uploaded Into Epic: N/A  Wound Consult Placed: N/A  RN Wound Prevention Protocol Ordered: No    What new preventative interventions did you add? N/A

## 2021-07-27 PROCEDURE — G0378 HOSPITAL OBSERVATION PER HR: HCPCS

## 2021-07-27 PROCEDURE — 99224 PR SUBSEQUENT OBSERVATION CARE,LEVEL I: CPT | Performed by: HOSPITALIST

## 2021-07-27 NOTE — PROGRESS NOTES
1 RN Skin Assessment completed.   Patient's risk of skin breakdown: Low     Devices in place and skin assessed under:   []? Pulse Ox  []? PIV []? Central Line []? SCDs []?Purewick  []? Mendosa  []?Condom Cath   []? BMS        []?  Cortrak   []?  Oxymask   []? Bipap    []? Nasal Canula   [x]? N/A   []? Other(specify):      Right Ear  [x]? WDL                or           []? Skin issue present (please provide assessment/description below)     Left Ear  [x]? WDL                or           []? Skin issue present (please provide assessment/description below)     Right Elbow:  [x]? WDL               or           []? Skin issue present (please provide assessment/description below)     Left Elbow:   [x]? WDL                or           []? Skin issue present (please provide assessment/description below)     Coccyx/Buttocks:  [x]? WDL                or           []? Skin issue present (please provide assessment/description below)     Right Heel/Foot/Toes:  [x]? WDL                or           []? Skin issue present (please provide assessment/description below)     Left Heel/Foot/Toes:   [x]? WDL              or           []? Skin issue present (please provide assessment/description below)        **Describe any other skin issues in areas not already listed from above list:   [x]? N/A     Interventions In Place: Pillows

## 2021-07-27 NOTE — PROGRESS NOTES
Alert x3, up self with a steady gait in her room; no needs at present. Cont plan od care, call  Light within reach

## 2021-07-28 PROCEDURE — G0378 HOSPITAL OBSERVATION PER HR: HCPCS

## 2021-07-28 NOTE — PROGRESS NOTES
Hospital Medicine Daily Progress Note    Date of Service  7/27/2021    Chief Complaint  Zully Lin is a 70 y.o. female admitted 6/11/2021 with altered mental status    Hospital Course  A 70-year-old woman with h/o dementia (unclear baseline), hypothyrodism, schizophrenia, a resident of [facility] presented 6/11/2021 with worsening agitation, delusions. Exam on admission was unremarkable and neuro exam is non focal, with the exception of mental status (AOx1, tangential thoughts and delusion). And Labs not suggestive of infection. CBC not suggestive of infection, BMP  normal, UA clean, and patient denies anything on ROS. Pt was evaluated by Psychiatry and Psychology on admission - considered incapacitated to make medical and discharge decisions; initially placed on legal hold. Psychiatrist medications adjusted. Pt was considered medically cleared as off 6/13/2021 - initially planned for Inpatient Psych facility which has been difficult due to limited insurance.      While on psych meds and upon follow up eval by Psychiatry, Pt appeared to be at her baseline. Patient was Psychiatrically cleared for discharge on 6/22. Plan has been for a long term placement and evaluation for guardianship as Pt cannot make decisions  Interval Problem Update  1/27/2021: Patient seen and evaluated on the medical floor.  I discussed with her nurse.  She is tolerating a diet.  She is ambulatory.  She is quite pleasantly confused and quite tangential in her speech though compliant with exam.    I have personally seen and examined the patient at bedside. I discussed the plan of care with bedside RN.    Consultants/Specialty  Psychiatry     Code Status  Full Code    Disposition  Patient is medically cleared.   Anticipate discharge to be determined by .  I have placed the appropriate orders for post-discharge needs.    Review of Systems  Review of Systems   Unable to perform ROS: Psychiatric disorder         Physical Exam  Temp:  [36.4 °C (97.5 °F)-36.6 °C (97.8 °F)] 36.6 °C (97.8 °F)  Pulse:  [71-82] 71  Resp:  [16-18] 16  BP: (116-125)/(78-82) 116/78  SpO2:  [97 %-98 %] 98 %    Physical Exam  Vitals and nursing note reviewed.   Constitutional:       General: She is not in acute distress.     Appearance: Normal appearance. She is not ill-appearing.   Cardiovascular:      Rate and Rhythm: Normal rate and regular rhythm.   Pulmonary:      Breath sounds: Normal breath sounds.   Skin:     General: Skin is warm and dry.   Neurological:      Mental Status: She is alert.      Comments: Pleasant   Cooperative with exam  Tangential thoughts         Fluids    Intake/Output Summary (Last 24 hours) at 7/27/2021 1837  Last data filed at 7/27/2021 1600  Gross per 24 hour   Intake 660 ml   Output --   Net 660 ml       Laboratory                        Imaging  CT head 10/31/20 was neg    Assessment/Plan  * Delusional disorder- (present on admission)  Assessment & Plan  Sent to the ED from her memory care facility for worsening agitation and behavioral changes.   Psychiatry evaluated the patient. Diagnosed by Psychiatry with schizoaffective disorder, bipolar type.  Based on this information, the patient lacks capacity to leave the hospital.      working on sending her to a memory care facility   7/1: Guardianship paperwork started    History of dementia  Assessment & Plan  Has a reported history of dementia.  Applied for guardianship    Schizoaffective disorder, bipolar type ([facility])  Assessment & Plan  History of schizoaffective disorder bipolar type.  Seen by psychiatry  Patient refusing medications, therefore none prescribed at this time    Hypothyroidism  Assessment & Plan  Chronic, TSH 15.600 on 6/11/21, FT4 0.93 on 6/14/21.  Is supposed to be on levothyroxine 50 mcg daily but the patient refuses to take the medication.    Noncompliance with treatment plan- (present on admission)  Assessment & Plan  Patient refusing  antipsychotic medications and all other medications  Encourage compliance.        VTE prophylaxis: ambulatory    I have performed a physical exam and reviewed and updated ROS and Plan today (7/27/2021).

## 2021-07-28 NOTE — PROGRESS NOTES
Received report from NOC RN, pt care assumed, VS stable on RA. Pt is AAOx3. Call light within reach, hourly rounding initiated. Denies having pain. No signs of distress.       1 RN Skin Assessment completed.   Patient's risk of skin breakdown: Low    Devices in place and skin assessed under:   [] Pulse Ox  [] PIV [] Central Line [] SCDs []Purewick  [] Mendosa  []Condom Cath   [] BMS        []  Cortrak   []  Oxymask   [] Bipap    [] Nasal Canula   [] N/A   [x] Other(specify): WG on right ankle     Right Ear  [x] WDL                or           [] Skin issue present (please provide assessment/description below)    Left Ear  [x] WDL                or           [] Skin issue present (please provide assessment/description below)    Right Elbow:  [x] WDL               or           [] Skin issue present (please provide assessment/description below)    Left Elbow:   [x] WDL                or           [] Skin issue present (please provide assessment/description below)    Coccyx/Buttocks:  [x] WDL                or           [] Skin issue present (please provide assessment/description below)    Right Heel/Foot/Toes:  [x] WDL                or           [] Skin issue present (please provide assessment/description below)    Left Heel/Foot/Toes:   [x] WDL              or           [] Skin issue present (please provide assessment/description below)      **Describe any other skin issues in areas not already listed from above list:   [x] N/A    Interventions In Place: Pillows and Pressure Redistribution Mattress    **If any new or digressing skin issues are found, fill out the selection below.    Possible Skin Injury No  Pictures Uploaded Into Epic: N/A  Wound Consult Placed: N/A  RN Wound Prevention Protocol Ordered: No    What new preventative interventions did you add? N/A

## 2021-07-28 NOTE — PROGRESS NOTES
1 RN Skin Assessment completed.   Patient's risk of skin breakdown: Low     Devices in place and skin assessed under:   []?? Pulse Ox  []?? PIV []?? Central Line []?? SCDs []??Purewick  []?? Mendosa  []??Condom Cath   []?? BMS        []??  Cortrak   []??  Oxymask   []?? Bipap    []?? Nasal Canula   [x]?? N/A   []?? Other(specify):      Right Ear  [x]?? WDL                or           []?? Skin issue present (please provide assessment/description below)     Left Ear  [x]?? WDL                or           []?? Skin issue present (please provide assessment/description below)     Right Elbow:  [x]?? WDL               or           []?? Skin issue present (please provide assessment/description below)     Left Elbow:   [x]?? WDL                or           []?? Skin issue present (please provide assessment/description below)     Coccyx/Buttocks:  [x]?? WDL                or           []?? Skin issue present (please provide assessment/description below)     Right Heel/Foot/Toes:  [x]?? WDL                or           []?? Skin issue present (please provide assessment/description below)     Left Heel/Foot/Toes:   [x]?? WDL              or           []?? Skin issue present (please provide assessment/description below)        **Describe any other skin issues in areas not already listed from above list:   [x]?? N/A     Interventions In Place: Pillows

## 2021-07-28 NOTE — PROGRESS NOTES
In her room without any needs, alert x3 with delusions. Cont plan of care, call light within reach

## 2021-07-28 NOTE — PROGRESS NOTES
Received report from NOC RN, pt care assumed. VS stable on RA. No signs of distress. Pt is AAOx3. Denies having pain. Pt remains in room throughout the day. Encouraged to ambulate or walk around unit. No bed-alarm. Call light within reach, hourly rounding initiated.     1 RN Skin Assessment completed.   Patient's risk of skin breakdown: Low     Devices in place and skin assessed under:   []? Pulse Ox  []? PIV []? Central Line []? SCDs []?Purewick  []? Mendosa  []?Condom Cath   []? BMS        []?  Cortrak   []?  Oxymask   []? Bipap    []? Nasal Canula   []? N/A   [x]? Other(specify): WG on right ankle      Right Ear  [x]? WDL                or           []? Skin issue present (please provide assessment/description below)     Left Ear  [x]? WDL                or           []? Skin issue present (please provide assessment/description below)     Right Elbow:  [x]? WDL               or           []? Skin issue present (please provide assessment/description below)     Left Elbow:   [x]? WDL                or           []? Skin issue present (please provide assessment/description below)     Coccyx/Buttocks:  [x]? WDL                or           []? Skin issue present (please provide assessment/description below)     Right Heel/Foot/Toes:  [x]? WDL                or           []? Skin issue present (please provide assessment/description below)     Left Heel/Foot/Toes:   [x]? WDL              or           []? Skin issue present (please provide assessment/description below)        **Describe any other skin issues in areas not already listed from above list:   [x]? N/A     Interventions In Place: Pillows and Pressure Redistribution Mattress     **If any new or digressing skin issues are found, fill out the selection below.     Possible Skin Injury No  Pictures Uploaded Into Epic: N/A  Wound Consult Placed: N/A  RN Wound Prevention Protocol Ordered: No    What new preventative interventions did you add? N/A

## 2021-07-29 PROCEDURE — G0378 HOSPITAL OBSERVATION PER HR: HCPCS

## 2021-07-29 NOTE — PROGRESS NOTES
1 RN Skin Assessment completed.   Patient's risk of skin breakdown: Low     Devices in place and skin assessed under:   []??? Pulse Ox  []??? PIV []??? Central Line []??? SCDs []???Purewick  []??? Mendosa  []???Condom Cath   []??? BMS        []???  Cortrak   []???  Oxymask   []??? Bipap    []??? Nasal Canula   [x]??? N/A   []??? Other(specify):      Right Ear  [x]??? WDL                or           []??? Skin issue present (please provide assessment/description below)     Left Ear  [x]??? WDL                or           []??? Skin issue present (please provide assessment/description below)     Right Elbow:  [x]??? WDL               or           []??? Skin issue present (please provide assessment/description below)     Left Elbow:   [x]??? WDL                or           []??? Skin issue present (please provide assessment/description below)     Coccyx/Buttocks:  [x]??? WDL                or           []??? Skin issue present (please provide assessment/description below)     Right Heel/Foot/Toes:  [x]??? WDL                or           []??? Skin issue present (please provide assessment/description below)     Left Heel/Foot/Toes:   [x]??? WDL              or           []??? Skin issue present (please provide assessment/description below)        **Describe any other skin issues in areas not already listed from above list:   [x]??? N/A     Interventions In Place: Pillows

## 2021-07-29 NOTE — PROGRESS NOTES
Received report from NOC RN, pt care assumed, VS stable on RA. No signs of distress. Call light within reach, hourly rounding initiated. Pt likes to stay in room. Pt is AAOx3. Denies having pain at this time.     1 RN Skin Assessment completed.   Patient's risk of skin breakdown: Low     Devices in place and skin assessed under:   []?? Pulse Ox  []?? PIV []?? Central Line []?? SCDs []??Purewick  []?? Mendosa  []??Condom Cath   []?? BMS        []??  Cortrak   []??  Oxymask   []?? Bipap    []?? Nasal Canula   []?? N/A   [x]?? Other(specify): WG on right ankle      Right Ear  [x]?? WDL                or           []?? Skin issue present (please provide assessment/description below)     Left Ear  [x]?? WDL                or           []?? Skin issue present (please provide assessment/description below)     Right Elbow:  [x]?? WDL               or           []?? Skin issue present (please provide assessment/description below)     Left Elbow:   [x]?? WDL                or           []?? Skin issue present (please provide assessment/description below)     Coccyx/Buttocks:  [x]?? WDL                or           []?? Skin issue present (please provide assessment/description below)     Right Heel/Foot/Toes:  [x]?? WDL                or           []?? Skin issue present (please provide assessment/description below)     Left Heel/Foot/Toes:   [x]?? WDL              or           []?? Skin issue present (please provide assessment/description below)        **Describe any other skin issues in areas not already listed from above list:   [x]?? N/A     Interventions In Place: Pillows and Pressure Redistribution Mattress

## 2021-07-30 PROCEDURE — G0378 HOSPITAL OBSERVATION PER HR: HCPCS

## 2021-07-30 NOTE — PROGRESS NOTES
Received report and assumed care of patient (pt) at change of shift. Pt is A&Ox1; oriented to self only. Pt refused thyroid medication this morning. Pt packed all belongs and stated that this RN told her she was going home today. This RN informed pt that that was not accurate, but pt continued to argue with RN, stating that she was kidnapped and was going to [place] and this place. Pt was not able to be redirected and continued to state that she owns [employer] and that this is illegal. Safety precautions in place and all needs met at this time.     1 RN Skin Assessment completed.   Patient's risk of skin breakdown: Low    Devices in place and skin assessed under:   [] Pulse Ox  [] PIV [] Central Line [] SCDs []Purewick  [] Mendosa  []Condom Cath   [] BMS        []  Cortrak   []  Oxymask   [] Bipap    [] Nasal Canula   [x] N/A   [] Other(specify):     Right Ear  [x] WDL                or           [] Skin issue present (please provide assessment/description below)    Left Ear  [x] WDL                or           [] Skin issue present (please provide assessment/description below)    Right Elbow:  [x] WDL               or           [] Skin issue present (please provide assessment/description below)    Left Elbow:   [x] WDL                or           [] Skin issue present (please provide assessment/description below)    Coccyx/Buttocks:  [x] WDL                or           [] Skin issue present (please provide assessment/description below)    Right Heel/Foot/Toes:  [x] WDL                or           [] Skin issue present (please provide assessment/description below)    Left Heel/Foot/Toes:   [x] WDL              or           [] Skin issue present (please provide assessment/description below)      **Describe any other skin issues in areas not already listed from above list:   [x] N/A    Interventions In Place: Pillows and Pressure Redistribution Mattress    **If any new or digressing skin issues are found, fill out the  selection below.    Possible Skin Injury No  Pictures Uploaded Into Epic: N/A  Wound Consult Placed: N/A  RN Wound Prevention Protocol Ordered: No    What new preventative interventions did you add? N/A

## 2021-07-30 NOTE — PROGRESS NOTES
Received report from day shift RN and assumed care of patient.   Pt is resting in bed, A&Ox3, on RA, VSS.   Assessment completed, POC discussed.   Denies pain or discomfort at this time.   Bed is in lowest, locked position, call bell and belongings are in reach.   Hourly rounding and safety precautions in place.   No further needs at this time.      1 RN Skin Assessment completed.   Patient's risk of skin breakdown: Low    Devices in place and skin assessed under:   [] Pulse Ox  [] PIV [] Central Line [] SCDs []Purewick  [] Mendosa  []Condom Cath   [] BMS        []  Cortrak   []  Oxymask   [] Bipap    [] Nasal Canula   [] N/A   [] Other(specify):     Right Ear  [x] WDL                or           [] Skin issue present (please provide assessment/description below)    Left Ear  [x] WDL                or           [] Skin issue present (please provide assessment/description below)    Right Elbow:  [x] WDL               or           [] Skin issue present (please provide assessment/description below)    Left Elbow:   [x] WDL                or           [] Skin issue present (please provide assessment/description below)    Coccyx/Buttocks:  [x] WDL                or           [] Skin issue present (please provide assessment/description below)    Right Heel/Foot/Toes:  [x] WDL                or           [] Skin issue present (please provide assessment/description below)    Left Heel/Foot/Toes:   [x] WDL              or           [] Skin issue present (please provide assessment/description below)    **Describe any other skin issues in areas not already listed from above list:   [x] N/A    Interventions In Place: Pillows and Pressure Redistribution Mattress    **If any new or digressing skin issues are found, fill out the selection below.    Possible Skin Injury No  Pictures Uploaded Into Epic: N/A  Wound Consult Placed: N/A  RN Wound Prevention Protocol Ordered: No    What new preventative interventions did you add? N/A

## 2021-07-31 PROCEDURE — 99224 PR SUBSEQUENT OBSERVATION CARE,LEVEL I: CPT | Performed by: HOSPITALIST

## 2021-07-31 PROCEDURE — G0378 HOSPITAL OBSERVATION PER HR: HCPCS

## 2021-07-31 ASSESSMENT — FIBROSIS 4 INDEX: FIB4 SCORE: 1.51

## 2021-07-31 ASSESSMENT — PAIN DESCRIPTION - PAIN TYPE: TYPE: ACUTE PAIN
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
## 2021-07-31 NOTE — PROGRESS NOTES
Hospital Medicine Daily Progress Note    Date of Service  7/31/2021    Chief Complaint  Zully Lin is a 70 y.o. female admitted 6/11/2021 with altered mental status    Hospital Course  A 70-year-old woman with h/o dementia (unclear baseline), hypothyrodism, schizophrenia, a resident of [facility] presented 6/11/2021 with worsening agitation, delusions. Exam on admission was unremarkable and neuro exam is non focal, with the exception of mental status (AOx1, tangential thoughts and delusion). And Labs not suggestive of infection. CBC not suggestive of infection, BMP  normal, UA clean, and patient denies anything on ROS. Pt was evaluated by Psychiatry and Psychology on admission - considered incapacitated to make medical and discharge decisions; initially placed on legal hold. Psychiatrist medications adjusted. Pt was considered medically cleared as off 6/13/2021 - initially planned for Inpatient Psych facility which has been difficult due to limited insurance.      While on psych meds and upon follow up eval by Psychiatry, Pt appeared to be at her baseline. Patient was Psychiatrically cleared for discharge on 6/22. Plan has been for a long term placement and evaluation for guardianship as Pt cannot make decisions  Interval Problem Update  1/27/2021: Patient seen and evaluated on the medical floor.  I discussed with her nurse.  She is tolerating a diet.  She is ambulatory.  She is quite pleasantly confused and quite tangential in her speech though compliant with exam.  7/31: Ms. Lin was evaluated and examined on the medical floor. Her BP has been as low at 110 systolic and as high as 141 systolic. We discussed psych meds and she refuses stating that the meds would be given to kill her.     I have personally seen and examined the patient at bedside. I discussed the plan of care with bedside RN.    Consultants/Specialty  Psychiatry     Code Status  Full Code    Disposition  Patient is  medically cleared.   Anticipate discharge to be determined by .  I have placed the appropriate orders for post-discharge needs.    Review of Systems  Review of Systems   Unable to perform ROS: Psychiatric disorder        Physical Exam  Temp:  [36.3 °C (97.4 °F)] 36.3 °C (97.4 °F)  Pulse:  [69-98] 69  Resp:  [16-17] 16  BP: (133-141)/(71-81) 133/71  SpO2:  [93 %-98 %] 98 %    Physical Exam  Vitals and nursing note reviewed.   Constitutional:       General: She is not in acute distress.     Appearance: Normal appearance. She is not ill-appearing.   Cardiovascular:      Rate and Rhythm: Normal rate and regular rhythm.   Pulmonary:      Breath sounds: Normal breath sounds.   Skin:     General: Skin is warm and dry.   Neurological:      Mental Status: She is alert.      Comments: Pleasant   Cooperative with exam  Tangential thoughts with paranoia          Fluids    Intake/Output Summary (Last 24 hours) at 7/31/2021 0858  Last data filed at 7/30/2021 1315  Gross per 24 hour   Intake 360 ml   Output --   Net 360 ml       Laboratory                        Imaging  CT head 10/31/20 was neg    Assessment/Plan  * Delusional disorder- (present on admission)  Assessment & Plan  Sent to the ED from her memory care facility for worsening agitation and behavioral changes.   Psychiatry evaluated the patient. Diagnosed by Psychiatry with schizoaffective disorder, bipolar type.  Based on this information, the patient lacks capacity to leave the hospital.      working on sending her to a memory care facility   7/1: Guardianship paperwork started    History of dementia  Assessment & Plan  Has a reported history of dementia.  Applied for guardianship    Schizoaffective disorder, bipolar type ([facility])  Assessment & Plan  History of schizoaffective disorder bipolar type.  Seen by psychiatry  Patient refusing medications due to paranoia       Hypothyroidism  Assessment & Plan  Chronic, TSH 15.600 on 6/11/21, FT4 0.93  on 6/14/21.  Is supposed to be on levothyroxine 50 mcg daily but the patient refuses to take the medication.    Noncompliance with treatment plan- (present on admission)  Assessment & Plan  Patient refusing antipsychotic medications and all other medications  Encourage compliance.        VTE prophylaxis: ambulatory    I have performed a physical exam and reviewed and updated ROS and Plan today (7/31/2021).

## 2021-07-31 NOTE — PROGRESS NOTES
"Received report from day shift RN and assumed care of patient.   Pt is resting in bed, A&Ox3 (disoriented to time), delusional, on RA, VSS.   Denies pain or discomfort at this time.   Pt refusing skin assessment at this time.   When this RN entered the room the pt stated \"Now tell me why I wasn't discharged today at 1 pm like I was told at 9:15 am. The lady Nayeli with the white hair came in with girls behind her and was all excited saying I get to leave today. So I packed up my stuff and they told me I'm not leaving. 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 I don't need permission from a . Look it up on [employer] and you'll see me there.\"    Bed is in lowest, locked position, call bell and belongings are in reach.   Hourly rounding and safety precautions in place.   No further needs at this time.  "

## 2021-08-01 PROCEDURE — G0378 HOSPITAL OBSERVATION PER HR: HCPCS

## 2021-08-01 NOTE — PROGRESS NOTES
Report received by sky RN. Assumed care of pt. Assessment complete. Pt A&Ox3, disoriented to event. VSS and on RA. Pt in no apparent signs of distress, denies pain. Wanderguard to L ankle for safety. Refuses medications. All needs met at this time. Call light within reach, bed locked and in lowest position, and pt has no further questions at this time.           1 RN Skin Assessment completed.   Patient's risk of skin breakdown: Low    Devices in place and skin assessed under:   [] Pulse Ox  [] PIV [] Central Line [] SCDs []Purewick  [] Mendosa  []Condom Cath   [] BMS        []  Cortrak   []  Oxymask   [] Bipap    [] Nasal Canula   [] N/A   [] Other(specify):     Right Ear  [x] WDL                or           [] Skin issue present (please provide assessment/description below)    Left Ear  [x] WDL                or           [] Skin issue present (please provide assessment/description below)    Right Elbow:  [x] WDL               or           [] Skin issue present (please provide assessment/description below)    Left Elbow:   [x] WDL                or           [] Skin issue present (please provide assessment/description below)    Coccyx/Buttocks:  [x] WDL                or           [] Skin issue present (please provide assessment/description below)    Right Heel/Foot/Toes:  [x] WDL                or           [] Skin issue present (please provide assessment/description below)    Left Heel/Foot/Toes:   [x] WDL              or           [] Skin issue present (please provide assessment/description below)      **Describe any other skin issues in areas not already listed from above list:   [x] N/A    Interventions In Place: Pillows and Pressure Redistribution Mattress    **If any new or digressing skin issues are found, fill out the selection below.    Possible Skin Injury No  Pictures Uploaded Into Epic: N/A  Wound Consult Placed: N/A  RN Wound Prevention Protocol Ordered: No    What new preventative interventions  did you add? N/A

## 2021-08-01 NOTE — PROGRESS NOTES
Received report and assumed care of patient (pt) at change of shift. Pt is A&Ox3, disoriented to event and suffers from delusions. Pt calm and cooperative during shift but refused medication.  Safety precautions in place and all needs met at this time.       1 RN Skin Assessment completed.   Patient's risk of skin breakdown: Low    Devices in place and skin assessed under:   [] Pulse Ox  [] PIV [] Central Line [] SCDs []Purewick  [] Mendosa  []Condom Cath   [] BMS        []  Cortrak   []  Oxymask   [] Bipap    [] Nasal Canula   [x] N/A   [] Other(specify):     Right Ear  [x] WDL                or           [] Skin issue present (please provide assessment/description below)    Left Ear  [x] WDL                or           [] Skin issue present (please provide assessment/description below)    Right Elbow:  [x] WDL               or           [] Skin issue present (please provide assessment/description below)    Left Elbow:   [x] WDL                or           [] Skin issue present (please provide assessment/description below)    Coccyx/Buttocks:  [x] WDL                or           [] Skin issue present (please provide assessment/description below)    Right Heel/Foot/Toes:  [x] WDL                or           [] Skin issue present (please provide assessment/description below)    Left Heel/Foot/Toes:   [x] WDL              or           [] Skin issue present (please provide assessment/description below)      **Describe any other skin issues in areas not already listed from above list:   [x] N/A    Interventions In Place: Pillows and Pressure Redistribution Mattress    **If any new or digressing skin issues are found, fill out the selection below.    Possible Skin Injury No  Pictures Uploaded Into Epic: N/A  Wound Consult Placed: N/A  RN Wound Prevention Protocol Ordered: No    What new preventative interventions did you add? N/A

## 2021-08-02 PROCEDURE — G0378 HOSPITAL OBSERVATION PER HR: HCPCS

## 2021-08-02 NOTE — PROGRESS NOTES
Received report and assumed care of patient (pt) at change of shift. Pt is A&Ox2; disoriented to time and situation. Pt remains in her room and is pleasant with staff. Safety precautions in place and all needs met at this time.       1 RN Skin Assessment completed.   Patient's risk of skin breakdown: Low    Devices in place and skin assessed under:   [] Pulse Ox  [] PIV [] Central Line [] SCDs []Purewick  [] Mendosa  []Condom Cath   [] BMS        []  Cortrak   []  Oxymask   [] Bipap    [] Nasal Canula   [x] N/A   [] Other(specify):     Right Ear  [x] WDL                or           [] Skin issue present (please provide assessment/description below)    Left Ear  [x] WDL                or           [] Skin issue present (please provide assessment/description below)    Right Elbow:  [x] WDL               or           [] Skin issue present (please provide assessment/description below)    Left Elbow:   [x] WDL                or           [] Skin issue present (please provide assessment/description below)    Coccyx/Buttocks:  [x] WDL                or           [] Skin issue present (please provide assessment/description below)    Right Heel/Foot/Toes:  [x] WDL                or           [] Skin issue present (please provide assessment/description below)    Left Heel/Foot/Toes:   [x] WDL              or           [] Skin issue present (please provide assessment/description below)      **Describe any other skin issues in areas not already listed from above list:   [x] N/A    Interventions In Place: Pillows and Pressure Redistribution Mattress    **If any new or digressing skin issues are found, fill out the selection below.    Possible Skin Injury No  Pictures Uploaded Into Epic: N/A  Wound Consult Placed: N/A  RN Wound Prevention Protocol Ordered: No    What new preventative interventions did you add? N/A

## 2021-08-03 PROCEDURE — G0378 HOSPITAL OBSERVATION PER HR: HCPCS

## 2021-08-03 NOTE — PROGRESS NOTES
Pt sitting up in bed and denied any pain. Pt on RA no s/s of distress.  No medication to be administered. Pt pleasantly confused.  Denied any further needs, wanderguard to R ankle.      1 RN Skin Assessment completed.   Patient's risk of skin breakdown: Low     Devices in place and skin assessed under:   []? Pulse Ox  []? PIV []? Central Line []? SCDs []?Purewick  []? Mendosa  []?Condom Cath   []? BMS        []?  Cortrak   []?  Oxymask   []? Bipap    []? Nasal Canula   [x]? N/A   []? Other(specify):      Right Ear  [x]? WDL                or           []? Skin issue present (please provide assessment/description below)     Left Ear  [x]? WDL                or           []? Skin issue present (please provide assessment/description below)     Right Elbow:  [x]? WDL               or           []? Skin issue present (please provide assessment/description below)     Left Elbow:   [x]? WDL                or           []? Skin issue present (please provide assessment/description below)     Coccyx/Buttocks:  [x]? WDL                or           []? Skin issue present (please provide assessment/description below)     Right Heel/Foot/Toes:  [x]? WDL                or           []? Skin issue present (please provide assessment/description below)     Left Heel/Foot/Toes:   [x]? WDL              or           []? Skin issue present (please provide assessment/description below)        **Describe any other skin issues in areas not already listed from above list:   []? N/A     Interventions In Place: Pillows and Pressure Redistribution Mattress     **If any new or digressing skin issues are found, fill out the selection below.     Possible Skin Injury No  Pictures Uploaded Into Epic: N/A  Wound Consult Placed: N/A  RN Wound Prevention Protocol Ordered: No    What new preventative interventions did you add? N/A

## 2021-08-03 NOTE — PROGRESS NOTES
Pt is A&Ox 2 - disoriented to situation and time. Pt is resting in bed, no signs of labored breathing or pain. Pt on RA. Call light & personal belongings within reach, bed in lowest position & locked. Pt allowed for skin check this shift. Pt declines any additional needs at this time.        1 RN Skin Assessment completed.   Patient's risk of skin breakdown: Low    Devices in place and skin assessed under:   [] Pulse Ox  [] PIV [] Central Line [] SCDs []Purewick  [] Mendosa  []Condom Cath   [] BMS        []  Cortrak   []  Oxymask   [] Bipap    [] Nasal Canula   [x] N/A   [] Other(specify):     Right Ear  [x] WDL                or           [] Skin issue present (please provide assessment/description below)    Left Ear  [x] WDL                or           [] Skin issue present (please provide assessment/description below)    Right Elbow:  [x] WDL               or           [] Skin issue present (please provide assessment/description below)    Left Elbow:   [x] WDL                or           [] Skin issue present (please provide assessment/description below)    Coccyx/Buttocks:  [x] WDL                or           [] Skin issue present (please provide assessment/description below)    Right Heel/Foot/Toes:  [x] WDL                or           [] Skin issue present (please provide assessment/description below)    Left Heel/Foot/Toes:   [x] WDL              or           [] Skin issue present (please provide assessment/description below)      **Describe any other skin issues in areas not already listed from above list:   [] N/A    Interventions In Place: Pillows and Pressure Redistribution Mattress    **If any new or digressing skin issues are found, fill out the selection below.    Possible Skin Injury No  Pictures Uploaded Into Epic: N/A  Wound Consult Placed: N/A  RN Wound Prevention Protocol Ordered: No    What new preventative interventions did you add? N/A

## 2021-08-04 PROCEDURE — G0378 HOSPITAL OBSERVATION PER HR: HCPCS

## 2021-08-04 ASSESSMENT — PAIN DESCRIPTION - PAIN TYPE: TYPE: ACUTE PAIN

## 2021-08-04 NOTE — PROGRESS NOTES
Received report from NOC RN and assumed care of pt. Pt is A&Ox3, disoriented to situation. Pt resting in bed on room air. Pt reports no pain or discomfort. POC discussed with pt; all questions answered. No other needs at this time. Bed locked in lowest position, call light within reach.

## 2021-08-04 NOTE — PROGRESS NOTES
1 RN Skin Assessment completed.   Patient's risk of skin breakdown: Low    Devices in place and skin assessed under:   [] Pulse Ox  [] PIV [] Central Line [] SCDs []Purewick  [] Mendosa  []Condom Cath   [] BMS        []  Cortrak   []  Oxymask   [] Bipap    [] Nasal Canula   [x] N/A   [] Other(specify):     Right Ear  [x] WDL                or           [] Skin issue present (please provide assessment/description below)    Left Ear  [x] WDL                or           [] Skin issue present (please provide assessment/description below)    Right Elbow:  [x] WDL               or           [] Skin issue present (please provide assessment/description below)    Left Elbow:   [x] WDL                or           [] Skin issue present (please provide assessment/description below)    Coccyx/Buttocks:  [x] WDL                or           [] Skin issue present (please provide assessment/description below)    Right Heel/Foot/Toes:  [x] WDL                or           [] Skin issue present (please provide assessment/description below)    Left Heel/Foot/Toes:   [x] WDL              or           [] Skin issue present (please provide assessment/description below)      **Describe any other skin issues in areas not already listed from above list:   [x] N/A    Interventions In Place: Pillows and Pressure Redistribution Mattress, encourage pt to get out of bed     **If any new or digressing skin issues are found, fill out the selection below.    Possible Skin Injury No  Pictures Uploaded Into Epic: N/A  Wound Consult Placed: N/A  RN Wound Prevention Protocol Ordered: No    What new preventative interventions did you add? N/A

## 2021-08-04 NOTE — DISCHARGE PLANNING
Anticipated Discharge Disposition: TBD    Action: Patient continues to refuse medications, chart notes indicate patient is pleasantly confused.  No acting out behaviors have been documented. [place]     Barriers to Discharge: guardianship,placement, medicaid.    Plan: continue to monitor for discharge barriers    Guardianship packet sent to Heydi Moreno on 7/26/21

## 2021-08-05 PROCEDURE — 99224 PR SUBSEQUENT OBSERVATION CARE,LEVEL I: CPT | Performed by: INTERNAL MEDICINE

## 2021-08-05 PROCEDURE — G0378 HOSPITAL OBSERVATION PER HR: HCPCS

## 2021-08-05 ASSESSMENT — ENCOUNTER SYMPTOMS
ABDOMINAL PAIN: 0
VOMITING: 0
NAUSEA: 0
COUGH: 0
FEVER: 0

## 2021-08-05 NOTE — SENIOR ADMIT NOTE
1 RN Skin Assessment completed.   Patient's risk of skin breakdown: Low    Devices in place and skin assessed under:   [] Pulse Ox  [] PIV [] Central Line [] SCDs []Purewick  [] Mendosa  []Condom Cath   [] BMS        []  Cortrak   []  Oxymask   [] Bipap    [] Nasal Canula   [x] N/A   [] Other(specify):     Right Ear  [x] WDL                or           [] Skin issue present (please provide assessment/description below)    Left Ear  [x] WDL                or           [] Skin issue present (please provide assessment/description below)    Right Elbow:  [x] WDL               or           [] Skin issue present (please provide assessment/description below)    Left Elbow:   [x] WDL                or           [] Skin issue present (please provide assessment/description below)    Coccyx/Buttocks:  [x] WDL                or           [] Skin issue present (please provide assessment/description below)    Right Heel/Foot/Toes:  [x] WDL                or           [] Skin issue present (please provide assessment/description below)    Left Heel/Foot/Toes:   [x] WDL              or           [] Skin issue present (please provide assessment/description below)      **Describe any other skin issues in areas not already listed from above list:   [x] N/A    Interventions In Place: Pressure Redistribution Mattress, pillows used for support, encouraged pt to frequently reposition while in bed.     **If any new or digressing skin issues are found, fill out the selection below.    Possible Skin Injury No  Pictures Uploaded Into Epic: N/A  Wound Consult Placed: N/A  RN Wound Prevention Protocol Ordered: No    What new preventative interventions did you add? N/A

## 2021-08-05 NOTE — PROGRESS NOTES
Received report from NOC RN and assumed care of pt. Pt is A&Ox3, disoriented to situation. Pt is resting in bed on room air. Pt reports no pain or discomfort. Pt ambulates up self to the bathroom. Pt denies any needs at this time. Pt in desk bed close to nurse's station. Bed locked in lowest position, call light within reach.

## 2021-08-05 NOTE — PROGRESS NOTES
Patient A/Ox3, VSS, RA, up self. No c/o pain. Pt pleasant but refusing all care and medications. RN tried to educate pt on medications but pt refused to listen. Pt is safe with needs met. Bed low locked. Hourly rounding. No s/sx of distress.

## 2021-08-06 PROCEDURE — G0378 HOSPITAL OBSERVATION PER HR: HCPCS

## 2021-08-06 NOTE — PROGRESS NOTES
Hospital Medicine Daily Progress Note    Date of Service  8/5/2021    Chief Complaint  Zully Lin is a 70 y.o. female admitted 6/11/2021 with altered mental status    Hospital Course  A 70-year-old woman with h/o dementia (unclear baseline), hypothyrodism, schizophrenia, a resident of [facility] presented 6/11/2021 with worsening agitation, delusions. Exam on admission was unremarkable and neuro exam is non focal, with the exception of mental status (AOx1, tangential thoughts and delusion). And Labs not suggestive of infection. CBC not suggestive of infection, BMP  normal, UA clean, and patient denies anything on ROS. Pt was evaluated by Psychiatry and Psychology on admission - considered incapacitated to make medical and discharge decisions; initially placed on legal hold. Psychiatrist medications adjusted. Pt was considered medically cleared as off 6/13/2021 - initially planned for Inpatient Psych facility which has been difficult due to limited insurance.      While on psych meds and upon follow up eval by Psychiatry, Pt appeared to be at her baseline. Patient was Psychiatrically cleared for discharge on 6/22. Plan has been for a long term placement and evaluation for guardianship as Pt cannot make decisions  Interval Problem Update  I evaluated and examined her at the bedside  She denies any acute complaints     I have personally seen and examined the patient at bedside. I discussed the plan of care with bedside RN and .    Consultants/Specialty  Psychiatry     Code Status  Full Code    Disposition  Patient is medically cleared.   Anticipate discharge to be determined by .  I have placed the appropriate orders for post-discharge needs.    Review of Systems  Review of Systems   Constitutional: Negative for fever.   Respiratory: Negative for cough.    Gastrointestinal: Negative for abdominal pain, nausea and vomiting.   All other systems reviewed and are  negative.       Physical Exam  Temp:  [36.2 °C (97.2 °F)-36.5 °C (97.7 °F)] 36.5 °C (97.7 °F)  Pulse:  [65-78] 77  Resp:  [16-17] 17  BP: ()/(52-75) 98/58  SpO2:  [94 %-97 %] 94 %    Physical Exam  Constitutional:       Appearance: Normal appearance.   Cardiovascular:      Rate and Rhythm: Normal rate.   Pulmonary:      Effort: Pulmonary effort is normal.   Abdominal:      Tenderness: There is no abdominal tenderness.   Musculoskeletal:         General: No swelling.   Neurological:      Mental Status: She is alert.      Comments: Following commands         Fluids    Intake/Output Summary (Last 24 hours) at 8/5/2021 1720  Last data filed at 8/5/2021 1400  Gross per 24 hour   Intake 1320 ml   Output --   Net 1320 ml       Laboratory                        Imaging  CT head 10/31/20 was neg    Assessment/Plan  * Delusional disorder- (present on admission)  Assessment & Plan  Sent to the ED from her memory care facility for worsening agitation and behavioral changes.   Psychiatry evaluated the patient. Diagnosed by Psychiatry with schizoaffective disorder, bipolar type.  Based on this information, the patient lacks capacity to leave the hospital.      working on sending her to a memory care facility   7/1: Guardianship paperwork started    I discussed with case management regarding her discharge plan    History of dementia  Assessment & Plan  Has a reported history of dementia.  Applied for guardianship    Schizoaffective disorder, bipolar type ([facility])  Assessment & Plan  History of schizoaffective disorder bipolar type.  Seen by psychiatry  Patient refusing medications due to paranoia       Hypothyroidism  Assessment & Plan  Chronic, TSH 15.600 on 6/11/21, FT4 0.93 on 6/14/21.  Is supposed to be on levothyroxine 50 mcg daily but the patient refuses to take the medication.    Noncompliance with treatment plan- (present on admission)  Assessment & Plan  Patient refusing antipsychotic medications and all  other medications  Encourage compliance.      Discussed plan of care with case management and charge RN regarding her discharge plan.  VTE prophylaxis: ambulatory

## 2021-08-06 NOTE — PROGRESS NOTES
Pt is A/Ox3, VSS, RA, up self. No complaints of pain. Refuses assessment at this time, pt educated on compliance. Pt is safe with needs met. Bed low locked. Hourly rounding. No s/sx of distress.

## 2021-08-06 NOTE — PROGRESS NOTES
Received report from NOC RN and assumed care of pt. Pt is A&Ox3, disoriented to situation. Pt is resting in bed on room air. Pt denies any pain. POC discussed with pt; all questions answered. No other needs at this time. Bed locked in lowest position, call light within reach.     Pt refusing skin assessment.

## 2021-08-07 PROCEDURE — G0378 HOSPITAL OBSERVATION PER HR: HCPCS

## 2021-08-07 ASSESSMENT — PAIN DESCRIPTION - PAIN TYPE: TYPE: ACUTE PAIN

## 2021-08-07 NOTE — PROGRESS NOTES
Bedside report received at change of shift. Pt is A&Ox3, disoriented to situation. Reports no pain at this time. Pt is refusing a skin assessment. Pt is up-self. All pt needs met at this time.

## 2021-08-07 NOTE — PROGRESS NOTES
"Patient is A/Ox3. VSS. RA. Up self in room. No complaints of pain nor discomfort of any kind. Pt refuses all assessments and refuses to answer any of this RNs questions. Pt states, \"You can go now.\" Bed low and locked. Pt is safe with needs met. No s/sx of acute distress.  "

## 2021-08-08 PROCEDURE — G0378 HOSPITAL OBSERVATION PER HR: HCPCS

## 2021-08-08 PROCEDURE — 99224 PR SUBSEQUENT OBSERVATION CARE,LEVEL I: CPT | Performed by: INTERNAL MEDICINE

## 2021-08-08 ASSESSMENT — PAIN DESCRIPTION - PAIN TYPE: TYPE: ACUTE PAIN

## 2021-08-08 ASSESSMENT — ENCOUNTER SYMPTOMS
FEVER: 0
COUGH: 0
NAUSEA: 0
VOMITING: 0
ABDOMINAL PAIN: 0

## 2021-08-08 NOTE — PROGRESS NOTES
Hospital Medicine Daily Progress Note    Date of Service  8/8/2021    Chief Complaint  Zully Lin is a 70 y.o. female admitted 6/11/2021 with altered mental status    Hospital Course  A 70-year-old woman with h/o dementia (unclear baseline), hypothyrodism, schizophrenia, a resident of [facility] presented 6/11/2021 with worsening agitation, delusions. Exam on admission was unremarkable and neuro exam is non focal, with the exception of mental status (AOx1, tangential thoughts and delusion). And Labs not suggestive of infection. CBC not suggestive of infection, BMP  normal, UA clean, and patient denies anything on ROS. Pt was evaluated by Psychiatry and Psychology on admission - considered incapacitated to make medical and discharge decisions; initially placed on legal hold. Psychiatrist medications adjusted. Pt was considered medically cleared as off 6/13/2021 - initially planned for Inpatient Psych facility which has been difficult due to limited insurance.      While on psych meds and upon follow up eval by Psychiatry, Pt appeared to be at her baseline. Patient was Psychiatrically cleared for discharge on 6/22. Plan has been for a long term placement and evaluation for guardianship as Pt cannot make decisions  Interval Problem Update  I evaluated and examined at the bedside this morning.  She denies any acute complaints.    I have personally seen and examined the patient at bedside. I discussed the plan of care with bedside RN.    Consultants/Specialty  Psychiatry     Code Status  Full Code    Disposition  Patient is medically cleared.   Anticipate discharge to be determined by .  I have placed the appropriate orders for post-discharge needs.    Review of Systems  Review of Systems   Constitutional: Negative for fever.   Respiratory: Negative for cough.    Gastrointestinal: Negative for abdominal pain, nausea and vomiting.   All other systems reviewed and are negative.        Physical Exam  Temp:  [36.1 °C (96.9 °F)-36.5 °C (97.7 °F)] 36.1 °C (96.9 °F)  Pulse:  [78] 78  Resp:  [16-17] 17  BP: (118-120)/(76-83) 118/83  SpO2:  [96 %-97 %] 97 %    Physical Exam  Constitutional:       Appearance: Normal appearance.   Cardiovascular:      Rate and Rhythm: Normal rate.   Pulmonary:      Effort: Pulmonary effort is normal.   Abdominal:      Tenderness: There is no abdominal tenderness.   Musculoskeletal:         General: No swelling.   Neurological:      Mental Status: She is alert.      Comments: Following commands       I performed physical exam on August 8, 2021 it remains similar without any acute changes.  Fluids  No intake or output data in the 24 hours ending 08/08/21 1426    Laboratory                        Imaging  CT head 10/31/20 was neg    Assessment/Plan  * Delusional disorder- (present on admission)  Assessment & Plan  Sent to the ED from her memory care facility for worsening agitation and behavioral changes.   Psychiatry evaluated the patient. Diagnosed by Psychiatry with schizoaffective disorder, bipolar type.  Based on this information, the patient lacks capacity to leave the hospital.      working on sending her to a memory care facility   7/1: Guardianship paperwork started    I discussed with case management regarding her discharge plan    History of dementia  Assessment & Plan  Has a reported history of dementia.  Applied for guardianship    Schizoaffective disorder, bipolar type ([facility])  Assessment & Plan  History of schizoaffective disorder bipolar type.  Seen by psychiatry  Patient refusing medications due to paranoia       Hypothyroidism  Assessment & Plan  Chronic, TSH 15.600 on 6/11/21, FT4 0.93 on 6/14/21.  Is supposed to be on levothyroxine 50 mcg daily but the patient refuses to take the medication.    Noncompliance with treatment plan- (present on admission)  Assessment & Plan  Patient refusing antipsychotic medications and all other  medications  Encourage compliance.      No acute changes in her clinical condition.  I discussed plan of care with bedside RN.  Discharge barriers: Guardianship and placement  VTE prophylaxis: ambulatory

## 2021-08-08 NOTE — PROGRESS NOTES
Pt. Received in bed watching TV awake, orientedx2. Denies any complaint of pain at the time of assessment. Fall prec in place. Call light in reach. Bed in locked position. Needs attended.

## 2021-08-09 PROCEDURE — G0378 HOSPITAL OBSERVATION PER HR: HCPCS

## 2021-08-09 ASSESSMENT — PAIN DESCRIPTION - PAIN TYPE
TYPE: ACUTE PAIN
TYPE: ACUTE PAIN

## 2021-08-09 NOTE — PROGRESS NOTES
Patient refuses  Head to toe physical and skin assessment.  Call light with in reach.  Bed locked and in lowest position. TM

## 2021-08-09 NOTE — PROGRESS NOTES
Assumed care of pt at shift change, report received. Pt sleeping, easy to arouse. Denies pain or discomfort. Refuses assessment at this time, states she is ready to sleep. Educated to use of call light for needs. Fall and safety precautions in place.

## 2021-08-10 PROCEDURE — G0378 HOSPITAL OBSERVATION PER HR: HCPCS

## 2021-08-10 ASSESSMENT — PAIN DESCRIPTION - PAIN TYPE: TYPE: ACUTE PAIN

## 2021-08-10 NOTE — PROGRESS NOTES
Assumed care of pt at shift change, report received. Pt resting in bed comfortably. Declines any needs at this time. Refuses assessment. Educated to call for needs. Fall and safety precautions in place.

## 2021-08-10 NOTE — DISCHARGE PLANNING
Anticipated Discharge Disposition: Memory Care    Action: Chart reviewed.  Patient is currently pending guardianship.  Application was submitted to Heydi Moreno on 7/26/21.      Barriers to Discharge: guardianship/placement/needs MEGHANN as only has MCR A    Plan: RN CM available for any further dc planning needs.

## 2021-08-11 PROCEDURE — G0378 HOSPITAL OBSERVATION PER HR: HCPCS

## 2021-08-11 ASSESSMENT — PAIN DESCRIPTION - PAIN TYPE
TYPE: ACUTE PAIN
TYPE: ACUTE PAIN

## 2021-08-11 NOTE — PROGRESS NOTES
Patient refused skin check, educated regarding importance. Declines any needs at this time. Refuses assessments. Educated to call for needs. Fall and safety precautions in place.

## 2021-08-11 NOTE — PROGRESS NOTES
Patient refused 1 rn skin check, educated regarding importance.  Education reinforced by KARRI harris.  Patient continues to refuse.

## 2021-08-12 PROCEDURE — G0378 HOSPITAL OBSERVATION PER HR: HCPCS

## 2021-08-12 ASSESSMENT — PAIN DESCRIPTION - PAIN TYPE: TYPE: ACUTE PAIN

## 2021-08-12 NOTE — DISCHARGE PLANNING
Anticipated Discharge Disposition: To be determined.     Action: Patient is currently pending guardianship.  Application was submitted to Heydi Moreno on 7/26/21.       Barriers to Discharge: Pending guardianship; placement.     Plan: CM to continue to follow for discharge needs.

## 2021-08-13 PROCEDURE — 99224 PR SUBSEQUENT OBSERVATION CARE,LEVEL I: CPT | Performed by: HOSPITALIST

## 2021-08-13 PROCEDURE — G0378 HOSPITAL OBSERVATION PER HR: HCPCS

## 2021-08-13 NOTE — PROGRESS NOTES
Pt resting in bed comfortably. Declines any needs at this time. Refuses assessment. Educated to call for needs. Fall and safety precautions in place. Left pt in the bed, belongings, bed lowest position, call light within reach.

## 2021-08-13 NOTE — PROGRESS NOTES
"Hospital Medicine Daily Progress Note    Date of Service  8/13/2021    Chief Complaint  Altered mental status    Hospital Course  Per prior notes: \"Zully Lin is a 70-year-old woman with h/o dementia (unclear baseline), hypothyrodism, schizophrenia, a resident of [facility] presented 6/11/2021 with worsening agitation, delusions. Exam on admission was unremarkable and neuro exam is non focal, with the exception of mental status (AOx1, tangential thoughts and delusion). And Labs not suggestive of infection. CBC not suggestive of infection, BMP  normal, UA clean, and patient denies anything on ROS. Pt was evaluated by Psychiatry and Psychology on admission - considered incapacitated to make medical and discharge decisions; initially placed on legal hold. Psychiatrist medications adjusted. Pt was considered medically cleared as off 6/13/2021 - initially planned for Inpatient Psych facility which has been difficult due to limited insurance.      While on psych meds and upon follow up eval by Psychiatry, Pt appeared to be at her baseline. Patient was Psychiatrically cleared for discharge on 6/22. Plan has been for a long term placement and evaluation for guardianship as Pt cannot make decisions\"      Interval Problem Update  8/13: patient alert and very pleasant with clear fluent speech.  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 The patient is currently wearing 6 shirts and a sweatshirt and it was warm in her room. Discussed with RN.     I have personally seen and examined the patient at bedside. I discussed the plan of care with patient, bedside RN and .    Consultants/Specialty  None    Code Status  Full Code    Disposition  Patient is medically cleared.   Anticipate discharge to SNF or group home once guardianship is appointed.  I have placed the appropriate orders for post-discharge needs.    Review of Systems  Review of Systems   Unable to " perform ROS: Psychiatric disorder        Physical Exam  Temp:  [36.1 °C (97 °F)-36.4 °C (97.5 °F)] 36.4 °C (97.5 °F)  Pulse:  [73-76] 73  Resp:  [16-17] 17  BP: (130-136)/(73-79) 136/79  SpO2:  [97 %-99 %] 97 %    Physical Exam  Vitals reviewed.   Constitutional:       Appearance: Normal appearance. She is not diaphoretic.   HENT:      Head: Normocephalic and atraumatic.      Nose: Nose normal.      Mouth/Throat:      Mouth: Mucous membranes are moist.      Pharynx: No oropharyngeal exudate.   Eyes:      General: No scleral icterus.        Right eye: No discharge.         Left eye: No discharge.      Extraocular Movements: Extraocular movements intact.      Conjunctiva/sclera: Conjunctivae normal.   Cardiovascular:      Rate and Rhythm: Normal rate and regular rhythm.      Pulses:           Radial pulses are 2+ on the right side and 2+ on the left side.        Dorsalis pedis pulses are 2+ on the right side and 2+ on the left side.      Heart sounds: Normal heart sounds. No murmur heard.     Pulmonary:      Effort: Pulmonary effort is normal. No respiratory distress.      Breath sounds: Normal breath sounds. No wheezing or rales.   Abdominal:      General: Bowel sounds are normal. There is no distension.      Palpations: Abdomen is soft.      Tenderness: There is no abdominal tenderness.   Musculoskeletal:         General: No swelling or tenderness.      Cervical back: No tenderness. No muscular tenderness.      Right lower leg: No edema.      Left lower leg: No edema.   Lymphadenopathy:      Cervical: No cervical adenopathy.   Skin:     Coloration: Skin is not jaundiced or pale.   Neurological:      General: No focal deficit present.      Mental Status: She is alert and oriented to person, place, and time. Mental status is at baseline.      Cranial Nerves: No cranial nerve deficit.   Psychiatric:         Mood and Affect: Mood normal.         Behavior: Behavior normal.         Fluids    Intake/Output Summary (Last 24  hours) at 8/13/2021 1529  Last data filed at 8/13/2021 0956  Gross per 24 hour   Intake 720 ml   Output --   Net 720 ml       Laboratory                        Imaging  DX-CHEST-LIMITED (1 VIEW)   Final Result      Left basilar atelectasis. No focal consolidation. No effusions.           Assessment/Plan  * Delusional disorder- (present on admission)  Assessment & Plan  Sent to the ED from her memory care facility for worsening agitation and behavioral changes.   Psychiatry evaluated the patient. Diagnosed by Psychiatry with schizoaffective disorder, bipolar type.  Based on this information, the patient lacks capacity to leave the hospital.      working on sending her to a memory care facility   7/1: Guardianship paperwork started    I discussed with case management regarding her discharge plan    History of dementia  Assessment & Plan  Has a reported history of dementia.  Applied for guardianship    Schizoaffective disorder, bipolar type ([facility])  Assessment & Plan  History of schizoaffective disorder bipolar type.  Seen by psychiatry  Patient refusing medications due to paranoia     Hypothyroidism  Assessment & Plan  Chronic, TSH 15.600 on 6/11/21, FT4 0.93 on 6/14/21.   levothyroxine 50 mcg daily but the patient refuses to take the medication (remains refusing 8/13)    Noncompliance with treatment plan- (present on admission)  Assessment & Plan  Patient refusing antipsychotic medications and all other medications  Encourage compliance.        VTE prophylaxis: ambulatory    I have performed a physical exam and reviewed and updated ROS and Plan today (8/13/2021). In review of yesterday's note (8/12/2021), there are no changes except as documented above.

## 2021-08-13 NOTE — DISCHARGE PLANNING
Anticipated Discharge Disposition: To be determined.     Action: Per PFA, patient does have Medicare, but it’s only Medicare A which would cover inpatient services only.  It appears at this time that the patient is receiving treatment for outpatient/observation services and the patient’s Medicare part B for outpatient care termed 02/28/2021. If the patient happens to roll inpatient PFA would then add Medicare A back to account.     Barriers to Discharge: Pending guardianship; placement.     Plan: CM to continue to follow for discharge needs.

## 2021-08-14 PROCEDURE — G0378 HOSPITAL OBSERVATION PER HR: HCPCS

## 2021-08-14 NOTE — PROGRESS NOTES
Moved and slightly loosened pt's wandergaurd to her right ankle as left ankle was swollen and band was very tight on that ankle.

## 2021-08-14 NOTE — PROGRESS NOTES
Received bedside report and assumed care of pt at change of shift. Pt is resting in bed at this time no signs of distress. Attempted to assess skin at this time in which she refused and was educated for importance. No other needs. Bed is locked and in lowest position with water and call light within reach. WG on r ankle

## 2021-08-15 PROCEDURE — 99224 PR SUBSEQUENT OBSERVATION CARE,LEVEL I: CPT | Performed by: HOSPITALIST

## 2021-08-15 PROCEDURE — G0378 HOSPITAL OBSERVATION PER HR: HCPCS

## 2021-08-15 NOTE — PROGRESS NOTES
Pt on her bed resting, on room air. Declines any pain/sob. Refuses assessment. Fall precautions in place. All needs attended.

## 2021-08-15 NOTE — PROGRESS NOTES
Pt refuses assessment. Able to ambulate up to bathroom with steady gait. Call bell light within reach. Bed in low and locked position.

## 2021-08-15 NOTE — PROGRESS NOTES
Pt able to ambulate up to bathroom with steady gait. Continent of bowel and bladder. Medications refused. Skin assessment refused, education provided. All needs attended.

## 2021-08-15 NOTE — PROGRESS NOTES
Pt resting on her bed, on room air, no sob noted. Declines pain. Refuses skin assessment. Fall precautions in place. All needs attended.                               POST-OP DIAGNOSIS:  Lung mass 25-Aug-2020 18:38:39  Ventura Griffith POST-OP DIAGNOSIS:  Hematoma or contusion of lung 31-Aug-2020 17:14:15  Esperanza Collier

## 2021-08-15 NOTE — PROGRESS NOTES
Pt is A&Ox1, VSS on RA.  Pt is able to ambulate independently with steady gait.  Pt reports 0/10 pain, medicated per MAR.       Pt refusing skin assessment, requesting RN to leave her room immediately

## 2021-08-15 NOTE — PROGRESS NOTES
"Hospital Medicine Daily Progress Note    Date of Service  8/15/2021    Chief Complaint  Altered mental status    Hospital Course  Per prior notes: \"Zully Lin is a 70-year-old woman with h/o dementia (unclear baseline), hypothyrodism, schizophrenia, a resident of [facility] presented 6/11/2021 with worsening agitation, delusions. Exam on admission was unremarkable and neuro exam is non focal, with the exception of mental status (AOx1, tangential thoughts and delusion). And Labs not suggestive of infection. CBC not suggestive of infection, BMP  normal, UA clean, and patient denies anything on ROS. Pt was evaluated by Psychiatry and Psychology on admission - considered incapacitated to make medical and discharge decisions; initially placed on legal hold. Psychiatrist medications adjusted. Pt was considered medically cleared as off 6/13/2021 - initially planned for Inpatient Psych facility which has been difficult due to limited insurance.      While on psych meds and upon follow up eval by Psychiatry, Pt appeared to be at her baseline. Patient was Psychiatrically cleared for discharge on 6/22. Plan has been for a long term placement and evaluation for guardianship as Pt cannot make decisions\"      Interval Problem Update  8/13: patient alert and very pleasant with clear fluent speech.  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 The patient is currently wearing 6 shirts and a sweatshirt and it was warm in her room. Discussed with RN.     8/15: Sitting up in a bedside chair.  When asked if she would like to walk outside her room in the halls she was fearful that someone would steal her stuff if she left the room.  She has 10 shirts on and states she is fine and not too hot.  When asked if she felt that was excessive wearing 10 shirts she said she didn't think so.  When asked why she didn't have 11 shirts on she stated she couldn't possibly wear that many.  She " does not want to take any of her shirts off currently.    I have personally seen and examined the patient at bedside. I discussed the plan of care with patient, bedside RN and .    Consultants/Specialty  None    Code Status  Full Code    Disposition  Patient is medically cleared.   Anticipate discharge to SNF or group home once guardianship is appointed.  I have placed the appropriate orders for post-discharge needs.    Review of Systems  Review of Systems        Physical Exam  Temp:  [36.2 °C (97.2 °F)-36.6 °C (97.9 °F)] 36.5 °C (97.7 °F)  Pulse:  [70-75] 70  Resp:  [16-18] 16  BP: (118-139)/(69-74) 129/70  SpO2:  [97 %-99 %] 97 %    Physical Exam  Vitals reviewed.   Constitutional:       Appearance: Normal appearance. She is not diaphoretic.   HENT:      Head: Normocephalic and atraumatic.      Nose: Nose normal.      Mouth/Throat:      Mouth: Mucous membranes are moist.   Eyes:      General: No scleral icterus.        Right eye: No discharge.         Left eye: No discharge.      Extraocular Movements: Extraocular movements intact.      Conjunctiva/sclera: Conjunctivae normal.   Cardiovascular:      Rate and Rhythm: Normal rate.      Pulses:           Radial pulses are 2+ on the right side.   Pulmonary:      Effort: Pulmonary effort is normal. No respiratory distress.   Abdominal:      General: There is no distension.   Musculoskeletal:      Cervical back: Normal range of motion. No muscular tenderness.      Right lower leg: No edema.      Left lower leg: No edema.   Neurological:      General: No focal deficit present.      Mental Status: She is alert and oriented to person, place, and time. Mental status is at baseline.      Cranial Nerves: No cranial nerve deficit.   Psychiatric:         Mood and Affect: Mood normal.         Behavior: Behavior normal.         Thought Content: Thought content is paranoid and delusional.         Fluids    Intake/Output Summary (Last 24 hours) at 8/15/2021 1058  Last  data filed at 8/15/2021 1000  Gross per 24 hour   Intake 680 ml   Output --   Net 680 ml       Laboratory                        Imaging  DX-CHEST-LIMITED (1 VIEW)   Final Result      Left basilar atelectasis. No focal consolidation. No effusions.           Assessment/Plan  * Delusional disorder- (present on admission)  Assessment & Plan  Sent to the ED from her memory care facility for worsening agitation and behavioral changes.   Psychiatry evaluated the patient. Diagnosed by Psychiatry with schizoaffective disorder, bipolar type.  Based on this information, the patient lacks capacity to leave the hospital.      working on sending her to a memory care facility   7/1: Guardianship paperwork started    I discussed with case management regarding her discharge plan    History of dementia  Assessment & Plan  Has a reported history of dementia.  Applied for guardianship    Schizoaffective disorder, bipolar type ([facility])  Assessment & Plan  History of schizoaffective disorder bipolar type.  Seen by psychiatry  Patient refusing medications due to paranoia     Hypothyroidism  Assessment & Plan  Chronic, TSH 15.600 on 6/11/21, FT4 0.93 on 6/14/21.   levothyroxine 50 mcg daily but the patient refuses to take the medication (remains refusing 8/13)    Noncompliance with treatment plan- (present on admission)  Assessment & Plan  Patient refusing antipsychotic medications and all other medications  Encourage compliance.        VTE prophylaxis: ambulatory    I have performed a physical exam and reviewed and updated ROS and Plan today (8/15/2021). In review of yesterday's note (8/14/2021), there are no changes except as documented above.

## 2021-08-16 PROCEDURE — G0378 HOSPITAL OBSERVATION PER HR: HCPCS

## 2021-08-16 ASSESSMENT — PAIN DESCRIPTION - PAIN TYPE: TYPE: ACUTE PAIN

## 2021-08-16 NOTE — PROGRESS NOTES
Report received by dayshift RN. Assumed care of pt. Assessment complete. Pt A&Ox3, disoriented to event. VSS and on RA. Pt in no apparent signs of distress, denies pain. Wanderguard to L ankle for safety. Refuses medications. All needs met at this time. Call light within reach, bed locked and in lowest position, and pt has no further questions at this time.           1 RN Skin Assessment completed.   Patient's risk of skin breakdown: Low    Devices in place and skin assessed under:   [] Pulse Ox  [] PIV [] Central Line [] SCDs []Purewick  [] Mendosa  []Condom Cath   [] BMS        []  Cortrak   []  Oxymask   [] Bipap    [] Nasal Canula   [x] N/A   [] Other(specify):     Right Ear  [] WDL                or           [x] Skin issue present (please provide assessment/description below)  -Pt refused assessment.    Left Ear  [] WDL                or           [x] Skin issue present (please provide assessment/description below)  -Pt refused assessment.    Right Elbow:  [] WDL               or           [x] Skin issue present (please provide assessment/description below)  -Pt refused assessment.    Left Elbow:   [] WDL                or           [x] Skin issue present (please provide assessment/description below)  -Pt refused assessment.    Coccyx/Buttocks:  [] WDL                or           [x] Skin issue present (please provide assessment/description below)   -Pt refused assessment.    Right Heel/Foot/Toes:  [] WDL                or           [x] Skin issue present (please provide assessment/description below)  -Pt refused assessment.     Left Heel/Foot/Toes:   [] WDL              or           [x] Skin issue present (please provide assessment/description below)   -Pt refused assessment.    **Describe any other skin issues in areas not already listed from above list:   [] N/A    Interventions In Place: Pillows and Pressure Redistribution Mattress    **If any new or digressing skin issues are found, fill out the selection  below.    Possible Skin Injury No  Pictures Uploaded Into Epic: N/A  Wound Consult Placed: N/A  RN Wound Prevention Protocol Ordered: No    What new preventative interventions did you add? N/A

## 2021-08-16 NOTE — PROGRESS NOTES
"Met with patient this am.  Nurse was able to do a upper body skin check and have the patient remove 10 garments she was wearing but the patient promptly placed them back upon herself.  I asked the patient if she felt it was abnormal to wear this many shirts and she said \"not if you are cold.\"  I alerted her that taking her thyroid medication may help her not be so cold.  The patient was very upset and yelled at me that she does not have hypothyroid and that she knows this.  She demanded to be sent home but could not give me an address.  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  I did state to her I felt she was having some delusions and that medicine would likely help her but this only infuriated her much more so I did not discuss this with her.    "

## 2021-08-16 NOTE — PROGRESS NOTES
Pt is A&Ox1, + delusions, VSS on RA.  Pt is able to ambulate independently with steady gait.  Pt reports 0/10 pain, medicated per MAR.         1 RN Skin Assessment completed.   Patient's risk of skin breakdown: Low    Devices in place and skin assessed under:   [] Pulse Ox  [] PIV [] Central Line [] SCDs []Purewick  [] Mendosa  []Condom Cath   [] BMS        []  Cortrak   []  Oxymask   [] Bipap    [] Nasal Canula   [x] N/A   [] Other(specify):     Right Ear  [x] WDL                or           [] Skin issue present (please provide assessment/description below)    Left Ear  [x] WDL                or           [] Skin issue present (please provide assessment/description below)    Right Elbow:  [x] WDL               or           [] Skin issue present (please provide assessment/description below)    Left Elbow:   [x] WDL                or           [] Skin issue present (please provide assessment/description below)    Coccyx/Buttocks:  [x] WDL                or           [] Skin issue present (please provide assessment/description below)    Right Heel/Foot/Toes:  [x] WDL                or           [] Skin issue present (please provide assessment/description below)    Left Heel/Foot/Toes:   [x] WDL              or           [] Skin issue present (please provide assessment/description below)      **Describe any other skin issues in areas not already listed from above list:   [x] N/A    Interventions In Place: Pressure Redistribution Mattress    **If any new or digressing skin issues are found, fill out the selection below.    Possible Skin Injury No  Pictures Uploaded Into Epic: N/A  Wound Consult Placed: N/A  RN Wound Prevention Protocol Ordered: No    What new preventative interventions did you add? N/A

## 2021-08-17 PROCEDURE — G0378 HOSPITAL OBSERVATION PER HR: HCPCS

## 2021-08-17 NOTE — PROGRESS NOTES
Received report from KARRI Maya and assumed care of pt. Pt A&OX2, disoriented to time and situation. Pt on RA.  Denies any pain or discomfort at this time. POC discussed. Denies further needs at this time. Bed locked and in lowest position. Bed alarm on, call light within reach & hourly rounding in place    1 RN Skin Assessment completed.   Patient's risk of skin breakdown: Low     Devices in place and skin assessed under:   []? Pulse Ox  []? PIV []? Central Line []? SCDs []?Purewick  []? Mendosa  []?Condom Cath   []? BMS        []?  Cortrak   []?  Oxymask   []? Bipap    []? Nasal Canula   []? N/A   [x]? Other(specify): Wanderguard     Right Ear  [x]? WDL                or           []? Skin issue present (please provide assessment/description below)     Left Ear  [x]? WDL                or           []? Skin issue present (please provide assessment/description below)     Right Elbow:  [x]? WDL               or           []? Skin issue present (please provide assessment/description below)     Left Elbow:   [x]? WDL                or           []? Skin issue present (please provide assessment/description below)     Coccyx/Buttocks:  []? WDL                or           [x]? Skin issue present (please provide assessment/description below)   Pt refused this assessment     Right Heel/Foot/Toes:  [x]? WDL                or           []? Skin issue present (please provide assessment/description below)     Left Heel/Foot/Toes:   [x]? WDL              or           []? Skin issue present (please provide assessment/description below)        **Describe any other skin issues in areas not already listed from above list:   [x]? N/A     Interventions In Place: Pressure Redistribution Mattress

## 2021-08-17 NOTE — DISCHARGE PLANNING
Anticipated Discharge Disposition: TBD    Action: Patient is orientated to self and location only.  Patient refuses medications, is delusional due to this.  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  Only that it is down the street then take a left.  Does not believe she has medical issues such as hypothyroid.      Patient self isolates in her room,  Is ambulatory, continent.     Barriers to Discharge: guardianship/placement/insurance, income    Plan: continue to monitor for discharge [facility]    Guardianship packet submitted to Heydi Moreno on 7/26/21

## 2021-08-17 NOTE — PROGRESS NOTES
Pt is A&Ox1, + delusions, VSS on RA.  Pt is able to ambulate independently with steady gait.  Pt reports 0/10 pain, medicated per MAR.   Pt continues to wear 10 shirts, refusing to take them off.     1 RN Skin Assessment completed.   Patient's risk of skin breakdown: Low    Devices in place and skin assessed under:   [] Pulse Ox  [] PIV [] Central Line [] SCDs []Purewick  [] Mendosa  []Condom Cath   [] BMS        []  Cortrak   []  Oxymask   [] Bipap    [] Nasal Canula   [x] N/A   [] Other(specify):     Right Ear  [x] WDL                or           [] Skin issue present (please provide assessment/description below)    Left Ear  [x] WDL                or           [] Skin issue present (please provide assessment/description below)    Right Elbow:  [x] WDL               or           [] Skin issue present (please provide assessment/description below)    Left Elbow:   [x] WDL                or           [] Skin issue present (please provide assessment/description below)    Coccyx/Buttocks:  [x] WDL                or           [] Skin issue present (please provide assessment/description below)    Right Heel/Foot/Toes:  [x] WDL                or           [] Skin issue present (please provide assessment/description below)    Left Heel/Foot/Toes:   [x] WDL              or           [] Skin issue present (please provide assessment/description below)      **Describe any other skin issues in areas not already listed from above list:   [x] N/A    Interventions In Place: Pressure Redistribution Mattress    **If any new or digressing skin issues are found, fill out the selection below.    Possible Skin Injury No  Pictures Uploaded Into Epic: N/A  Wound Consult Placed: N/A  RN Wound Prevention Protocol Ordered: No    What new preventative interventions did you add? N/A

## 2021-08-18 PROCEDURE — G0378 HOSPITAL OBSERVATION PER HR: HCPCS

## 2021-08-18 NOTE — PROGRESS NOTES
Pt is A&Ox1, + delusions, VSS on RA.  Pt is able to ambulate independently with steady gait.  Pt reports 0/10 pain, medicated per MAR.   Pt agreeable to moving rooms this afternoon, walked with RN to new room.    1 RN Skin Assessment completed.   Patient's risk of skin breakdown: Low    Devices in place and skin assessed under:   [] Pulse Ox  [] PIV [] Central Line [] SCDs []Purewick  [] Mendosa  []Condom Cath   [] BMS        []  Cortrak   []  Oxymask   [] Bipap    [] Nasal Canula   [x] N/A   [] Other(specify):     Right Ear  [x] WDL                or           [] Skin issue present (please provide assessment/description below)    Left Ear  [x] WDL                or           [] Skin issue present (please provide assessment/description below)    Right Elbow:  [x] WDL               or           [] Skin issue present (please provide assessment/description below)    Left Elbow:   [x] WDL                or           [] Skin issue present (please provide assessment/description below)    Coccyx/Buttocks:  [x] WDL                or           [] Skin issue present (please provide assessment/description below)    Right Heel/Foot/Toes:  [x] WDL                or           [] Skin issue present (please provide assessment/description below)    Left Heel/Foot/Toes:   [x] WDL              or           [] Skin issue present (please provide assessment/description below)      **Describe any other skin issues in areas not already listed from above list:   [x] N/A    Interventions In Place: Pressure Redistribution Mattress    **If any new or digressing skin issues are found, fill out the selection below.    Possible Skin Injury No  Pictures Uploaded Into Epic: N/A  Wound Consult Placed: N/A  RN Wound Prevention Protocol Ordered: No    What new preventative interventions did you add? N/A

## 2021-08-18 NOTE — PROGRESS NOTES
Received report from KARRI Maya and assumed care of pt. Pt A&OX2, disoriented to time and situation. Pt on RA.  Denies any pain or discomfort at this time. Pt has not left room during this RN shift and will not call for needs. POC discussed. Denies further needs at this time. Bed locked and in lowest position. Bed alarm on, call light within reach & hourly rounding in place     1 RN Skin Assessment completed.   Patient's risk of skin breakdown: Low     Devices in place and skin assessed under:   []?? Pulse Ox  []?? PIV []?? Central Line []?? SCDs []??Purewick  []?? Mendosa  []??Condom Cath   []?? BMS        []??  Cortrak   []??  Oxymask   []?? Bipap    []?? Nasal Canula   []?? N/A   [x]?? Other(specify): Wanderguard     Right Ear  [x]?? WDL                or           []?? Skin issue present (please provide assessment/description below)     Left Ear  [x]?? WDL                or           []?? Skin issue present (please provide assessment/description below)     Right Elbow:  [x]?? WDL               or           []?? Skin issue present (please provide assessment/description below)     Left Elbow:   [x]?? WDL                or           []?? Skin issue present (please provide assessment/description below)     Coccyx/Buttocks:  []?? WDL                or           [x]?? Skin issue present (please provide assessment/description below)   Pt refused this assessment      Right Heel/Foot/Toes:  [x]?? WDL                or           []?? Skin issue present (please provide assessment/description below)     Left Heel/Foot/Toes:   [x]?? WDL              or           []?? Skin issue present (please provide assessment/description below)        **Describe any other skin issues in areas not already listed from above list:   [x]?? N/A     Interventions In Place: Pressure Redistribution Mattress

## 2021-08-19 PROCEDURE — G0378 HOSPITAL OBSERVATION PER HR: HCPCS

## 2021-08-19 PROCEDURE — 99224 PR SUBSEQUENT OBSERVATION CARE,LEVEL I: CPT | Performed by: HOSPITALIST

## 2021-08-19 ASSESSMENT — ENCOUNTER SYMPTOMS
CHILLS: 0
FEVER: 0
SHORTNESS OF BREATH: 0
VOMITING: 0
NAUSEA: 0
COUGH: 0
ABDOMINAL PAIN: 0

## 2021-08-19 ASSESSMENT — PAIN DESCRIPTION - PAIN TYPE: TYPE: ACUTE PAIN

## 2021-08-19 NOTE — PROGRESS NOTES
1 RN Skin Assessment completed.   Patient's risk of skin breakdown: Low    Devices in place and skin assessed under:   [] Pulse Ox  [] PIV [] Central Line [] SCDs []Purewick  [] Mendosa  []Condom Cath   [] BMS        []  Cortrak   []  Oxymask   [] Bipap    [] Nasal Canula   [x] N/A   [] Other(specify):     Right Ear  [x] WDL                or           [] Skin issue present (please provide assessment/description below)    Left Ear  [x] WDL                or           [] Skin issue present (please provide assessment/description below)    Right Elbow:  [x] WDL               or           [] Skin issue present (please provide assessment/description below)    Left Elbow:   [x] WDL                or           [] Skin issue present (please provide assessment/description below)    Coccyx/Buttocks: Pt refused assessment  [] WDL                or           [] Skin issue present (please provide assessment/description below)    Right Heel/Foot/Toes:  [x] WDL                or           [] Skin issue present (please provide assessment/description below)    Left Heel/Foot/Toes:   [x] WDL              or           [] Skin issue present (please provide assessment/description below)      **Describe any other skin issues in areas not already listed from above list:   [x] N/A    Interventions In Place: Pillows and Pressure Redistribution Mattress

## 2021-08-19 NOTE — PROGRESS NOTES
"Hospital Medicine Daily Progress Note    Date of Service  8/19/2021    Chief Complaint  Altered mental status    Hospital Course  Per prior notes: \"Zully Lin is a 70-year-old woman with h/o dementia (unclear baseline), hypothyrodism, schizophrenia, a resident of [facility] presented 6/11/2021 with worsening agitation, delusions. Exam on admission was unremarkable and neuro exam is non focal, with the exception of mental status (AOx1, tangential thoughts and delusion). And Labs not suggestive of infection. CBC not suggestive of infection, BMP  normal, UA clean, and patient denies anything on ROS. Pt was evaluated by Psychiatry and Psychology on admission - considered incapacitated to make medical and discharge decisions; initially placed on legal hold. Psychiatrist medications adjusted. Pt was considered medically cleared as off 6/13/2021 - initially planned for Inpatient Psych facility which has been difficult due to limited insurance.      While on psych meds and upon follow up eval by Psychiatry, Pt appeared to be at her baseline. Patient was Psychiatrically cleared for discharge on 6/22. Plan has been for a long term placement and evaluation for guardianship as Pt cannot make decisions\"      Interval Problem Update    Patient is alert oriented to self place and year  She knows she is at [facility] and claims that she \"owns the place\"  She denies any pain  She is afebrile on room air  She has multiple layers of clothing on    I have personally seen and examined the patient at bedside. I discussed the plan of care with patient, bedside RN and .    Consultants/Specialty  None    Code Status  Full Code    Disposition  Patient is medically cleared.   Anticipate discharge to SNF or group home once guardianship is appointed.  I have placed the appropriate orders for post-discharge needs.    Review of Systems  Review of Systems   Constitutional: Negative for chills and fever. "   Respiratory: Negative for cough and shortness of breath.    Gastrointestinal: Negative for abdominal pain, nausea and vomiting.   All other systems reviewed and are negative.       Physical Exam  Temp:  [36.1 °C (97 °F)-36.7 °C (98.1 °F)] 36.2 °C (97.2 °F)  Pulse:  [] 100  Resp:  [17-18] 18  BP: (122-135)/(72-85) 135/82  SpO2:  [93 %-100 %] 93 %    Physical Exam  Vitals and nursing note reviewed.   Constitutional:       General: She is not in acute distress.  HENT:      Head: Normocephalic and atraumatic.      Nose: Nose normal. No rhinorrhea.      Mouth/Throat:      Pharynx: No oropharyngeal exudate or posterior oropharyngeal erythema.   Eyes:      General: No scleral icterus.        Right eye: No discharge.         Left eye: No discharge.   Cardiovascular:      Rate and Rhythm: Normal rate and regular rhythm.      Heart sounds: Normal heart sounds. No murmur heard.   No friction rub. No gallop.    Pulmonary:      Effort: Pulmonary effort is normal. No respiratory distress.      Breath sounds: Normal breath sounds. No stridor. No wheezing, rhonchi or rales.   Chest:      Chest wall: No tenderness.   Abdominal:      General: Bowel sounds are normal. There is no distension.      Palpations: Abdomen is soft. There is no mass.      Tenderness: There is no abdominal tenderness. There is no rebound.   Musculoskeletal:         General: No swelling or tenderness.      Cervical back: Neck supple. No rigidity.   Skin:     General: Skin is warm and dry.      Coloration: Skin is not cyanotic or jaundiced.      Nails: There is no clubbing.   Neurological:      General: No focal deficit present.      Mental Status: She is alert and oriented to person, place, and time.      Cranial Nerves: No cranial nerve deficit.      Motor: No weakness.   Psychiatric:         Thought Content: Thought content is delusional.         Fluids    Intake/Output Summary (Last 24 hours) at 8/19/2021 1113  Last data filed at 8/18/2021  1400  Gross per 24 hour   Intake 240 ml   Output --   Net 240 ml       Laboratory                        Imaging  DX-CHEST-LIMITED (1 VIEW)   Final Result      Left basilar atelectasis. No focal consolidation. No effusions.           Assessment/Plan  * Delusional disorder- (present on admission)  Assessment & Plan  Sent to the ED from her memory care facility for worsening agitation and behavioral changes.   Psychiatry evaluated the patient. Diagnosed by Psychiatry with schizoaffective disorder, bipolar type.  Based on this information, the patient lacks capacity to leave the hospital.     Pending guardianship for placement discussed with case management    History of dementia  Assessment & Plan  Has a reported history of dementia.  Applied for guardianship    Schizoaffective disorder, bipolar type ([facility])  Assessment & Plan  History of schizoaffective disorder bipolar type.  Seen by psychiatry  Patient refusing medications due to paranoia     Hypothyroidism  Assessment & Plan  Suboptimal control secondary to patient's noncompliance with medication  Reinforced compliance with patient    Noncompliance with treatment plan- (present on admission)  Assessment & Plan  Patient refusing antipsychotic medications and all other medications  Encourage compliance.        VTE prophylaxis: ambulatory

## 2021-08-19 NOTE — PROGRESS NOTES
Bedside report received at change of shift. Pt is A&Ox2, disoriented to place and situation. Pt is up self, ambulates with steady gait. Call light within reach. All pt needs met at this time.

## 2021-08-20 PROCEDURE — G0378 HOSPITAL OBSERVATION PER HR: HCPCS

## 2021-08-20 NOTE — PROGRESS NOTES
Received bedside report and accepted care of patient.    Pt is currently A/ox2, Room air wti hn ovisiuble or stated sign of distress, discomfort or SOB. Pt is upself with steady gait. Refused medications this morning.    Patient currently resting in bed in no visible or stated signs of distress. Bed in locked and lowest position. Call light and personal possessions within reach. Patient educated about use of call light and verbalized understanding.    Devices in place and skin assessed under:   []? Pulse Ox  []? PIV []? Central Line []? SCDs []?Purewick  []? Mendosa  []?Condom Cath   []? BMS        []?  Cortrak   []?  Oxymask   []? Bipap    []? Nasal Canula   [x]? N/A   []? Other(specify):      Right Ear  [x]? WDL                or           []? Skin issue present (please provide assessment/description below)     Left Ear  [x]? WDL                or           []? Skin issue present (please provide assessment/description below)     Right Elbow:  [x]? WDL               or           []? Skin issue present (please provide assessment/description below)     Left Elbow:   [x]? WDL                or           []? Skin issue present (please provide assessment/description below)     Coccyx/Buttocks: Pt refused assessment  []? WDL                or           []? Skin issue present (please provide assessment/description below)     Right Heel/Foot/Toes:  [x]? WDL                or           []? Skin issue present (please provide assessment/description below)     Left Heel/Foot/Toes:   [x]? WDL              or           []? Skin issue present (please provide assessment/description below)        **Describe any other skin issues in areas not already listed from above list:   [x]? N/A     Interventions In Place: Pillows and Pressure Redistribution Mattress

## 2021-08-20 NOTE — DISCHARGE PLANNING
Medical Social Work  PC from [facility], [employer].  Calling to obtain additional information on the patient, income, insurance, family.  Abril stated she would be in later today to see the patient.

## 2021-08-21 PROCEDURE — G0378 HOSPITAL OBSERVATION PER HR: HCPCS

## 2021-08-21 ASSESSMENT — FIBROSIS 4 INDEX: FIB4 SCORE: 1.51

## 2021-08-21 NOTE — PROGRESS NOTES
Report received by dayshift RN. Assumed care of pt. Assessment complete. Pt A&Ox3, disoriented to event. VSS and on RA. Pt in no apparent signs of distress, denies pain. Wanderguard to R ankle for safety. Refuses medications. All needs met at this time. Call light within reach, bed locked and in lowest position, and pt has no further questions at this time.           1 RN Skin Assessment completed.   Patient's risk of skin breakdown: Low    Devices in place and skin assessed under:   [] Pulse Ox  [] PIV [] Central Line [] SCDs []Purewick  [] Mendosa  []Condom Cath   [] BMS        []  Cortrak   []  Oxymask   [] Bipap    [] Nasal Canula   [] N/A   [x] Other(specify): Wanderguard    Right Ear  [x] WDL                or           [] Skin issue present (please provide assessment/description below)    Left Ear  [x] WDL                or           [] Skin issue present (please provide assessment/description below)    Right Elbow:  [x] WDL               or           [] Skin issue present (please provide assessment/description below)    Left Elbow:   [x] WDL                or           [] Skin issue present (please provide assessment/description below)    Coccyx/Buttocks:  [] WDL                or           [x] Skin issue present (please provide assessment/description below)  -pt refused assessment     Right Heel/Foot/Toes:  [x] WDL                or           [] Skin issue present (please provide assessment/description below)    Left Heel/Foot/Toes:   [x] WDL              or           [] Skin issue present (please provide assessment/description below)      **Describe any other skin issues in areas not already listed from above list:   [x] N/A    Interventions In Place: Pillows and Pressure Redistribution Mattress    **If any new or digressing skin issues are found, fill out the selection below.    Possible Skin Injury No  Pictures Uploaded Into Epic: N/A  Wound Consult Placed: N/A  RN Wound Prevention Protocol Ordered: No     What new preventative interventions did you add? N/A

## 2021-08-21 NOTE — PROGRESS NOTES
Received bedside report and accepted care of patient.    Pt is currently A/ox2, Room air wti hn ovisiuble or stated sign of distress, discomfort or SOB. Pt is upself with steady gait.      Patient currently resting in bed in no visible or stated signs of distress. Bed in locked and lowest position. Call light and personal possessions within reach. Patient educated about use of call light and verbalized understanding.    Devices in place and skin assessed under:   []? Pulse Ox  []? PIV []? Central Line []? SCDs []?Purewick  []? Mendosa  []?Condom Cath   []? BMS        []?  Cortrak   []?  Oxymask   []? Bipap    []? Nasal Canula   [x]? N/A   []? Other(specify):      Right Ear  [x]? WDL                or           []? Skin issue present (please provide assessment/description below)     Left Ear  [x]? WDL                or           []? Skin issue present (please provide assessment/description below)     Right Elbow:  [x]? WDL               or           []? Skin issue present (please provide assessment/description below)     Left Elbow:   [x]? WDL                or           []? Skin issue present (please provide assessment/description below)     Coccyx/Buttocks: Pt refused assessment  []? WDL                or           []? Skin issue present (please provide assessment/description below)     Right Heel/Foot/Toes:  [x]? WDL                or           []? Skin issue present (please provide assessment/description below)     Left Heel/Foot/Toes:   [x]? WDL              or           []? Skin issue present (please provide assessment/description below)        **Describe any other skin issues in areas not already listed from above list:   [x]? N/A     Interventions In Place: Pillows and Pressure Redistribution Mattress

## 2021-08-21 NOTE — PROGRESS NOTES
Pt alert and oriented x3, on room air. Pt encouraged to ambulate out from her room to the nurse station, pt declined. Refused skin assessment. Safety precautions in place. All needs attended.

## 2021-08-22 PROCEDURE — G0378 HOSPITAL OBSERVATION PER HR: HCPCS

## 2021-08-22 ASSESSMENT — PAIN DESCRIPTION - PAIN TYPE: TYPE: ACUTE PAIN

## 2021-08-22 NOTE — PROGRESS NOTES
AAOx3, disoriented to situation. Currently resting in bed comfortably. Denies presence of pain. Up self, steady. Wanderguard present to R ankle. Refusing 1 RN skin check at this time despite education as to risk for skin breakdown. POC discussed, denies further needs at this time. Fall precautions in place. Bed alarm on, call light within reach & hourly rounding in place.

## 2021-08-22 NOTE — PROGRESS NOTES
Report received by sky RN. Assumed care of pt. Assessment complete. Pt A&Ox3, disoriented to event. VSS and on RA. Pt in no apparent signs of distress, denies pain. Wanderguard to R ankle for safety. Refuses medications. All needs met at this time. Call light within reach, bed locked and in lowest position, and pt has no further questions at this time.           1 RN Skin Assessment completed.   Patient's risk of skin breakdown: Low    Devices in place and skin assessed under:   [] Pulse Ox  [] PIV [] Central Line [] SCDs []Purewick  [] Mendosa  []Condom Cath   [] BMS        []  Cortrak   []  Oxymask   [] Bipap    [] Nasal Canula   [] N/A   [x] Other(specify):  Wanderguard    Right Ear  [x] WDL                or           [] Skin issue present (please provide assessment/description below)    Left Ear  [x] WDL                or           [] Skin issue present (please provide assessment/description below)    Right Elbow:  [x] WDL               or           [] Skin issue present (please provide assessment/description below)    Left Elbow:   [x] WDL                or           [] Skin issue present (please provide assessment/description below)    Coccyx/Buttocks:  [x] WDL                or           [] Skin issue present (please provide assessment/description below)    Right Heel/Foot/Toes:  [x] WDL                or           [] Skin issue present (please provide assessment/description below)    Left Heel/Foot/Toes:   [x] WDL              or           [] Skin issue present (please provide assessment/description below)      **Describe any other skin issues in areas not already listed from above list:   [x] N/A    Interventions In Place: Pillows and Pressure Redistribution Mattress    **If any new or digressing skin issues are found, fill out the selection below.    Possible Skin Injury No  Pictures Uploaded Into Epic: N/A  Wound Consult Placed: N/A  RN Wound Prevention Protocol Ordered: No    What new preventative  interventions did you add? N/A

## 2021-08-23 PROCEDURE — 99224 PR SUBSEQUENT OBSERVATION CARE,LEVEL I: CPT | Performed by: HOSPITALIST

## 2021-08-23 PROCEDURE — G0378 HOSPITAL OBSERVATION PER HR: HCPCS

## 2021-08-23 ASSESSMENT — ENCOUNTER SYMPTOMS
VOMITING: 0
SHORTNESS OF BREATH: 0
NAUSEA: 0
COUGH: 0
FEVER: 0
ABDOMINAL PAIN: 0

## 2021-08-23 NOTE — PROGRESS NOTES
"Hospital Medicine Daily Progress Note    Date of Service  8/23/2021    Chief Complaint  Altered mental status    Hospital Course  Per prior notes: \"Zully Lin is a 70-year-old woman with h/o dementia (unclear baseline), hypothyrodism, schizophrenia, a resident of [facility] presented 6/11/2021 with worsening agitation, delusions. Exam on admission was unremarkable and neuro exam is non focal, with the exception of mental status (AOx1, tangential thoughts and delusion). And Labs not suggestive of infection. CBC not suggestive of infection, BMP  normal, UA clean, and patient denies anything on ROS. Pt was evaluated by Psychiatry and Psychology on admission - considered incapacitated to make medical and discharge decisions; initially placed on legal hold. Psychiatrist medications adjusted. Pt was considered medically cleared as off 6/13/2021 - initially planned for Inpatient Psych facility which has been difficult due to limited insurance.      While on psych meds and upon follow up eval by Psychiatry, Pt appeared to be at her baseline. Patient was Psychiatrically cleared for discharge on 6/22. 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  She denies pain    I have personally seen and examined the patient at bedside. I discussed the plan of care with patient, bedside RN and .    Consultants/Specialty  None    Code Status  Full Code    Disposition  Patient is medically cleared.   Anticipate discharge to SNF or group home once guardianship is appointed.  I have placed the appropriate orders for post-discharge needs.    Review of Systems  Review of Systems   Constitutional: Negative for fever.   Respiratory: Negative for cough and shortness of " breath.    Cardiovascular: Negative for chest pain.   Gastrointestinal: Negative for abdominal pain, nausea and vomiting.   All other systems reviewed and are negative.       Physical Exam  Temp:  [36.1 °C (97 °F)-36.6 °C (97.8 °F)] 36.1 °C (97 °F)  Pulse:  [73-81] 73  Resp:  [16-18] 18  BP: (111-134)/(66-71) 134/69  SpO2:  [94 %-98 %] 94 %    Physical Exam  Vitals and nursing note reviewed.   Constitutional:       General: She is not in acute distress.  HENT:      Head: Normocephalic and atraumatic.      Nose: Nose normal. No rhinorrhea.      Mouth/Throat:      Pharynx: No oropharyngeal exudate or posterior oropharyngeal erythema.   Eyes:      General: No scleral icterus.        Right eye: No discharge.         Left eye: No discharge.   Cardiovascular:      Rate and Rhythm: Normal rate and regular rhythm.      Heart sounds: Normal heart sounds. No murmur heard.   No friction rub. No gallop.    Pulmonary:      Effort: Pulmonary effort is normal. No respiratory distress.      Breath sounds: Normal breath sounds. No stridor. No wheezing, rhonchi or rales.   Chest:      Chest wall: No tenderness.   Abdominal:      General: Bowel sounds are normal. There is no distension.      Palpations: Abdomen is soft. There is no mass.      Tenderness: There is no abdominal tenderness. There is no rebound.   Musculoskeletal:         General: No swelling or tenderness.      Cervical back: Neck supple. No rigidity.   Skin:     General: Skin is warm and dry.      Coloration: Skin is not cyanotic or jaundiced.      Nails: There is no clubbing.   Neurological:      General: No focal deficit present.      Mental Status: She is alert.      Cranial Nerves: No cranial nerve deficit.      Motor: No weakness.      Comments: Oriented x2   Psychiatric:         Thought Content: Thought content is delusional.         Fluids    Intake/Output Summary (Last 24 hours) at 8/23/2021 1215  Last data filed at 8/22/2021 2052  Gross per 24 hour   Intake 960  ml   Output --   Net 960 ml       Laboratory                        Imaging  DX-CHEST-LIMITED (1 VIEW)   Final Result      Left basilar atelectasis. No focal consolidation. No effusions.           Assessment/Plan  * Delusional disorder- (present on admission)  Assessment & Plan  Sent to the ED from her memory care facility for worsening agitation and behavioral changes.   Psychiatry evaluated the patient. Diagnosed by Psychiatry with schizoaffective disorder, bipolar type.  Based on this information, the patient lacks capacity to leave the hospital.     Awaiting guardianship for placement    History of dementia  Assessment & Plan  Has a reported history of dementia.  Applied for guardianship    Schizoaffective disorder, bipolar type ([facility])  Assessment & Plan  History of schizoaffective disorder bipolar type.  Seen by psychiatry  Patient refusing medications due to paranoia     Hypothyroidism  Assessment & Plan  Suboptimal control secondary to patient's noncompliance with medication    Continue to try to reinforce compliance with patient    Noncompliance with treatment plan- (present on admission)  Assessment & Plan  Patient refusing antipsychotic medications and all other medications  Encourage compliance.        VTE prophylaxis: ambulatory

## 2021-08-23 NOTE — PROGRESS NOTES
1 RN Skin Assessment completed.   Patient's risk of skin breakdown: Low     Devices in place and skin assessed under:   []? Pulse Ox  []? PIV []? Central Line []? SCDs []?Purewick  []? Mendosa  []?Condom Cath   []? BMS        []?  Cortrak   []?  Oxymask   []? Bipap    []? Nasal Canula   []? N/A   [x]? Other(specify): Wanderguard     Right Ear  [x]? WDL                or           []? Skin issue present (please provide assessment/description below)     Left Ear  [x]? WDL                or           []? Skin issue present (please provide assessment/description below)     Right Elbow:  [x]? WDL               or           []? Skin issue present (please provide assessment/description below)     Left Elbow:   [x]? WDL                or           []? Skin issue present (please provide assessment/description below)     Coccyx/Buttocks:  [x]? WDL                or           []? Skin issue present (please provide assessment/description below)     Right Heel/Foot/Toes:  [x]? WDL                or           []? Skin issue present (please provide assessment/description below)     Left Heel/Foot/Toes:   [x]? WDL              or           []? Skin issue present (please provide assessment/description below)        **Describe any other skin issues in areas not already listed from above list:   []? N/A     Interventions In Place: Pillows and Pressure Redistribution Mattress

## 2021-08-23 NOTE — PROGRESS NOTES
Report received by dayshift RN. Assumed care of pt. Assessment complete. Pt A&Ox3, disoriented to event. VSS and on RA. Pt in no apparent signs of distress, denies pain. Wanderguard to R ankle for safety. Refuses medications. All needs met at this time. Call light within reach, bed locked and in lowest position, and pt has no further questions at this time.         1 RN Skin Assessment completed.   Patient's risk of skin breakdown: Low    Devices in place and skin assessed under:   [] Pulse Ox  [] PIV [] Central Line [] SCDs []Purewick  [] Mendosa  []Condom Cath   [] BMS        []  Cortrak   []  Oxymask   [] Bipap    [] Nasal Canula   [] N/A   [x] Other(specify): Wanderguard    Right Ear  [x] WDL                or           [] Skin issue present (please provide assessment/description below)    Left Ear  [x] WDL                or           [] Skin issue present (please provide assessment/description below)    Right Elbow:  [x] WDL               or           [] Skin issue present (please provide assessment/description below)    Left Elbow:   [x] WDL                or           [] Skin issue present (please provide assessment/description below)    Coccyx/Buttocks:  [x] WDL                or           [] Skin issue present (please provide assessment/description below)    Right Heel/Foot/Toes:  [x] WDL                or           [] Skin issue present (please provide assessment/description below)    Left Heel/Foot/Toes:   [x] WDL              or           [] Skin issue present (please provide assessment/description below)      **Describe any other skin issues in areas not already listed from above list:   [] N/A    Interventions In Place: Pillows and Pressure Redistribution Mattress

## 2021-08-24 PROCEDURE — G0378 HOSPITAL OBSERVATION PER HR: HCPCS

## 2021-08-24 PROCEDURE — 99224 PR SUBSEQUENT OBSERVATION CARE,LEVEL I: CPT | Performed by: HOSPITALIST

## 2021-08-24 NOTE — PROGRESS NOTES
Pt A&Ox3, disoriented to event. VSS and on RA. Pt in no apparent signs of distress, denies pain. Wanderguard to R ankle for safety. Refuses medications. Pt asked to take a shower, gave supplies needed. All needs met at this time. Call light within reach, bed locked and in lowest position, and pt has no further questions at this time.        1 RN Skin Assessment completed.   Patient's risk of skin breakdown: Low    Devices in place and skin assessed under:   [] Pulse Ox  [] PIV [] Central Line [] SCDs []Purewick  [] Mendosa  []Condom Cath   [] BMS        []  Cortrak   []  Oxymask   [] Bipap    [] Nasal Canula   [] N/A   [x] Other(specify): Wanderguard    Right Ear  [x] WDL                or           [] Skin issue present (please provide assessment/description below)    Left Ear  [x] WDL                or           [] Skin issue present (please provide assessment/description below)    Right Elbow:  [x] WDL               or           [] Skin issue present (please provide assessment/description below)    Left Elbow:   [x] WDL                or           [] Skin issue present (please provide assessment/description below)    Coccyx/Buttocks:  [x] WDL                or           [] Skin issue present (please provide assessment/description below)    Right Heel/Foot/Toes:  [x] WDL                or           [] Skin issue present (please provide assessment/description below)    Left Heel/Foot/Toes:   [x] WDL              or           [] Skin issue present (please provide assessment/description below)      **Describe any other skin issues in areas not already listed from above list:   [x] N/A    Interventions In Place: Pillows and Pressure Redistribution Mattress    **If any new or digressing skin issues are found, fill out the selection below.    Possible Skin Injury No  Pictures Uploaded Into Epic: N/A  Wound Consult Placed: N/A  RN Wound Prevention Protocol Ordered: No    What new preventative interventions did you add?  N/A

## 2021-08-24 NOTE — DISCHARGE PLANNING
Anticipated Discharge Disposition: TBD    Action: Abril Dawn, Guardianship Services has assessed the patient, and will be calling the patient's daughter for more information.     Patient continues to refuse most medications and psych medications.  Stating that there is nothing wrong with her and she is fine.      Barriers to Discharge: guardianship/medicaid/what is her income    Plan: continue to monitor for discharge barriers    Guardianship packet submitted on 7/26/21

## 2021-08-24 NOTE — PROGRESS NOTES
"Hospital Medicine Daily Progress Note    Date of Service  8/24/2021    Chief Complaint  Zully Lin is a 70 y.o. female admitted 6/11/2021 with altered mental status    Hospital Course    Per prior notes: \"Zully Lin is a 70-year-old woman with h/o dementia (unclear baseline), hypothyrodism, schizophrenia, a resident of [facility] presented 6/11/2021 with worsening agitation, delusions. Exam on admission was unremarkable and neuro exam is non focal, with the exception of mental status (AOx1, tangential thoughts and delusion). And Labs not suggestive of infection. CBC not suggestive of infection, BMP  normal, UA clean, and patient denies anything on ROS. Pt was evaluated by Psychiatry and Psychology on admission - considered incapacitated to make medical and discharge decisions; initially placed on legal hold. Psychiatrist medications adjusted. Pt was considered medically cleared as off 6/13/2021 - initially planned for Inpatient Psych facility which has been difficult due to limited insurance.      While on psych meds and upon follow up eval by Psychiatry, Pt appeared to be at her baseline. Patient was Psychiatrically cleared for discharge on 6/22. Plan has been for a long term placement and evaluation for guardianship as Pt cannot make decisions\"        Interval Problem Update  ROS is limited as pt refuses to fully cooperate though she does state \"I'm fine\"    I have personally seen and examined the patient at bedside. I discussed the plan of care with bedside RN and .    Consultants/Specialty    Code Status  Full Code    Disposition  Patient is medically cleared.   Anticipate discharge to to a psychiatric hospital.  I have placed the appropriate orders for post-discharge needs.    Review of Systems  Review of Systems   Unable to perform ROS: Psychiatric disorder        Physical Exam  Temp:  [36.1 °C (97 °F)-36.2 °C (97.2 °F)] 36.1 °C (97 °F)  Pulse:  [71-78] 71  Resp:  " "[18] 18  BP: (121-134)/(69-74) 121/70  SpO2:  [94 %-99 %] 99 %    Physical Exam  Constitutional:       General: She is not in acute distress.     Appearance: Normal appearance. She is well-developed. She is not diaphoretic.   HENT:      Head: Normocephalic and atraumatic.   Eyes:      General:         Right eye: No discharge.         Left eye: No discharge.      Conjunctiva/sclera: Conjunctivae normal.   Neck:      Vascular: No JVD.   Pulmonary:      Effort: Pulmonary effort is normal. No respiratory distress.      Breath sounds: No stridor.   Abdominal:      Palpations: Abdomen is soft.   Skin:     General: Skin is warm and dry.   Neurological:      Mental Status: She is alert.      Gait: Gait normal.   Psychiatric:         Mood and Affect: Affect is angry.         Speech: Speech is rapid and pressured.         Behavior: Behavior is uncooperative.         Thought Content: Thought content is paranoid.      Comments: States [facility] \"doesn't exist\"  Feels she is the Governor         Fluids    Intake/Output Summary (Last 24 hours) at 8/24/2021 0622  Last data filed at 8/23/2021 2000  Gross per 24 hour   Intake 500 ml   Output --   Net 500 ml       Laboratory                        Imaging  DX-CHEST-LIMITED (1 VIEW)   Final Result      Left basilar atelectasis. No focal consolidation. No effusions.           Assessment/Plan  * Delusional disorder- (present on admission)  Assessment & Plan  Sent to the ED from her memory care facility for worsening agitation and behavioral changes.   Psychiatry evaluated the patient. Diagnosed by Psychiatry with schizoaffective disorder, bipolar type.  Based on this information, the patient lacks capacity to leave the hospital.     Awaiting guardianship for placement    History of dementia  Assessment & Plan  Has a reported history of dementia.  Applied for guardianship    Schizoaffective disorder, bipolar type ([facility])  Assessment & Plan  History of schizoaffective disorder bipolar " type.  Seen by psychiatry  Patient refusing medications due to paranoia     Hypothyroidism  Assessment & Plan  Suboptimal control secondary to patient's noncompliance with medication    Continue to try to reinforce compliance with patient    Noncompliance with treatment plan- (present on admission)  Assessment & Plan  Patient refusing antipsychotic medications and all other medications  Encourage compliance.        VTE prophylaxis: pharmacologic prophylaxis contraindicated due to ambulatory    I have performed a physical exam and reviewed and updated ROS and Plan today (8/24/2021). In review of yesterday's note (8/23/2021), there are no changes except as documented above.

## 2021-08-24 NOTE — PROGRESS NOTES
1 RN Skin Assessment completed.   Patient's risk of skin breakdown: Low     Devices in place and skin assessed under:   []? Pulse Ox  []? PIV []? Central Line []? SCDs []?Purewick  []? Mendosa  []?Condom Cath   []? BMS        []?  Cortrak   []?  Oxymask   []? Bipap    []? Nasal Canula   []? N/A   [x]? Other(specify): Wanderguard     Right Ear  [x]? WDL                or           []? Skin issue present (please provide assessment/description below)     Left Ear  [x]? WDL                or           []? Skin issue present (please provide assessment/description below)     Right Elbow:  [x]? WDL               or           []? Skin issue present (please provide assessment/description below)     Left Elbow:   [x]? WDL                or           []? Skin issue present (please provide assessment/description below)     Coccyx/Buttocks:  [x]? WDL                or           []? Skin issue present (please provide assessment/description below)     Right Heel/Foot/Toes:  [x]? WDL                or           []? Skin issue present (please provide assessment/description below)     Left Heel/Foot/Toes:   [x]? WDL              or           []? Skin issue present (please provide assessment/description below)        **Describe any other skin issues in areas not already listed from above list:   [x]? N/A     Interventions In Place: Pillows and Pressure Redistribution Mattress     **If any new or digressing skin issues are found, fill out the selection below.     Possible Skin Injury No  Pictures Uploaded Into Epic: N/A  Wound Consult Placed: N/A  RN Wound Prevention Protocol Ordered: No    What new preventative interventions did you add? N/A

## 2021-08-25 PROCEDURE — G0378 HOSPITAL OBSERVATION PER HR: HCPCS

## 2021-08-25 ASSESSMENT — PAIN DESCRIPTION - PAIN TYPE: TYPE: ACUTE PAIN

## 2021-08-25 NOTE — PROGRESS NOTES
Pt was found to have barricaded her pt room door shut with a bedside table and linen hamper tonight. When door was opened, pt was lying in bed sleeping. Pt awoken and went on to refuse assessment and care.

## 2021-08-25 NOTE — PROGRESS NOTES
Pharmacy Pharmacotherapy Consult for LOS >30 days    Admit Date: 6/11/2021      Medications were reviewed for appropriateness and ongoing need.     Current Facility-Administered Medications   Medication Dose Route Frequency Provider Last Rate Last Admin   • senna-docusate (PERICOLACE or SENOKOT S) 8.6-50 MG per tablet 2 tablet  2 Tablet Oral BID PRN Mir Larry, A.P.R.N.        And   • polyethylene glycol/lytes (MIRALAX) PACKET 1 Packet  1 Packet Oral QDAY PRN Mir Larry, A.P.R.N.        And   • magnesium hydroxide (MILK OF MAGNESIA) suspension 30 mL  30 mL Oral QDAY PRN Mir Larry, A.P.R.N.        And   • bisacodyl (DULCOLAX) suppository 10 mg  10 mg Rectal QDAY PRN Mir Larry, A.P.R.N.       • levothyroxine (SYNTHROID) tablet 50 mcg  50 mcg Oral AM ES Mir Larry, A.P.R.N.           Recommendations:  No changes recommended    Mirza Mahmood, BinaD, BCPS

## 2021-08-25 NOTE — PROGRESS NOTES
1 RN Skin Assessment completed.   Patient's risk of skin breakdown: Low     Devices in place and skin assessed under:   []?? Pulse Ox  []?? PIV []?? Central Line []?? SCDs []??Purewick  []?? Mendosa  []??Condom Cath   []?? BMS        []??  Cortrak   []??  Oxymask   []?? Bipap    []?? Nasal Canula   []?? N/A   [x]?? Other(specify): Wanderguard     Right Ear  [x]?? WDL                or           []?? Skin issue present (please provide assessment/description below)     Left Ear  [x]?? WDL                or           []?? Skin issue present (please provide assessment/description below)     Right Elbow:  [x]?? WDL               or           []?? Skin issue present (please provide assessment/description below)     Left Elbow:   [x]?? WDL                or           []?? Skin issue present (please provide assessment/description below)     Coccyx/Buttocks:  [x]?? WDL                or           []?? Skin issue present (please provide assessment/description below)     Right Heel/Foot/Toes:  [x]?? WDL                or           []?? Skin issue present (please provide assessment/description below)     Left Heel/Foot/Toes:   [x]?? WDL              or           []?? Skin issue present (please provide assessment/description below)        **Describe any other skin issues in areas not already listed from above list:   [x]?? N/A     Interventions In Place: Pillows and Pressure Redistribution Mattress     **If any new or digressing skin issues are found, fill out the selection below.     Possible Skin Injury No  Pictures Uploaded Into Epic: N/A  Wound Consult Placed: N/A  RN Wound Prevention Protocol Ordered: No    What new preventative interventions did you add? N/A

## 2021-08-26 PROCEDURE — 99224 PR SUBSEQUENT OBSERVATION CARE,LEVEL I: CPT | Performed by: HOSPITALIST

## 2021-08-26 PROCEDURE — G0378 HOSPITAL OBSERVATION PER HR: HCPCS

## 2021-08-26 NOTE — PROGRESS NOTES
1 RN Skin Assessment completed.   Patient's risk of skin breakdown: Low    Devices in place and skin assessed under:   [] Pulse Ox  [] PIV [] Central Line [] SCDs []Purewick  [] Mendosa  []Condom Cath   [] BMS        []  Cortrak   []  Oxymask   [] Bipap    [] Nasal Canula   [] N/A   [x] Other(specify): Wanderguard    Right Ear  [x] WDL                or           [] Skin issue present (please provide assessment/description below)    Left Ear  [x] WDL                or           [] Skin issue present (please provide assessment/description below)    Right Elbow:  [x] WDL               or           [] Skin issue present (please provide assessment/description below)    Left Elbow:   [x] WDL                or           [] Skin issue present (please provide assessment/description below)    Coccyx/Buttocks:  [x] WDL                or           [] Skin issue present (please provide assessment/description below)    Right Heel/Foot/Toes:  [x] WDL                or           [] Skin issue present (please provide assessment/description below)    Left Heel/Foot/Toes:   [x] WDL              or           [] Skin issue present (please provide assessment/description below)      **Describe any other skin issues in areas not already listed from above list:   [x] N/A    Interventions In Place: Pillows and Pressure Redistribution Mattress    **If any new or digressing skin issues are found, fill out the selection below.    Possible Skin Injury No  Pictures Uploaded Into Epic: N/A  Wound Consult Placed: N/A  RN Wound Prevention Protocol Ordered: No    What new preventative interventions did you add? N/A

## 2021-08-26 NOTE — PROGRESS NOTES
"Hospital Medicine Daily Progress Note    Date of Service  8/26/2021    Chief Complaint  Zully Lin is a 70 y.o. female admitted 6/11/2021 with altered mental status    Hospital Course    Per prior notes: \"Zully Lin is a 70-year-old woman with h/o dementia (unclear baseline), hypothyrodism, schizophrenia, a resident of [facility] presented 6/11/2021 with worsening agitation, delusions. Exam on admission was unremarkable and neuro exam is non focal, with the exception of mental status (AOx1, tangential thoughts and delusion). And Labs not suggestive of infection. CBC not suggestive of infection, BMP  normal, UA clean, and patient denies anything on ROS. Pt was evaluated by Psychiatry and Psychology on admission - considered incapacitated to make medical and discharge decisions; initially placed on legal hold. Psychiatrist medications adjusted. Pt was considered medically cleared as off 6/13/2021 - initially planned for Inpatient Psych facility which has been difficult due to limited insurance.      While on psych meds and upon follow up eval by Psychiatry, Pt appeared to be at her baseline. Patient was Psychiatrically cleared for discharge on 6/22. Plan has been for a long term placement and evaluation for guardianship as Pt cannot make decisions\"        Interval Problem Update  ROS is limited as pt refuses to fully cooperate though states she's doing well today.  Waiting to go to work per her report (pt does not have a job)  Refuses most medications as she states she doesn't have any medical or psychiatric problems    I have personally seen and examined the patient at bedside. I discussed the plan of care with bedside RN and .    Consultants/Specialty    Code Status  Full Code    Disposition  Patient is medically cleared.   Anticipate discharge to to a psychiatric hospital.  Guardian has been established  I have placed the appropriate orders for post-discharge " needs.    Review of Systems  Review of Systems   Unable to perform ROS: Psychiatric disorder        Physical Exam  Temp:  [36.1 °C (97 °F)-36.5 °C (97.7 °F)] 36.5 °C (97.7 °F)  Pulse:  [71-79] 73  Resp:  [17-19] 19  BP: (116-138)/(64-84) 138/84  SpO2:  [97 %-99 %] 97 %    Physical Exam  Constitutional:       General: She is not in acute distress.     Appearance: Normal appearance. She is well-developed. She is not diaphoretic.   HENT:      Head: Normocephalic and atraumatic.   Eyes:      General:         Right eye: No discharge.         Left eye: No discharge.      Conjunctiva/sclera: Conjunctivae normal.   Neck:      Vascular: No JVD.   Pulmonary:      Effort: Pulmonary effort is normal. No respiratory distress.      Breath sounds: No stridor.   Abdominal:      Palpations: Abdomen is soft.   Skin:     General: Skin is warm and dry.   Neurological:      Mental Status: She is alert. Mental status is at baseline.      Gait: Gait normal.   Psychiatric:         Mood and Affect: Affect is angry.         Speech: Speech is rapid and pressured.         Behavior: Behavior is uncooperative.         Thought Content: Thought content is paranoid.      Comments: Thinks she is going to work for the [employer] today         Fluids    Intake/Output Summary (Last 24 hours) at 8/26/2021 1447  Last data filed at 8/26/2021 1000  Gross per 24 hour   Intake 1080 ml   Output --   Net 1080 ml       Laboratory                        Imaging  DX-CHEST-LIMITED (1 VIEW)   Final Result      Left basilar atelectasis. No focal consolidation. No effusions.           Assessment/Plan  * Delusional disorder- (present on admission)  Assessment & Plan  Sent to the ED from her memory care facility for worsening agitation and behavioral changes.   Psychiatry evaluated the patient. Diagnosed by Psychiatry with schizoaffective disorder, bipolar type.  Based on this information, the patient lacks capacity to leave the hospital.     Awaiting guardianship for  placement    History of dementia  Assessment & Plan  Has a reported history of dementia.  Applied for guardianship    Schizoaffective disorder, bipolar type ([facility]  Assessment & Plan  History of schizoaffective disorder bipolar type.  Seen by psychiatry  Patient refusing medications due to paranoia     Hypothyroidism  Assessment & Plan  Suboptimal control secondary to patient's noncompliance with medication    Continue to try to reinforce compliance with patient    Noncompliance with treatment plan- (present on admission)  Assessment & Plan  Patient refusing antipsychotic medications and all other medications  Encourage compliance.        VTE prophylaxis: pharmacologic prophylaxis contraindicated due to ambulatory    I have performed a physical exam and reviewed and updated ROS and Plan today (8/26/2021). In review of yesterday's note (8/25/2021), there are no changes except as documented above.

## 2021-08-27 PROCEDURE — G0378 HOSPITAL OBSERVATION PER HR: HCPCS

## 2021-08-27 NOTE — PROGRESS NOTES
Bedside report received at change of shift. Pt is A&Ox1, reports no pain at this time. Pt is refusing a skin assessment at this time, educated on importance. Pt is up-self and ambulates in her room with steady gait. All pt needs met at this time.

## 2021-08-27 NOTE — PROGRESS NOTES
Patient refused 1 RN eli, educated regarding importance.  Education reinforced by KARRI Triana.  Patient continues to refuse.

## 2021-08-28 PROCEDURE — G0378 HOSPITAL OBSERVATION PER HR: HCPCS

## 2021-08-28 ASSESSMENT — FIBROSIS 4 INDEX: FIB4 SCORE: 1.51

## 2021-08-28 NOTE — PROGRESS NOTES
Bedside report received at change of shift. Pt is A&Ox2, reports no pain at this time. Pt is refusing a skin assessment at this time. Educated on importance. Pt ambulates with steady gait. All pt needs met at this time.

## 2021-08-29 PROCEDURE — G0378 HOSPITAL OBSERVATION PER HR: HCPCS

## 2021-08-29 NOTE — PROGRESS NOTES
Received bedside report and assumed pt care. Pt is A&Ox4 and denies any pain at this time. Pt is upself and cont. Pt refused skin assessment despite education. POC for today discussed with pt. Hourly rounding in place

## 2021-08-30 PROCEDURE — G0378 HOSPITAL OBSERVATION PER HR: HCPCS

## 2021-08-30 NOTE — PROGRESS NOTES
"Pt is A&Ox3- disoriented to situation. Pt is resting in bed, no signs of labored breathing or pain. Pt on RA. Call light & personal belongings within reach, bed in lowest position & locked. Pt is independent. No indications of delusions at this time. Fall precautions in place and education provided on how to use call light. Pt updated on plan of care for the shift. Pt declines any additional needs at this time.      Pt refused skin assessment \" too late and trying to sleep\", pt educated on importance of q shift skin assessments.     1 RN Skin Assessment completed.   Patient's risk of skin breakdown: Low      Interventions In Place: Pillows and Low Air Loss Mattress    **If any new or digressing skin issues are found, fill out the selection below.    Possible Skin Injury No  Pictures Uploaded Into Epic: N/A  Wound Consult Placed: N/A  RN Wound Prevention Protocol Ordered: No    What new preventative interventions did you add? N/A  "
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
## 2021-08-31 PROCEDURE — G0378 HOSPITAL OBSERVATION PER HR: HCPCS
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
## 2021-08-31 NOTE — PROGRESS NOTES
Pt is orientated to self and place only, and was pleasant during this morning encounter with no delusions present at this moment.. Pt denies any pain and shows no signs of distress at this time. Refused skin assessment despite education stating it was done last night already. Pt remains independent with ADLs, and is currently sitting up in chair. Denies any needs at this time. POC for today discussed with pt. Safety precautions in place. Hourly rounding in place.

## 2021-09-01 PROCEDURE — 99224 PR SUBSEQUENT OBSERVATION CARE,LEVEL I: CPT | Performed by: FAMILY MEDICINE

## 2021-09-01 PROCEDURE — G0378 HOSPITAL OBSERVATION PER HR: HCPCS

## 2021-09-01 ASSESSMENT — ENCOUNTER SYMPTOMS
NERVOUS/ANXIOUS: 0
DIARRHEA: 0
BACK PAIN: 0
SENSORY CHANGE: 0
NAUSEA: 0
PALPITATIONS: 0
WEAKNESS: 0
BLURRED VISION: 0
FOCAL WEAKNESS: 0
ABDOMINAL PAIN: 0
COUGH: 0
HEADACHES: 0
HEARTBURN: 0
FLANK PAIN: 0
WHEEZING: 0
NECK PAIN: 0
SHORTNESS OF BREATH: 0
VOMITING: 0
SPEECH CHANGE: 0
FEVER: 0
DIZZINESS: 0
SORE THROAT: 0
HALLUCINATIONS: 0
DIAPHORESIS: 0
MYALGIAS: 0
CHILLS: 0

## 2021-09-01 NOTE — PROGRESS NOTES
Hospital Medicine Daily Progress Note    Date of Service  9/1/2021    Chief Complaint  Zully Lin is a 70 y.o. female admitted 6/11/2021 with altered mental status    Hospital Course  Patient is a 70-year-old woman with h/o dementia (unclear baseline), hypothyrodism, schizophrenia, a resident of [facility] presented 6/11/2021 with worsening agitation, delusions. Exam on admission was unremarkable and neuro exam is non focal, with the exception of mental status (AOx1, tangential thoughts and delusion). And Labs not suggestive of infection. CBC not suggestive of infection, BMP  normal, UA clean, and patient denies anything on ROS. Pt was evaluated by Psychiatry and Psychology on admission - considered incapacitated to make medical and discharge decisions; initially placed on legal hold. Psychiatrist medications adjusted. Pt was considered medically cleared as off 6/13/2021 - initially planned for Inpatient Psych facility which has been difficult due to limited insurance.         Interval Problem Update  Appears calm and cooperative. Denies pain, headache, dizziness or lightheadedness, nausea or vomiting.    I have personally seen and examined the patient at bedside. I discussed the plan of care with patient, bedside RN, charge RN and .    Consultants/Specialty    Code Status  Full Code    Disposition  Patient is medically cleared.   Anticipate discharge to Guardianship pending.   I have placed the appropriate orders for post-discharge needs.    Review of Systems  Review of Systems   Constitutional: Negative for chills, diaphoresis, fever and malaise/fatigue.   HENT: Negative for congestion, hearing loss and sore throat.    Eyes: Negative for blurred vision.   Respiratory: Negative for cough, shortness of breath and wheezing.    Cardiovascular: Negative for chest pain and palpitations.   Gastrointestinal: Negative for abdominal pain, diarrhea, heartburn, nausea and vomiting.    Genitourinary: Negative for dysuria, flank pain and hematuria.   Musculoskeletal: Negative for back pain, joint pain, myalgias and neck pain.   Skin: Negative for rash.   Neurological: Negative for dizziness, sensory change, speech change, focal weakness, weakness and headaches.   Psychiatric/Behavioral: Negative for hallucinations and suicidal ideas. The patient is not nervous/anxious.         Physical Exam  Temp:  [36.3 °C (97.4 °F)-36.8 °C (98.2 °F)] 36.8 °C (98.2 °F)  Pulse:  [81-96] 96  Resp:  [18] 18  BP: (118-137)/(84-95) 131/91  SpO2:  [93 %-97 %] 97 %    Physical Exam  Vitals and nursing note reviewed.   Constitutional:       Appearance: She is well-developed. She is obese.   HENT:      Head: Normocephalic and atraumatic.      Nose: No congestion.      Mouth/Throat:      Mouth: Mucous membranes are moist.   Eyes:      Extraocular Movements: Extraocular movements intact.      Conjunctiva/sclera: Conjunctivae normal.   Neck:      Vascular: No JVD.   Cardiovascular:      Rate and Rhythm: Normal rate and regular rhythm.   Pulmonary:      Effort: Pulmonary effort is normal.      Breath sounds: Normal breath sounds.   Abdominal:      General: There is no distension.      Tenderness: There is no abdominal tenderness. There is no guarding or rebound.   Musculoskeletal:      Cervical back: No tenderness.      Right lower leg: No edema.      Left lower leg: No edema.   Skin:     General: Skin is warm and dry.   Neurological:      General: No focal deficit present.      Mental Status: She is alert and oriented to person, place, and time.      Cranial Nerves: No cranial nerve deficit.   Psychiatric:         Behavior: Behavior is cooperative.         Thought Content: Thought content is not paranoid or delusional.         Fluids    Intake/Output Summary (Last 24 hours) at 9/1/2021 1022  Last data filed at 9/1/2021 1000  Gross per 24 hour   Intake 1260 ml   Output --   Net 1260 ml       Laboratory                         Imaging  DX-CHEST-LIMITED (1 VIEW)   Final Result      Left basilar atelectasis. No focal consolidation. No effusions.           Assessment/Plan  * Delusional disorder- (present on admission)  Assessment & Plan  Refused medications    Schizoaffective disorder, bipolar type (HCC)- (present on admission)  Assessment & Plan  Patient refusing medications due to paranoia     Noncompliance with treatment plan- (present on admission)  Assessment & Plan  Patient refusing antipsychotic medications and all other medications  Encourage compliance.     History of dementia- (present on admission)  Assessment & Plan  reported history    Hypothyroidism- (present on admission)  Assessment & Plan  Levothyroxine  Check TSH       VTE prophylaxis: pharmacologic prophylaxis contraindicated due to ambulatory    I have performed a physical exam and reviewed and updated ROS and Plan today (9/1/2021). In review of yesterday's note (8/31/2021), there are no changes except as documented above.

## 2021-09-01 NOTE — PROGRESS NOTES
Bedside report received at change of shift. Pt is A&Ox3, disoriented to situation. Pt reports no pain. Pt is up self, ambulates with steady gait in her room. Pt is refusing skin assessment at this time. Safety precautions in place. All pt needs met at this time.
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
## 2021-09-02 PROCEDURE — G0378 HOSPITAL OBSERVATION PER HR: HCPCS
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
## 2021-09-02 NOTE — DISCHARGE PLANNING
Medical Social Work  SW went to patients' room to provide notice of being appointed an  for her guardianship hearing.  When SW went into patient's room, patient was wearing multiple layers of clothing and had her bags packed.  When SW asked patient if she was going somewhere.  Patient stated she has work to do, when asked what.  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  That the previous governor quit and she was appointed.      SW presented letter to patient and why she has been appointed an .  Patient immediately stated she does not need a guardian. SW stated that she should tell this to her  along with the . Patient stated she will tell them this as she does not need a guardian.

## 2021-09-02 NOTE — PROGRESS NOTES
Bedside report received at change of shift. Pt is A&Ox3, disoriented to situation. Pt reports no pain. Pt is up-self, ambulates in her room with steady gait. Is refusing a skin check at this time, education provided. Safety precautions in place. All pt needs met at this time.
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
## 2021-09-03 PROCEDURE — G0378 HOSPITAL OBSERVATION PER HR: HCPCS

## 2021-09-03 NOTE — PROGRESS NOTES
Received report from KARRI Harris and assumed care of pt. Pt A&OX1, only to self at this time. Pt on RA.  Denies any pain or discomfort at this time. Pt did not allow for skin assessment at this time. POC discussed. Denies further needs at this time. Bed locked and in lowest position. Bed alarm on, call light within reach & hourly rounding in place

## 2021-09-03 NOTE — PROGRESS NOTES
Bedside report received at change of shift. Pt is A&Ox3, disoriented to situation. Pt reports no pain. Is refusing a skin assessment at this time, education provided. Safety precautions in place. All pt needs met at this time.

## 2021-09-04 PROCEDURE — G0378 HOSPITAL OBSERVATION PER HR: HCPCS

## 2021-09-04 ASSESSMENT — PAIN DESCRIPTION - PAIN TYPE: TYPE: ACUTE PAIN

## 2021-09-04 NOTE — PROGRESS NOTES
Pt is resting in bed watching TV. Refused meal tray by stating she doesn't trust the food. Preferred cereal, cereal ordered from kitchen and provided to pt. Denied pain. Pt denied further needs. Refused full skin check. Pt ambulates self and steadily. Call light and belongings within reach.

## 2021-09-04 NOTE — PROGRESS NOTES
"Received bedside report from night shift RN.   Assumed care of patient at change of shift.   Unable to complete full assessment due to patient's refusal.  Patient is A&Ox3 (disoriented to situation), VSS, on RA.   Patient denies pain, no apparent signs of distress or discomfort.   Patient is sitting in chair in room.  Patient states she is \"airing the room out because it stinks.\"  This RN did not notice an odor, but provided patient with deodorizing room spray.  Bed is in lowest/locked position.   Call light and belongings are within reach.   No further needs at this time.    Refused skin check.  "

## 2021-09-05 PROCEDURE — G0378 HOSPITAL OBSERVATION PER HR: HCPCS

## 2021-09-05 NOTE — PROGRESS NOTES
Pt is resting in chair watching TV. Denied pain. Pt denied further needs. Refused full skin check. Pt ambulates self and steadily. Call light and belongings within reach.

## 2021-09-05 NOTE — PROGRESS NOTES
Received bedside report from night shift RN.   Assumed care of patient at change of shift.   Assessment complete and POC discussed.   Patient is A&Ox3 (disoriented to situation), VSS, on RA.   Denies pain, no apparent signs of distress or discomfort.   Patient is sitting up in chair in room for breakfast.  Encouraged patient to sit at nurses station or ambulate halls.  Patient declined, states she will ambulate in room instead.   Bed is in lowest/locked position.   Call light and belongings are within reach.   No further needs at this time.    Refused skin check.

## 2021-09-06 PROCEDURE — 99224 PR SUBSEQUENT OBSERVATION CARE,LEVEL I: CPT | Performed by: FAMILY MEDICINE

## 2021-09-06 PROCEDURE — G0378 HOSPITAL OBSERVATION PER HR: HCPCS

## 2021-09-06 ASSESSMENT — ENCOUNTER SYMPTOMS
BACK PAIN: 0
VOMITING: 0
CHILLS: 0
COUGH: 0
WHEEZING: 0
PALPITATIONS: 0
FLANK PAIN: 0
DIARRHEA: 0
DIAPHORESIS: 0
ABDOMINAL PAIN: 0
FEVER: 0
HEADACHES: 0
SPEECH CHANGE: 0
NECK PAIN: 0
SHORTNESS OF BREATH: 0
SENSORY CHANGE: 0
DIZZINESS: 0
HEARTBURN: 0
MYALGIAS: 0
SORE THROAT: 0
HALLUCINATIONS: 0
WEAKNESS: 0
NERVOUS/ANXIOUS: 0
BLURRED VISION: 0
FOCAL WEAKNESS: 0
NAUSEA: 0

## 2021-09-06 NOTE — PROGRESS NOTES
"Hospital Medicine Daily Progress Note    Date of Service  9/6/2021    Chief Complaint  Zully Lin is a 70 y.o. female admitted 6/11/2021 with altered mental status    Hospital Course  Patient is a 70-year-old woman with h/o dementia (unclear baseline), hypothyrodism, schizophrenia, a resident of [facility], [place], operated by [facility] presented 6/11/2021 with worsening agitation, delusions. Exam on admission was unremarkable and neuro exam is non focal, with the exception of mental status (AOx1, tangential thoughts and delusion). And Labs not suggestive of infection. CBC not suggestive of infection, BMP  normal, UA clean, and patient denies anything on ROS. Pt was evaluated by Psychiatry and Psychology on admission - considered incapacitated to make medical and discharge decisions; initially placed on legal hold. Psychiatrist medications adjusted. Pt was considered medically cleared as off 6/13/2021 - initially planned for Inpatient Psych facility which has been difficult due to limited insurance.         Interval Problem Update  Denies pain, headache, dizziness, nausea or vomiting.  Patient states \"when do I get out of here.\"    I have personally seen and examined the patient at bedside. I discussed the plan of care with patient and bedside RN.    Consultants/Specialty  Psychiatry    Code Status  Full Code    Disposition  Patient is medically cleared.   Anticipate discharge to Guardianship pending.   I have placed the appropriate orders for post-discharge needs.    Review of Systems  Review of Systems   Constitutional: Negative for chills, diaphoresis, fever and malaise/fatigue.   HENT: Negative for congestion, hearing loss and sore throat.    Eyes: Negative for blurred vision.   Respiratory: Negative for cough, shortness of breath and wheezing.    Cardiovascular: Negative for chest pain and palpitations.   Gastrointestinal: Negative for abdominal pain, diarrhea, heartburn, nausea and vomiting.   Genitourinary: Negative " for dysuria, flank pain and hematuria.   Musculoskeletal: Negative for back pain, joint pain, myalgias and neck pain.   Skin: Negative for rash.   Neurological: Negative for dizziness, sensory change, speech change, focal weakness, weakness and headaches.   Psychiatric/Behavioral: Negative for hallucinations and suicidal ideas. The patient is not nervous/anxious.         Physical Exam  Temp:  [36.3 °C (97.3 °F)-36.6 °C (97.8 °F)] 36.3 °C (97.3 °F)  Pulse:  [64-71] 71  Resp:  [16-18] 17  BP: (130-134)/(80-88) 134/88  SpO2:  [96 %-97 %] 97 %    Physical Exam  Vitals and nursing note reviewed.   Constitutional:       Appearance: She is well-developed. She is obese.   HENT:      Head: Normocephalic and atraumatic.      Nose: No congestion.      Mouth/Throat:      Mouth: Mucous membranes are moist.   Eyes:      Extraocular Movements: Extraocular movements intact.      Conjunctiva/sclera: Conjunctivae normal.   Neck:      Vascular: No JVD.   Cardiovascular:      Rate and Rhythm: Normal rate and regular rhythm.   Pulmonary:      Effort: Pulmonary effort is normal.      Breath sounds: Normal breath sounds.   Abdominal:      General: There is no distension.      Tenderness: There is no abdominal tenderness. There is no guarding or rebound.   Musculoskeletal:      Cervical back: No tenderness.      Right lower leg: No edema.      Left lower leg: No edema.   Skin:     General: Skin is warm and dry.   Neurological:      General: No focal deficit present.      Mental Status: She is alert and oriented to person, place, and time.      Cranial Nerves: No cranial nerve deficit.   Psychiatric:         Behavior: Behavior is cooperative.         Thought Content: Thought content is not paranoid or delusional.         Fluids    Intake/Output Summary (Last 24 hours) at 9/6/2021 0908  Last data filed at 9/5/2021 1948  Gross per 24 hour   Intake 960 ml   Output --   Net 960 ml       Laboratory                         Imaging  DX-CHEST-LIMITED (1 VIEW)   Final Result      Left basilar atelectasis. No focal consolidation. No effusions.           Assessment/Plan  * Delusional disorder- (present on admission)  Assessment & Plan  Refused medications    Schizoaffective disorder, bipolar type (HCC)- (present on admission)  Assessment & Plan  Patient refused medications due to paranoia     Case management for discharge planning - For Guardianship    Noncompliance with treatment plan- (present on admission)  Assessment & Plan  Patient refused antipsychotic medications and all other medications    History of dementia- (present on admission)  Assessment & Plan  reported history    Hypothyroidism- (present on admission)  Assessment & Plan  Levothyroxine  Refused TSH lab draw -9/1/2021       VTE prophylaxis: pharmacologic prophylaxis contraindicated due to ambulatory    I have performed a physical exam and reviewed and updated ROS and Plan today (9/6/2021). In review of yesterday's note (9/5/2021), there are no changes except as documented above.
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
## 2021-09-07 PROCEDURE — G0378 HOSPITAL OBSERVATION PER HR: HCPCS

## 2021-09-07 PROCEDURE — 99224 PR SUBSEQUENT OBSERVATION CARE,LEVEL I: CPT | Performed by: STUDENT IN AN ORGANIZED HEALTH CARE EDUCATION/TRAINING PROGRAM

## 2021-09-07 RX ORDER — ACETAMINOPHEN 325 MG/1
650 TABLET ORAL EVERY 4 HOURS PRN
Status: DISCONTINUED | OUTPATIENT
Start: 2021-09-07 | End: 2022-03-15 | Stop reason: HOSPADM

## 2021-09-07 ASSESSMENT — ENCOUNTER SYMPTOMS
NAUSEA: 0
INSOMNIA: 0
FALLS: 0
HEADACHES: 0
CHILLS: 0
MYALGIAS: 0
VOMITING: 0
COUGH: 0
NERVOUS/ANXIOUS: 0
DOUBLE VISION: 0
ABDOMINAL PAIN: 0
FLANK PAIN: 0
FEVER: 0
SHORTNESS OF BREATH: 0
BRUISES/BLEEDS EASILY: 0
DIZZINESS: 0
BLURRED VISION: 0
PALPITATIONS: 0
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
## 2021-09-07 NOTE — PROGRESS NOTES
Hospital Medicine Daily Progress Note    Date of Service  9/7/2021    Chief Complaint  Zully Lin is a 70 y.o. female admitted 6/11/2021 with AMS    Hospital Course  Patient is a 70-year-old woman with h/o dementia (unclear baseline), hypothyrodism, schizophrenia, a resident of [facility] presented 6/11/2021 with worsening agitation, delusions. Exam on admission was unremarkable and neuro exam is non focal, with the exception of mental status (AOx1, tangential thoughts and delusion). And Labs not suggestive of infection. CBC not suggestive of infection, BMP  normal, UA clean, and patient denies anything on ROS. Pt was evaluated by Psychiatry and Psychology on admission - considered incapacitated to make medical and discharge decisions; initially placed on legal hold. Psychiatrist medications adjusted. Pt was considered medically cleared as off 6/13/2021 - initially planned for Inpatient Psych facility which has been difficult due to limited insurance.     Interval Problem Update  9/7: No acute event overnight. Overall, appears to be noncompliant medical recommendations, refusing psychiatric medications.  She only takes Synthroid. Pending guardianship.    I have personally seen and examined the patient at bedside. I discussed the plan of care with patient and bedside RN.    Consultants/Specialty  Psychiatry    Code Status  Full Code    Disposition  Patient is medically cleared.   Anticipate discharge to guardianship and placement.  I have placed the appropriate orders for post-discharge needs.    Review of Systems  Review of Systems   Constitutional: Negative for chills and fever.   HENT: Negative for nosebleeds and tinnitus.    Eyes: Negative for blurred vision and double vision.   Respiratory: Negative for cough and shortness of breath.    Cardiovascular: Negative for chest pain and palpitations.   Gastrointestinal: Negative for abdominal pain, nausea and vomiting.   Genitourinary: Negative  for dysuria and flank pain.   Musculoskeletal: Negative for falls and myalgias.   Skin: Negative for itching and rash.   Neurological: Negative for dizziness and headaches.   Endo/Heme/Allergies: Negative for environmental allergies. Does not bruise/bleed easily.   Psychiatric/Behavioral: The patient is not nervous/anxious and does not have insomnia.    All other systems reviewed and are negative.       Physical Exam  Temp:  [35.7 °C (96.2 °F)] 35.7 °C (96.2 °F)  Pulse:  [70] 70  Resp:  [17] 17  BP: (140)/(85) 140/85    Physical Exam  Vitals and nursing note reviewed.   Constitutional:       Appearance: Normal appearance.   HENT:      Head: Normocephalic and atraumatic.      Nose: Nose normal.      Mouth/Throat:      Mouth: Mucous membranes are moist.      Pharynx: Oropharynx is clear.   Eyes:      General: No scleral icterus.     Extraocular Movements: Extraocular movements intact.   Cardiovascular:      Rate and Rhythm: Normal rate and regular rhythm.      Pulses: Normal pulses.   Pulmonary:      Effort: Pulmonary effort is normal. No respiratory distress.      Breath sounds: No wheezing.   Abdominal:      General: There is no distension.      Palpations: Abdomen is soft.      Tenderness: There is no abdominal tenderness. There is no guarding.   Musculoskeletal:         General: No swelling or tenderness. Normal range of motion.      Cervical back: Neck supple. No tenderness.   Skin:     General: Skin is warm and dry.      Capillary Refill: Capillary refill takes less than 2 seconds.   Neurological:      General: No focal deficit present.      Mental Status: She is alert and oriented to person, place, and time.      Motor: No weakness.   Psychiatric:         Mood and Affect: Mood normal.         Fluids    Intake/Output Summary (Last 24 hours) at 9/7/2021 1515  Last data filed at 9/7/2021 1325  Gross per 24 hour   Intake 600 ml   Output --   Net 600 ml       Laboratory                         Imaging  DX-CHEST-LIMITED (1 VIEW)   Final Result      Left basilar atelectasis. No focal consolidation. No effusions.           Assessment/Plan  * Delusional disorder- (present on admission)  Assessment & Plan  Refused medications    History of dementia- (present on admission)  Assessment & Plan  reported history    Schizoaffective disorder, bipolar type (HCC)- (present on admission)  Assessment & Plan  Patient refused medications due to paranoia     Case management for discharge planning - For Guardianship    Hypothyroidism- (present on admission)  Assessment & Plan  Levothyroxine  Refused TSH lab draw -9/1/2021    Noncompliance with treatment plan- (present on admission)  Assessment & Plan  Patient refused antipsychotic medications and all other medications       VTE prophylaxis: SCDs/TEDs    I have performed a physical exam and reviewed and updated ROS and Plan today (9/7/2021). In review of yesterday's note (9/6/2021), there are no changes except as documented above.

## 2021-09-07 NOTE — PROGRESS NOTES
Pt A&O 3. Pt is RA. Walking around room this shift. Pt refused assesment. Call bell within reach. Bed low and locked.

## 2021-09-08 PROCEDURE — G0378 HOSPITAL OBSERVATION PER HR: HCPCS

## 2021-09-08 ASSESSMENT — PAIN DESCRIPTION - PAIN TYPE: TYPE: ACUTE PAIN

## 2021-09-08 NOTE — PROGRESS NOTES
Pt. Received sitting up in the chair watching TV, denies any complaint of pain at the time of assessment. Pt. Upself in the room wearing non skid rubber shoes. BLE edema noted. WG on R ankle intact and active. Educated about use of call light for assistance. Pt. Otherwise is calm and cooperative but stated she will refused to take any meds. No other needs at this time.      Pt. Refused full skin assessment at this time.

## 2021-09-09 PROCEDURE — G0378 HOSPITAL OBSERVATION PER HR: HCPCS

## 2021-09-09 ASSESSMENT — PAIN DESCRIPTION - PAIN TYPE: TYPE: ACUTE PAIN

## 2021-09-09 NOTE — PROGRESS NOTES
Pt irritable, confused, appears fatigued. Pt refused assessments. No signs of acute distress observed. Bed locked in lowest position, upper rails raised, call light within reach. Hourly rounding in place.    Pt refused skin assessment.

## 2021-09-09 NOTE — PROGRESS NOTES
Assumed care of pt at shift change. Pt is on RA with no signs of acute distres. Denies any pain. Upself, refused skin assessment. All safety measures in place. Call light and personal belongings by bedside. Bed locked and in lowest position. Hourly rounding in place.

## 2021-09-10 PROCEDURE — G0378 HOSPITAL OBSERVATION PER HR: HCPCS

## 2021-09-10 NOTE — PROGRESS NOTES
Assumed care of pt at shift change. Pt is on RA with no signs of acute distres. Denies any pain. Upself. Pt Agreed to skin assessment.  All safety measures in place. Call light and personal belongings by bedside. Bed locked and in lowest position. Hourly rounding in place.        1 RN Skin Assessment completed.   Patient's risk of skin breakdown: Low    Devices in place and skin assessed under:   [] Pulse Ox  [] PIV [] Central Line [] SCDs []Purewick  [] Mendosa  []Condom Cath   [] BMS        []  Cortrak   []  Oxymask   [] Bipap    [] Nasal Canula   [] N/A   [x] Other(specify): Wander Guard    Right Ear  [x] WDL                or           [] Skin issue present (please provide assessment/description below)    Left Ear  [x] WDL                or           [] Skin issue present (please provide assessment/description below)    Right Elbow:  [x] WDL               or           [] Skin issue present (please provide assessment/description below)    Left Elbow:   [x] WDL                or           [] Skin issue present (please provide assessment/description below)    Coccyx/Buttocks:  [x] WDL                or           [] Skin issue present (please provide assessment/description below)    Right Heel/Foot/Toes:  [x] WDL                or           [] Skin issue present (please provide assessment/description below)    Left Heel/Foot/Toes:   [x] WDL              or           [] Skin issue present (please provide assessment/description below)      **Describe any other skin issues in areas not already listed from above list:   [x] N/A    Interventions In Place: Pt is able to turn self. Pressure redistribution mattress.     **If any new or digressing skin issues are found, fill out the selection below.

## 2021-09-10 NOTE — DISCHARGE PLANNING
"Guardianship hearing paperwork delivered to pt. Pt stated \"there won't be a hearing\" and asked SW to leave her room. Paperwork left with pt.   "

## 2021-09-10 NOTE — PROGRESS NOTES
Pt A&Ox3 on RA, denies pain or discomfort. Pt up self. No signs of acute distress observed at this time. Bed locked in lowest position, upper rails raised, call light within reach. Hourly rounding in place.    Pt refused skin assessment.

## 2021-09-11 PROCEDURE — G0378 HOSPITAL OBSERVATION PER HR: HCPCS

## 2021-09-11 PROCEDURE — 99224 PR SUBSEQUENT OBSERVATION CARE,LEVEL I: CPT | Performed by: STUDENT IN AN ORGANIZED HEALTH CARE EDUCATION/TRAINING PROGRAM

## 2021-09-11 ASSESSMENT — ENCOUNTER SYMPTOMS
FEVER: 0
ABDOMINAL PAIN: 0
FLANK PAIN: 0
DOUBLE VISION: 0
COUGH: 0
PALPITATIONS: 0
VOMITING: 0
SHORTNESS OF BREATH: 0
NAUSEA: 0
CHILLS: 0
BRUISES/BLEEDS EASILY: 0
HEADACHES: 0
DIZZINESS: 0
MYALGIAS: 0
NERVOUS/ANXIOUS: 0
INSOMNIA: 0
FALLS: 0
BLURRED VISION: 0

## 2021-09-11 ASSESSMENT — PAIN DESCRIPTION - PAIN TYPE: TYPE: ACUTE PAIN

## 2021-09-11 NOTE — PROGRESS NOTES
Hospital Medicine Daily Progress Note    Date of Service  9/11/2021    Chief Complaint  Zully Lin is a 70 y.o. female admitted 6/11/2021 with AMS    Hospital Course  Patient is a 70-year-old woman with h/o dementia (unclear baseline), hypothyrodism, schizophrenia, a resident of [facility] presented 6/11/2021 with worsening agitation, delusions. Exam on admission was unremarkable and neuro exam is non focal, with the exception of mental status (AOx1, tangential thoughts and delusion). And Labs not suggestive of infection. CBC not suggestive of infection, BMP  normal, UA clean, and patient denies anything on ROS. Pt was evaluated by Psychiatry and Psychology on admission - considered incapacitated to make medical and discharge decisions; initially placed on legal hold. Psychiatrist medications adjusted. Pt was considered medically cleared as off 6/13/2021 - initially planned for Inpatient Psych facility which has been difficult due to limited insurance.     Interval Problem Update  9/7: No acute event overnight. Overall, appears to be noncompliant medical recommendations, refusing psychiatric medications.  She only takes Synthroid. Pending guardianship.    9/11: No acute event overnight, denies any complaints.  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 Pending guardianship    I have personally seen and examined the patient at bedside. I discussed the plan of care with patient and bedside RN.    Consultants/Specialty  Psychiatry    Code Status  Full Code    Disposition  Patient is medically cleared.   Anticipate discharge to guardianship and placement.  I have placed the appropriate orders for post-discharge needs.    Review of Systems  Review of Systems   Constitutional: Negative for chills and fever.   HENT: Negative for nosebleeds and tinnitus.    Eyes: Negative for blurred vision and double vision.   Respiratory: Negative for cough and shortness of  breath.    Cardiovascular: Negative for chest pain and palpitations.   Gastrointestinal: Negative for abdominal pain, nausea and vomiting.   Genitourinary: Negative for dysuria and flank pain.   Musculoskeletal: Negative for falls and myalgias.   Skin: Negative for itching and rash.   Neurological: Negative for dizziness and headaches.   Endo/Heme/Allergies: Negative for environmental allergies. Does not bruise/bleed easily.   Psychiatric/Behavioral: The patient is not nervous/anxious and does not have insomnia.    All other systems reviewed and are negative.       Physical Exam  Temp:  [36.6 °C (97.9 °F)-37.3 °C (99.2 °F)] 36.7 °C (98 °F)  Pulse:  [67-85] 69  Resp:  [17-18] 17  BP: (130-135)/(79-84) 130/79  SpO2:  [98 %-100 %] 100 %    Physical Exam  Vitals and nursing note reviewed.   Constitutional:       Appearance: Normal appearance.   HENT:      Head: Normocephalic and atraumatic.      Nose: Nose normal.      Mouth/Throat:      Mouth: Mucous membranes are moist.      Pharynx: Oropharynx is clear.   Eyes:      General: No scleral icterus.     Extraocular Movements: Extraocular movements intact.   Cardiovascular:      Rate and Rhythm: Normal rate and regular rhythm.      Pulses: Normal pulses.   Pulmonary:      Effort: Pulmonary effort is normal. No respiratory distress.      Breath sounds: No wheezing.   Abdominal:      General: There is no distension.      Palpations: Abdomen is soft.      Tenderness: There is no abdominal tenderness. There is no guarding.   Musculoskeletal:         General: No swelling or tenderness. Normal range of motion.      Cervical back: Neck supple. No tenderness.   Skin:     General: Skin is warm and dry.      Capillary Refill: Capillary refill takes less than 2 seconds.   Neurological:      General: No focal deficit present.      Mental Status: She is alert and oriented to person, place, and time.      Motor: No weakness.   Psychiatric:         Mood and Affect: Mood normal.       Comments: delusional         Fluids  No intake or output data in the 24 hours ending 09/11/21 1209    Laboratory                        Imaging  DX-CHEST-LIMITED (1 VIEW)   Final Result      Left basilar atelectasis. No focal consolidation. No effusions.           Assessment/Plan  * Delusional disorder- (present on admission)  Assessment & Plan  Refused medications    History of dementia- (present on admission)  Assessment & Plan  reported history    Schizoaffective disorder, bipolar type (HCC)- (present on admission)  Assessment & Plan  Patient refused medications due to paranoia     Case management for discharge planning - For Guardianship    Hypothyroidism- (present on admission)  Assessment & Plan  Levothyroxine  Refused TSH lab draw -9/1/2021    Noncompliance with treatment plan- (present on admission)  Assessment & Plan  Patient refused antipsychotic medications and all other medications       VTE prophylaxis: SCDs/TEDs    I have performed a physical exam and reviewed and updated ROS and Plan today (9/11/2021). In review of yesterday's note (9/10/2021), there are no changes except as documented above.

## 2021-09-11 NOTE — PROGRESS NOTES
RN went into do assessment.  Pt already in bed.  Irritable.  Refused assessment despite education.  Denied any needs.

## 2021-09-12 PROCEDURE — G0378 HOSPITAL OBSERVATION PER HR: HCPCS

## 2021-09-12 ASSESSMENT — PAIN DESCRIPTION - PAIN TYPE: TYPE: ACUTE PAIN

## 2021-09-12 NOTE — PROGRESS NOTES
Bedside report received.  Pt is A&O time 1, oriented to self. Pt is on RA. Pt is dressed, has her packed bags on the bed.  She refused assessment and skin check.  Pt denies any pain.  POC discussed with pt; all questions answered at this time.

## 2021-09-12 NOTE — PROGRESS NOTES
Pt again refused assessment despite education.  Pt denied any pain or needs.  Pt stated that she did have BM today.

## 2021-09-12 NOTE — PROGRESS NOTES
Bedside report received.  Pt is A&O X4, on RA,VSS.  Pt ate breakfast, took morning medications, no issues.  Pt denies having any pain at the present time.  Pt is resting comfortably in bed, watching movies.  Pt is able to ambulate by self, steady gait.  POC discussed with pt; all questions answered at this time.

## 2021-09-12 NOTE — PROGRESS NOTES
1 RN Skin Assessment completed.   Patient's risk of skin breakdown: Medium    Devices in place and skin assessed under:   [] Pulse Ox  [] PIV [] Central Line [] SCDs []Purewick  [] Mendosa  []Condom Cath   [] BMS        []  Cortrak   []  Oxymask   [] Bipap    [] Nasal Canula   [] N/A   [x] Other(specify):     Right Ear  [x] WDL                or           [] Skin issue present (please provide assessment/description below)    Left Ear  [x] WDL                or           [] Skin issue present (please provide assessment/description below)    Right Elbow:  [x] WDL               or           [] Skin issue present (please provide assessment/description below)    Left Elbow:   [x] WDL                or           [] Skin issue present (please provide assessment/description below)    Coccyx/Buttocks:  [x] WDL                or           [] Skin issue present (please provide assessment/description below)    Right Heel/Foot/Toes:  [x] WDL                or           [] Skin issue present (please provide assessment/description below)    Left Heel/Foot/Toes:   [x] WDL              or           [] Skin issue present (please provide assessment/description below)      **Describe any other skin issues in areas not already listed from above list:   Pt has healing rash over entire body, skin intact.  [] N/A    Interventions In Place: Pillows and Pressure Redistribution Mattress    **If any new or digressing skin issues are found, fill out the selection below.    Possible Skin Injury No  Pictures Uploaded Into Epic: N/A  Wound Consult Placed: N/A  RN Wound Prevention Protocol Ordered: No    What new preventative interventions did you add? N/A

## 2021-09-13 PROCEDURE — G0378 HOSPITAL OBSERVATION PER HR: HCPCS

## 2021-09-13 ASSESSMENT — PAIN DESCRIPTION - PAIN TYPE: TYPE: ACUTE PAIN

## 2021-09-13 NOTE — PROGRESS NOTES
"Pt refuses all care from this RN.  Pt states that she has a home and that we are holding her hostage.  Pt informed that we are trying to help her out, but the process isn't very quick.  Pt has all her bags packed and sitting in a chair in her room, and \"ready to leave\".  Hourly rounding in place.   "

## 2021-09-13 NOTE — PROGRESS NOTES
"Pt is A&Ox1, oriented to self only. Pt is sitting up in a chair at bedside, all belongings are packed on her bed. Pt denies pain. Pt refused full assessment and 1 RN skin check at this time. Pt states \"I don't need anything\". Fall precautions and hourly rounding in place. All needs met at this time.    "

## 2021-09-14 PROCEDURE — G0378 HOSPITAL OBSERVATION PER HR: HCPCS

## 2021-09-14 ASSESSMENT — PAIN DESCRIPTION - PAIN TYPE
TYPE: ACUTE PAIN
TYPE: ACUTE PAIN

## 2021-09-14 NOTE — PROGRESS NOTES
Pt is A&Ox1, oriented to self only and very confused. Pt denies any pain at this time. Pt sitting in chair with all her belongings packed. Attempted to reorient pt, pt not receptive.

## 2021-09-14 NOTE — PROGRESS NOTES
Pt is A&O to self only. Pt is sitting up in chair at bedside. All belongings are noted to be packed on her bed. Pt states she is leaving in the morning and does not need anything until then. Pt refuses assessment and skin check. No complaints of pain. Hourly rounding in place.

## 2021-09-15 PROCEDURE — 99224 PR SUBSEQUENT OBSERVATION CARE,LEVEL I: CPT | Performed by: INTERNAL MEDICINE

## 2021-09-15 PROCEDURE — G0378 HOSPITAL OBSERVATION PER HR: HCPCS

## 2021-09-15 ASSESSMENT — ENCOUNTER SYMPTOMS
FEVER: 0
WEIGHT LOSS: 0
PALPITATIONS: 0

## 2021-09-15 ASSESSMENT — PAIN DESCRIPTION - PAIN TYPE: TYPE: ACUTE PAIN

## 2021-09-15 NOTE — PROGRESS NOTES
"Hospital Medicine Daily Progress Note    Date of Service  9/15/2021    Chief Complaint  Zully Lin is a 70 y.o. female admitted 6/11/2021 with AMS    Hospital Course  Patient is a 70-year-old woman with h/o dementia (unclear baseline), hypothyrodism, schizophrenia, a resident of [facility] presented 6/11/2021 with worsening agitation, delusions. Exam on admission was unremarkable and neuro exam is non focal, with the exception of mental status (AOx1, tangential thoughts and delusion). And Labs not suggestive of infection. CBC not suggestive of infection, BMP  normal, UA clean, and patient denies anything on ROS. Pt was evaluated by Psychiatry and Psychology on admission - considered incapacitated to make medical and discharge decisions; initially placed on legal hold. Psychiatrist medications adjusted. Pt was considered medically cleared as off 6/13/2021 - initially planned for Inpatient Psych facility which has been difficult due to limited insurance.     Interval Problem Update  9/15 - patient stated \"I bought the place and tried to kick em all out\" referring to her previous living situation. Patient not aware of why she is in the hospital. Bags on bed, patient appears to have packed up.  Patient has refused levothyroxine everyday.    I have personally seen and examined the patient at bedside. I discussed the plan of care with patient and bedside RN.    Consultants/Specialty  Psychiatry    Code Status  Full Code    Disposition  Patient is medically cleared.   Anticipate discharge to guardianship and placement.  I have placed the appropriate orders for post-discharge needs.    Review of Systems  Review of Systems   Constitutional: Negative for fever and weight loss.   Cardiovascular: Negative for chest pain and palpitations.   All other systems reviewed and are negative.       Physical Exam  Temp:  [36.2 °C (97.1 °F)-36.4 °C (97.6 °F)] 36.2 °C (97.1 °F)  Pulse:  [71-76] 71  Resp:  [16-18] " 16  BP: (132-139)/(80-87) 135/80  SpO2:  [97 %-99 %] 99 %    Physical Exam  Vitals and nursing note reviewed.   Constitutional:       General: She is not in acute distress.     Appearance: Normal appearance. She is obese. She is not ill-appearing.   HENT:      Head: Normocephalic and atraumatic.   Cardiovascular:      Rate and Rhythm: Normal rate and regular rhythm.      Pulses: Normal pulses.      Heart sounds: Normal heart sounds.   Pulmonary:      Effort: Pulmonary effort is normal. No respiratory distress.      Breath sounds: Normal breath sounds.   Abdominal:      General: Bowel sounds are normal.      Palpations: Abdomen is soft.   Musculoskeletal:         General: No swelling or tenderness. Normal range of motion.   Skin:     General: Skin is warm.      Coloration: Skin is not jaundiced.      Findings: No erythema.   Neurological:      General: No focal deficit present.      Mental Status: She is alert. Mental status is at baseline. She is disoriented.   Psychiatric:         Mood and Affect: Mood normal.      Comments: Poor judgement, poor insight         Fluids  No intake or output data in the 24 hours ending 09/15/21 1424    Laboratory                      Last labs 6/15/21, reviewed    Imaging  DX-CHEST-LIMITED (1 VIEW)   Final Result      Left basilar atelectasis. No focal consolidation. No effusions.           Assessment/Plan  * Delusional disorder- (present on admission)  Assessment & Plan  Refused medications    History of dementia- (present on admission)  Assessment & Plan  reported history    Schizoaffective disorder, bipolar type (HCC)- (present on admission)  Assessment & Plan  Patient refused medications due to paranoia     Case management for discharge planning - For Guardianship    Hypothyroidism- (present on admission)  Assessment & Plan  Levothyroxine, patient has refused medication  Refused TSH lab draw -9/1/2021    Noncompliance with treatment plan- (present on admission)  Assessment &  Plan  Patient refused antipsychotic medications and all other medications       VTE prophylaxis: SCDs/TEDs    I have performed a physical exam and reviewed and updated ROS and Plan today (9/15/2021). In review of yesterday's note (9/14/2021), there are no changes except as documented above.

## 2021-09-15 NOTE — PROGRESS NOTES
COVID-19 Surge in effect:      Pt is A&Ox1, oriented to self only and very confused. Pt denies any pain at this time. Pt sitting in chair with all her belongings packed. Attempted to reorient pt, pt not receptive.

## 2021-09-15 NOTE — PROGRESS NOTES
"Pt is A&OX1, to self only. Pt is sitting up in chair, no complaints of pain or needs. States \"I'm waiting for you all to hurry up the process. I own land I need to get back to\". Pt refused assessment. All needs met a this time.    COVID-19 surge in effect  "

## 2021-09-15 NOTE — DISCHARGE PLANNING
"Court documents delivered to [facility], copies were made and faxed to FELIX Samuels. LSW handed to pt, notifying of petition for guardianship and hearing. 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 Pt took paperwork and walked away.   "

## 2021-09-16 PROCEDURE — G0378 HOSPITAL OBSERVATION PER HR: HCPCS

## 2021-09-16 NOTE — PROGRESS NOTES
COVID-19 surge in effect.     Received report from NOC RN and assumed care of pt. Pt refusing assessment at this time. Pt is resting in bed on room air. No other needs at this time. Bed locked in lowest position, call light within reach.

## 2021-09-17 PROCEDURE — G0378 HOSPITAL OBSERVATION PER HR: HCPCS

## 2021-09-17 NOTE — PROGRESS NOTES
COVID-19 surge in effect.    Received report from NOC RN and assumed care of pt. Pt refusing assessment at this morning. Pt is sitting up to chair at the bedside, no needs at this time.

## 2021-09-18 PROCEDURE — G0378 HOSPITAL OBSERVATION PER HR: HCPCS

## 2021-09-18 NOTE — PROGRESS NOTES
COVID-19 surge in effect:   Received report from KARRI Wynn and assumed care of pt. Pt A&OX1 only to self and very delusional. Reorientation provided to pt. Pt on RA.  Denies any pain or discomfort at this time. Pt sitting up in bed and shows no signs of distress. POC discussed. Denies further needs at this time. Bed locked and in lowest position. Bed alarm on, call light within reach & hourly rounding in place

## 2021-09-19 PROCEDURE — G0378 HOSPITAL OBSERVATION PER HR: HCPCS

## 2021-09-19 PROCEDURE — 99224 PR SUBSEQUENT OBSERVATION CARE,LEVEL I: CPT | Performed by: INTERNAL MEDICINE

## 2021-09-19 ASSESSMENT — ENCOUNTER SYMPTOMS
WEIGHT LOSS: 0
FEVER: 0
PALPITATIONS: 0

## 2021-09-19 ASSESSMENT — PAIN DESCRIPTION - PAIN TYPE: TYPE: ACUTE PAIN

## 2021-09-19 NOTE — HOSPITAL COURSE
This is a 71 year old female with PMHx of bipolar disorder, schizoaffective disorder, hypothyroidism, and dementia who was transferred from [facility] on 06/11/2021 due to worsening agitation and delusions.    Patient was evaluated by speech for cognitive evaluation, she did rather well in all domains, It is unclear if she has underlying dementia or if her presentation is psychiatric. Patient was evaluated by psychiatry, and her medications were adjusted. Patient refusing to take risperidone 0.5 every morning and risperidone 1 mg every night, as recommended by psychiatry.    Patient was deemed incapacitated to make medical decisions. She was cleared for discharge 6/2021.  Patient currently has guardian, and pursuing placement in group home in [place] ([facility]). Arranging for care flight to [place].    Pt has received 2 doses of COVID vaccine 1and is now fully vaccinated. Quant negative 12/9/2021.

## 2021-09-19 NOTE — PROGRESS NOTES
Hospital Medicine Daily Progress Note    Date of Service  9/19/2021    Chief Complaint  Zully Lin is a 70 y.o. female admitted 6/11/2021 with AMS    Hospital Course  Patient is a 70-year-old woman with h/o dementia (unclear baseline), hypothyrodism, schizophrenia, a resident of [facility] presented 6/11/2021 with worsening agitation, delusions. Exam on admission was unremarkable and neuro exam is non focal, with the exception of mental status (AOx1, tangential thoughts and delusion). And Labs not suggestive of infection. CBC not suggestive of infection, BMP  normal, UA clean, and patient denies anything on ROS. Pt was evaluated by Psychiatry and Psychology on admission - considered incapacitated to make medical and discharge decisions; initially placed on legal hold. Psychiatrist medications adjusted. Pt was considered medically cleared as off 6/13/2021 - initially planned for Inpatient Psych facility which has been difficult due to limited insurance.     Interval Problem Update  Patient mentioned she is ready to leave, has bags packed. Asked where she would be going, patient could not give concrete location.    I have personally seen and examined the patient at bedside. I discussed the plan of care with patient and bedside RN.    Consultants/Specialty  Psychiatry    Code Status  Full Code    Disposition  Patient is medically cleared.   Anticipate discharge to guardianship and placement.  I have placed the appropriate orders for post-discharge needs.    Review of Systems  Review of Systems   Constitutional: Negative for fever and weight loss.   Cardiovascular: Negative for chest pain and palpitations.   All other systems reviewed and are negative.       Physical Exam  Temp:  [35.9 °C (96.7 °F)-36.5 °C (97.7 °F)] 36.5 °C (97.7 °F)  Pulse:  [68-76] 76  Resp:  [16-18] 16  BP: (138-148)/(76-93) 138/76  SpO2:  [97 %-98 %] 97 %    Physical Exam  Vitals and nursing note reviewed.   Constitutional:        General: She is not in acute distress.     Appearance: Normal appearance. She is obese. She is not ill-appearing.   HENT:      Head: Normocephalic and atraumatic.   Cardiovascular:      Rate and Rhythm: Normal rate and regular rhythm.      Pulses: Normal pulses.      Heart sounds: Normal heart sounds.   Pulmonary:      Effort: Pulmonary effort is normal. No respiratory distress.      Breath sounds: Normal breath sounds.   Abdominal:      General: Bowel sounds are normal.      Palpations: Abdomen is soft.   Musculoskeletal:         General: No swelling or tenderness. Normal range of motion.   Skin:     General: Skin is warm.      Coloration: Skin is not jaundiced.      Findings: No erythema.   Neurological:      General: No focal deficit present.      Mental Status: She is alert. Mental status is at baseline. She is disoriented.   Psychiatric:         Mood and Affect: Mood normal.      Comments: Poor judgement, poor insight         Fluids    Intake/Output Summary (Last 24 hours) at 9/19/2021 1234  Last data filed at 9/19/2021 1006  Gross per 24 hour   Intake 740 ml   Output --   Net 740 ml       Laboratory                      Last labs 6/15/21, reviewed    Imaging  DX-CHEST-LIMITED (1 VIEW)   Final Result      Left basilar atelectasis. No focal consolidation. No effusions.           Assessment/Plan  * Delusional disorder- (present on admission)  Assessment & Plan  Refused medications  risperdal was tried in June    History of dementia- (present on admission)  Assessment & Plan  reported history    Schizoaffective disorder, bipolar type (HCC)- (present on admission)  Assessment & Plan  Patient refused medications due to paranoia     Case management for discharge planning - For Guardianship    Hypothyroidism- (present on admission)  Assessment & Plan  Levothyroxine, patient has refused medication  Refused TSH lab draw -9/1/2021    Noncompliance with treatment plan- (present on admission)  Assessment &  Plan  Patient refused antipsychotic medications and all other medications       VTE prophylaxis: SCDs/TEDs    I have performed a physical exam and reviewed and updated ROS and Plan today (9/19/2021). In review of yesterday's note (9/18/2021), there are no changes except as documented above.

## 2021-09-19 NOTE — PROGRESS NOTES
COVID-19 surge in effect.     Pt is A&Ox2, disoriented to time and situation. No complaints of pain. Pt is on RA. Pt ambulates in room independently. Pt is wearing many layers of clothing and has personal belongings packed. Pt has no wandergaurd at this time. Charge RN did attempt to put one on, pt became irritable, refused to comply to the WG and seemed to be advancing towards aggressive behaviors. Pt stated the WG was an explosive device. Even when reassured that is is not a dangerous device, the pt declined its placement. Pt did not voice any other needs other than to be left alone in her room.     Skin assessment refused.

## 2021-09-19 NOTE — PROGRESS NOTES
COVID-19 surge in effect.     Assumed care of patient this shift. Patient is alert and oriented to self only.  Able to make her needs known. Denied complaints of pain this shift when asked.  Up with assist Up independently in room and to bathroom. Patient is fully dressed with bags packed on bed. Wander guard in place.

## 2021-09-20 PROCEDURE — G0378 HOSPITAL OBSERVATION PER HR: HCPCS

## 2021-09-20 NOTE — PROGRESS NOTES
COVID 19 surge in effect.     Pt has no needs at this time. No statements of pain. On room air. Unable to place wander guard due to pt refusal and continuous removal of device by pt.

## 2021-09-20 NOTE — DISCHARGE PLANNING
Guardianship Hearing scheduled for 10/15/21 at 9:15    Barrier to placement, unknown income, if Medicaid eligible will be a Level 1 due to her minimal needs

## 2021-09-21 PROCEDURE — G0378 HOSPITAL OBSERVATION PER HR: HCPCS

## 2021-09-21 PROCEDURE — 99226 PR SUBSEQUENT OBSERVATION CARE,LEVEL III: CPT | Performed by: INTERNAL MEDICINE

## 2021-09-21 ASSESSMENT — ENCOUNTER SYMPTOMS
NEUROLOGICAL NEGATIVE: 1
CONSTITUTIONAL NEGATIVE: 1
PSYCHIATRIC NEGATIVE: 1
CARDIOVASCULAR NEGATIVE: 1
EYES NEGATIVE: 1
GASTROINTESTINAL NEGATIVE: 1
MUSCULOSKELETAL NEGATIVE: 1
RESPIRATORY NEGATIVE: 1

## 2021-09-21 NOTE — PROGRESS NOTES
Hospital Medicine Daily Progress Note    Date of Service  9/21/2021    Chief Complaint  Zully Lin is a 70 y.o. female admitted 6/11/[facility] from [facility] on 6/11/2021 with AMS, including worsening agitation and delusions. She has dementia (unclear baseline), hypothyroidism, schizophrenia.     Hospital Course  Infectious work-up was unremarkable. Patient was evaluated by psychiatry and psychology. She was considered incapacitated to make medical and discharge decisions. She was initially placed on legal hold. Psychiatric medications were adjusted. Patient was medically cleared on 6/13/2021. Inpatient psych facility placement was difficult due to insurance limitations.  Patient has refused to take her psychiatric medications, psychiatry signed off on 6/22/2020.    Interval Problem Update  Calm.    I have personally seen and examined the patient at bedside. I discussed the plan of care with bedside RN.    Consultants/Specialty  psychiatry    Code Status  Full Code    Disposition  Patient is medically cleared.   Anticipate discharge to Guardianship pending.  I have placed the appropriate orders for post-discharge needs.    Review of Systems  Review of Systems   Constitutional: Negative.    HENT: Negative.    Eyes: Negative.    Respiratory: Negative.    Cardiovascular: Negative.    Gastrointestinal: Negative.    Genitourinary: Negative.    Musculoskeletal: Negative.    Skin: Negative.    Neurological: Negative.    Endo/Heme/Allergies: Negative.    Psychiatric/Behavioral: Negative.         Physical Exam  Temp:  [36 °C (96.8 °F)-36.7 °C (98 °F)] 36.7 °C (98 °F)  Pulse:  [72-78] 75  Resp:  [16] 16  BP: (106-132)/(50-73) 130/73  SpO2:  [97 %-100 %] 97 %    Physical Exam  Constitutional:       Appearance: She is obese.   HENT:      Head: Normocephalic.      Nose: Nose normal.      Mouth/Throat:      Mouth: Mucous membranes are moist.   Pulmonary:      Effort: Pulmonary effort is normal.   Abdominal:       Palpations: Abdomen is soft.   Musculoskeletal:      Cervical back: Normal range of motion.      Right lower leg: No edema.      Left lower leg: No edema.   Skin:     General: Skin is warm.   Neurological:      Mental Status: She is disoriented.   Psychiatric:      Comments: Calm         Fluids  No intake or output data in the 24 hours ending 09/21/21 1505    Laboratory                        Imaging  DX-CHEST-LIMITED (1 VIEW)   Final Result      Left basilar atelectasis. No focal consolidation. No effusions.           Assessment/Plan  * Delusional disorder- (present on admission)  Assessment & Plan  Refuses medications.    History of dementia- (present on admission)  Assessment & Plan  Stable.    Schizoaffective disorder, bipolar type (HCC)- (present on admission)  Assessment & Plan  Patient has been refusing medications and blood draw due to paranoia.  Case management are working on discharge planning and guardianship.    Hypothyroidism- (present on admission)  Assessment & Plan  Levothyroxine has been ordered.  Patient refuses her medications.  She refused blood draw on 9/1/2021.      Noncompliance with treatment plan- (present on admission)  Assessment & Plan  Patient refused antipsychotic medications and all other medications       VTE prophylaxis: SCDs/TEDs

## 2021-09-21 NOTE — PROGRESS NOTES
COVID 19 surge in effect.     Pt monitored for increase risk of elopement. Pt has no wander guard in place currently due to consistent removal of the device as well as the pt refusing to put another one on. Pt becomes angry and displays advancement to aggressive behaviors when attempting to put the wander guard on.  Pt has on normal clothing, many layers and has bags packed in room.

## 2021-09-22 PROCEDURE — G0378 HOSPITAL OBSERVATION PER HR: HCPCS

## 2021-09-22 ASSESSMENT — PAIN DESCRIPTION - PAIN TYPE
TYPE: ACUTE PAIN
TYPE: ACUTE PAIN

## 2021-09-22 NOTE — PROGRESS NOTES
COVID19 surge in effect.     Pt refuses placement of wander guard. Pt has previously removed wander guard and disposed of them. Charge RN is aware of no wander guard placement. Close monitoring of pt location throughout shift.     Pt is wearing many layers of regular clothing and has her bags packed in the room.

## 2021-09-23 PROCEDURE — G0378 HOSPITAL OBSERVATION PER HR: HCPCS

## 2021-09-23 NOTE — DISCHARGE PLANNING
Anticipated Discharge Disposition: Group Home    Action: Patient wears multiple layers of clothes and has her bags packed.  When asked why so many clothes, patient has stated she is cold and her bags are packed as she needs to leave.  Declines most medications and all psychiatric medications, stating there is nothing wrong with her.  Patient is ambulatory, continent.    Barriers to Discharge: guardianship, medicaid,income, patient will need a secure facility.     Plan: continue to monitor for discharge barriers    Guardianship hearing scheduled for 10/15/21

## 2021-09-23 NOTE — PROGRESS NOTES
COVID-19 surge in effect     Assumed care of pt after receiving report from dayshift RN. Pt agitated at this time and refused to answer orientation questions and refused assessment. Pt shows no physical signs of pain at this time. Pt laying in bed, bed is locked and in lowest position, call light within reach, fall precautions in place.

## 2021-09-24 PROCEDURE — G0378 HOSPITAL OBSERVATION PER HR: HCPCS

## 2021-09-24 ASSESSMENT — ENCOUNTER SYMPTOMS
NEUROLOGICAL NEGATIVE: 1
GASTROINTESTINAL NEGATIVE: 1
RESPIRATORY NEGATIVE: 1
EYES NEGATIVE: 1
PSYCHIATRIC NEGATIVE: 1
CONSTITUTIONAL NEGATIVE: 1
MUSCULOSKELETAL NEGATIVE: 1
CARDIOVASCULAR NEGATIVE: 1

## 2021-09-24 ASSESSMENT — PAIN DESCRIPTION - PAIN TYPE: TYPE: ACUTE PAIN

## 2021-09-24 NOTE — PROGRESS NOTES
"Hospital Medicine Daily Progress Note    Date of Service  9/25/2021    Chief Complaint  Zully Lin is a 70 y.o. female admitted 6/11/2021 from [facility] on 6/11/2021 with AMS, including worsening agitation and delusions. She has dementia (unclear baseline), hypothyroidism, schizoaffective disorder and bipolar disorder.     Hospital Course  Infectious work-up was unremarkable. Patient was evaluated by psychiatry and psychology. She was considered incapacitated to make medical and discharge decisions. She was initially placed on legal hold. Psychiatric medications were adjusted. Patient was medically cleared on 6/13/2021. Inpatient psych facility placement was difficult due to insurance limitations.  Patient has refused to take her psychiatric medications, psychiatry signed off and clear from Psychiatry services to be discharged on 6/22/2020. Since then, CM team has been working on a safe DC planning. Pt lacks decisional capacity; hence, now guardian is being pursued and possible eventual placement in Group home.     Interval Problem Update  9/25  No acute issues per nursing report with stable vitals   Mostly stay in her room with all her belongings packed  Continues to refuse levothyroxine.     At bedside, calm and clear speech; upset about being here for 3 months. She stated she was brought in by EMS in a hurry and later \"they were killed\". She talked about owning a property in [facility] and then also reports of living with people brought in by [employer].     I have personally seen and examined the patient at bedside. I discussed the plan of care with bedside RN.    Consultants/Specialty  psychiatry    Code Status  Full Code    Disposition  Patient is medically cleared.   Anticipate discharge to Guardianship pending; eventual possible placement to group home.   I have placed the appropriate orders for post-discharge needs.    Review of Systems  Review of Systems   Constitutional: " Negative.    HENT: Negative.    Eyes: Negative.    Respiratory: Negative.    Cardiovascular: Negative.    Gastrointestinal: Negative.    Genitourinary: Negative.    Musculoskeletal: Negative.    Skin: Negative.    Neurological: Negative.    Endo/Heme/Allergies: Negative.    Psychiatric/Behavioral: Negative.         Physical Exam  Temp:  [36.3 °C (97.4 °F)-37 °C (98.6 °F)] 36.3 °C (97.4 °F)  Pulse:  [65-76] 69  Resp:  [16-18] 18  BP: (109-137)/(69-88) 116/88  SpO2:  [98 %] 98 %    Physical Exam  Constitutional:       Appearance: She is obese.   HENT:      Head: Normocephalic.      Nose: Nose normal.      Mouth/Throat:      Mouth: Mucous membranes are moist.   Pulmonary:      Effort: Pulmonary effort is normal.   Abdominal:      Palpations: Abdomen is soft.   Musculoskeletal:      Cervical back: Normal range of motion.      Right lower leg: No edema.      Left lower leg: No edema.   Skin:     General: Skin is warm.   Neurological:      Mental Status: She is disoriented.   Psychiatric:         Attention and Perception: Attention normal.         Mood and Affect: Affect is angry.         Speech: Speech is tangential.         Thought Content: Thought content is delusional.      Comments: Calm         Fluids    Intake/Output Summary (Last 24 hours) at 9/25/2021 0956  Last data filed at 9/25/2021 0408  Gross per 24 hour   Intake 1980 ml   Output --   Net 1980 ml       Laboratory                        Imaging  DX-CHEST-LIMITED (1 VIEW)   Final Result      Left basilar atelectasis. No focal consolidation. No effusions.           Assessment/Plan  * Delusional disorder- (present on admission)  Assessment & Plan  Refuses medications.    History of dementia- (present on admission)  Assessment & Plan  Stable.    Schizoaffective disorder, bipolar type (HCC)- (present on admission)  Assessment & Plan  Patient has been refusing medications and blood draw due to paranoia.  Case management are working on discharge planning and  guardianship.    Hypothyroidism- (present on admission)  Assessment & Plan  Levothyroxine has been ordered.  Patient refuses her medications.  She refused blood draw on 9/1/2021.      Noncompliance with treatment plan- (present on admission)  Assessment & Plan  Patient refused antipsychotic medications and all other medications       VTE prophylaxis: SCDs/TEDs

## 2021-09-24 NOTE — PROGRESS NOTES
COVID-19 surge in effect.    Pt refused assessment. Remains in room, fall and safety precautions in place. Call light within reach.

## 2021-09-25 PROCEDURE — 99231 SBSQ HOSP IP/OBS SF/LOW 25: CPT | Performed by: STUDENT IN AN ORGANIZED HEALTH CARE EDUCATION/TRAINING PROGRAM

## 2021-09-25 PROCEDURE — G0378 HOSPITAL OBSERVATION PER HR: HCPCS

## 2021-09-25 ASSESSMENT — PAIN DESCRIPTION - PAIN TYPE
TYPE: ACUTE PAIN
TYPE: ACUTE PAIN

## 2021-09-25 ASSESSMENT — FIBROSIS 4 INDEX: FIB4 SCORE: 1.51

## 2021-09-25 NOTE — PROGRESS NOTES
Pharmacy Pharmacotherapy Consult for LOS >30 days    Admit Date: 6/11/2021      Medications were reviewed for appropriateness and ongoing need.     Current Facility-Administered Medications   Medication Dose Route Frequency Provider Last Rate Last Admin   • acetaminophen (Tylenol) tablet 650 mg  650 mg Oral Q4HRS PRN Liban Roca M.D.       • senna-docusate (PERICOLACE or SENOKOT S) 8.6-50 MG per tablet 2 tablet  2 Tablet Oral BID PRN Liban Roca M.D.        And   • polyethylene glycol/lytes (MIRALAX) PACKET 1 Packet  1 Packet Oral QDAY PRN Liban Roca M.D.        And   • magnesium hydroxide (MILK OF MAGNESIA) suspension 30 mL  30 mL Oral QDAY PRN Liban Roca M.D.        And   • bisacodyl (DULCOLAX) suppository 10 mg  10 mg Rectal QDAY PRN Liban Roca M.D.       • levothyroxine (SYNTHROID) tablet 50 mcg  50 mcg Oral AM ES Liban Roca M.D.           Recommendations:  1) Patient has not yet received her flu vaccine for this year; will discuss with MD. Mary Jane Saenz, PharmD, BCCCP

## 2021-09-26 PROCEDURE — G0378 HOSPITAL OBSERVATION PER HR: HCPCS

## 2021-09-27 PROCEDURE — G0378 HOSPITAL OBSERVATION PER HR: HCPCS

## 2021-09-27 NOTE — PROGRESS NOTES
COVID-19 surge in effect      Pt alert. RA. Pt refusing all medical interventions. No s/sx of acute distress.

## 2021-09-28 PROCEDURE — 99231 SBSQ HOSP IP/OBS SF/LOW 25: CPT | Performed by: HOSPITALIST

## 2021-09-28 PROCEDURE — G0378 HOSPITAL OBSERVATION PER HR: HCPCS

## 2021-09-28 ASSESSMENT — ENCOUNTER SYMPTOMS
FEVER: 0
NAUSEA: 0
VOMITING: 0
SHORTNESS OF BREATH: 0
CHILLS: 0
COUGH: 0

## 2021-09-28 NOTE — PROGRESS NOTES
"Hospital Medicine Daily Progress Note    Date of Service  9/28/2021    Chief Complaint  Zully Lin is a 70 y.o. female admitted 6/11/2021 from [facility] on 6/11/2021 with AMS, including worsening agitation and delusions. She has dementia (unclear baseline), hypothyroidism, schizoaffective disorder and bipolar disorder.     Hospital Course  Infectious work-up was unremarkable. Patient was evaluated by psychiatry and psychology. She was considered incapacitated to make medical and discharge decisions. She was initially placed on legal hold. Psychiatric medications were adjusted. Patient was medically cleared on 6/13/2021. Inpatient psych facility placement was difficult due to insurance limitations.  Patient has refused to take her psychiatric medications, psychiatry signed off and clear from Psychiatry services to be discharged on 6/22/2020. Since then, CM team has been working on a safe DC planning. Pt lacks decisional capacity; hence, now guardian is being pursued and possible eventual placement in Group home.     Interval Problem Update  9/25 No acute issues per nursing report with stable vitals   Mostly stay in her room with all her belongings packed  Continues to refuse levothyroxine.   At bedside, calm and clear speech; upset about being here for 3 months. She stated she was brought in by EMS in a hurry and later \"they were killed\". She talked about owning a property in [facility] and then also reports of living with people brought in by [employer].     9/28: Still delusional, feels she is the governor of [place] and there should be no bureaucratic barriers to her discharge.  Otherwise calm and behaved.    I have personally seen and examined the patient at bedside. I discussed the plan of care with bedside RN.    Consultants/Specialty  psychiatry    Code Status  Full Code    Disposition  Patient is medically cleared.   Anticipate discharge to Guardianship pending; eventual possible " placement to group home.   I have placed the appropriate orders for post-discharge needs.    Review of Systems  Review of Systems   Constitutional: Negative for chills and fever.   Respiratory: Negative for cough and shortness of breath.    Cardiovascular: Negative for chest pain.   Gastrointestinal: Negative for nausea and vomiting.   Genitourinary: Negative for dysuria.        Physical Exam  Temp:  [36.5 °C (97.7 °F)-36.6 °C (97.9 °F)] 36.5 °C (97.7 °F)  Pulse:  [63-73] 73  Resp:  [16-18] 17  BP: (115-140)/(75-80) 115/75  SpO2:  [94 %-98 %] 94 %    Physical Exam  Constitutional:       Appearance: Normal appearance. She is obese.   HENT:      Head: Normocephalic.      Nose: Nose normal.      Mouth/Throat:      Mouth: Mucous membranes are moist.   Cardiovascular:      Rate and Rhythm: Normal rate.   Pulmonary:      Effort: Pulmonary effort is normal.   Abdominal:      Palpations: Abdomen is soft.   Musculoskeletal:      Cervical back: Normal range of motion.      Right lower leg: No edema.      Left lower leg: No edema.   Skin:     General: Skin is warm.   Neurological:      Mental Status: She is disoriented.   Psychiatric:         Attention and Perception: Attention normal.         Mood and Affect: Affect is angry.         Speech: Speech is tangential.         Thought Content: Thought content is delusional.      Comments: Calm and well behaved         Fluids    Intake/Output Summary (Last 24 hours) at 9/28/2021 1326  Last data filed at 9/28/2021 1000  Gross per 24 hour   Intake 600 ml   Output --   Net 600 ml       Laboratory                        Imaging  DX-CHEST-LIMITED (1 VIEW)   Final Result      Left basilar atelectasis. No focal consolidation. No effusions.           Assessment/Plan  * Delusional disorder- (present on admission)  Assessment & Plan  Refuses medications.    History of dementia- (present on admission)  Assessment & Plan  Stable.    Schizoaffective disorder, bipolar type (HCC)- (present on  admission)  Assessment & Plan  Patient has been refusing medications and blood draw due to paranoia.    Actively delusional  Case management are working on discharge planning and guardianship.    Hypothyroidism- (present on admission)  Assessment & Plan  Levothyroxine has been ordered.  Patient refuses her medications.  She refused blood draw on 9/1/2021.      Noncompliance with treatment plan- (present on admission)  Assessment & Plan  Patient refused antipsychotic medications and all other medications       VTE prophylaxis: SCDs/TEDs

## 2021-09-29 PROCEDURE — G0378 HOSPITAL OBSERVATION PER HR: HCPCS

## 2021-09-29 ASSESSMENT — PAIN DESCRIPTION - PAIN TYPE: TYPE: ACUTE PAIN

## 2021-09-30 PROCEDURE — G0378 HOSPITAL OBSERVATION PER HR: HCPCS

## 2021-09-30 ASSESSMENT — PAIN DESCRIPTION - PAIN TYPE: TYPE: ACUTE PAIN

## 2021-09-30 NOTE — PROGRESS NOTES
Pt is resting in chair watching TV. Denied pain. Pt denied further needs. Call light and belongings within reach.    COVID-19 surge in effect.

## 2021-10-01 PROCEDURE — G0378 HOSPITAL OBSERVATION PER HR: HCPCS

## 2021-10-01 NOTE — PROGRESS NOTES
Pt is resting in chair watching TV. Denied pain. Pr refused to be hospital gown despite education. Call light and belongings within reach, pt able to make needs known.     COVID-19 surge in effect.

## 2021-10-01 NOTE — DISCHARGE PLANNING
"Medical Social Work  SW presented paper work from [facility] stating patient was assigned an .  Patient stated this is not needed, as she has spoken to the .  SW asked which , patient stated [facility].  When SW stated he has not heard of this , patient stated he is  for [facility].  \"It use to be [place], but I changed the name to [facility].\"  "

## 2021-10-01 NOTE — PROGRESS NOTES
COVID-19 surge in effect.      Pt sitting up in chair. Denies any pain or needs at this time. Hourly rounding in place.

## 2021-10-02 PROCEDURE — G0378 HOSPITAL OBSERVATION PER HR: HCPCS

## 2021-10-02 PROCEDURE — 99231 SBSQ HOSP IP/OBS SF/LOW 25: CPT | Performed by: HOSPITALIST

## 2021-10-02 ASSESSMENT — ENCOUNTER SYMPTOMS
COUGH: 0
SHORTNESS OF BREATH: 0
DIZZINESS: 0
VOMITING: 0
CHILLS: 0
NAUSEA: 0
FEVER: 0

## 2021-10-02 ASSESSMENT — FIBROSIS 4 INDEX: FIB4 SCORE: 1.51

## 2021-10-02 NOTE — PROGRESS NOTES
COVID-19 surge in effect    Pt. Found in bed resting, no WG noted at this time on her ankle. No complaints of pain.

## 2021-10-02 NOTE — PROGRESS NOTES
"Hospital Medicine Daily Progress Note    Date of Service  10/2/2021    Chief Complaint  Zully Lin is a 70 y.o. female admitted 6/11/2021 from [facility] on 6/11/2021 with AMS, including worsening agitation and delusions. She has dementia (unclear baseline), hypothyroidism, schizoaffective disorder and bipolar disorder.     Hospital Course  Infectious work-up was unremarkable. Patient was evaluated by psychiatry and psychology. She was considered incapacitated to make medical and discharge decisions. She was initially placed on legal hold. Psychiatric medications were adjusted. Patient was medically cleared on 6/13/2021. Inpatient psych facility placement was difficult due to insurance limitations.  Patient has refused to take her psychiatric medications, psychiatry signed off and clear from Psychiatry services to be discharged on 6/22/2020. Since then, CM team has been working on a safe DC planning. Pt lacks decisional capacity; hence, now guardian is being pursued and possible eventual placement in Group home.     Interval Problem Update  9/25 No acute issues per nursing report with stable vitals   Mostly stay in her room with all her belongings packed  Continues to refuse levothyroxine.   At bedside, calm and clear speech; upset about being here for 3 months. She stated she was brought in by EMS in a hurry and later \"they were killed\". She talked about owning a property in [facility] and then also reports of living with people brought in by [employer].   9/28: Still delusional, feels she is the governor of [place] and there should be no bureaucratic barriers to her discharge.  Otherwise calm and behaved.  10/2: Still delusional, reporting she has family on [employer].    I have personally seen and examined the patient at bedside. I discussed the plan of care with bedside RN.    Consultants/Specialty  psychiatry    Code Status  Full Code    Disposition  Patient is medically cleared. "   Anticipate discharge to Guardianship pending; eventual possible placement to group home.   I have placed the appropriate orders for post-discharge needs.    Review of Systems  Review of Systems   Constitutional: Negative for chills and fever.   Respiratory: Negative for cough and shortness of breath.    Cardiovascular: Negative for chest pain.   Gastrointestinal: Negative for nausea and vomiting.   Genitourinary: Negative for dysuria.   Neurological: Negative for dizziness.   Psychiatric/Behavioral:        Delusional        Physical Exam  Temp:  [36.1 °C (97 °F)-36.7 °C (98.1 °F)] 36.4 °C (97.5 °F)  Pulse:  [65-76] 65  Resp:  [16-18] 16  BP: (123-141)/(68-79) 141/79  SpO2:  [97 %-98 %] 97 %    Physical Exam  Constitutional:       Appearance: Normal appearance. She is obese.   HENT:      Head: Normocephalic.      Nose: Nose normal.      Mouth/Throat:      Mouth: Mucous membranes are moist.   Cardiovascular:      Rate and Rhythm: Normal rate.      Heart sounds: No gallop.    Pulmonary:      Effort: Pulmonary effort is normal. No respiratory distress.   Abdominal:      Palpations: Abdomen is soft.   Musculoskeletal:      Cervical back: Normal range of motion.      Right lower leg: No edema.      Left lower leg: No edema.   Skin:     General: Skin is warm.   Neurological:      Mental Status: She is disoriented.   Psychiatric:         Attention and Perception: Attention normal.         Mood and Affect: Affect is angry.         Speech: Speech is tangential.         Thought Content: Thought content is delusional.      Comments: Calm and well behaved         Fluids    Intake/Output Summary (Last 24 hours) at 10/2/2021 1110  Last data filed at 10/2/2021 0800  Gross per 24 hour   Intake 1672 ml   Output --   Net 1672 ml       Laboratory                        Imaging  DX-CHEST-LIMITED (1 VIEW)   Final Result      Left basilar atelectasis. No focal consolidation. No effusions.           Assessment/Plan  * Delusional  disorder- (present on admission)  Assessment & Plan  Refuses medications.    History of dementia- (present on admission)  Assessment & Plan  Stable.    Schizoaffective disorder, bipolar type (HCC)- (present on admission)  Assessment & Plan  Patient has been refusing medications and blood draw due to paranoia.    Actively delusional  Case management are working on discharge planning and guardianship.    Hypothyroidism- (present on admission)  Assessment & Plan  Levothyroxine has been ordered.  Patient refuses her medications.  She refused blood draw on 9/1/2021.      Noncompliance with treatment plan- (present on admission)  Assessment & Plan  Patient refused antipsychotic medications and all other medications  Consistent with this inpatient       VTE prophylaxis: SCDs/TEDs

## 2021-10-02 NOTE — PROGRESS NOTES
COVID-19 surge in effect.     Pt is refusing to be assessed. States she has no needs at this time.

## 2021-10-03 PROCEDURE — G0378 HOSPITAL OBSERVATION PER HR: HCPCS

## 2021-10-03 NOTE — PROGRESS NOTES
COVID-19 surge in effect    Pt. In room resting, no complaints of pain. Belongings in her room. Call light in reach.

## 2021-10-04 PROCEDURE — G0378 HOSPITAL OBSERVATION PER HR: HCPCS

## 2021-10-04 ASSESSMENT — PAIN DESCRIPTION - PAIN TYPE: TYPE: ACUTE PAIN

## 2021-10-04 NOTE — PROGRESS NOTES
COVID 19 surge in effect.     Pt in room, rearranged some furniture in room. Bed is in lowest locked position. Pt A&Ox3- disoriented to situation. Pt has no complaints of pain, headache or nausea. Skin check refused, pt did not want to remove many layers of clothing. Appropriate mood and allowed for head to toe general assessment. No WG in place, charge RN aware, to monitor pt for wandering behaviors. No other needs at this time.

## 2021-10-05 PROCEDURE — G0378 HOSPITAL OBSERVATION PER HR: HCPCS

## 2021-10-05 PROCEDURE — 99225 PR SUBSEQUENT OBSERVATION CARE,LEVEL II: CPT | Performed by: STUDENT IN AN ORGANIZED HEALTH CARE EDUCATION/TRAINING PROGRAM

## 2021-10-05 ASSESSMENT — ENCOUNTER SYMPTOMS
DIZZINESS: 0
VOMITING: 0
NAUSEA: 0
SHORTNESS OF BREATH: 0

## 2021-10-05 ASSESSMENT — PAIN DESCRIPTION - PAIN TYPE
TYPE: ACUTE PAIN
TYPE: ACUTE PAIN

## 2021-10-05 NOTE — PROGRESS NOTES
Hospital Medicine Daily Progress Note    Date of Service  10/5/2021    Chief Complaint  Zully Lin is a 70 y.o. female admitted 6/11/2021 from [facility] on 6/11/2021 with AMS, including worsening agitation and delusions. She has dementia (unclear baseline), hypothyroidism, schizoaffective disorder and bipolar disorder.     Hospital Course  Infectious work-up was unremarkable. Patient was evaluated by psychiatry and psychology. She was considered incapacitated to make medical and discharge decisions. She was initially placed on legal hold. Psychiatric medications were adjusted. Patient was medically cleared on 6/13/2021. Inpatient psych facility placement was difficult due to insurance limitations.  Patient has refused to take her psychiatric medications, psychiatry signed off and clear from Psychiatry services to be discharged on 6/22/2020. Since then, CM team has been working on a safe DC planning. Pt lacks decisional capacity; hence, now guardian is being pursued and possible eventual placement in Group home.     Interval Problem Update  10/5: No acute complaints, wants to leave saying she is the Governor of [place], and [place] now.     I have personally seen and examined the patient at bedside. I discussed the plan of care with bedside RN.    Consultants/Specialty  psychiatry    Code Status  Full Code    Disposition  Patient is medically cleared.   Anticipate discharge to Guardianship pending; eventual possible placement to group home.   I have placed the appropriate orders for post-discharge needs.    Review of Systems  Review of Systems   Respiratory: Negative for shortness of breath.    Cardiovascular: Negative for chest pain.   Gastrointestinal: Negative for nausea and vomiting.   Genitourinary: Negative for dysuria.   Neurological: Negative for dizziness.   Psychiatric/Behavioral:        Delusional   All other systems reviewed and are negative.       Physical Exam  Temp:   [36.1 °C (96.9 °F)-36.4 °C (97.5 °F)] 36.4 °C (97.5 °F)  Pulse:  [72-78] 78  Resp:  [17-18] 17  BP: (114-139)/(77-81) 114/79  SpO2:  [94 %-96 %] 95 %    Physical Exam  Constitutional:       Appearance: Normal appearance. She is obese.   HENT:      Head: Normocephalic.      Mouth/Throat:      Mouth: Mucous membranes are moist.   Cardiovascular:      Rate and Rhythm: Normal rate.      Heart sounds: No gallop.    Pulmonary:      Effort: Pulmonary effort is normal. No respiratory distress.   Musculoskeletal:      Right lower leg: No edema.      Left lower leg: No edema.   Skin:     General: Skin is warm.   Neurological:      Mental Status: She is alert. She is disoriented.   Psychiatric:         Attention and Perception: Attention normal.         Mood and Affect: Affect is angry.         Speech: Speech is tangential.         Thought Content: Thought content is delusional.      Comments: Calm         Fluids    Intake/Output Summary (Last 24 hours) at 10/5/2021 1421  Last data filed at 10/5/2021 0742  Gross per 24 hour   Intake 960 ml   Output --   Net 960 ml       Laboratory                        Imaging  DX-CHEST-LIMITED (1 VIEW)   Final Result      Left basilar atelectasis. No focal consolidation. No effusions.           Assessment/Plan  * Delusional disorder- (present on admission)  Assessment & Plan  Refuses medications.    Schizoaffective disorder, bipolar type (HCC)- (present on admission)  Assessment & Plan  Patient has been refusing medications and blood draw due to paranoia.    Actively delusional  Case management are working on discharge planning and guardianship.    History of dementia- (present on admission)  Assessment & Plan  At baseline    Hypothyroidism- (present on admission)  Assessment & Plan  Levothyroxine has been ordered.  Patient refuses her medications.  She refused blood draw on 9/1/2021.      Noncompliance with treatment plan- (present on admission)  Assessment & Plan  Patient refused  antipsychotic medications and all other medications       VTE prophylaxis: SCDs/TEDs

## 2021-10-05 NOTE — DISCHARGE PLANNING
Medical Social Work  PC to [facility], [phone]; GOMEZ spoke to Denia and Althea about discharging patient ASAP on guardianship is established on the 15th.     GOMEZ told them that [facility] is willing to pay from 3 to 4 months of cost of care, TODD.  That they will be able to find out income, apply to be rep-payee and apply for the Medicaid Waiver.  GOMEZ told that discharging on the 15th may not be possible. But, the next week after yes.  Althea stated that she has started reaching out to group homes.  GOMEZ stated that there are 5 patients that will be granted a guardian this month, and GOMEZ would like all 5 placed no later than 10/31.  GOMEZ told they will do the best they can.

## 2021-10-06 PROCEDURE — G0378 HOSPITAL OBSERVATION PER HR: HCPCS

## 2021-10-06 ASSESSMENT — PAIN DESCRIPTION - PAIN TYPE
TYPE: ACUTE PAIN
TYPE: ACUTE PAIN

## 2021-10-06 NOTE — PROGRESS NOTES
COVID-19 Surge in effect:        Pt is A&Ox1, oriented to self only. Pt denies any pain at this time. Pt sitting in chair with all her belongings packed. Attempted to reorient pt, pt not receptive.

## 2021-10-06 NOTE — PROGRESS NOTES
Pt is resting in bed. Pt denied further needs. Call light and belongings within reach.     COVID-19 surge in effect.

## 2021-10-07 PROCEDURE — G0378 HOSPITAL OBSERVATION PER HR: HCPCS

## 2021-10-07 NOTE — PROGRESS NOTES
Pt is resting in chair, watching TV. A&Ox3, disoriented to situation. Reorientation provided. Pt denied further needs. Call light and belongings within reach.     COVID-19 surge in effect.

## 2021-10-07 NOTE — DISCHARGE PLANNING
"Late entry from 10/6/21  Medical Social Work  Ohalesia Fernandez from [facility] in to meet the patient.  Patient was argumentative, stating she does not need an  let along a guardian.  She has done nothing wrong.  \"I have an  the  Governor who also is a medical doctor who states there is nothing wrong with me.\"  Patient stated she is being held hostage and demands to leave.  Patient then quickly switched subjects to she just sold [employer] to someone.  When SW asked who she sold [facility] to, patient could only say someone she met.  When asked if she sold [employer] for a great deal of money, patient stated she didn't get money.  \"I go a bright yellow space craft.\"  Patient stated she hasn't seen it, that she has been told about it and that it is a bright yellow.   "

## 2021-10-08 PROCEDURE — G0378 HOSPITAL OBSERVATION PER HR: HCPCS

## 2021-10-08 NOTE — PROGRESS NOTES
Alert and oriented x2, disoriented with event and place. Offered fluids and snacks. Safety precautions in placed. Bed in lowest position. Upper side rails up. Treaded socks on. Reinforced the use of call light when needing assistance.

## 2021-10-09 PROCEDURE — G0378 HOSPITAL OBSERVATION PER HR: HCPCS

## 2021-10-09 PROCEDURE — 99225 PR SUBSEQUENT OBSERVATION CARE,LEVEL II: CPT | Performed by: STUDENT IN AN ORGANIZED HEALTH CARE EDUCATION/TRAINING PROGRAM

## 2021-10-09 ASSESSMENT — ENCOUNTER SYMPTOMS
VOMITING: 0
NAUSEA: 0
DIZZINESS: 0
SHORTNESS OF BREATH: 0

## 2021-10-09 ASSESSMENT — FIBROSIS 4 INDEX: FIB4 SCORE: 1.51

## 2021-10-09 NOTE — PROGRESS NOTES
[facility] Medicine Daily Progress Note    Date of Service  10/9/2021    Chief Complaint  Zully Lin is a 70 y.o. female admitted 6/11/2021 from [facility] on 6/11/2021 with AMS, including worsening agitation and delusions. She has dementia (unclear baseline), hypothyroidism, schizoaffective disorder and bipolar disorder.     Hospital Course  Infectious work-up was unremarkable. Patient was evaluated by psychiatry and psychology. She was considered incapacitated to make medical and discharge decisions. She was initially placed on legal hold. Psychiatric medications were adjusted. Patient was medically cleared on 6/13/2021. Inpatient psych facility placement was difficult due to insurance limitations.  Patient has refused to take her psychiatric medications, psychiatry signed off and clear from Psychiatry services to be discharged on 6/22/2020. Since then, CM team has been working on a safe DC planning. Pt lacks decisional capacity; hence, now guardian is being pursued and possible eventual placement in Group home.     Interval Problem Update  10/5: No acute complaints, wants to leave saying she is the Governor of [place], and [place] now.    10/8: No acute issues. Denying medications, blood work and EKG. Says she wants to see her own doctors. Says she was  to 2 doctors in the past and knows what she's doing.      I have personally seen and examined the patient at bedside. I discussed the plan of care with bedside RN.    Consultants/Specialty  psychiatry    Code Status  Full Code    Disposition  Patient is medically cleared.   Anticipate discharge to Guardianship pending; eventual possible placement to group home.   I have placed the appropriate orders for post-discharge needs.    Review of Systems  Review of Systems   Respiratory: Negative for shortness of breath.    Cardiovascular: Negative for chest pain.   Gastrointestinal: Negative for nausea and vomiting.   Genitourinary:  Negative for dysuria.   Neurological: Negative for dizziness.   Psychiatric/Behavioral:        Delusional   All other systems reviewed and are negative.       Physical Exam  Temp:  [36.3 °C (97.3 °F)-36.4 °C (97.6 °F)] 36.3 °C (97.3 °F)  Pulse:  [69-82] 82  Resp:  [17-18] 17  BP: (105-138)/(73-80) 105/73  SpO2:  [96 %-99 %] 96 %    Physical Exam  Constitutional:       Appearance: Normal appearance. She is obese.   HENT:      Head: Normocephalic.      Mouth/Throat:      Mouth: Mucous membranes are moist.   Cardiovascular:      Rate and Rhythm: Normal rate.      Heart sounds: No gallop.    Pulmonary:      Effort: Pulmonary effort is normal. No respiratory distress.   Musculoskeletal:      Right lower leg: No edema.      Left lower leg: No edema.   Skin:     General: Skin is warm.   Neurological:      Mental Status: She is alert. She is disoriented.   Psychiatric:         Attention and Perception: Attention normal.         Mood and Affect: Affect is angry.         Speech: Speech is tangential.         Thought Content: Thought content is delusional.      Comments: Calm         Fluids    Intake/Output Summary (Last 24 hours) at 10/9/2021 0911  Last data filed at 10/8/2021 2000  Gross per 24 hour   Intake 1428 ml   Output --   Net 1428 ml       Laboratory                        Imaging  DX-CHEST-LIMITED (1 VIEW)   Final Result      Left basilar atelectasis. No focal consolidation. No effusions.           Assessment/Plan  * Delusional disorder- (present on admission)  Assessment & Plan  Refuses medications.    Schizoaffective disorder, bipolar type (HCC)- (present on admission)  Assessment & Plan  Patient has been refusing medications and blood draw due to paranoia.    Actively delusional  Case management are working on discharge planning and guardianship.    History of dementia- (present on admission)  Assessment & Plan  At baseline    Hypothyroidism- (present on admission)  Assessment & Plan  Levothyroxine has been  ordered.  Patient refuses her medications.  She refused blood draw on multiple tries     Noncompliance with treatment plan- (present on admission)  Assessment & Plan  Patient refused antipsychotic medications and all other medications as well as any lab work       VTE prophylaxis: SCDs/TEDs

## 2021-10-09 NOTE — PROGRESS NOTES
COVID-19 surge in effect      Report received by dayshelder RN. Assumed care of pt. Pt A&Ox3, disoriented to event. VSS and on RA. Pt in no apparent signs of distress, denies pain. Pt refuses medications and care. Wants to be left alone in room. Call light within reach, bed locked and in lowest position, and pt has no further questions at this time.

## 2021-10-10 PROCEDURE — G0378 HOSPITAL OBSERVATION PER HR: HCPCS

## 2021-10-10 NOTE — PROGRESS NOTES
COVID 19 surge in effect.     Pt A&Ox3- disoriented to situation claiming she is the mayor and the [place], that we are holding her captive here. Pt on RA, no complaints of pain. Pt refused full skin assessment and medications. Pt is resting in bed with bags packed. No WG,  patient has removed it multiple times in the past, charge aware.

## 2021-10-11 PROCEDURE — G0378 HOSPITAL OBSERVATION PER HR: HCPCS

## 2021-10-12 PROCEDURE — G0378 HOSPITAL OBSERVATION PER HR: HCPCS

## 2021-10-13 PROCEDURE — 99225 PR SUBSEQUENT OBSERVATION CARE,LEVEL II: CPT | Performed by: INTERNAL MEDICINE

## 2021-10-13 PROCEDURE — G0378 HOSPITAL OBSERVATION PER HR: HCPCS

## 2021-10-13 ASSESSMENT — ENCOUNTER SYMPTOMS
DIZZINESS: 0
FEVER: 0
SHORTNESS OF BREATH: 0
NAUSEA: 0
CHILLS: 0
VOMITING: 0

## 2021-10-13 NOTE — PROGRESS NOTES
"Hospital Medicine Daily Progress Note    Date of Service  10/13/2021    Chief Complaint  Zully Lin is a 70 y.o. female admitted 6/11/2021 from [facility] on 6/11/2021 with AMS, including worsening agitation and delusions. She has dementia (unclear baseline), hypothyroidism, schizoaffective disorder and bipolar disorder.     Hospital Course  Infectious work-up was unremarkable. Patient was evaluated by psychiatry and psychology. She was considered incapacitated to make medical and discharge decisions. She was initially placed on legal hold. Psychiatric medications were adjusted. Patient was medically cleared on 6/13/2021. Inpatient psych facility placement was difficult due to insurance limitations.  Patient has refused to take her psychiatric medications, psychiatry signed off and clear from Psychiatry services to be discharged on 6/22/2020. Since then, CM team has been working on a safe DC planning. Pt lacks decisional capacity; hence, now guardian is being pursued and possible eventual placement in Group home.     Interval Problem Update  10/5: No acute complaints, wants to leave saying she is the Governor of [place], and [place] now.    10/8: No acute issues. Denying medications, blood work and EKG. Says she wants to see her own doctors. Says she was  to 2 doctors in the past and knows what she's doing.      PATIENT SEEN BY PREVIOUS HOSPITALIST UNTIL 10/11.    10/13: Patient seen at bedside this morning, patient is dressed up and has her bags \"ready to go\". She mentioned to me she is the governor of [place]. As per nursing no other over night events reported. We are pending placement.    I have personally seen and examined the patient at bedside. I discussed the plan of care with bedside RN.    Consultants/Specialty  psychiatry    Code Status  Full Code    Disposition  Patient is medically cleared.   Anticipate discharge to Guardianship pending; eventual possible placement " to group home.   I have placed the appropriate orders for post-discharge needs.    Review of Systems  Review of Systems   Constitutional: Negative for chills and fever.   Respiratory: Negative for shortness of breath.    Cardiovascular: Negative for chest pain.   Gastrointestinal: Negative for nausea and vomiting.   Genitourinary: Negative for dysuria.   Neurological: Negative for dizziness.   Psychiatric/Behavioral:        Delusional   All other systems reviewed and are negative.       Physical Exam  Temp:  [36.3 °C (97.4 °F)-37.1 °C (98.7 °F)] 37.1 °C (98.7 °F)  Pulse:  [75-79] 79  Resp:  [15-19] 15  BP: (123-137)/(72-75) 134/73  SpO2:  [94 %-99 %] 96 %    Physical Exam  Constitutional:       Appearance: Normal appearance. She is obese.   HENT:      Head: Normocephalic.      Mouth/Throat:      Mouth: Mucous membranes are moist.   Cardiovascular:      Rate and Rhythm: Normal rate.      Heart sounds: No gallop.    Pulmonary:      Effort: Pulmonary effort is normal. No respiratory distress.   Musculoskeletal:      Right lower leg: No edema.      Left lower leg: No edema.   Skin:     General: Skin is warm.   Neurological:      Mental Status: She is alert. She is disoriented.   Psychiatric:         Attention and Perception: Attention normal.         Mood and Affect: Affect is angry.         Speech: Speech is tangential.         Thought Content: Thought content is delusional.      Comments: Calm         Fluids    Intake/Output Summary (Last 24 hours) at 10/13/2021 1032  Last data filed at 10/12/2021 1800  Gross per 24 hour   Intake 1080 ml   Output --   Net 1080 ml       Laboratory                        Imaging  DX-CHEST-LIMITED (1 VIEW)   Final Result      Left basilar atelectasis. No focal consolidation. No effusions.           Assessment/Plan  * Delusional disorder- (present on admission)  Assessment & Plan  Refuses medications.    History of dementia- (present on admission)  Assessment & Plan  At baseline    10/13:  Pending placement.    Schizoaffective disorder, bipolar type (HCC)- (present on admission)  Assessment & Plan  Patient has been refusing medications and blood draw due to paranoia.    Actively delusional  Case management are working on discharge planning and guardianship.    10/13: Pending placement.    Hypothyroidism- (present on admission)  Assessment & Plan  Levothyroxine has been ordered.  Patient refuses her medications.  She refused blood draw on multiple tries     Noncompliance with treatment plan- (present on admission)  Assessment & Plan  Patient refused antipsychotic medications and all other medications as well as any lab work    10/13: Pending placement.       VTE prophylaxis: SCDs/TEDs

## 2021-10-14 PROCEDURE — G0378 HOSPITAL OBSERVATION PER HR: HCPCS

## 2021-10-14 ASSESSMENT — PATIENT HEALTH QUESTIONNAIRE - PHQ9
2. FEELING DOWN, DEPRESSED, IRRITABLE, OR HOPELESS: NOT AT ALL
SUM OF ALL RESPONSES TO PHQ9 QUESTIONS 1 AND 2: 0
1. LITTLE INTEREST OR PLEASURE IN DOING THINGS: NOT AT ALL

## 2021-10-14 ASSESSMENT — PAIN DESCRIPTION - PAIN TYPE: TYPE: ACUTE PAIN

## 2021-10-15 PROCEDURE — G0378 HOSPITAL OBSERVATION PER HR: HCPCS

## 2021-10-15 NOTE — PROGRESS NOTES
Patient is alert and oriented x 3. No complaint of pain. Not in nay distress. Kept safe and comfortable.

## 2021-10-15 NOTE — DISCHARGE PLANNING
"Medical Social Work  Patient was assigned a guardian through [facility]    Patient was initially open to being present and talking.  Patient asked Judge Li where his credentials are to show he is a real .  SW attempted to show patient, Judge Lis bio on the [facility] Website, patient declined.  Stating that can be made up by anyone.  Judge Li found his ID card from the [facility] and attempted to show to patient.  Patient stated this is also fake.  Patient then told SW that she is done with him and this entire show, and to leave the room.  SW did not leave immediately, when Judge Li stated patient was granted a guardian and why.  The patient stated that no one has the right to take her title away. \"Let alone someone who isn't a real .\"  "

## 2021-10-15 NOTE — DISCHARGE PLANNING
Received Choice form at [id number]  Agency/Facility Name: [facility]  Referral not sent, pending fax number or e-mail.     [phone]  Agency/Facility Name: [facility]  Outcome: DPA left v-mail regarding fax number for referral at [phone] ext [phone], none stated on web site. DPA to fax SNF referral once fax number has been obtained.

## 2021-10-15 NOTE — PROGRESS NOTES
Received bedside report and accepted care of patient.    Pt is currently alert at this moment. Pt is not in pain at this moment, all needs met at this time.    Patient currently resting in bed in no visible or stated signs of distress. Bed in locked and lowest position. Call light and personal possessions within reach. Patient educated about use of call light and verbalized understanding.

## 2021-10-16 PROCEDURE — 99225 PR SUBSEQUENT OBSERVATION CARE,LEVEL II: CPT | Performed by: INTERNAL MEDICINE

## 2021-10-16 PROCEDURE — G0378 HOSPITAL OBSERVATION PER HR: HCPCS

## 2021-10-16 ASSESSMENT — ENCOUNTER SYMPTOMS
VOMITING: 0
NAUSEA: 0
SHORTNESS OF BREATH: 0
FEVER: 0
CHILLS: 0
DIZZINESS: 0

## 2021-10-16 ASSESSMENT — FIBROSIS 4 INDEX: FIB4 SCORE: 1.51

## 2021-10-16 NOTE — PROGRESS NOTES
"Hospital Medicine Daily Progress Note    Date of Service  10/16/2021    Chief Complaint  Zully Lin is a 70 y.o. female admitted 6/11/2021 from [facility] on 6/11/2021 with AMS, including worsening agitation and delusions. She has dementia (unclear baseline), hypothyroidism, schizoaffective disorder and bipolar disorder.     Hospital Course  Infectious work-up was unremarkable. Patient was evaluated by psychiatry and psychology. She was considered incapacitated to make medical and discharge decisions. She was initially placed on legal hold. Psychiatric medications were adjusted. Patient was medically cleared on 6/13/2021. Inpatient psych facility placement was difficult due to insurance limitations.  Patient has refused to take her psychiatric medications, psychiatry signed off and clear from Psychiatry services to be discharged on 6/22/2020. Since then, CM team has been working on a safe DC planning. Pt lacks decisional capacity; hence, now guardian is being pursued and possible eventual placement in Group home.     Interval Problem Update  10/5: No acute complaints, wants to leave saying she is the Governor of [place], and [place] now.    10/8: No acute issues. Denying medications, blood work and EKG. Says she wants to see her own doctors. Says she was  to 2 doctors in the past and knows what she's doing.      PATIENT SEEN BY PREVIOUS HOSPITALIST UNTIL 10/11.    10/13: Patient seen at bedside this morning, patient is dressed up and has her bags \"ready to go\". She mentioned to me she is the governor of [place]. As per nursing no other over night events reported. We are pending placement.    10/16: Patient seen at bedside this morning.  Patient is sitting in the chair fully dressed, mentioning that she is being held hostage.  The patient is calm, not complaining of pain and mentioned that she had a good night sleep.  As per nursing no other overnight events reported.  We are " pending placement.    UPDATE: As per chart review, patient seems to have got a guardian. Pending placement.    I have personally seen and examined the patient at bedside. I discussed the plan of care with bedside RN.    Consultants/Specialty  psychiatry    Code Status  Full Code    Disposition  Patient is medically cleared.   Anticipate discharge to Guardianship pending; eventual possible placement to group home.   I have placed the appropriate orders for post-discharge needs.    Review of Systems  Review of Systems   Constitutional: Negative for chills and fever.   Respiratory: Negative for shortness of breath.    Cardiovascular: Negative for chest pain.   Gastrointestinal: Negative for nausea and vomiting.   Genitourinary: Negative for dysuria.   Neurological: Negative for dizziness.   Psychiatric/Behavioral:        Delusional   All other systems reviewed and are negative.       Physical Exam  Temp:  [36.1 °C (97 °F)-36.4 °C (97.6 °F)] 36.1 °C (97 °F)  Pulse:  [65-85] 65  Resp:  [16-18] 16  BP: (118-148)/(78-86) 148/86  SpO2:  [94 %-100 %] 99 %    Physical Exam  Constitutional:       Appearance: Normal appearance. She is obese.   HENT:      Head: Normocephalic.      Mouth/Throat:      Mouth: Mucous membranes are moist.   Cardiovascular:      Rate and Rhythm: Normal rate.      Heart sounds: No gallop.    Pulmonary:      Effort: Pulmonary effort is normal. No respiratory distress.   Musculoskeletal:      Right lower leg: No edema.      Left lower leg: No edema.   Skin:     General: Skin is warm.   Neurological:      Mental Status: She is alert. She is disoriented.   Psychiatric:         Attention and Perception: Attention normal.         Mood and Affect: Affect is angry.         Speech: Speech is tangential.         Thought Content: Thought content is delusional.      Comments: Calm         Fluids    Intake/Output Summary (Last 24 hours) at 10/16/2021 0831  Last data filed at 10/16/2021 0500  Gross per 24 hour    Intake 600 ml   Output --   Net 600 ml       Laboratory                        Imaging  DX-CHEST-LIMITED (1 VIEW)   Final Result      Left basilar atelectasis. No focal consolidation. No effusions.           Assessment/Plan  * Delusional disorder- (present on admission)  Assessment & Plan  Refuses medications.    History of dementia- (present on admission)  Assessment & Plan  At baseline    10/16: Pending placement.    Schizoaffective disorder, bipolar type (HCC)- (present on admission)  Assessment & Plan  Patient has been refusing medications and blood draw due to paranoia.    Actively delusional  Case management are working on discharge planning and guardianship.    10/16: Pending placement.    Hypothyroidism- (present on admission)  Assessment & Plan  Levothyroxine has been ordered.  Patient refuses her medications.  She refused blood draw on multiple tries     Noncompliance with treatment plan- (present on admission)  Assessment & Plan  Patient refused antipsychotic medications and all other medications as well as any lab work    10/16: Pending placement.       VTE prophylaxis: SCDs/TEDs

## 2021-10-17 PROCEDURE — G0378 HOSPITAL OBSERVATION PER HR: HCPCS

## 2021-10-17 NOTE — PROGRESS NOTES
Covid-19 surge in effect.    Was sitting in chair earlier.  Better mood.  Agreed on assessment except skin assessment.  Denied any pain or needs.

## 2021-10-18 PROCEDURE — G0378 HOSPITAL OBSERVATION PER HR: HCPCS

## 2021-10-18 ASSESSMENT — PAIN DESCRIPTION - PAIN TYPE: TYPE: ACUTE PAIN

## 2021-10-18 NOTE — PROGRESS NOTES
Covid-19 surge in effect.    Pt was already in bed by the time this RN went into her room for assessment.  Pt agreed assessments except skin.  Denied any pain or needs.

## 2021-10-18 NOTE — PROGRESS NOTES
Bedside report received at change of shift. Pt is A&Ox3, reports no pain at this time. Refuses any assessments. Declines any needs at this time.

## 2021-10-19 PROCEDURE — 99231 SBSQ HOSP IP/OBS SF/LOW 25: CPT | Performed by: INTERNAL MEDICINE

## 2021-10-19 PROCEDURE — G0378 HOSPITAL OBSERVATION PER HR: HCPCS

## 2021-10-19 ASSESSMENT — ENCOUNTER SYMPTOMS
VOMITING: 0
CHILLS: 0
SHORTNESS OF BREATH: 0
DIZZINESS: 0
FEVER: 0
NAUSEA: 0

## 2021-10-19 ASSESSMENT — PAIN DESCRIPTION - PAIN TYPE: TYPE: ACUTE PAIN

## 2021-10-19 NOTE — PROGRESS NOTES
Bedside report received at change of shift. Pt is A&Ox3, disoriented to situation. Pt refuses to be assessed at this time. Pt is up-self. States all her needs are met at this time.

## 2021-10-19 NOTE — PROGRESS NOTES
"[facility] Medicine Daily Progress Note    Date of Service  10/19/2021    Chief Complaint  Zully Lin is a 70 y.o. female admitted 6/11/2021 from [facility] on 6/11/2021 with AMS, including worsening agitation and delusions. She has dementia (unclear baseline), hypothyroidism, schizoaffective disorder and bipolar disorder.     Hospital Course  Infectious work-up was unremarkable. Patient was evaluated by psychiatry and psychology. She was considered incapacitated to make medical and discharge decisions. She was initially placed on legal hold. Psychiatric medications were adjusted. Patient was medically cleared on 6/13/2021. Inpatient psych facility placement was difficult due to insurance limitations.  Patient has refused to take her psychiatric medications, psychiatry signed off and clear from Psychiatry services to be discharged on 6/22/2020. Since then, CM team has been working on a safe DC planning. Pt lacks decisional capacity; hence, now guardian is being pursued and possible eventual placement in Group home.     Interval Problem Update  10/5: No acute complaints, wants to leave saying she is the Governor of [place], and [place] now.    10/8: No acute issues. Denying medications, blood work and EKG. Says she wants to see her own doctors. Says she was  to 2 doctors in the past and knows what she's doing.    .    10/13: Patient seen at bedside this morning, patient is dressed up and has her bags \"ready to go\". She mentioned to me she is the governor of [place]. As per nursing no other over night events reported. We are pending placement.    10/16: Patient seen at bedside this morning.  Patient is sitting in the chair fully dressed, mentioning that she is being held hostage.  The patient is calm, not complaining of pain and mentioned that she had a good night sleep.  As per nursing no other overnight events reported.  We are pending placement.    UPDATE: As per chart review, " patient seems to have got a guardian. Pending placement.    PATIENT SEEN BY PREVIOUS HOSPITALIST UNTIL 10/18    10/19: Patient seen and examined, no acute events overnight, delusional, she is sitting in the chair dressed up and her bags ready and asking when she can leave.   Patient has been in the hospital for more then 100 days and has been doing this all this time but she has never tried to elope so she needs just a regular group home.     I have personally seen and examined the patient at bedside. I discussed the plan of care with bedside RN.    Consultants/Specialty  psychiatry    Code Status  Full Code    Disposition  Patient is medically cleared.   Anticipate discharge to Guardianship pending; eventual possible placement to group home.   I have placed the appropriate orders for post-discharge needs.    Review of Systems  Review of Systems   Constitutional: Negative for chills and fever.   Respiratory: Negative for shortness of breath.    Cardiovascular: Negative for chest pain.   Gastrointestinal: Negative for nausea and vomiting.   Genitourinary: Negative for dysuria.   Neurological: Negative for dizziness.   Psychiatric/Behavioral:        Delusional   All other systems reviewed and are negative.       Physical Exam  Temp:  [36.1 °C (97 °F)-36.8 °C (98.3 °F)] 36.8 °C (98.3 °F)  Pulse:  [70-83] 83  Resp:  [16] 16  BP: (104-132)/(62-74) 104/62  SpO2:  [94 %-99 %] 94 %    Physical Exam  Constitutional:       Appearance: Normal appearance. She is obese.   HENT:      Head: Normocephalic.      Mouth/Throat:      Mouth: Mucous membranes are moist.   Cardiovascular:      Rate and Rhythm: Normal rate.      Heart sounds: No gallop.    Pulmonary:      Effort: Pulmonary effort is normal. No respiratory distress.   Abdominal:      General: There is no distension.      Tenderness: There is no abdominal tenderness.   Musculoskeletal:      Right lower leg: No edema.      Left lower leg: No edema.   Skin:     General: Skin  is warm.   Neurological:      Mental Status: She is alert. She is disoriented.   Psychiatric:         Attention and Perception: Attention normal.         Mood and Affect: Affect is angry.         Speech: Speech is tangential.         Thought Content: Thought content is delusional.      Comments: Calm         Fluids    Intake/Output Summary (Last 24 hours) at 10/19/2021 1236  Last data filed at 10/18/2021 1545  Gross per 24 hour   Intake 360 ml   Output --   Net 360 ml       Laboratory                        Imaging  DX-CHEST-LIMITED (1 VIEW)   Final Result      Left basilar atelectasis. No focal consolidation. No effusions.           Assessment/Plan  * Delusional disorder- (present on admission)  Assessment & Plan  Refuses medications.    History of dementia- (present on admission)  Assessment & Plan  At baseline    10/16: Pending placement.    Schizoaffective disorder, bipolar type (HCC)- (present on admission)  Assessment & Plan  Patient has been refusing medications and blood draw due to paranoia.    Actively delusional  Case management are working on discharge planning and guardianship.    10/16: Pending placement.    Hypothyroidism- (present on admission)  Assessment & Plan  Levothyroxine has been ordered.  Patient refuses her medications.  She refused blood draw on multiple tries     Noncompliance with treatment plan- (present on admission)  Assessment & Plan  Patient refused antipsychotic medications and all other medications as well as any lab work    10/16: Pending placement.       VTE prophylaxis: SCDs/TEDs

## 2021-10-19 NOTE — PROGRESS NOTES
Pt A&Ox2 on RA, denies pain/discomfort. Pt refused skin assessment, easily irritable. No signs of acute distress observed. Hourly rounding in place.        COVID-19 surge in effect.

## 2021-10-20 PROCEDURE — G0378 HOSPITAL OBSERVATION PER HR: HCPCS

## 2021-10-20 NOTE — PROGRESS NOTES
"Pt A&Ox2-3 on RA, denies pain/discomfort. Pt refused assessments, easily irritable, pt yelled to nurse \"I'm fine, now get out of my room!\" Safety precautions and hourly rounding in place.           COVID-19 surge in effect.  "

## 2021-10-20 NOTE — PROGRESS NOTES
Pt is A&Ox2, on RA, no signs of acute distress. Sitting up in chair. safety precautions in place. Bed in lowest position. Hourly rounding in place.

## 2021-10-21 PROCEDURE — G0378 HOSPITAL OBSERVATION PER HR: HCPCS

## 2021-10-21 NOTE — PROGRESS NOTES
COVI-19 surge in effect.    Patient sitting up in chair, no needs at this time. Hurly rounding in place.

## 2021-10-21 NOTE — PROGRESS NOTES
Alert and oriented x2, disoriented to event and place. Offered fluids and snacks. Safety precautions in placed. Bed in lowest position. Upper side rails up. Treaded socks on. Reinforced the use of call light when needing assistance.

## 2021-10-22 PROCEDURE — 99231 SBSQ HOSP IP/OBS SF/LOW 25: CPT | Performed by: INTERNAL MEDICINE

## 2021-10-22 PROCEDURE — G0378 HOSPITAL OBSERVATION PER HR: HCPCS

## 2021-10-22 ASSESSMENT — PAIN DESCRIPTION - PAIN TYPE: TYPE: ACUTE PAIN

## 2021-10-22 ASSESSMENT — ENCOUNTER SYMPTOMS
VOMITING: 0
FEVER: 0
NAUSEA: 0
DIZZINESS: 0
SHORTNESS OF BREATH: 0
CHILLS: 0

## 2021-10-22 NOTE — PROGRESS NOTES
"Hospital Medicine Daily Progress Note    Date of Service  10/22/2021    Chief Complaint  Zully Lin is a 70 y.o. female admitted 6/11/2021 from [facility] on 6/11/2021 with AMS, including worsening agitation and delusions. She has dementia (unclear baseline), hypothyroidism, schizoaffective disorder and bipolar disorder.     Hospital Course  Infectious work-up was unremarkable. Patient was evaluated by psychiatry and psychology. She was considered incapacitated to make medical and discharge decisions. She was initially placed on legal hold. Psychiatric medications were adjusted. Patient was medically cleared on 6/13/2021. Inpatient psych facility placement was difficult due to insurance limitations.  Patient has refused to take her psychiatric medications, psychiatry signed off and clear from Psychiatry services to be discharged on 6/22/2020. Since then, CM team has been working on a safe DC planning. Pt lacks decisional capacity; hence, now guardian is being pursued and possible eventual placement in Group home.     Interval Problem Update  10/5: No acute complaints, wants to leave saying she is the Governor of [place], and [place] now.    10/8: No acute issues. Denying medications, blood work and EKG. Says she wants to see her own doctors. Says she was  to 2 doctors in the past and knows what she's doing.    .    10/13: Patient seen at bedside this morning, patient is dressed up and has her bags \"ready to go\". She mentioned to me she is the governor of [place]. As per nursing no other over night events reported. We are pending placement.    10/16: Patient seen at bedside this morning.  Patient is sitting in the chair fully dressed, mentioning that she is being held hostage.  The patient is calm, not complaining of pain and mentioned that she had a good night sleep.  As per nursing no other overnight events reported.  We are pending placement.    UPDATE: As per chart review, " patient seems to have got a guardian. Pending placement.    PATIENT SEEN BY PREVIOUS HOSPITALIST UNTIL 10/18    10/19: Patient seen and examined, no acute events overnight, delusional, she is sitting in the chair dressed up and her bags ready and asking when she can leave.   Patient has been in the hospital for more then 100 days and has been doing this all this time but she has never tried to elope so she needs just a regular group home.     10/22: Patient seen and examined, sitting at the side of the bed, dressed up and asking when she can go, no overnight events.   Awaiting placement     I have personally seen and examined the patient at bedside. I discussed the plan of care with bedside RN.    Consultants/Specialty  psychiatry    Code Status  Full Code    Disposition  Patient is medically cleared.   Anticipate discharge to Guardianship pending; eventual possible placement to group home.   I have placed the appropriate orders for post-discharge needs.    Review of Systems  Review of Systems   Constitutional: Negative for chills and fever.   Respiratory: Negative for shortness of breath.    Cardiovascular: Negative for chest pain.   Gastrointestinal: Negative for nausea and vomiting.   Genitourinary: Negative for dysuria.   Neurological: Negative for dizziness.   Psychiatric/Behavioral:        Delusional   All other systems reviewed and are negative.       Physical Exam  Temp:  [36.1 °C (97 °F)-36.2 °C (97.2 °F)] 36.1 °C (97 °F)  Pulse:  [79-83] 79  Resp:  [16-18] 18  BP: (126-142)/(65-80) 130/80  SpO2:  [94 %-99 %] 94 %    Physical Exam  Constitutional:       Appearance: Normal appearance. She is obese.   HENT:      Head: Normocephalic.      Mouth/Throat:      Mouth: Mucous membranes are moist.   Cardiovascular:      Rate and Rhythm: Normal rate.      Heart sounds: No gallop.    Pulmonary:      Effort: Pulmonary effort is normal. No respiratory distress.   Abdominal:      General: There is no distension.       Tenderness: There is no abdominal tenderness.   Musculoskeletal:      Right lower leg: No edema.      Left lower leg: No edema.   Skin:     General: Skin is warm.   Neurological:      Mental Status: She is alert. She is disoriented.   Psychiatric:         Attention and Perception: Attention normal.         Mood and Affect: Affect is angry.         Speech: Speech is tangential.         Thought Content: Thought content is delusional.      Comments: Calm         Fluids    Intake/Output Summary (Last 24 hours) at 10/22/2021 1110  Last data filed at 10/22/2021 0800  Gross per 24 hour   Intake 400 ml   Output --   Net 400 ml       Laboratory                        Imaging  DX-CHEST-LIMITED (1 VIEW)   Final Result      Left basilar atelectasis. No focal consolidation. No effusions.           Assessment/Plan  * Delusional disorder- (present on admission)  Assessment & Plan  Refuses medications.    History of dementia- (present on admission)  Assessment & Plan  At baseline    10/16: Pending placement.    Schizoaffective disorder, bipolar type (HCC)- (present on admission)  Assessment & Plan  Patient has been refusing medications and blood draw due to paranoia.    Actively delusional  Case management are working on discharge planning and guardianship.    10/16: Pending placement.    Hypothyroidism- (present on admission)  Assessment & Plan  Levothyroxine has been ordered.  Patient refuses her medications.  She refused blood draw on multiple tries     Noncompliance with treatment plan- (present on admission)  Assessment & Plan  Patient refused antipsychotic medications and all other medications as well as any lab work    10/16: Pending placement.       VTE prophylaxis: SCDs/TEDs

## 2021-10-23 PROCEDURE — G0378 HOSPITAL OBSERVATION PER HR: HCPCS

## 2021-10-23 ASSESSMENT — FIBROSIS 4 INDEX: FIB4 SCORE: 1.51

## 2021-10-23 ASSESSMENT — PAIN DESCRIPTION - PAIN TYPE: TYPE: ACUTE PAIN

## 2021-10-23 NOTE — PROGRESS NOTES
"Assumed care of patient this shift. Patient is alert and oriented to self and place. Patient asked this RN to come into her room, patient stated \"I have paperwork in my purse that says I'm the governor, I know people think I am lying but I am not and I don't want to stay here anymore\"   Patient has multiple bags packed sitting beside her on the bed. Denied any other complaints. Patient sitting at edge of bed eating breakfast.   "

## 2021-10-23 NOTE — PROGRESS NOTES
"COVID-19 surge in effect    Pt is A&Ox1, to self only. Pt is resting in bed, VSS on RA. Pt refused full assessment. Pt screamed 'What are you doing in here? Get out now\". Call light & personal belongings within reach, bed in lowest position & locked. Fall precautions in place. All needs met at this time.       "

## 2021-10-24 PROCEDURE — G0378 HOSPITAL OBSERVATION PER HR: HCPCS

## 2021-10-24 ASSESSMENT — PAIN DESCRIPTION - PAIN TYPE: TYPE: ACUTE PAIN

## 2021-10-24 NOTE — PROGRESS NOTES
"COVID-19 surge in effect    Pt is A&Ox1, to self only. Pt is resting in bed, with trash can placed above her pillow on the bed and all belongings packed at the bottom of the bed. Pt is refusing full assessment and states \"I'm fine. Please leave me alone\". Hourly rounding in place.     "

## 2021-10-25 PROCEDURE — G0378 HOSPITAL OBSERVATION PER HR: HCPCS

## 2021-10-25 NOTE — PROGRESS NOTES
COVID-19 surge in effect    Pt is A&Ox1, to self only. Pt is resting in bed with all belongings packed on end of bed. Pt refuses assessment. No complaints of pain or discomfort. All needs met at this time.

## 2021-10-26 PROCEDURE — G0378 HOSPITAL OBSERVATION PER HR: HCPCS

## 2021-10-26 NOTE — PROGRESS NOTES
Received report from NOC RN and assumed care of pt. Pt is A&Ox1, oriented to self only. Pt is resting in bed on room air. Pt denies any needs at this time.

## 2021-10-26 NOTE — PROGRESS NOTES
covid surge in effect    Irritable at start of shift, convinced she's the mayor; refused assessment and refuses morning medications

## 2021-10-27 PROCEDURE — G0378 HOSPITAL OBSERVATION PER HR: HCPCS

## 2021-10-27 PROCEDURE — 700111 HCHG RX REV CODE 636 W/ 250 OVERRIDE (IP): Performed by: HOSPITALIST

## 2021-10-27 PROCEDURE — 91300 HCHG RX REV CODE 636 W/ 250 OVERRIDE (IP): CPT | Performed by: HOSPITALIST

## 2021-10-27 PROCEDURE — 0001A HCHG PFIZER COVID ADMIN 1ST DOSE: CPT

## 2021-10-27 RX ADMIN — RNA INGREDIENT BNT-162B2 0.3 ML: 0.23 INJECTION, SUSPENSION INTRAMUSCULAR at 10:49

## 2021-10-27 ASSESSMENT — PAIN DESCRIPTION - PAIN TYPE: TYPE: ACUTE PAIN

## 2021-10-27 NOTE — DISCHARGE PLANNING
Medical Social Work  Email from Denia, patient has $28,000 in her account with [employer] and [facility] and feels this is why they wanted her out.  Patient receives $2,000 per month from Social Security.    Denia gave approval for COVID vaccination  Volt to Dr Spangler requesting COVID vaccination.

## 2021-10-27 NOTE — DISCHARGE PLANNING
Anticipated Discharge Disposition: Group Home    Action: GOMEZ emailed guardian Denia Haas at [facility] with attending placement recommendations.   SW also asked about having patient get a COVID vaccination.  Patient has a negative Quantiferon, 1/15/2020      Barriers to Discharge: placement    Plan: continue to monitor for discharge barriers

## 2021-10-27 NOTE — PROGRESS NOTES
COVID19 surge in effect.     Pt refused skin assessment. Pt A&Ox 2 disoriented to situation and time.   No reports of pain. Pt has bags packed. No wander gaurd on, charge nurse aware.

## 2021-10-28 PROCEDURE — G0378 HOSPITAL OBSERVATION PER HR: HCPCS

## 2021-10-28 ASSESSMENT — PAIN DESCRIPTION - PAIN TYPE: TYPE: ACUTE PAIN

## 2021-10-28 NOTE — PROGRESS NOTES
COVID-19 surge in effect.    Pt alert/oriented x2 (disoriented to time/event), denies pain. Compliant with assessment. Pt resting quietly in bed, needs met. Safety precautions and hourly rounding in place.

## 2021-10-29 PROCEDURE — G0378 HOSPITAL OBSERVATION PER HR: HCPCS

## 2021-10-29 ASSESSMENT — PAIN DESCRIPTION - PAIN TYPE: TYPE: ACUTE PAIN

## 2021-10-30 PROCEDURE — G0378 HOSPITAL OBSERVATION PER HR: HCPCS

## 2021-10-31 PROCEDURE — G0378 HOSPITAL OBSERVATION PER HR: HCPCS

## 2021-10-31 ASSESSMENT — FIBROSIS 4 INDEX: FIB4 SCORE: 1.53

## 2021-11-01 PROCEDURE — 99231 SBSQ HOSP IP/OBS SF/LOW 25: CPT | Performed by: HOSPITALIST

## 2021-11-01 PROCEDURE — G0378 HOSPITAL OBSERVATION PER HR: HCPCS

## 2021-11-01 ASSESSMENT — ENCOUNTER SYMPTOMS
SHORTNESS OF BREATH: 0
VOMITING: 0
NAUSEA: 0
FEVER: 0
CHILLS: 0
DIZZINESS: 0

## 2021-11-01 ASSESSMENT — PAIN DESCRIPTION - PAIN TYPE: TYPE: ACUTE PAIN

## 2021-11-01 NOTE — DISCHARGE PLANNING
"Anticipated Discharge Disposition: Group Home/Secure    Action: Patient was assessed by John Cox.  Informed SW that the patient is not interested in coming to her group home.  Has concerns over patient's mental health statements, \"I already have a home and I don't need any ones help.\"    SW spoke to patient, when SW knocked on patient's door asking if it was okay to come in.  Patient asked why SW wanted to come in, \"are you sending me home, as I wont live in someone else's home.  I have one already.\"  SW asked patient where her home is.  Patient stated that SW is playing games with her.  \"You took me to [place] yesterday and now I am back in [place].\"  When SW asked patient why SW would do this.  Patient stated she doesn't know, \"you tell me why you did this.\"  Patient then told SW she has 9 13 year old children at home along with a 51 year old child.   Patient then told GOMEZ that her home is [address] of [address] in [place], \"[place].\"     Barriers to Discharge: placement    Plan: follow up with guardian     "

## 2021-11-01 NOTE — DISCHARGE PLANNING
Medical Social Work  Email from guardian , [facility] will be contact SW today to discuss possible placement.    SW discussed with patient's nurse, who feels patient will need a private room due to her behaviors, psychosis.

## 2021-11-01 NOTE — PROGRESS NOTES
"[facility] Medicine Daily Progress Note    Date of Service  11/1/2021    Chief Complaint  Zully Lin is a 70 y.o. female admitted 6/11/2021 from [facility] on 6/11/2021 with AMS, including worsening agitation and delusions. She has dementia (unclear baseline), hypothyroidism, schizoaffective disorder and bipolar disorder.     Hospital Course  Infectious work-up was unremarkable. Patient was evaluated by psychiatry and psychology. She was considered incapacitated to make medical and discharge decisions. She was initially placed on legal hold. Psychiatric medications were adjusted. Patient was medically cleared on 6/13/2021. Inpatient psych facility placement was difficult due to insurance limitations.  Patient has refused to take her psychiatric medications, psychiatry signed off and clear from Psychiatry services to be discharged on 6/22/2020. Since then, CM team has been working on a safe DC planning. Pt lacks decisional capacity; hence, now guardian is being pursued and possible eventual placement in Group home.     Interval Problem Update  10/5: No acute complaints, wants to leave saying she is the [place] of [place], [place] now.    10/8: No acute issues. Denying medications, blood work and EKG. Says she wants to see her own doctors. Says she was  to 2 doctors in the past and knows what she's doing.    .    10/13: Patient seen at bedside this morning, patient is dressed up and has her bags \"ready to go\". She mentioned to me she is the governor of [place]. As per nursing no other over night events reported. We are pending placement.    10/16: Patient seen at bedside this morning.  Patient is sitting in the chair fully dressed, mentioning that she is being held hostage.  The patient is calm, not complaining of pain and mentioned that she had a good night sleep.  As per nursing no other overnight events reported.  We are pending placement.    UPDATE: As per chart review, " patient seems to have got a guardian. Pending placement.    PATIENT SEEN BY PREVIOUS HOSPITALIST UNTIL 10/18    10/19: Patient seen and examined, no acute events overnight, delusional, she is sitting in the chair dressed up and her bags ready and asking when she can leave.   Patient has been in the hospital for more then 100 days and has been doing this all this time but she has never tried to elope so she needs just a regular group home.     10/22: Patient seen and examined, sitting at the side of the bed, dressed up and asking when she can go, no overnight events.   Awaiting placement     11/1: Waiting for acceptance at group home.    I have personally seen and examined the patient at bedside. I discussed the plan of care with bedside RN.    Consultants/Specialty  psychiatry    Code Status  Full Code    Disposition  Patient is medically cleared.   Anticipate discharge to Guardianship pending; eventual possible placement to group home.   I have placed the appropriate orders for post-discharge needs.    Review of Systems  Review of Systems   Constitutional: Negative for chills and fever.   Respiratory: Negative for shortness of breath.    Cardiovascular: Negative for chest pain.   Gastrointestinal: Negative for nausea and vomiting.   Genitourinary: Negative for dysuria.   Neurological: Negative for dizziness.   Psychiatric/Behavioral:        Delusional   All other systems reviewed and are negative.       Physical Exam  Temp:  [36.1 °C (96.9 °F)-36.2 °C (97.2 °F)] 36.1 °C (96.9 °F)  Pulse:  [61-65] 65  Resp:  [16-18] 18  BP: (127-151)/(75-78) 133/75  SpO2:  [96 %-98 %] 98 %    Physical Exam  Constitutional:       Appearance: Normal appearance. She is obese.   HENT:      Head: Normocephalic.      Mouth/Throat:      Mouth: Mucous membranes are moist.   Cardiovascular:      Rate and Rhythm: Normal rate.      Heart sounds: No gallop.    Pulmonary:      Effort: Pulmonary effort is normal. No respiratory distress.    Abdominal:      General: There is no distension.      Tenderness: There is no abdominal tenderness.   Musculoskeletal:      Right lower leg: No edema.      Left lower leg: No edema.   Skin:     General: Skin is warm.   Neurological:      Mental Status: She is alert. She is disoriented.   Psychiatric:         Attention and Perception: Attention normal.         Mood and Affect: Affect is angry.         Speech: Speech is tangential.         Thought Content: Thought content is delusional.      Comments: Calm         Fluids    Intake/Output Summary (Last 24 hours) at 11/1/2021 1456  Last data filed at 11/1/2021 1230  Gross per 24 hour   Intake 1080 ml   Output --   Net 1080 ml       Laboratory                        Imaging  DX-CHEST-LIMITED (1 VIEW)   Final Result      Left basilar atelectasis. No focal consolidation. No effusions.           Assessment/Plan  * Delusional disorder- (present on admission)  Assessment & Plan  Refuses medications.    History of dementia- (present on admission)  Assessment & Plan  At baseline    10/16: Pending placement.    Schizoaffective disorder, bipolar type (HCC)- (present on admission)  Assessment & Plan  Patient has been refusing medications and blood draw due to paranoia.    Actively delusional  Case management are working on discharge planning and guardianship.    10/16: Pending placement.    Hypothyroidism- (present on admission)  Assessment & Plan  Levothyroxine has been ordered.  Patient refuses her medications.  She refused blood draw on multiple tries     Noncompliance with treatment plan- (present on admission)  Assessment & Plan  Patient refused antipsychotic medications and all other medications as well as any lab work    10/16: Pending placement.       VTE prophylaxis: SCDs/TEDs

## 2021-11-01 NOTE — DISCHARGE PLANNING
Medical Social Work  Email from Zully Cartagena, guardian , Brooke at [facility] has a private room and can assess the patient for acceptance    PC to Brooke,[phone].  SW told that she was contacted by [facility] and will be out today to assess patient for acceptance.

## 2021-11-02 PROCEDURE — G0378 HOSPITAL OBSERVATION PER HR: HCPCS

## 2021-11-02 PROCEDURE — 99224 PR SUBSEQUENT OBSERVATION CARE,LEVEL I: CPT | Performed by: INTERNAL MEDICINE

## 2021-11-02 ASSESSMENT — ENCOUNTER SYMPTOMS
VOMITING: 0
DIZZINESS: 0
NAUSEA: 0
ABDOMINAL PAIN: 0
COUGH: 0
SHORTNESS OF BREATH: 0
PALPITATIONS: 0
FALLS: 0
CHILLS: 0
LOSS OF CONSCIOUSNESS: 0
FEVER: 0

## 2021-11-02 ASSESSMENT — PAIN DESCRIPTION - PAIN TYPE: TYPE: ACUTE PAIN

## 2021-11-02 NOTE — PROGRESS NOTES
[facility] Medicine Daily Progress Note    Date of Service  11/2/2021    Chief Complaint  Zully Lin is a 70 y.o. female admitted 6/11/2021 from [facility] on 6/11/2021 with AMS, including worsening agitation and delusions. She has dementia (unclear baseline), hypothyroidism, schizoaffective disorder and bipolar disorder.     Hospital Course  Infectious work-up was unremarkable. Patient was evaluated by psychiatry and psychology. She was considered incapacitated to make medical and discharge decisions. She was initially placed on legal hold. Psychiatric medications were adjusted. Patient was medically cleared on 6/13/2021. Inpatient psych facility placement was difficult due to insurance limitations.  Patient has refused to take her psychiatric medications, psychiatry signed off and clear from Psychiatry services to be discharged on 6/22/2020. Since then, CM team has been working on a safe DC planning. Pt lacks decisional capacity; hence, now guardian is being pursued and possible eventual placement in Group home.     Interval Problem Update  Patient was seen and examined at bedside.  No acute events overnight. Patient is resting comfortably in bed and in no acute distress.     Awaiting placement    I have personally seen and examined the patient at bedside. I discussed the plan of care with bedside RN.    Consultants/Specialty  psychiatry    Code Status  Full Code    Disposition  Patient is medically cleared.   Anticipate discharge to Guardianship pending; eventual possible placement to group home.   I have placed the appropriate orders for post-discharge needs.    Review of Systems  Review of Systems   Constitutional: Negative for chills and fever.   Respiratory: Negative for cough and shortness of breath.    Cardiovascular: Negative for chest pain and palpitations.   Gastrointestinal: Negative for abdominal pain, nausea and vomiting.   Genitourinary: Negative for dysuria.   Musculoskeletal:  Negative for falls.   Neurological: Negative for dizziness and loss of consciousness.   Psychiatric/Behavioral:        Delusional   All other systems reviewed and are negative.       Physical Exam  Temp:  [36.1 °C (96.9 °F)-36.5 °C (97.7 °F)] 36.2 °C (97.1 °F)  Pulse:  [76-94] 76  Resp:  [16-18] 16  BP: (121-148)/(73-75) 135/74  SpO2:  [95 %-98 %] 98 %    Physical Exam  Constitutional:       General: She is not in acute distress.  HENT:      Head: Normocephalic.      Right Ear: External ear normal.      Left Ear: External ear normal.      Mouth/Throat:      Mouth: Mucous membranes are moist.      Pharynx: No oropharyngeal exudate.   Eyes:      General: No scleral icterus.  Cardiovascular:      Rate and Rhythm: Normal rate and regular rhythm.   Pulmonary:      Effort: Pulmonary effort is normal. No respiratory distress.   Abdominal:      Tenderness: There is no abdominal tenderness. There is no guarding or rebound.   Musculoskeletal:         General: No swelling or deformity.   Skin:     General: Skin is warm.      Coloration: Skin is not jaundiced.      Findings: No bruising.   Neurological:      Mental Status: She is alert. She is disoriented.   Psychiatric:         Attention and Perception: Attention normal.         Mood and Affect: Affect is angry.         Speech: Speech is tangential.         Thought Content: Thought content is delusional.      Comments: Calm         Fluids    Intake/Output Summary (Last 24 hours) at 11/2/2021 1217  Last data filed at 11/2/2021 0905  Gross per 24 hour   Intake 840 ml   Output --   Net 840 ml       Laboratory                        Imaging  DX-CHEST-LIMITED (1 VIEW)   Final Result      Left basilar atelectasis. No focal consolidation. No effusions.           Assessment/Plan  * Delusional disorder- (present on admission)  Assessment & Plan  Refuses medications.    History of dementia- (present on admission)  Assessment & Plan  At baseline    10/16: Pending  placement.    Schizoaffective disorder, bipolar type (HCC)- (present on admission)  Assessment & Plan  Patient has been refusing medications and blood draw due to paranoia.    Actively delusional  Case management are working on discharge planning and guardianship.    10/16: Pending placement.    Hypothyroidism- (present on admission)  Assessment & Plan  Levothyroxine has been ordered.  Patient refuses her medications.  She refused blood draw on multiple tries     Noncompliance with treatment plan- (present on admission)  Assessment & Plan  Patient refused antipsychotic medications and all other medications as well as any lab work    10/16: Pending placement.       VTE prophylaxis: SCDs/TEDs

## 2021-11-02 NOTE — PROGRESS NOTES
COVID-19 Surge in effect:        Pt is A&Ox1, oriented to self only and very confused. Pt denies any pain at this time. Pt sitting in chair with all her belongings packed.

## 2021-11-03 PROCEDURE — G0378 HOSPITAL OBSERVATION PER HR: HCPCS

## 2021-11-03 NOTE — PROGRESS NOTES
Bedside report received at change of shift. Pt is A&Ox3, disoriented to situation. Pt is up-self. Refuses assessment. All pt needs met at this time.

## 2021-11-04 PROCEDURE — G0378 HOSPITAL OBSERVATION PER HR: HCPCS

## 2021-11-04 NOTE — PROGRESS NOTES
Assumed care of pt after receiving report from Lynda ZENG. Pt resting in room. No signs of pain or distress noted. All needs met. Fall and safety precautions in place.

## 2021-11-04 NOTE — PROGRESS NOTES
Pt refuses to be assessed at this time. Pt reports that all her needs are met at this time.     COVID-19 surge in effect.

## 2021-11-05 PROCEDURE — G0378 HOSPITAL OBSERVATION PER HR: HCPCS

## 2021-11-05 NOTE — PROGRESS NOTES
Pt is refusing to be assessed. Has been yelling at hallucinations in her room. Declines having any needs when asked.      COVID-19 surge in effect.

## 2021-11-06 PROCEDURE — G0378 HOSPITAL OBSERVATION PER HR: HCPCS

## 2021-11-06 ASSESSMENT — PAIN DESCRIPTION - PAIN TYPE: TYPE: ACUTE PAIN

## 2021-11-06 ASSESSMENT — FIBROSIS 4 INDEX: FIB4 SCORE: 1.53

## 2021-11-07 PROCEDURE — G0378 HOSPITAL OBSERVATION PER HR: HCPCS

## 2021-11-07 PROCEDURE — 99224 PR SUBSEQUENT OBSERVATION CARE,LEVEL I: CPT | Performed by: INTERNAL MEDICINE

## 2021-11-07 ASSESSMENT — ENCOUNTER SYMPTOMS
ABDOMINAL PAIN: 0
VOMITING: 0
LOSS OF CONSCIOUSNESS: 0
CHILLS: 0
DIZZINESS: 0
NAUSEA: 0
SHORTNESS OF BREATH: 0
FEVER: 0
FALLS: 0
PALPITATIONS: 0
COUGH: 0

## 2021-11-07 ASSESSMENT — PAIN DESCRIPTION - PAIN TYPE: TYPE: ACUTE PAIN

## 2021-11-07 NOTE — PROGRESS NOTES
Hospital Medicine Daily Progress Note    Date of Service  11/7/2021    Chief Complaint  Zully Lin is a 70 y.o. female admitted 6/11/2021 from [facility] on 6/11/2021 with AMS, including worsening agitation and delusions. She has dementia (unclear baseline), hypothyroidism, schizoaffective disorder and bipolar disorder.     Hospital Course  Infectious work-up was unremarkable. Patient was evaluated by psychiatry and psychology. She was considered incapacitated to make medical and discharge decisions. She was initially placed on legal hold. Psychiatric medications were adjusted. Patient was medically cleared on 6/13/2021. Inpatient psych facility placement was difficult due to insurance limitations.  Patient has refused to take her psychiatric medications, psychiatry signed off and clear from Psychiatry services to be discharged on 6/22/2020. Since then, CM team has been working on a safe DC planning. Pt lacks decisional capacity; hence, now guardian is being pursued and possible eventual placement in Group home.     Interval Problem Update  Patient was seen and examined at bedside.  No acute events overnight. Patient is resting comfortably in bed and in no acute distress.     Awaiting placement: exploring group home options    I have personally seen and examined the patient at bedside. I discussed the plan of care with bedside RN.    Consultants/Specialty  psychiatry    Code Status  Full Code    Disposition  Patient is medically cleared.   Anticipate discharge to Guardianship pending; eventual possible placement to group home.   I have placed the appropriate orders for post-discharge needs.    Review of Systems  Review of Systems   Constitutional: Negative for chills and fever.   Respiratory: Negative for cough and shortness of breath.    Cardiovascular: Negative for chest pain and palpitations.   Gastrointestinal: Negative for abdominal pain, nausea and vomiting.   Genitourinary: Negative  for dysuria.   Musculoskeletal: Negative for falls.   Neurological: Negative for dizziness and loss of consciousness.   Psychiatric/Behavioral:        Delusional   All other systems reviewed and are negative.       Physical Exam  Temp:  [36.3 °C (97.3 °F)-36.6 °C (97.8 °F)] 36.3 °C (97.3 °F)  Pulse:  [74-75] 75  Resp:  [16-17] 17  BP: (124-132)/(72-83) 132/83  SpO2:  [95 %-99 %] 95 %    Physical Exam  Constitutional:       General: She is not in acute distress.  HENT:      Head: Normocephalic.      Right Ear: External ear normal.      Left Ear: External ear normal.      Nose: No congestion.      Mouth/Throat:      Mouth: Mucous membranes are moist.      Pharynx: No oropharyngeal exudate.   Eyes:      General: No scleral icterus.  Cardiovascular:      Rate and Rhythm: Normal rate and regular rhythm.   Pulmonary:      Effort: Pulmonary effort is normal. No respiratory distress.   Abdominal:      Tenderness: There is no abdominal tenderness. There is no guarding or rebound.   Musculoskeletal:         General: No swelling or deformity.   Skin:     General: Skin is warm.      Coloration: Skin is not jaundiced.      Findings: No bruising.   Neurological:      Mental Status: She is alert. She is disoriented.   Psychiatric:         Attention and Perception: Attention normal.         Speech: Speech is tangential.         Thought Content: Thought content is delusional.         Cognition and Memory: Cognition is impaired. Memory is impaired.      Comments: Calm         Fluids    Intake/Output Summary (Last 24 hours) at 11/7/2021 0920  Last data filed at 11/7/2021 0800  Gross per 24 hour   Intake 1794 ml   Output --   Net 1794 ml       Laboratory                        Imaging  DX-CHEST-LIMITED (1 VIEW)   Final Result      Left basilar atelectasis. No focal consolidation. No effusions.           Assessment/Plan  * Delusional disorder- (present on admission)  Assessment & Plan  Refuses medications.    History of dementia-  (present on admission)  Assessment & Plan  At baseline  Pending placement.    Schizoaffective disorder, bipolar type (HCC)- (present on admission)  Assessment & Plan  Patient has been refusing medications and blood draw due to paranoia.    Actively delusional  Case management are working on discharge planning and guardianship.    Hypothyroidism- (present on admission)  Assessment & Plan  Levothyroxine has been ordered.  Patient refuses her medications.  She refused blood draw on multiple tries     Noncompliance with treatment plan- (present on admission)  Assessment & Plan  Patient refused antipsychotic medications and all other medications as well as any lab work       VTE prophylaxis: SCDs/TEDs

## 2021-11-07 NOTE — PROGRESS NOTES
COVID-19 surge in effect    Pt is A&Ox1, to self only. Pt is laying in bed with all belongings packed on bed. Pt refused full assessment this pm. Pt denies any pain or discomfort. All needs met at this time.

## 2021-11-08 PROCEDURE — G0378 HOSPITAL OBSERVATION PER HR: HCPCS

## 2021-11-08 ASSESSMENT — PAIN DESCRIPTION - PAIN TYPE: TYPE: ACUTE PAIN

## 2021-11-09 PROCEDURE — G0378 HOSPITAL OBSERVATION PER HR: HCPCS

## 2021-11-09 ASSESSMENT — PAIN DESCRIPTION - PAIN TYPE: TYPE: ACUTE PAIN

## 2021-11-09 NOTE — DISCHARGE PLANNING
Late Entry from 11/8/21    PC to [facility].  Joseph requested they reach out to [facility], [facility].  Skilled is not option as patient is pending medicaid, plus needs to do spend down.      Email from [facility] requested JOSEPH send H/P and face sheet to [employer]@"[email or web address]"    JOSEPH email requested information

## 2021-11-10 PROCEDURE — G0378 HOSPITAL OBSERVATION PER HR: HCPCS

## 2021-11-10 NOTE — PROGRESS NOTES
Received report from NOC RN, pt care assumed VS stable on RA. No signs of acute distress. Pt is AAOx3. Pt denies having pain at this time. Hourly rounding initiated. Call light within reach.

## 2021-11-11 PROCEDURE — G0378 HOSPITAL OBSERVATION PER HR: HCPCS

## 2021-11-11 PROCEDURE — 99224 PR SUBSEQUENT OBSERVATION CARE,LEVEL I: CPT | Performed by: HOSPITALIST

## 2021-11-11 RX ORDER — HALOPERIDOL 5 MG/ML
2 INJECTION INTRAMUSCULAR ONCE
Status: ACTIVE | OUTPATIENT
Start: 2021-11-11 | End: 2021-11-12

## 2021-11-11 ASSESSMENT — ENCOUNTER SYMPTOMS
DIZZINESS: 0
ABDOMINAL PAIN: 0
CHILLS: 0
FEVER: 0
NAUSEA: 0
COUGH: 0
PALPITATIONS: 0
VOMITING: 0
LOSS OF CONSCIOUSNESS: 0
FALLS: 0
SHORTNESS OF BREATH: 0

## 2021-11-11 ASSESSMENT — PAIN DESCRIPTION - PAIN TYPE
TYPE: ACUTE PAIN
TYPE: ACUTE PAIN

## 2021-11-11 NOTE — PROGRESS NOTES
Hospital Medicine Daily Progress Note    Date of Service  11/11/2021    Chief Complaint  Zully Lin is a 70 y.o. female admitted 6/11/2021 from [facility] on 6/11/2021 with AMS, including worsening agitation and delusions. She has dementia (unclear baseline), hypothyroidism, schizoaffective disorder and bipolar disorder.     Hospital Course  Infectious work-up was unremarkable. Patient was evaluated by psychiatry and psychology. She was considered incapacitated to make medical and discharge decisions. She was initially placed on legal hold. Psychiatric medications were adjusted. Patient was medically cleared on 6/13/2021. Inpatient psych facility placement was difficult due to insurance limitations.  Patient has refused to take her psychiatric medications, psychiatry signed off and clear from Psychiatry services to be discharged on 6/22/2020. Since then, CM team has been working on a safe DC planning. Pt lacks decisional capacity; hence, now guardian is being pursued and possible eventual placement in Group home.     Interval Problem Update  MATHEUS overnight  Awaiting group home placement    I have personally seen and examined the patient at bedside. I discussed the plan of care with bedside RN.    Consultants/Specialty  psychiatry    Code Status  Full Code    Disposition  Patient is medically cleared.   Anticipate discharge to Guardianship pending; eventual possible placement to group home.   I have placed the appropriate orders for post-discharge needs.    Review of Systems  Review of Systems   Constitutional: Negative for chills and fever.   Respiratory: Negative for cough and shortness of breath.    Cardiovascular: Negative for chest pain and palpitations.   Gastrointestinal: Negative for abdominal pain, nausea and vomiting.   Genitourinary: Negative for dysuria.   Musculoskeletal: Negative for falls.   Neurological: Negative for dizziness and loss of consciousness.   Psychiatric/Behavioral:         Delusional   All other systems reviewed and are negative.       Physical Exam  Temp:  [36.1 °C (97 °F)-36.5 °C (97.7 °F)] 36.5 °C (97.7 °F)  Pulse:  [71-78] 71  Resp:  [16-18] 17  BP: (120-128)/(69-96) 120/96  SpO2:  [93 %-98 %] 98 %    Physical Exam  Constitutional:       General: She is not in acute distress.  HENT:      Head: Normocephalic.      Right Ear: External ear normal.      Left Ear: External ear normal.      Nose: No congestion.      Mouth/Throat:      Mouth: Mucous membranes are moist.      Pharynx: No oropharyngeal exudate.   Eyes:      General: No scleral icterus.  Cardiovascular:      Rate and Rhythm: Normal rate and regular rhythm.   Pulmonary:      Effort: Pulmonary effort is normal. No respiratory distress.   Abdominal:      Tenderness: There is no abdominal tenderness. There is no guarding or rebound.   Musculoskeletal:         General: No swelling or deformity.   Skin:     General: Skin is warm.      Coloration: Skin is not jaundiced.      Findings: No bruising.   Neurological:      Mental Status: She is alert. She is disoriented.   Psychiatric:         Attention and Perception: Attention normal.         Speech: Speech is tangential.         Thought Content: Thought content is delusional.         Cognition and Memory: Cognition is impaired. Memory is impaired.      Comments: Calm         Fluids    Intake/Output Summary (Last 24 hours) at 11/11/2021 1152  Last data filed at 11/11/2021 0830  Gross per 24 hour   Intake 570 ml   Output --   Net 570 ml       Laboratory                        Imaging  DX-CHEST-LIMITED (1 VIEW)   Final Result      Left basilar atelectasis. No focal consolidation. No effusions.           Assessment/Plan  * Delusional disorder- (present on admission)  Assessment & Plan  Refuses medications.    History of dementia- (present on admission)  Assessment & Plan  At baseline  Pending placement.    Schizoaffective disorder, bipolar type (HCC)- (present on  admission)  Assessment & Plan  Patient has been refusing medications and blood draw due to paranoia.    Actively delusional  Case management are working on discharge planning and guardianship.    Hypothyroidism- (present on admission)  Assessment & Plan  Levothyroxine has been ordered.  Patient refuses her medications.  She refused blood draw on multiple tries     Noncompliance with treatment plan- (present on admission)  Assessment & Plan  Patient refused antipsychotic medications and all other medications as well as any lab work       VTE prophylaxis: SCDs/TEDs

## 2021-11-11 NOTE — PROGRESS NOTES
Received report from NOC RN, pt care assumed, VS stable on RA. No signs of acute distress. Call light within reach, hourly rounding initiated. Pt is AAOx2 and denies having pain at this time.

## 2021-11-12 PROCEDURE — G0378 HOSPITAL OBSERVATION PER HR: HCPCS

## 2021-11-12 PROCEDURE — 99224 PR SUBSEQUENT OBSERVATION CARE,LEVEL I: CPT | Performed by: HOSPITALIST

## 2021-11-12 ASSESSMENT — ENCOUNTER SYMPTOMS
SHORTNESS OF BREATH: 0
FEVER: 0
LOSS OF CONSCIOUSNESS: 0
ABDOMINAL PAIN: 0
NAUSEA: 0
FALLS: 0
VOMITING: 0
DIZZINESS: 0
CHILLS: 0
COUGH: 0
PALPITATIONS: 0

## 2021-11-12 NOTE — PROGRESS NOTES
" \"COVID-19 surge in effect\"    Patient attempted to elope with her belongings, was stopped by this writer and CNA by double doors. Patient claims she heard explosions downstairs, that staff is poisoning patient and taking too much blood. Security was called, walked patient back to her room with staff and calmed her down. Patient is back in bed, no yelling at this time.    "

## 2021-11-12 NOTE — PROGRESS NOTES
" \"COVID-19 surge in effect\"    Patient in bed, asleep after her attempt to elope earlier tonight.   "

## 2021-11-12 NOTE — PROGRESS NOTES
Hospital Medicine Daily Progress Note    Date of Service  11/12/2021    Chief Complaint  Zully Lin is a 70 y.o. female admitted 6/11/2021 from [facility] on 6/11/2021 with AMS, including worsening agitation and delusions. She has dementia (unclear baseline), hypothyroidism, schizoaffective disorder and bipolar disorder.     Hospital Course  Infectious work-up was unremarkable. Patient was evaluated by psychiatry and psychology. She was considered incapacitated to make medical and discharge decisions. She was initially placed on legal hold. Psychiatric medications were adjusted. Patient was medically cleared on 6/13/2021. Inpatient psych facility placement was difficult due to insurance limitations.  Patient has refused to take her psychiatric medications, psychiatry signed off and clear from Psychiatry services to be discharged on 6/22/2020. Since then, CM team has been working on a safe DC planning. Pt lacks decisional capacity; hence, now guardian is being pursued and possible eventual placement in Group home.     Interval Problem Update  Pt attempted to elope overnight  She refused covid swab  Pending placement    I have personally seen and examined the patient at bedside. I discussed the plan of care with bedside RN.    Consultants/Specialty  psychiatry    Code Status  Full Code    Disposition  Patient is medically cleared.   Anticipate discharge to Guardianship pending; eventual possible placement to group home.   I have placed the appropriate orders for post-discharge needs.    Review of Systems  Review of Systems   Constitutional: Negative for chills and fever.   Respiratory: Negative for cough and shortness of breath.    Cardiovascular: Negative for chest pain and palpitations.   Gastrointestinal: Negative for abdominal pain, nausea and vomiting.   Genitourinary: Negative for dysuria.   Musculoskeletal: Negative for falls.   Neurological: Negative for dizziness and loss of  consciousness.   Psychiatric/Behavioral:        Delusional   All other systems reviewed and are negative.       Physical Exam  Temp:  [36.1 °C (96.9 °F)-36.5 °C (97.7 °F)] 36.4 °C (97.5 °F)  Pulse:  [67-82] 82  Resp:  [17-19] 17  BP: (117-130)/(67-96) 127/67  SpO2:  [93 %-99 %] 99 %    Physical Exam  Constitutional:       General: She is not in acute distress.  HENT:      Head: Normocephalic.      Right Ear: External ear normal.      Left Ear: External ear normal.      Nose: No congestion.      Mouth/Throat:      Mouth: Mucous membranes are moist.      Pharynx: No oropharyngeal exudate.   Eyes:      General: No scleral icterus.  Cardiovascular:      Rate and Rhythm: Normal rate and regular rhythm.   Pulmonary:      Effort: Pulmonary effort is normal. No respiratory distress.   Abdominal:      Tenderness: There is no abdominal tenderness. There is no guarding or rebound.   Musculoskeletal:         General: No swelling or deformity.   Skin:     General: Skin is warm.      Coloration: Skin is not jaundiced.      Findings: No bruising.   Neurological:      Mental Status: She is alert. She is disoriented.   Psychiatric:         Attention and Perception: Attention normal.         Speech: Speech is tangential.         Thought Content: Thought content is delusional.         Cognition and Memory: Cognition is impaired. Memory is impaired.      Comments: Calm         Fluids    Intake/Output Summary (Last 24 hours) at 11/12/2021 0900  Last data filed at 11/12/2021 0800  Gross per 24 hour   Intake 1140 ml   Output --   Net 1140 ml       Laboratory                        Imaging  DX-CHEST-LIMITED (1 VIEW)   Final Result      Left basilar atelectasis. No focal consolidation. No effusions.           Assessment/Plan  * Delusional disorder- (present on admission)  Assessment & Plan  Refuses medications.    History of dementia- (present on admission)  Assessment & Plan  At baseline  Pending placement.    Schizoaffective disorder,  bipolar type (HCC)- (present on admission)  Assessment & Plan  Patient has been refusing medications and blood draw due to paranoia.    Actively delusional  Case management are working on discharge planning and guardianship.    Hypothyroidism- (present on admission)  Assessment & Plan  Levothyroxine has been ordered.  Patient refuses her medications.  She refused blood draw on multiple tries     Noncompliance with treatment plan- (present on admission)  Assessment & Plan  Patient refused antipsychotic medications and all other medications as well as any lab work       VTE prophylaxis: SCDs/TEDs

## 2021-11-13 PROCEDURE — G0378 HOSPITAL OBSERVATION PER HR: HCPCS

## 2021-11-14 PROCEDURE — G0378 HOSPITAL OBSERVATION PER HR: HCPCS

## 2021-11-15 PROCEDURE — G0378 HOSPITAL OBSERVATION PER HR: HCPCS

## 2021-11-15 ASSESSMENT — PAIN DESCRIPTION - PAIN TYPE: TYPE: ACUTE PAIN

## 2021-11-16 PROCEDURE — G0378 HOSPITAL OBSERVATION PER HR: HCPCS

## 2021-11-16 NOTE — DISCHARGE PLANNING
Anticipated Discharge Disposition: Group Home    Action: GOMEZ spoke to patient about discharge planning.  Patient asked SW why she can't go home, as she just lives around the corner.  That her 9 children, all under 13 and under and her 52 year old daughter are waiting for her.  Patient stated that her 52 year old daughter, is the daughter of Nitesh Triana and her name is Michelle Triana.     Barriers to Discharge: placement    Plan: follow up with Denia Price [facility]    PC to Denia Price,[phone] , message left to call GOMEZ

## 2021-11-16 NOTE — PROGRESS NOTES
Received report from NOC RN, pt care assumed. VS stable on RA, no signs of acute distress. Pt is AAOx3 and denies having pain at this time. Call light within reach, hourly rounding initiated.

## 2021-11-16 NOTE — PROGRESS NOTES
Assumed care of pt after receiving report from dayshift RN. Pt is A&Ox 3 - oriented to self, place, and time; disoriented to situation. Pt denies pain at this time. Pt refusing skin assessment at this time, allowing for only partial assessment. Bed is locked and in lowest position, call light within reach, fall precautions in place. All needs met at this time.

## 2021-11-17 PROCEDURE — 99226 PR SUBSEQUENT OBSERVATION CARE,LEVEL III: CPT | Performed by: HOSPITALIST

## 2021-11-17 PROCEDURE — G0378 HOSPITAL OBSERVATION PER HR: HCPCS

## 2021-11-17 ASSESSMENT — ENCOUNTER SYMPTOMS
COUGH: 0
ABDOMINAL PAIN: 0
CHILLS: 0
FALLS: 0
DIZZINESS: 0
FEVER: 0
VOMITING: 0
NAUSEA: 0
SHORTNESS OF BREATH: 0
PALPITATIONS: 0
LOSS OF CONSCIOUSNESS: 0

## 2021-11-17 ASSESSMENT — PAIN DESCRIPTION - PAIN TYPE: TYPE: ACUTE PAIN

## 2021-11-17 NOTE — PROGRESS NOTES
Hospital Medicine Daily Progress Note    Date of Service  11/17/2021    Chief Complaint  Zully Lin is a 70 y.o. female admitted 6/11/2021 from [facility] on 6/11/2021 with AMS, including worsening agitation and delusions. She has dementia (unclear baseline), hypothyroidism, schizoaffective disorder and bipolar disorder.     Hospital Course  Infectious work-up was unremarkable. Patient was evaluated by psychiatry and psychology. She was considered incapacitated to make medical and discharge decisions. She was initially placed on legal hold. Psychiatric medications were adjusted. Patient was medically cleared on 6/13/2021. Inpatient psych facility placement was difficult due to insurance limitations.  Patient has refused to take her psychiatric medications, psychiatry signed off and clear from Psychiatry services to be discharged on 6/22/2020. Since then, CM team has been working on a safe DC planning. Pt lacks decisional capacity; hence, now guardian is being pursued and possible eventual placement in Group home.     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  Fully dressed, seated at bedside.  No needs.  Of note, patient does refuse to wear her patient identification armband.    I have personally seen and examined the patient at bedside. I discussed the plan of care with bedside RN.    Consultants/Specialty  psychiatry    Code Status  Full Code    Disposition  Patient is medically cleared.   Anticipate discharge to Guardianship pending; eventual possible placement to group home vs memory care.   I have placed the appropriate orders for post-discharge needs.    Review of Systems  Review of Systems   Constitutional: Negative for chills and fever.   Respiratory: Negative for cough and shortness of breath.    Cardiovascular: Negative for chest pain and palpitations.   Gastrointestinal: Negative for  abdominal pain, nausea and vomiting.   Genitourinary: Negative for dysuria.   Musculoskeletal: Negative for falls.   Neurological: Negative for dizziness and loss of consciousness.   Psychiatric/Behavioral:        Delusional   All other systems reviewed and are negative.       Physical Exam  Temp:  [36 °C (96.8 °F)-36.4 °C (97.5 °F)] 36 °C (96.8 °F)  Pulse:  [70-80] 72  Resp:  [16-18] 18  BP: (124-131)/(75-81) 131/81  SpO2:  [93 %-99 %] 99 %    Physical Exam  Constitutional:       General: She is not in acute distress.  HENT:      Head: Normocephalic.      Right Ear: External ear normal.      Left Ear: External ear normal.      Nose: No congestion.      Mouth/Throat:      Mouth: Mucous membranes are moist.      Pharynx: No oropharyngeal exudate.   Eyes:      General: No scleral icterus.  Cardiovascular:      Rate and Rhythm: Normal rate and regular rhythm.   Pulmonary:      Effort: Pulmonary effort is normal. No respiratory distress.   Abdominal:      Tenderness: There is no abdominal tenderness. There is no guarding or rebound.   Musculoskeletal:         General: No swelling or deformity.   Skin:     General: Skin is warm.      Coloration: Skin is not jaundiced.      Findings: No bruising.   Neurological:      Mental Status: She is alert. She is disoriented.   Psychiatric:         Attention and Perception: Attention normal.         Speech: Speech is tangential.         Thought Content: Thought content is delusional.         Cognition and Memory: Cognition is impaired. Memory is impaired.      Comments: Calm         Fluids    Intake/Output Summary (Last 24 hours) at 11/17/2021 1449  Last data filed at 11/17/2021 1218  Gross per 24 hour   Intake 720 ml   Output --   Net 720 ml       Laboratory                        Imaging  DX-CHEST-LIMITED (1 VIEW)   Final Result      Left basilar atelectasis. No focal consolidation. No effusions.           Assessment/Plan  * Delusional disorder- (present on admission)  Assessment  & Plan  Refuses medications.    History of dementia- (present on admission)  Assessment & Plan  At baseline  Pending placement.    Schizoaffective disorder, bipolar type (HCC)- (present on admission)  Assessment & Plan  Patient has been refusing medications and blood draw due to paranoia.    Actively delusional  Case management are working on discharge planning and guardianship.    Hypothyroidism- (present on admission)  Assessment & Plan  Levothyroxine has been ordered.  Patient refuses her medications.  She refused blood draw on multiple tries     Noncompliance with treatment plan- (present on admission)  Assessment & Plan  Patient refused antipsychotic medications and all other medications as well as any lab work       VTE prophylaxis: SCDs/TEDs

## 2021-11-18 PROCEDURE — G0378 HOSPITAL OBSERVATION PER HR: HCPCS

## 2021-11-18 ASSESSMENT — PAIN DESCRIPTION - PAIN TYPE: TYPE: ACUTE PAIN

## 2021-11-19 PROCEDURE — G0378 HOSPITAL OBSERVATION PER HR: HCPCS

## 2021-11-19 ASSESSMENT — PAIN DESCRIPTION - PAIN TYPE
TYPE: ACUTE PAIN
TYPE: ACUTE PAIN

## 2021-11-19 NOTE — PROGRESS NOTES
Alert and oriented x1, only to self. Refusing assessment and medication. Education provided. But pt still refused. Offered fluids and snacks. Safety precautions in placed. Bed in lowest position. Upper side rails up. Treaded socks on. Reinforced the use of call light when needing assistance.

## 2021-11-20 PROCEDURE — G0378 HOSPITAL OBSERVATION PER HR: HCPCS

## 2021-11-20 ASSESSMENT — FIBROSIS 4 INDEX: FIB4 SCORE: 1.53

## 2021-11-20 NOTE — PROGRESS NOTES
COVID-19 surge in effect.    Received report from NOC RN and assumed care of pt. Pt refusing assessment this morning. Pt stated she doesn't need anything. Pt educated to call for assistance.

## 2021-11-20 NOTE — PROGRESS NOTES
"Pt is resting in bed. Refused assessment and declined further needs. Education refused, \"No I'm good\". Pt able to make needs known.    COVID-19 surge in effect.  "

## 2021-11-21 PROCEDURE — 99225 PR SUBSEQUENT OBSERVATION CARE,LEVEL II: CPT | Performed by: HOSPITALIST

## 2021-11-21 PROCEDURE — G0378 HOSPITAL OBSERVATION PER HR: HCPCS

## 2021-11-21 ASSESSMENT — ENCOUNTER SYMPTOMS
VOMITING: 0
CHILLS: 0
NAUSEA: 0
SHORTNESS OF BREATH: 0
FALLS: 0
LOSS OF CONSCIOUSNESS: 0
ABDOMINAL PAIN: 0
COUGH: 0
PALPITATIONS: 0
DIZZINESS: 0
FEVER: 0

## 2021-11-21 ASSESSMENT — PAIN DESCRIPTION - PAIN TYPE: TYPE: ACUTE PAIN

## 2021-11-21 NOTE — PROGRESS NOTES
Hospital Medicine Daily Progress Note    Date of Service  11/21/2021    Chief Complaint  Zully Lin is a 70 y.o. female admitted 6/11/2021 from [facility] on 6/11/2021 with AMS, including worsening agitation and delusions. She has dementia (unclear baseline), hypothyroidism, schizoaffective disorder and bipolar disorder.     Hospital Course  Infectious work-up was unremarkable. Patient was evaluated by psychiatry and psychology. She was considered incapacitated to make medical and discharge decisions. She was initially placed on legal hold. Psychiatric medications were adjusted. Patient was medically cleared on 6/13/2021. Inpatient psych facility placement was difficult due to insurance limitations.  Patient has refused to take her psychiatric medications, psychiatry signed off and clear from Psychiatry services to be discharged on 6/22/2020. Since then, CM team has been working on a safe DC planning. Pt lacks decisional capacity; hence, now guardian is being pursued and possible eventual placement in Group home.     Interval Problem Update  No acute overnight events, patient is seated in a chair at bedside, fully dressed with belongings packed in bags on top of bed.  Tells me that she is prepared to leave the hospital, informs me that she was cleared to leave 3 weeks ago by Dr. Arshad.  Otherwise no needs.  Of note, patient does refuse to wear her patient identification armband.    I have personally seen and examined the patient at bedside. I discussed the plan of care with bedside RN.    Consultants/Specialty  psychiatry    Code Status  Full Code    Disposition  Patient is medically cleared.   Anticipate discharge to Guardianship pending; eventual possible placement to group home vs memory care.   I have placed the appropriate orders for post-discharge needs.    Review of Systems  Review of Systems   Constitutional: Negative for chills and fever.   Respiratory: Negative for cough and  shortness of breath.    Cardiovascular: Negative for chest pain and palpitations.   Gastrointestinal: Negative for abdominal pain, nausea and vomiting.   Genitourinary: Negative for dysuria.   Musculoskeletal: Negative for falls.   Neurological: Negative for dizziness and loss of consciousness.   Psychiatric/Behavioral:        Delusional   All other systems reviewed and are negative.       Physical Exam  Temp:  [36 °C (96.8 °F)-36.8 °C (98.2 °F)] 36.4 °C (97.6 °F)  Pulse:  [61-83] 79  Resp:  [17-18] 17  BP: (120-147)/(68-89) 120/68  SpO2:  [95 %-99 %] 96 %    Physical Exam  Constitutional:       General: She is not in acute distress.  HENT:      Head: Normocephalic.      Right Ear: External ear normal.      Left Ear: External ear normal.      Nose: No congestion.      Mouth/Throat:      Mouth: Mucous membranes are moist.      Pharynx: No oropharyngeal exudate.   Eyes:      General: No scleral icterus.  Cardiovascular:      Rate and Rhythm: Normal rate and regular rhythm.   Pulmonary:      Effort: Pulmonary effort is normal. No respiratory distress.   Abdominal:      Tenderness: There is no abdominal tenderness. There is no guarding or rebound.   Musculoskeletal:         General: No swelling or deformity.   Skin:     General: Skin is warm.      Coloration: Skin is not jaundiced.      Findings: No bruising.   Neurological:      Mental Status: She is alert. She is disoriented.   Psychiatric:         Attention and Perception: Attention normal.         Speech: Speech is tangential.         Thought Content: Thought content is delusional.         Cognition and Memory: Cognition is impaired. Memory is impaired.      Comments: Calm         Fluids    Intake/Output Summary (Last 24 hours) at 11/21/2021 1519  Last data filed at 11/21/2021 0900  Gross per 24 hour   Intake 1528 ml   Output --   Net 1528 ml       Laboratory                        Imaging  DX-CHEST-LIMITED (1 VIEW)   Final Result      Left basilar atelectasis. No  focal consolidation. No effusions.           Assessment/Plan  * Delusional disorder- (present on admission)  Assessment & Plan  Refuses medications.    History of dementia- (present on admission)  Assessment & Plan  At baseline  Pending placement.    Schizoaffective disorder, bipolar type (HCC)- (present on admission)  Assessment & Plan  Patient has been refusing medications and blood draw due to paranoia.    Actively delusional  Case management are working on discharge planning and guardianship.    Hypothyroidism- (present on admission)  Assessment & Plan  Levothyroxine has been ordered.  Patient refuses her medications.  She refused blood draw on multiple tries     Noncompliance with treatment plan- (present on admission)  Assessment & Plan  Patient refused antipsychotic medications and all other medications as well as any lab work       VTE prophylaxis: SCDs/TEDs

## 2021-11-21 NOTE — PROGRESS NOTES
COVID-19 surge in effect.    Received report from NOC RN and assumed care of pt. Pt refusing assessment at this time. Pt stated she is ready to leave; pt educated on why she needs to stay at the hospital. Pt educated to call for assistance.

## 2021-11-22 PROCEDURE — G0378 HOSPITAL OBSERVATION PER HR: HCPCS

## 2021-11-22 NOTE — PROGRESS NOTES
Received report from NOC RN, pt care assumed. VS stable on RA. No signs of acute distress. Call light within reach, hourly rounding initiated. Pt is AAOx3 and denies having pain.

## 2021-11-22 NOTE — PROGRESS NOTES
"Pt is resting in bed. Disoriented to event. Refused full assessment and declined further needs, \"No I'm good\". Pt able to make needs known.    COVID-19 surge in effect.  "

## 2021-11-23 PROCEDURE — G0378 HOSPITAL OBSERVATION PER HR: HCPCS

## 2021-11-23 NOTE — PROGRESS NOTES
COVID-19 surge in effect:     Received report from KARRI Morales and assumed care of pt. SHILA orientation d/t pt refusing assessments. Pt on RA O2.  Currently on a regular diet.  No PIV in place. Pt lying in bed with no signs of distress.Denies further needs at this time. Bed locked and in lowest position. Call light within reach & hourly rounding in place

## 2021-11-23 NOTE — PROGRESS NOTES
"Covid 19 Surge in effect    Patient is alert though she does become irritated when asked the orientation questions. 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 Reminded patient that I am here to take care of her and that anything that she needs she can reach me through her call light. Denies pain at this time.   "

## 2021-11-24 PROCEDURE — G0378 HOSPITAL OBSERVATION PER HR: HCPCS

## 2021-11-24 NOTE — PROGRESS NOTES
Report received at change of shift. Pt is A&Ox3, disoriented to situation. Refuses all assessments. Declines having pain. States all her needs are met at this time.

## 2021-11-24 NOTE — PROGRESS NOTES
Extensive thoughts of grandeur and delusions with morning conversations . Continues to refuse medications and assessment. pleasant

## 2021-11-25 PROCEDURE — G0378 HOSPITAL OBSERVATION PER HR: HCPCS

## 2021-11-25 PROCEDURE — 99224 PR SUBSEQUENT OBSERVATION CARE,LEVEL I: CPT | Performed by: INTERNAL MEDICINE

## 2021-11-25 ASSESSMENT — ENCOUNTER SYMPTOMS
SHORTNESS OF BREATH: 0
DIZZINESS: 0
FEVER: 0
VOMITING: 0
FALLS: 0
LOSS OF CONSCIOUSNESS: 0
ABDOMINAL PAIN: 0
PALPITATIONS: 0
NAUSEA: 0
COUGH: 0
CHILLS: 0

## 2021-11-25 NOTE — PROGRESS NOTES
Bedside report received at change of shift. Pt is A&Ox3, disoriented to situation. Pt reports no pain. Declines any assessments. Stated all her needs are met at this time.

## 2021-11-25 NOTE — PROGRESS NOTES
Pt woke up and asked for coffee.  Pt stated that  told her there is poison in tap water.  Pt asked RN to not to use tap water to make coffee.

## 2021-11-25 NOTE — PROGRESS NOTES
Covid-19 surge in effect.    This RN went into pt's room at 1930 for assessment.  Pt was already in bed and refused assessment.  Denied any needs.

## 2021-11-25 NOTE — PROGRESS NOTES
Hospital Medicine Daily Progress Note    Date of Service  11/25/2021    Chief Complaint  Zully Lin is a 70 y.o. female admitted 6/11/2021 from [facility] on 6/11/2021 with AMS, including worsening agitation and delusions. She has dementia (unclear baseline), hypothyroidism, schizoaffective disorder and bipolar disorder.     Hospital Course  Infectious work-up was unremarkable. Patient was evaluated by psychiatry and psychology. She was considered incapacitated to make medical and discharge decisions. She was initially placed on legal hold. Psychiatric medications were adjusted. Patient was medically cleared on 6/13/2021. Inpatient psych facility placement was difficult due to insurance limitations.  Patient has refused to take her psychiatric medications, psychiatry signed off and clear from Psychiatry services to be discharged on 6/22/2020. Since then, CM team has been working on a safe DC planning. Pt lacks decisional capacity; hence, now guardian is being pursued and possible eventual placement in Group home.     Interval Problem Update  Vitals signs stable. No changes. Pending Medicaid and group home placement. Patient has no complaints, only asking when she can leave. Continues to say Dr. Dan told her she was clear to leave 3 weeks ago. 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     I have personally seen and examined the patient at bedside. I discussed the plan of care with bedside RN.    Consultants/Specialty  psychiatry    Code Status  Full Code    Disposition  Patient is medically cleared.   Anticipate discharge to group home  vs memory care  I have placed the appropriate orders for post-discharge needs.    Review of Systems  Review of Systems   Constitutional: Negative for chills and fever.   Respiratory: Negative for cough and shortness of breath.    Cardiovascular: Negative for chest pain and palpitations.    Gastrointestinal: Negative for abdominal pain, nausea and vomiting.   Genitourinary: Negative for dysuria.   Musculoskeletal: Negative for falls.   Neurological: Negative for dizziness and loss of consciousness.   Psychiatric/Behavioral:        Delusional   All other systems reviewed and are negative.       Physical Exam  Temp:  [36.1 °C (97 °F)-37 °C (98.6 °F)] 36.7 °C (98.1 °F)  Pulse:  [68-87] 87  Resp:  [16-18] 16  BP: (124-144)/(61-84) 125/61  SpO2:  [95 %-98 %] 96 %    Physical Exam  Constitutional:       General: She is not in acute distress.  HENT:      Head: Normocephalic.   Eyes:      General: No scleral icterus.     Conjunctiva/sclera: Conjunctivae normal.   Cardiovascular:      Rate and Rhythm: Normal rate and regular rhythm.   Pulmonary:      Effort: Pulmonary effort is normal. No respiratory distress.   Abdominal:      General: There is no distension.      Palpations: Abdomen is soft.   Musculoskeletal:         General: No deformity.   Skin:     General: Skin is warm.      Coloration: Skin is not jaundiced.      Findings: No bruising.   Neurological:      General: No focal deficit present.      Mental Status: She is alert.      Motor: No weakness.      Gait: Gait normal.      Comments: Oriented x3, not situation    Psychiatric:         Attention and Perception: Attention normal.         Speech: Speech is tangential.         Thought Content: Thought content is delusional.         Cognition and Memory: Cognition is impaired. Memory is impaired.      Comments: Calm         Fluids    Intake/Output Summary (Last 24 hours) at 11/25/2021 1103  Last data filed at 11/25/2021 0514  Gross per 24 hour   Intake 640 ml   Output --   Net 640 ml       Laboratory                        Imaging  DX-CHEST-LIMITED (1 VIEW)   Final Result      Left basilar atelectasis. No focal consolidation. No effusions.           Assessment/Plan  * Delusional disorder- (present on admission)  Assessment & Plan  Refuses  medications.    History of dementia- (present on admission)  Assessment & Plan  At baseline  Pending placement.    Schizoaffective disorder, bipolar type (HCC)- (present on admission)  Assessment & Plan  Patient has been refusing medications and blood draw due to paranoia.    Actively delusional  Case management are working on discharge planning and guardianship.    Hypothyroidism- (present on admission)  Assessment & Plan  Levothyroxine has been ordered.  Patient refuses her medications.  She refused blood draw on multiple tries     Noncompliance with treatment plan- (present on admission)  Assessment & Plan  Patient refused antipsychotic medications and all other medications as well as any lab work       VTE prophylaxis: SCDs/TEDs

## 2021-11-26 PROCEDURE — G0378 HOSPITAL OBSERVATION PER HR: HCPCS

## 2021-11-26 ASSESSMENT — PAIN DESCRIPTION - PAIN TYPE: TYPE: ACUTE PAIN

## 2021-11-26 NOTE — PROGRESS NOTES
Bedside report received at change of shift. Pt is A&Ox3, disoriented to situation. Pt refuses to be assessed. States all her needs are met at this time.

## 2021-11-26 NOTE — PROGRESS NOTES
Covid-19 surge in effect.      Went into pt's room and found her sitting in chair in dark.  Agreed on some assessment.  Refused skin assessment despite education.  Pt stated that she had BM today.  Denied any pain or needs at this time.

## 2021-11-26 NOTE — PROGRESS NOTES
Pharmacy Pharmacotherapy Consult for LOS >30 days    Admit Date: 6/11/2021      Medications were reviewed for appropriateness and ongoing need.     Current Facility-Administered Medications   Medication Dose Route Frequency Provider Last Rate Last Admin   • influenza Vac High-Dose Quad (Fluzone) injection 0.7 mL  0.7 mL Intramuscular Once Ivelisse Willams D.O.       • acetaminophen (Tylenol) tablet 650 mg  650 mg Oral Q4HRS PRN Ivelisse Willams, D.O.       • senna-docusate (PERICOLACE or SENOKOT S) 8.6-50 MG per tablet 2 tablet  2 Tablet Oral BID PRN Ivelisse Willams, D.O.        And   • polyethylene glycol/lytes (MIRALAX) PACKET 1 Packet  1 Packet Oral QDAY PRN Ivelisse Willams, D.O.        And   • magnesium hydroxide (MILK OF MAGNESIA) suspension 30 mL  30 mL Oral QDAY PRN Ivelisse Willams, D.O.        And   • bisacodyl (DULCOLAX) suppository 10 mg  10 mg Rectal QDAY PRN Ivelisse Willams, D.O.       • levothyroxine (SYNTHROID) tablet 50 mcg  50 mcg Oral AM ES Ivelisse Willams, D.O.           Recommendations:  None - all medications appropriate at this time.    Mary Jane Saenz, PharmD, BCCCP

## 2021-11-27 PROCEDURE — G0378 HOSPITAL OBSERVATION PER HR: HCPCS

## 2021-11-27 ASSESSMENT — PAIN DESCRIPTION - PAIN TYPE
TYPE: ACUTE PAIN
TYPE: ACUTE PAIN

## 2021-11-27 NOTE — PROGRESS NOTES
" \"COVID-19 surge in effect\"    Continue to refuse assessments, nursing cares and medication. CNA informed this writer, patient asked for pain medication, but patient denied asking for medication.   "

## 2021-11-27 NOTE — PROGRESS NOTES
"COVID 19 surge in effect.     Patient refusing assessment at this time. 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 Denies additional needs at this time.   "

## 2021-11-28 PROCEDURE — G0378 HOSPITAL OBSERVATION PER HR: HCPCS

## 2021-11-28 PROCEDURE — 99224 PR SUBSEQUENT OBSERVATION CARE,LEVEL I: CPT | Performed by: INTERNAL MEDICINE

## 2021-11-28 ASSESSMENT — PAIN DESCRIPTION - PAIN TYPE
TYPE: ACUTE PAIN
TYPE: ACUTE PAIN

## 2021-11-28 ASSESSMENT — ENCOUNTER SYMPTOMS
FEVER: 0
NAUSEA: 0
ABDOMINAL PAIN: 0
CHILLS: 0
SHORTNESS OF BREATH: 0

## 2021-11-28 NOTE — PROGRESS NOTES
Hospital Medicine Daily Progress Note    Date of Service  11/28/2021    Chief Complaint  Zully Lin is a 70 y.o. female admitted 6/11/2021 from [facility] on 6/11/2021 with AMS, including worsening agitation and delusions. She has dementia (unclear baseline), hypothyroidism, schizoaffective disorder and bipolar disorder.     Hospital Course  Infectious work-up was unremarkable. Patient was evaluated by psychiatry and psychology. She was considered incapacitated to make medical and discharge decisions. She was initially placed on legal hold. Psychiatric medications were adjusted. Patient was medically cleared on 6/13/2021. Inpatient psych facility placement was difficult due to insurance limitations.  Patient has refused to take her psychiatric medications, psychiatry signed off and clear from Psychiatry services to be discharged on 6/22/2020. Since then, CM team has been working on a safe DC planning. Pt lacks decisional capacity; hence, now guardian is being pursued and possible eventual placement in Group home.     Interval Problem Update  Pending Medicaid and group home placement. Patients continues to refuse meds and assessment. Per nursing continues to say she is the governor of [place] and demands to be released. She has no new complaints, sitting up in chair at bedside.     I have personally seen and examined the patient at bedside. I discussed the plan of care with bedside RN and charge RN.    Consultants/Specialty  psychiatry    Code Status  Full Code    Disposition  Patient is medically cleared.   Anticipate discharge to group home  vs memory care  I have placed the appropriate orders for post-discharge needs.    Review of Systems  Review of Systems   Constitutional: Negative for chills and fever.   Respiratory: Negative for shortness of breath.    Cardiovascular: Negative for chest pain.   Gastrointestinal: Negative for abdominal pain and nausea.   Psychiatric/Behavioral:         Delusional   All other systems reviewed and are negative.       Physical Exam  Temp:  [36.5 °C (97.7 °F)-36.7 °C (98 °F)] 36.6 °C (97.8 °F)  Pulse:  [66-76] 66  Resp:  [16-18] 17  BP: (127-144)/(71-77) 128/77  SpO2:  [94 %-97 %] 97 %    Physical Exam  Constitutional:       General: She is not in acute distress.  HENT:      Head: Normocephalic.   Eyes:      Conjunctiva/sclera: Conjunctivae normal.   Cardiovascular:      Rate and Rhythm: Normal rate and regular rhythm.   Pulmonary:      Effort: Pulmonary effort is normal. No respiratory distress.   Abdominal:      General: There is no distension.   Musculoskeletal:         General: No deformity.   Skin:     General: Skin is warm.      Coloration: Skin is not jaundiced.      Findings: No bruising.   Neurological:      General: No focal deficit present.      Mental Status: She is alert.      Motor: No weakness.      Gait: Gait normal.   Psychiatric:         Attention and Perception: Attention normal.         Speech: Speech is tangential.         Thought Content: Thought content is delusional.         Cognition and Memory: Cognition is impaired. Memory is impaired.      Comments: Calm         Fluids    Intake/Output Summary (Last 24 hours) at 11/28/2021 1044  Last data filed at 11/28/2021 0800  Gross per 24 hour   Intake 1330 ml   Output --   Net 1330 ml       Laboratory                        Imaging  DX-CHEST-LIMITED (1 VIEW)   Final Result      Left basilar atelectasis. No focal consolidation. No effusions.           Assessment/Plan  * Delusional disorder- (present on admission)  Assessment & Plan  Refuses medications.    History of dementia- (present on admission)  Assessment & Plan  At baseline  Pending placement.    Schizoaffective disorder, bipolar type (HCC)- (present on admission)  Assessment & Plan  Patient has been refusing medications and blood draw due to paranoia.    Actively delusional  Case management are working on discharge planning and  guardianship.    Hypothyroidism- (present on admission)  Assessment & Plan  Levothyroxine has been ordered.  Patient refuses her medications.  She refused blood draw on multiple tries     Noncompliance with treatment plan- (present on admission)  Assessment & Plan  Patient refused antipsychotic medications and all other medications as well as any lab work       VTE prophylaxis: SCDs/TEDs

## 2021-11-29 PROCEDURE — G0378 HOSPITAL OBSERVATION PER HR: HCPCS

## 2021-11-29 ASSESSMENT — PAIN DESCRIPTION - PAIN TYPE: TYPE: ACUTE PAIN

## 2021-11-29 NOTE — PROGRESS NOTES
Covid 19 surge in effect:    Pt sitting in room with all belongings packed. Pt refuses to answer any orientation questions but did state her name. Pt denies any pain at this time.

## 2021-11-30 PROCEDURE — G0378 HOSPITAL OBSERVATION PER HR: HCPCS

## 2021-11-30 PROCEDURE — 99224 PR SUBSEQUENT OBSERVATION CARE,LEVEL I: CPT | Performed by: GENERAL PRACTICE

## 2021-11-30 RX ORDER — PROCHLORPERAZINE MALEATE 5 MG/1
5 TABLET ORAL EVERY 6 HOURS PRN
Status: DISCONTINUED | OUTPATIENT
Start: 2021-11-30 | End: 2022-03-15 | Stop reason: HOSPADM

## 2021-11-30 ASSESSMENT — PAIN DESCRIPTION - PAIN TYPE: TYPE: ACUTE PAIN

## 2021-11-30 NOTE — PROGRESS NOTES
Pt. Received in the chair sitting, denies any pain at the time. Pt. Refused assessment, all her bags are in the bed. No WG on pt. Refused meds for the morning. Said she hasn't taken anything since 2017.

## 2021-12-01 PROCEDURE — G0378 HOSPITAL OBSERVATION PER HR: HCPCS

## 2021-12-01 ASSESSMENT — PAIN DESCRIPTION - PAIN TYPE
TYPE: ACUTE PAIN
TYPE: ACUTE PAIN

## 2021-12-01 NOTE — PROGRESS NOTES
Hospital Medicine Daily Progress Note    Date of Service  11/30/2021    Chief Complaint  Zully iLn is a 71 y.o. female admitted 6/11/2021 with delusions    Hospital Course  This is a 71 year old female with PMHx of bipolar disorder, schizoaffective disorder, hypothyroidism, and dementia who was transferred from [facility] on 06/11/2021 due to worsening agitation and delusions.    Patient was evaluated by psychiatry, her medications were adjusted, patient was deemed incapacitated to make medical decisions. Patient cleared for discharge 6/2021.  Patient currently has guardian, attempting to get patient placed in group home.    Patient refusing to take risperidone 0.5 every morning and risperidone 1 mg every night, as recommended by psychiatry.    Interval Problem Update  Spoke to patient at bedside, she is fully dressed, bags are packed, patient is anxiously waiting to go home.    She admits that she lives with her 9 children who are under the age of 13 and her one 51-year-old daughter, her grandmother who is 125 and she needs to attend to them shortly.  She reports that they all live next-door to the hospital.    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    I have personally seen and examined the patient at bedside. I discussed the plan of care with patient and bedside RN.    Consultants/Specialty  psychiatry    Code Status  Full Code    Disposition  Patient is medically cleared.   Anticipate discharge to [facility]/ .  I have placed the appropriate orders for post-discharge needs.    Review of Systems  Review of Systems   Unable to perform ROS: Mental acuity        Physical Exam  Temp:  [36.4 °C (97.5 °F)-36.8 °C (98.2 °F)] 36.4 °C (97.5 °F)  Pulse:  [68-72] 71  Resp:  [16-18] 16  BP: (121-129)/(62-82) 129/80  SpO2:  [96 %-99 %] 99 %    Physical Exam  Vitals and nursing note reviewed.    Constitutional:       General: She is not in acute distress.     Appearance: Normal appearance.   HENT:      Head: Normocephalic and atraumatic.      Mouth/Throat:      Mouth: Mucous membranes are moist.      Pharynx: No oropharyngeal exudate.   Eyes:      Extraocular Movements: Extraocular movements intact.      Pupils: Pupils are equal, round, and reactive to light.   Cardiovascular:      Rate and Rhythm: Normal rate and regular rhythm.      Pulses: Normal pulses.      Heart sounds: No murmur heard.  No friction rub. No gallop.    Pulmonary:      Effort: Pulmonary effort is normal. No respiratory distress.      Breath sounds: No wheezing, rhonchi or rales.   Abdominal:      General: Bowel sounds are normal. There is no distension.      Palpations: Abdomen is soft. There is no mass.      Tenderness: There is no abdominal tenderness.   Musculoskeletal:         General: No swelling or tenderness. Normal range of motion.      Cervical back: Normal range of motion. No rigidity. No muscular tenderness.      Right lower leg: No edema.      Left lower leg: No edema.   Skin:     General: Skin is warm and dry.      Capillary Refill: Capillary refill takes less than 2 seconds.      Findings: No erythema or rash.   Neurological:      General: No focal deficit present.      Mental Status: She is alert and oriented to person, place, and time.      Motor: No weakness.      Gait: Gait normal.         Fluids    Intake/Output Summary (Last 24 hours) at 11/30/2021 1935  Last data filed at 11/30/2021 1800  Gross per 24 hour   Intake 1200 ml   Output --   Net 1200 ml       Laboratory                        Imaging  DX-CHEST-LIMITED (1 VIEW)   Final Result      Left basilar atelectasis. No focal consolidation. No effusions.           Assessment/Plan  * Delusional disorder- (present on admission)  Assessment & Plan  Patient was evaluated by psychiatry, her medications were adjusted, patient was deemed incapacitated to make medical  decisions. Patient cleared for discharge 6/2021.  Patient currently has guardian, attempting to get patient placed in group home.    Patient refusing to take risperidone 0.5 every morning and risperidone 1 mg every night, as recommended by psychiatry.    History of dementia- (present on admission)  Assessment & Plan  At baseline  Pending placement.    Schizoaffective disorder, bipolar type (HCC)- (present on admission)  Assessment & Plan  Patient has been refusing medications and blood draw due to paranoia.    Actively delusional  Case management are working on discharge planning and guardianship.    Patient was evaluated by psychiatry, her medications were adjusted, patient was deemed incapacitated to make medical decisions. Patient cleared for discharge 6/2021.  Patient currently has guardian, attempting to get patient placed in group home.    Patient refusing to take risperidone 0.5 every morning and risperidone 1 mg every night, as recommended by psychiatry.    Hypothyroidism- (present on admission)  Assessment & Plan  Levothyroxine has been ordered.  Patient refuses her medications.  She refused blood draw on multiple tries     Noncompliance with treatment plan- (present on admission)  Assessment & Plan  Patient refused antipsychotic medications and all other medications as well as any lab work       VTE prophylaxis: SCDs/TEDs    I have performed a physical exam and reviewed and updated ROS and Plan today (11/30/2021). In review of yesterday's note (11/29/2021), there are no changes except as documented above.

## 2021-12-01 NOTE — PROGRESS NOTES
Pt. Received sitting on the chair, no complaints of pain at this time. Pt. Has no WG. Educated about fall risks, pt. Wears her shoes when up ambulating. Pt. Resting comfortably in bed at this time.

## 2021-12-02 PROCEDURE — G0378 HOSPITAL OBSERVATION PER HR: HCPCS

## 2021-12-02 ASSESSMENT — PAIN DESCRIPTION - PAIN TYPE: TYPE: ACUTE PAIN

## 2021-12-02 NOTE — PROGRESS NOTES
Received bedside report from night shift RN.   Assumed care of patient at change of shift.   Patient refuses assessment.  Sitting in chair inside of room looking out window.  Denies any needs at this time.

## 2021-12-02 NOTE — PROGRESS NOTES
Pt is A&Ox1. Pt is resting in bed,VSS on RA. No complaints of pain. All belongings are packed on bed. Pt refusing assessment. Call light & personal belongings within reach, bed in lowest position & locked. Fall precautions in place and education provided on how to use call light. All needs met at this time.

## 2021-12-03 PROCEDURE — G0378 HOSPITAL OBSERVATION PER HR: HCPCS

## 2021-12-03 PROCEDURE — 99224 PR SUBSEQUENT OBSERVATION CARE,LEVEL I: CPT | Performed by: GENERAL PRACTICE

## 2021-12-03 ASSESSMENT — PAIN DESCRIPTION - PAIN TYPE: TYPE: ACUTE PAIN

## 2021-12-03 NOTE — PROGRESS NOTES
Pt is alert and oriented to self. No complaints of pain. Pt refused all assessments beginning of shift. Offered snacks and fresh fluids. Reinforced use of call light. All needs met.

## 2021-12-03 NOTE — PROGRESS NOTES
[facility] Medicine TWICE WEEKLY Progress Note    Date of Service  12/3/2021    Chief Complaint  Zully Lin is a 71 y.o. female admitted 6/11/2021 with delusions    Hospital Course  This is a 71 year old female with PMHx of bipolar disorder, schizoaffective disorder, hypothyroidism, and dementia who was transferred from [facility] on 06/11/2021 due to worsening agitation and delusions.    Patient was evaluated by psychiatry, her medications were adjusted, patient was deemed incapacitated to make medical decisions. Patient cleared for discharge 6/2021.  Patient currently has guardian, attempting to get patient placed in group home.    Patient refusing to take risperidone 0.5 every morning and risperidone 1 mg every night, as recommended by psychiatry.    Interval Problem Update  Spoke to patient at bedside, she is fully dressed, bags are packed, patient is anxiously waiting to go home.    Patient continues to refuse all PO medications due to paranoia.     PENDING group home placement.    I have personally seen and examined the patient at bedside. I discussed the plan of care with patient and bedside RN.    Consultants/Specialty  psychiatry    Code Status  Full Code    Disposition  Patient is medically cleared.   Anticipate discharge to [facility]/ .  I have placed the appropriate orders for post-discharge needs.    Review of Systems  Review of Systems   Unable to perform ROS: Mental acuity        Physical Exam  Temp:  [36.1 °C (96.9 °F)-36.7 °C (98.1 °F)] 36.7 °C (98.1 °F)  Pulse:  [75-81] 81  Resp:  [16-18] 16  BP: (116-128)/(72-75) 128/75  SpO2:  [97 %-99 %] 97 %    Physical Exam  Vitals and nursing note reviewed.   Constitutional:       General: She is not in acute distress.     Appearance: Normal appearance.   HENT:      Head: Normocephalic and atraumatic.      Mouth/Throat:      Mouth: Mucous membranes are moist.      Pharynx: No oropharyngeal exudate.   Eyes:      Extraocular Movements:  Extraocular movements intact.      Pupils: Pupils are equal, round, and reactive to light.   Cardiovascular:      Rate and Rhythm: Normal rate and regular rhythm.      Pulses: Normal pulses.      Heart sounds: No murmur heard.  No friction rub. No gallop.    Pulmonary:      Effort: Pulmonary effort is normal. No respiratory distress.      Breath sounds: No wheezing, rhonchi or rales.   Abdominal:      General: Bowel sounds are normal. There is no distension.      Palpations: Abdomen is soft. There is no mass.      Tenderness: There is no abdominal tenderness.   Musculoskeletal:         General: No swelling or tenderness. Normal range of motion.      Cervical back: Normal range of motion. No rigidity. No muscular tenderness.      Right lower leg: No edema.      Left lower leg: No edema.   Skin:     General: Skin is warm and dry.      Capillary Refill: Capillary refill takes less than 2 seconds.      Findings: No erythema or rash.   Neurological:      General: No focal deficit present.      Mental Status: She is alert and oriented to person, place, and time.      Motor: No weakness.      Gait: Gait normal.         Fluids    Intake/Output Summary (Last 24 hours) at 12/3/2021 0753  Last data filed at 12/2/2021 1549  Gross per 24 hour   Intake 1200 ml   Output --   Net 1200 ml       Laboratory                        Imaging  DX-CHEST-LIMITED (1 VIEW)   Final Result      Left basilar atelectasis. No focal consolidation. No effusions.           Assessment/Plan  * Delusional disorder- (present on admission)  Assessment & Plan  Patient was evaluated by psychiatry, her medications were adjusted, patient was deemed incapacitated to make medical decisions. Patient cleared for discharge 6/2021.  Patient currently has guardian, attempting to get patient placed in group home.    Patient refusing to take risperidone 0.5 every morning and risperidone 1 mg every night, as recommended by psychiatry.    History of dementia- (present on  admission)  Assessment & Plan  At baseline  Pending placement.    Schizoaffective disorder, bipolar type (HCC)- (present on admission)  Assessment & Plan  Patient has been refusing medications and blood draw due to paranoia.    Actively delusional  Case management are working on discharge planning and guardianship.    Patient was evaluated by psychiatry, her medications were adjusted, patient was deemed incapacitated to make medical decisions. Patient cleared for discharge 6/2021.  Patient currently has guardian, attempting to get patient placed in group home.    Patient refusing to take risperidone 0.5 every morning and risperidone 1 mg every night, as recommended by psychiatry.    Hypothyroidism- (present on admission)  Assessment & Plan  Levothyroxine has been ordered.  Patient refuses her medications.  She refused blood draw on multiple tries     Noncompliance with treatment plan- (present on admission)  Assessment & Plan  Patient refused antipsychotic medications and all other medications as well as any lab work       VTE prophylaxis: SCDs/TEDs    I have performed a physical exam and reviewed and updated ROS and Plan today (12/3/2021). In review of yesterday's note (12/2/2021), there are no changes except as documented above.

## 2021-12-04 PROCEDURE — G0378 HOSPITAL OBSERVATION PER HR: HCPCS

## 2021-12-04 ASSESSMENT — FIBROSIS 4 INDEX: FIB4 SCORE: 1.53

## 2021-12-04 NOTE — PROGRESS NOTES
"Pt is A&Ox2, disoriented to time and situation. Pt knows she is at [facility], yet states \"This is my building. I own the place\". Pt refuses assessment, makes it known she is fine. Hourly rounding in place.   "

## 2021-12-05 PROCEDURE — G0378 HOSPITAL OBSERVATION PER HR: HCPCS

## 2021-12-05 NOTE — PROGRESS NOTES
Received report from NOC RN and assumed care of pt. Pt is A&Ox1, oriented to self only. Pt resting is resting in room on room air. Pt reports no pain. Pt up self with steady gait. No other needs at this time. Bed locked in lowest position, call light within reach.

## 2021-12-06 PROCEDURE — G0378 HOSPITAL OBSERVATION PER HR: HCPCS

## 2021-12-06 ASSESSMENT — PAIN DESCRIPTION - PAIN TYPE
TYPE: ACUTE PAIN
TYPE: ACUTE PAIN

## 2021-12-07 PROCEDURE — G0378 HOSPITAL OBSERVATION PER HR: HCPCS

## 2021-12-07 ASSESSMENT — PAIN DESCRIPTION - PAIN TYPE
TYPE: ACUTE PAIN
TYPE: ACUTE PAIN

## 2021-12-07 NOTE — PROGRESS NOTES
"Pt laying in bed. Pt refusing assessment stating \"I'm fine.\" No report of pain/discomfort. Call light within reach, personal belongings available, bed in lowest position, treaded socks on, and hourly rounding in place.      "

## 2021-12-07 NOTE — PROGRESS NOTES
Pt sitting in room with all belongings packed. Pt refuses to answer any orientation questions but will state her name. Pt denies any pain at this time.

## 2021-12-08 PROCEDURE — 99224 PR SUBSEQUENT OBSERVATION CARE,LEVEL I: CPT | Performed by: STUDENT IN AN ORGANIZED HEALTH CARE EDUCATION/TRAINING PROGRAM

## 2021-12-08 PROCEDURE — G0378 HOSPITAL OBSERVATION PER HR: HCPCS

## 2021-12-08 ASSESSMENT — PAIN DESCRIPTION - PAIN TYPE: TYPE: ACUTE PAIN

## 2021-12-08 NOTE — PROGRESS NOTES
[facility] Medicine TWICE WEEKLY Progress Note    Date of Service  12/8/2021    Chief Complaint  Zully Lin is a 71 y.o. female admitted 6/11/2021 with delusions    Hospital Course  This is a 71 year old female with PMHx of bipolar disorder, schizoaffective disorder, hypothyroidism, and dementia who was transferred from [facility] on 06/11/2021 due to worsening agitation and delusions.    Patient was evaluated by psychiatry, her medications were adjusted, patient was deemed incapacitated to make medical decisions. She was cleared for discharge 6/2021.  Patient currently has guardian, attempting to get patient placed in group home.    Patient was evaluated by speech for cognitive evaluation, she did rather well in all domains, It is unclear if she has underlying dementia or if her presentation is psychiatric.     Patient refusing to take risperidone 0.5 every morning and risperidone 1 mg every night, as recommended by psychiatry.    Interval Problem Update  Patient seen at room entrance, did not want to be examined. She states she is [employer] and has an onn device to speak with them, she has a remote control (onn brand). She perseverates on this. Denies complaints.   She is pleasant     Patient continues to refuse all PO medications due to paranoia.     PENDING group home placement.    I have personally seen and examined the patient at bedside. I discussed the plan of care with patient and bedside RN.    Consultants/Specialty  psychiatry    Code Status  Full Code    Disposition  Patient is medically cleared.   Anticipate discharge to [facility]/ .  I have placed the appropriate orders for post-discharge needs.    Review of Systems  Review of Systems   Unable to perform ROS: Mental acuity        Physical Exam  Temp:  [35.9 °C (96.6 °F)-36.6 °C (97.9 °F)] 36.1 °C (97 °F)  Pulse:  [64-79] 77  Resp:  [16-17] 16  BP: (133-142)/(70-79) 133/70  SpO2:  [97 %-99 %] 97 %    Physical Exam  Vitals and nursing  note reviewed.   Constitutional:       General: She is not in acute distress.     Appearance: Normal appearance.   HENT:      Head: Normocephalic and atraumatic.      Mouth/Throat:      Mouth: Mucous membranes are moist.      Pharynx: No oropharyngeal exudate.   Eyes:      Extraocular Movements: Extraocular movements intact.      Pupils: Pupils are equal, round, and reactive to light.   Cardiovascular:      Rate and Rhythm: Normal rate and regular rhythm.      Pulses: Normal pulses.      Heart sounds: No murmur heard.  No friction rub. No gallop.    Pulmonary:      Effort: Pulmonary effort is normal. No respiratory distress.      Breath sounds: No wheezing, rhonchi or rales.   Abdominal:      General: Bowel sounds are normal. There is no distension.      Palpations: Abdomen is soft. There is no mass.      Tenderness: There is no abdominal tenderness.   Musculoskeletal:         General: No swelling or tenderness. Normal range of motion.      Cervical back: Normal range of motion. No rigidity. No muscular tenderness.      Right lower leg: No edema.      Left lower leg: No edema.   Skin:     General: Skin is warm and dry.      Capillary Refill: Capillary refill takes less than 2 seconds.      Findings: No erythema or rash.   Neurological:      General: No focal deficit present.      Mental Status: She is alert and oriented to person, place, and time.      Motor: No weakness.      Gait: Gait normal.         Fluids    Intake/Output Summary (Last 24 hours) at 12/8/2021 1325  Last data filed at 12/8/2021 0733  Gross per 24 hour   Intake 480 ml   Output --   Net 480 ml       Laboratory                        Imaging  DX-CHEST-LIMITED (1 VIEW)   Final Result      Left basilar atelectasis. No focal consolidation. No effusions.           Assessment/Plan  * Delusional disorder- (present on admission)  Assessment & Plan  Patient was evaluated by psychiatry, her medications were adjusted, patient was deemed incapacitated to make  medical decisions. Patient cleared for discharge 6/2021.  Patient currently has guardian, attempting to get patient placed in group home.    Patient refusing to take risperidone 0.5 every morning and risperidone 1 mg every night, as recommended by psychiatry.    History of dementia- (present on admission)  Assessment & Plan  At baseline  Pending placement.    Schizoaffective disorder, bipolar type (HCC)- (present on admission)  Assessment & Plan  Patient has been refusing medications and blood draw due to paranoia.    Actively delusional  Case management are working on discharge planning and guardianship.    Patient was evaluated by psychiatry, her medications were adjusted, patient was deemed incapacitated to make medical decisions. Patient cleared for discharge 6/2021.  Patient currently has guardian, attempting to get patient placed in group home.    Patient refusing to take risperidone 0.5 every morning and risperidone 1 mg every night, as recommended by psychiatry.    Hypothyroidism- (present on admission)  Assessment & Plan  Levothyroxine has been ordered.  Patient refuses her medications.  She refused blood draw on multiple tries     Noncompliance with treatment plan- (present on admission)  Assessment & Plan  Patient refused antipsychotic medications and all other medications as well as any lab work       VTE prophylaxis: SCDs/TEDs    I have performed a physical exam and reviewed and updated ROS and Plan today (12/8/2021). In review of yesterday's note (12/7/2021), there are no changes except as documented above.

## 2021-12-08 NOTE — PROGRESS NOTES
Pt A&Ox3 on RA, denies pain/discomfort. Pt confused, delusional - believes she is governor of [place]. Pt refused some assessments (see flowsheet). Safety precautions and hourly rounding in place.

## 2021-12-08 NOTE — PROGRESS NOTES
"Patient walked out of room asking when she was leaving. Told patient I didn't know, pt then stated \"you can't hold me hostage here, watch out you're going to be murdered.\"  "

## 2021-12-09 PROCEDURE — 700111 HCHG RX REV CODE 636 W/ 250 OVERRIDE (IP): Performed by: STUDENT IN AN ORGANIZED HEALTH CARE EDUCATION/TRAINING PROGRAM

## 2021-12-09 PROCEDURE — 36415 COLL VENOUS BLD VENIPUNCTURE: CPT

## 2021-12-09 PROCEDURE — 86480 TB TEST CELL IMMUN MEASURE: CPT

## 2021-12-09 PROCEDURE — G0378 HOSPITAL OBSERVATION PER HR: HCPCS

## 2021-12-09 PROCEDURE — 0002A HCHG PFIZER COVID ADMIN 2ND DOSE: CPT

## 2021-12-09 PROCEDURE — 91300 HCHG RX REV CODE 636 W/ 250 OVERRIDE (IP): CPT | Performed by: STUDENT IN AN ORGANIZED HEALTH CARE EDUCATION/TRAINING PROGRAM

## 2021-12-09 RX ORDER — HALOPERIDOL 5 MG/ML
5 INJECTION INTRAMUSCULAR EVERY 6 HOURS PRN
Status: DISCONTINUED | OUTPATIENT
Start: 2021-12-09 | End: 2021-12-11

## 2021-12-09 RX ADMIN — RNA INGREDIENT BNT-162B2 0.3 ML: 0.23 INJECTION, SUSPENSION INTRAMUSCULAR at 14:08

## 2021-12-09 ASSESSMENT — PAIN DESCRIPTION - PAIN TYPE: TYPE: ACUTE PAIN

## 2021-12-09 NOTE — PROGRESS NOTES
Pt A&Ox3 on RA, denies pain/discomfort. Pt confused, delusional, refuses some assessments (see flowsheet). No signs of acute distress observed at this time, pt currently sleeping. Safety precautions and hourly rounding in place.

## 2021-12-09 NOTE — DISCHARGE PLANNING
Medical Social Work  PC to Samantha at [facility] [phone].  GOMEZ went over in detail patient's behaviors, delusions, parinaud,hearing voices, refusing all medications, medical/psch.  Patient recent statements, governor of the [facility], has multiple homes, 10 children, 52 year old adult daughter, Nitesh Triana the father.  GOMEZ stated that patient can be verbally aggressive, no racial remarks.  No chart notes of elopement attempts or striking others.     Samantha requested the the H/P most recent attending notes/face sheet be emailed to her at [email or web address]    Samantha asked about COVID, GOMEZ stated patient has had her first shot of Pfizer.  Samantha stated patient will need her second will not accept if this isn't done.    Volt to Dr Plunkett with an update and request for second  COVID shot.      GOMEZ emailed requested information to Samantha.

## 2021-12-09 NOTE — PROGRESS NOTES
Received bedside report from night shift RN.   Assumed care of patient at change of shift.   Patient refuses assessment.  Sitting in chair inside of room looking out window.  Denies any needs at this time.    1408: Covid vaccine administered in R deltoid with the help of security.

## 2021-12-10 PROCEDURE — G0378 HOSPITAL OBSERVATION PER HR: HCPCS

## 2021-12-10 ASSESSMENT — PAIN DESCRIPTION - PAIN TYPE: TYPE: ACUTE PAIN

## 2021-12-10 NOTE — PROGRESS NOTES
Pt A&Ox3 on RA, denies pain/discomfort. Pt confused, delusional, refuses some assessments (see flowsheet). Pt up self with steady gait. No signs of acute distress observed at this time, pt currently sleeping. Safety precautions and hourly rounding in place

## 2021-12-11 PROCEDURE — 99224 PR SUBSEQUENT OBSERVATION CARE,LEVEL I: CPT | Performed by: STUDENT IN AN ORGANIZED HEALTH CARE EDUCATION/TRAINING PROGRAM

## 2021-12-11 PROCEDURE — G0378 HOSPITAL OBSERVATION PER HR: HCPCS

## 2021-12-11 ASSESSMENT — PAIN DESCRIPTION - PAIN TYPE: TYPE: ACUTE PAIN

## 2021-12-11 ASSESSMENT — FIBROSIS 4 INDEX: FIB4 SCORE: 1.53

## 2021-12-11 NOTE — PROGRESS NOTES
Received report and assumed care at shift change. A&O x 2, cotninue to be noncompliant with cares. No complain of pain or distress.

## 2021-12-12 PROCEDURE — G0378 HOSPITAL OBSERVATION PER HR: HCPCS

## 2021-12-12 NOTE — PROGRESS NOTES
Received report and assumed care at shift change. A&O x 3, disoriented to situation, non compliant with cares and medications. No complain of pain or distress.

## 2021-12-12 NOTE — PROGRESS NOTES
"Attempted to assess patient this morning. Patient agitated. Asked patient how she is doing. Patient states \"I'm not dead if that's what you want to know.\" This RN asked if patient was doing okay. Patient said \"Get lost, bitch. You are on my property. Don't you know I own this place? Look it up!\" Patient refused assessment. Patient sitting in chair looking out window.  "

## 2021-12-13 PROCEDURE — 99224 PR SUBSEQUENT OBSERVATION CARE,LEVEL I: CPT | Performed by: INTERNAL MEDICINE

## 2021-12-13 PROCEDURE — G0378 HOSPITAL OBSERVATION PER HR: HCPCS

## 2021-12-13 ASSESSMENT — PAIN DESCRIPTION - PAIN TYPE
TYPE: ACUTE PAIN
TYPE: ACUTE PAIN

## 2021-12-13 NOTE — PROGRESS NOTES
Assumed care of pt after receiving report from dayshift RN. Pt A&O x 2 - disoriented to place and situation, reoriented pt with no success. Pt irritable and refusing assessment despite education. Bed is locked and in lowest position, call light within reach, fall precautions in place. All needs met at this time.

## 2021-12-13 NOTE — DISCHARGE PLANNING
Agency/Facility Name: [facility]  Spoke To: Jaison  Outcome: DPA called stating [facility] was going to pay for flight from [place] to [place].  The nurse resource line will contact IMTIAZ Hoff.  They will send a single source case agreement and once approved and processed, they will contact Jaison for a flight be to scheduled.    IMTIAZ Hoff notified via Teams.

## 2021-12-13 NOTE — DISCHARGE PLANNING
0941  Agency/Facility Name: [facility] - [phone]  Outcome: DPA received a voice message from Jaison with a quote of $21,156.00 including the ground side.    1003  Agency/Facility Name: [facility]  Spoke To: Jaison  Outcome: DPA called to confirm price quote is $21,156.00.  This includes ground service in [place] and [place].    LSW was notified.

## 2021-12-13 NOTE — DISCHARGE PLANNING
Agency/Facility Name: [facility] [phone]  Spoke To: Jaison Barcenas: Will get a quote and call this DPA back.    Flight is from [facility] to:  [facility]  [address] [place]  [address]

## 2021-12-14 PROCEDURE — G0378 HOSPITAL OBSERVATION PER HR: HCPCS

## 2021-12-14 ASSESSMENT — PAIN DESCRIPTION - PAIN TYPE
TYPE: ACUTE PAIN
TYPE: ACUTE PAIN

## 2021-12-14 NOTE — PROGRESS NOTES
Pt resting quietly in bed. Refusing to answer orientations questions and refusing to be assessed. Safety precautions and hourly rounding in place.

## 2021-12-14 NOTE — PROGRESS NOTES
"Hospital Medicine TWICE WEEKLY Progress Note    Date of Service  12/14/2021    Chief Complaint  Zully Lin is a 71 y.o. female admitted 6/11/2021 with delusions    Hospital Course  This is a 71 year old female with PMHx of bipolar disorder, schizoaffective disorder, hypothyroidism, and dementia who was transferred from [facility] on 06/11/2021 due to worsening agitation and delusions.    Patient was evaluated by speech for cognitive evaluation, she did rather well in all domains, It is unclear if she has underlying dementia or if her presentation is psychiatric. Patient was evaluated by psychiatry, and her medications were adjusted. Patient refusing to take risperidone 0.5 every morning and risperidone 1 mg every night, as recommended by psychiatry.    Patient was deemed incapacitated to make medical decisions. She was cleared for discharge 6/2021.  Patient currently has guardian, and pursuing placement in group home in [place] ([facility]). Arranging for care flight to [place].    Pt has received 2 doses of COVID vaccine 1and is now fully vaccinated. Quant negative 12/9/2021.     Interval Problem Update  12/14/2021 - I reviewed the patient's chart. There were no significant overnight events. Remains hemodynamically stable and afebrile. Stable on RA.  No recent labs.    > I have personally seen and examined the patient today.  She is cooperative, and conversant, but continues to have delusions (talking about cannibals, \"REID\".  She states \"I do not know why I am here, I been here since June\" no agitation today.  She continues to refuse her medications per the RN.  No complaints.  She denies any pain.  Having regular bowel movements.  No nausea or vomiting.  No chest pain or shortness of breath.      I have personally seen and examined the patient at bedside. I discussed the plan of care with patient and bedside RN.    Consultants/Specialty  psychiatry    Code Status  Full " Code    Disposition  Patient is medically cleared.   Anticipate discharge to [facility]/ .  I have placed the appropriate orders for post-discharge needs.    Review of Systems  Review of Systems      Pertinent positives/negatives as mentioned above.     A complete review of systems was personally done by me. All other systems were negative.         Physical Exam  Temp:  [36.1 °C (96.9 °F)-36.3 °C (97.4 °F)] 36.1 °C (96.9 °F)  Pulse:  [70-77] 70  Resp:  [14-16] 15  BP: (118-143)/(71-80) 137/71  SpO2:  [97 %-99 %] 99 %    Physical Exam  Vitals and nursing note reviewed.   Constitutional:       General: She is not in acute distress.     Appearance: Normal appearance.   HENT:      Head: Normocephalic and atraumatic.      Mouth/Throat:      Mouth: Mucous membranes are moist.      Pharynx: No oropharyngeal exudate.   Eyes:      Extraocular Movements: Extraocular movements intact.      Pupils: Pupils are equal, round, and reactive to light.   Cardiovascular:      Rate and Rhythm: Normal rate and regular rhythm.      Pulses: Normal pulses.      Heart sounds: No murmur heard.  No friction rub. No gallop.    Pulmonary:      Effort: Pulmonary effort is normal. No respiratory distress.      Breath sounds: No wheezing, rhonchi or rales.   Abdominal:      General: Bowel sounds are normal. There is no distension.      Palpations: Abdomen is soft. There is no mass.      Tenderness: There is no abdominal tenderness.   Musculoskeletal:         General: No swelling or tenderness. Normal range of motion.      Cervical back: Normal range of motion. No rigidity. No muscular tenderness.      Right lower leg: No edema.      Left lower leg: No edema.   Skin:     General: Skin is warm and dry.      Capillary Refill: Capillary refill takes less than 2 seconds.      Findings: No erythema or rash.   Neurological:      General: No focal deficit present.      Mental Status: She is alert and oriented to person, place, and time.      Motor: No  weakness.      Gait: Gait normal.   Psychiatric:      Comments: Continues to have paranoia and delusions.  Cooperative and conversant.  She is actually pleasant today.               Fluids    Intake/Output Summary (Last 24 hours) at 12/14/2021 1006  Last data filed at 12/14/2021 0840  Gross per 24 hour   Intake 920 ml   Output --   Net 920 ml       Laboratory                        Imaging  DX-CHEST-LIMITED (1 VIEW)   Final Result      Left basilar atelectasis. No focal consolidation. No effusions.           Assessment/Plan  * Delusional disorder- (present on admission)  Assessment & Plan  -Patient was evaluated by psychiatry, her medications were adjusted, patient was deemed incapacitated to make medical decisions. Patient cleared for discharge 6/2021.    -Patient currently has guardian, attempting to get patient placed in group home.  -Patient refusing to take risperidone 0.5 every morning and risperidone 1 mg every night, as recommended by psychiatry.    History of dementia- (present on admission)  Assessment & Plan  -At baseline  -[facility] [place] [facility] to accept. [facility] will pay for care flight to .   -Now fully vaccinated.   -Has guardian.    Schizoaffective disorder, bipolar type (HCC)- (present on admission)  Assessment & Plan  -Patient has been refusing medications and blood draw due to paranoia.    -Actively delusional  -Patient was evaluated by psychiatry, her medications were adjusted, patient was deemed incapacitated to make medical decisions. Patient cleared for discharge 6/2021.  Patient currently has guardian, attempting to get patient placed in group home.  -Patient refusing to take risperidone 0.5 every morning and risperidone 1 mg every night, as recommended by psychiatry.    Hypothyroidism- (present on admission)  Assessment & Plan  -Patient refuses to take her medications.  She refused blood draw on multiple tries     Noncompliance with treatment plan- (present on  admission)  Assessment & Plan  -Patient refused antipsychotic medications and all other medications as well as any lab work       VTE prophylaxis: SCDs/TEDs    I have performed the physical examination, and reviewed updated ROS and plan today 12/14/2021.  In review of yesterday's note, there are no new changes except as documented above.

## 2021-12-15 PROCEDURE — RXMED WILLOW AMBULATORY MEDICATION CHARGE: Performed by: INTERNAL MEDICINE

## 2021-12-15 PROCEDURE — G0378 HOSPITAL OBSERVATION PER HR: HCPCS

## 2021-12-15 RX ORDER — HALOPERIDOL 2 MG/1
2 TABLET ORAL 4 TIMES DAILY PRN
Qty: 30 TABLET | Refills: 0 | Status: SHIPPED | OUTPATIENT
Start: 2021-12-15 | End: 2022-03-15 | Stop reason: SDUPTHER

## 2021-12-15 ASSESSMENT — PAIN DESCRIPTION - PAIN TYPE
TYPE: ACUTE PAIN
TYPE: ACUTE PAIN

## 2021-12-15 NOTE — DISCHARGE PLANNING
Medical Social Work  SW emailed signed agreement for transport to   [email or web address]

## 2021-12-15 NOTE — PROGRESS NOTES
"Pt resting quietly in bed, denies pain/discomfort. Refused assessment, stating \"I'm ok.\" Call light within reach, personal belongings available, bed in lowest position, treaded socks on, and hourly rounding in place.      "

## 2021-12-16 LAB
SARS-COV-2 RNA RESP QL NAA+PROBE: NOTDETECTED
SPECIMEN SOURCE: NORMAL

## 2021-12-16 PROCEDURE — U0003 INFECTIOUS AGENT DETECTION BY NUCLEIC ACID (DNA OR RNA); SEVERE ACUTE RESPIRATORY SYNDROME CORONAVIRUS 2 (SARS-COV-2) (CORONAVIRUS DISEASE [COVID-19]), AMPLIFIED PROBE TECHNIQUE, MAKING USE OF HIGH THROUGHPUT TECHNOLOGIES AS DESCRIBED BY CMS-2020-01-R: HCPCS

## 2021-12-16 PROCEDURE — G0378 HOSPITAL OBSERVATION PER HR: HCPCS

## 2021-12-16 PROCEDURE — U0005 INFEC AGEN DETEC AMPLI PROBE: HCPCS

## 2021-12-16 NOTE — PROGRESS NOTES
Pt on bed, refused assessments, reinforced education. Encouraged to use call bell light for assistance. Monitored hourly.

## 2021-12-16 NOTE — DISCHARGE PLANNING
Medical Social Work  PC to Nurse Ride Line [phone], GOMEZ spoke to Esteban who verified they received signed agreement of payment.  Asked when patient will be able to discharge, GOMEZ stated Friday.  Esteban will contact Markos and start working on this.  GOMEZ provided name and number group home.  GOMEZ will be called/emailed when transport is set.    PC to [facility], will have Rx delivered tomorrow    GOMEZ faxed Approved Services to [facility]

## 2021-12-16 NOTE — PROGRESS NOTES
Received report from NOC RN, pt care assumed. VS stable on RA. No signs of acute distress. Call light within reach, hourly rounding initiated. Pt refuses assessment questions.  Pt sitting comfortably looking out the bedroom window.

## 2021-12-17 ENCOUNTER — PATIENT OUTREACH (OUTPATIENT)
Dept: HEALTH INFORMATION MANAGEMENT | Facility: OTHER | Age: 71
End: 2021-12-17

## 2021-12-17 ENCOUNTER — PHARMACY VISIT (OUTPATIENT)
Dept: PHARMACY | Facility: MEDICAL CENTER | Age: 71
End: 2021-12-17
Payer: COMMERCIAL

## 2021-12-17 PROCEDURE — 99224 PR SUBSEQUENT OBSERVATION CARE,LEVEL I: CPT | Performed by: INTERNAL MEDICINE

## 2021-12-17 PROCEDURE — G0378 HOSPITAL OBSERVATION PER HR: HCPCS

## 2021-12-17 RX ORDER — HALOPERIDOL 5 MG/ML
4 INJECTION INTRAMUSCULAR
Status: DISCONTINUED | OUTPATIENT
Start: 2021-12-17 | End: 2022-01-25

## 2021-12-17 RX ORDER — HALOPERIDOL 5 MG/ML
4 INJECTION INTRAMUSCULAR
Status: DISCONTINUED | OUTPATIENT
Start: 2021-12-17 | End: 2021-12-17

## 2021-12-17 NOTE — DISCHARGE PLANNING
RNCM [facility] from [facility], patient to be picked up at 9am bedside by [facility] for airflight to Group [place] in [facility].   Group Home is:   [facility]  [address], [place] [address]   Phone: [phone]  Charge Nurse and bedside nurse notified. No DC Order or Summary for this patient. DC packet completed. Pharmacy notified and discharge medications delivered to unit.      RNCM called Group Home and spoke to Samantha who says she cannot accept patient today as she has not received the following paperwork:   Physician Assessment and Placement Determination.   Samantha says she can accept patient tomorrow if paperwork received today and approved. [facility] transportation at bedside notified and cancelled.   Bedside Nurse notified as well as CM Supervisor.    to follow up on above later today.

## 2021-12-17 NOTE — DISCHARGE PLANNING
"GOMEZ supervisor called Samantha at [facility]: [phone] spoke with \"Samantha.\" Requested information about what she needs for patient to be accepted.     She reported she emailed [facility] (?) group home paperwork and has not received it back. Also requested information on payment. I informed her that I would have Luis Eduardo call later on today to coordinate. Samantha is able to take patient tomorrow if everything is secured.   "

## 2021-12-17 NOTE — PROGRESS NOTES
"Assumed care of patient this shift. Patient is alert and oriented x 2.  Up independently in room and to bathroom. Patient ambulating in hallways and began yelling at staff this morning that she \"has meetings to get to\" and \"my ride is waiting downstairs\" patient also stated that she has unplugged everything in her room due to \"people recording me\"   Patient stated \"I am now recording you female\" and \"I am the governor and you can't keep the governor against her will\"   Attempted to reorient patient to situation, patient refuses to listen to staff and closed door to her room.   "

## 2021-12-17 NOTE — DISCHARGE PLANNING
Medical Social Work  PC to juani Patel  [facility].  She was unaware that patient did not discharge, as she like GOMEZ felt everything was arranged.  Denia stated she will call the group home owner and get back to GOMEZ.    PC from Denia, who stated that the owner is wanting additional paperwork along with what has been sent.  Requesting the H/P, COVID shot, TB.  Denia stated that she had received this information from the  and had forwarded to the group home.  The group home is also wanting [facility] to pay up front for cost of care. Check at time of admit and paid monthly on the 1-5th, like rent  GOMEZ asked Denia if the owner had stated this prior to her, Denia statd no this is the first she has heard about this request. GOMEZ stated he will follow up with upper management on paying upfront.  GOMEZ stated that to his knowledge we do not do this.

## 2021-12-17 NOTE — PROGRESS NOTES
Received report and assumed care of pt. Pt irritable at this time and refused all assessments despite education. Pt shows no signs of distress/pain. Educated pt on use of call light. Pt is sitting on chair. Fall and safety precautions in place at all times.

## 2021-12-17 NOTE — DISCHARGE PLANNING
Agency/Facility Name: [facility]  Spoke To: Rancho & Esteban  Outcome: Will coordinate flight time and update DPA.     @0815  DPA spoke to Rancho, crew to be at bedside at 0900, will land in [place] at 1100.    RNCM notified.

## 2021-12-17 NOTE — PROGRESS NOTES
0825: Notfied by Case Management that patient would be transported via [facility] at 0900.   Notfied Dr. Frias as no discharge order is currently in place.     0845: [facility] transportation arrived. Patient currently does not have discharge order in place. Notified Dr. Frias.     0900: Notified by Case Management that transportation will be cancelled.

## 2021-12-17 NOTE — PROGRESS NOTES
"Hospital Medicine TWICE WEEKLY Progress Note    Date of Service  12/17/2021    Chief Complaint  Zully Lin is a 71 y.o. female admitted 6/11/2021 with delusions    Hospital Course  This is a 71 year old female with PMHx of bipolar disorder, schizoaffective disorder, hypothyroidism, and dementia who was transferred from [facility] on 06/11/2021 due to worsening agitation and delusions.    Patient was evaluated by speech for cognitive evaluation, she did rather well in all domains, It is unclear if she has underlying dementia or if her presentation is psychiatric. Patient was evaluated by psychiatry, and her medications were adjusted. Patient refusing to take risperidone 0.5 every morning and risperidone 1 mg every night, as recommended by psychiatry.    Patient was deemed incapacitated to make medical decisions. She was cleared for discharge 6/2021.  Patient currently has guardian, and pursuing placement in group home in [place] ([facility]). Arranging for care flight to [place].    Pt has received 2 doses of COVID vaccine 1and is now fully vaccinated. Quant negative 12/9/2021.     Interval Problem Update  12/17/2021 - I reviewed the patient's chart today. Uneventful night. VSS. Afebrile. Saturating well on RA.  COVID-19 test has been negative on 12/16.    > I have personally seen and examined the patient today.  She is calm and cooperative today, pleasant to me.  She states she is looking forward to leaving today.  She remains delusional, stating that \"I unplug everything in this room that can hurt me\", and asks that I called maintenance to fix a wire coming out of the wall that \"keeps spitting me\".  She continues to think that she is the governor. \"  I do not need a  if I am the governor\" otherwise no other complaints.  Ate her breakfast well without issues.  Denies any pain.  No shortness of breath nausea vomiting abdominal pain fevers or chills.        I have " personally seen and examined the patient at bedside. I discussed the plan of care with patient and bedside RN.    Consultants/Specialty  psychiatry    Code Status  Full Code    Disposition  Patient is medically cleared.   Anticipate discharge to [facility]/ .  I have placed the appropriate orders for post-discharge needs.    Review of Systems  ROS   Pertinent positives/negatives as mentioned above.     A complete review of systems was personally done by me limited by delusions. All other systems were negative.         Physical Exam  Temp:  [36.2 °C (97.1 °F)-36.2 °C (97.2 °F)] 36.2 °C (97.2 °F)  Pulse:  [64-70] 64  Resp:  [17-18] 17  BP: (132-150)/(65-79) 138/72  SpO2:  [95 %-98 %] 95 %    Physical Exam  Vitals and nursing note reviewed.   Constitutional:       General: She is not in acute distress.     Appearance: Normal appearance.   HENT:      Head: Normocephalic and atraumatic.      Mouth/Throat:      Mouth: Mucous membranes are moist.      Pharynx: No oropharyngeal exudate.   Eyes:      Extraocular Movements: Extraocular movements intact.      Pupils: Pupils are equal, round, and reactive to light.   Cardiovascular:      Rate and Rhythm: Normal rate and regular rhythm.      Pulses: Normal pulses.      Heart sounds: No murmur heard.  No friction rub. No gallop.    Pulmonary:      Effort: Pulmonary effort is normal. No respiratory distress.      Breath sounds: No wheezing, rhonchi or rales.   Abdominal:      General: Bowel sounds are normal. There is no distension.      Palpations: Abdomen is soft. There is no mass.      Tenderness: There is no abdominal tenderness.   Musculoskeletal:         General: No swelling or tenderness. Normal range of motion.      Cervical back: Normal range of motion. No rigidity. No muscular tenderness.      Right lower leg: No edema.      Left lower leg: No edema.   Skin:     General: Skin is warm and dry.      Capillary Refill: Capillary refill takes less than 2 seconds.       Findings: No erythema or rash.   Neurological:      General: No focal deficit present.      Mental Status: She is alert and oriented to person, place, and time.      Motor: No weakness.      Gait: Gait normal.   Psychiatric:      Comments: Continues to have paranoia and delusions.  Cooperative and conversant.  Pleasant and calm.               Fluids    Intake/Output Summary (Last 24 hours) at 12/17/2021 1041  Last data filed at 12/16/2021 1700  Gross per 24 hour   Intake 960 ml   Output --   Net 960 ml       Laboratory                        Imaging  DX-CHEST-LIMITED (1 VIEW)   Final Result      Left basilar atelectasis. No focal consolidation. No effusions.           Assessment/Plan  * Delusional disorder- (present on admission)  Assessment & Plan  -Patient was evaluated by psychiatry, her medications were adjusted, patient was deemed incapacitated to make medical decisions. Patient cleared for discharge 6/2021.    -Patient currently has guardian, attempting to get patient placed in group home.  -Patient refusing to take risperidone 0.5 every morning and risperidone 1 mg every night, as recommended by psychiatry.    History of dementia- (present on admission)  Assessment & Plan  -At baseline  -[facility] is [place] likely to accept. [facility] will pay for care flight to .   -Now fully vaccinated.   -Has guardian.    Schizoaffective disorder, bipolar type (HCC)- (present on admission)  Assessment & Plan  -Patient has been refusing medications and blood draw due to paranoia.    -Actively delusional  -Patient was evaluated by psychiatry, her medications were adjusted, patient was deemed incapacitated to make medical decisions. Patient cleared for discharge 6/2021.  Patient currently has guardian, attempting to get patient placed in group home.  -Patient refusing to take risperidone 0.5 every morning and risperidone 1 mg every night, as recommended by psychiatry.    Hypothyroidism- (present on  admission)  Assessment & Plan  -Patient refuses to take her medications.  She refused blood draw on multiple tries     Noncompliance with treatment plan- (present on admission)  Assessment & Plan  -Patient refused antipsychotic medications and all other medications as well as any lab work       VTE prophylaxis: SCDs/TEDs    I have performed the physical examination, and reviewed updated ROS and plan today 12/17/2021.  In review of last progress note, there are no new changes except as documented above.

## 2021-12-17 NOTE — DISCHARGE INSTRUCTIONS
Mrs. Lin was calm and cooperative and pleasant throughout her stay.  A metabolic or infectious cause of her symptoms would not resolve spontaneously.  Furthermore, her chest x-ray, blood work, and urine tests were all normal.  There is no sign of a medical emergency, new medical condition, or any new or dangerous cause of any events today at her care facility.      Discharge Instructions    Discharged to [facility] home by medical transportation with escort. Discharged via ambulance, hospital escort: Yes.  Special equipment needed: Not Applicable    Be sure to schedule a follow-up appointment with your primary care doctor or any specialists as instructed.     Discharge Plan:   Diet Plan: Discussed  Activity Level: Discussed  Confirmed Follow up Appointment: Patient to Call and Schedule Appointment  Confirmed Symptoms Management: Discussed  Medication Reconciliation Updated: Yes    I understand that a diet low in cholesterol, fat, and sodium is recommended for good health. Unless I have been given specific instructions below for another diet, I accept this instruction as my diet prescription.   Other diet: Regular diet       Special Instructions: None    · Is patient discharged on Warfarin / Coumadin?   No     Depression / Suicide Risk    As you are discharged from this [facility] facility, it is important to learn how to keep safe from harming yourself.    Recognize the warning signs:  · Abrupt changes in personality, positive or negative- including increase in energy   · Giving away possessions  · Change in eating patterns- significant weight changes-  positive or negative  · Change in sleeping patterns- unable to sleep or sleeping all the time   · Unwillingness or inability to communicate  · Depression  · Unusual sadness, discouragement and loneliness  · Talk of wanting to die  · Neglect of personal appearance   · Rebelliousness- reckless behavior  · Withdrawal from people/activities they love  · Confusion-  inability to concentrate     If you or a loved one observes any of these behaviors or has concerns about self-harm, here's what you can do:  · Talk about it- your feelings and reasons for harming yourself  · Remove any means that you might use to hurt yourself (examples: pills, rope, extension cords, firearm)  · Get professional help from the community (Mental Health, Substance Abuse, psychological counseling)  · Do not be alone:Call your Safe Contact- someone whom you trust who will be there for you.  · Call your local CRISIS HOTLINE 539-0698 or 454-638-8744  · Call your local Children's Mobile Crisis Response Team [place] (421) 444-8665 or www.OOgave  · Call the toll free National Suicide Prevention Hotlines   · National Suicide Prevention Lifeline 598-322-NKID (2728)  · National Hope Line Network 800-SUICIDE (081-5467)

## 2021-12-18 PROCEDURE — G0378 HOSPITAL OBSERVATION PER HR: HCPCS

## 2021-12-18 ASSESSMENT — FIBROSIS 4 INDEX: FIB4 SCORE: 1.53

## 2021-12-18 ASSESSMENT — PAIN DESCRIPTION - PAIN TYPE: TYPE: ACUTE PAIN

## 2021-12-18 NOTE — PROGRESS NOTES
Assumed care of patient this shift. Patient is alert and oriented x  2. Able to make her needs known. Denied complaints of pain this shift when asked. Up independently in room and to bathroom. Patient denied any needs at this time.

## 2021-12-19 PROCEDURE — G0378 HOSPITAL OBSERVATION PER HR: HCPCS

## 2021-12-19 ASSESSMENT — PAIN DESCRIPTION - PAIN TYPE: TYPE: ACUTE PAIN

## 2021-12-19 NOTE — PROGRESS NOTES
Assumed care of pt. Now awake, looking at the window. Ambulating steadily in the room. Refused to be bothered at this time. Pt. Wearing her non skid shoes on. No additional needs at this time.

## 2021-12-19 NOTE — PROGRESS NOTES
Received report and assumed care at shift change. A&O x 2 , continue to be noncompliant with medications and cares. No complain of pain or distress.

## 2021-12-20 PROCEDURE — G0378 HOSPITAL OBSERVATION PER HR: HCPCS

## 2021-12-20 ASSESSMENT — PAIN DESCRIPTION - PAIN TYPE: TYPE: ACUTE PAIN

## 2021-12-20 NOTE — PROGRESS NOTES
Assumed care of patient this shift. Patient is alert and oriented x  2. Continues to refuse any assessments and medications. Denied complaints of pain this shift when asked. Up independently in room and to bathroom. Patient denied any needs at this time.

## 2021-12-20 NOTE — PROGRESS NOTES
Received report and assumed care at shift change. Patient continue to refuse medications and cares.

## 2021-12-21 PROCEDURE — G0378 HOSPITAL OBSERVATION PER HR: HCPCS

## 2021-12-21 PROCEDURE — 99224 PR SUBSEQUENT OBSERVATION CARE,LEVEL I: CPT | Performed by: INTERNAL MEDICINE

## 2021-12-21 ASSESSMENT — ENCOUNTER SYMPTOMS
EYE PAIN: 0
SPUTUM PRODUCTION: 0
ORTHOPNEA: 0
CHILLS: 0
HEADACHES: 0
DIARRHEA: 0
TREMORS: 0
NAUSEA: 0
PHOTOPHOBIA: 0
NECK PAIN: 0
DIZZINESS: 0
COUGH: 0
BLURRED VISION: 0
ABDOMINAL PAIN: 0
WEIGHT LOSS: 0
BACK PAIN: 0
PALPITATIONS: 0
SPEECH CHANGE: 0
VOMITING: 0
MYALGIAS: 0
TINGLING: 0
FEVER: 0
FOCAL WEAKNESS: 0
CONSTIPATION: 0
SHORTNESS OF BREATH: 0
SENSORY CHANGE: 0
HALLUCINATIONS: 0
DOUBLE VISION: 0

## 2021-12-21 ASSESSMENT — LIFESTYLE VARIABLES: SUBSTANCE_ABUSE: 0

## 2021-12-21 ASSESSMENT — PAIN DESCRIPTION - PAIN TYPE: TYPE: ACUTE PAIN

## 2021-12-21 NOTE — PROGRESS NOTES
Received report from NOC RN and assumed care of pt. Unable to assess orientation as pt refuses assessment. Pt is resting in bedsife chair on room air. Pt does not appear to be in pain. No other needs at this time. Bed locked in lowest position, call light within reach, pt educated to use call light for assistance.

## 2021-12-22 PROCEDURE — G0378 HOSPITAL OBSERVATION PER HR: HCPCS

## 2021-12-22 NOTE — PROGRESS NOTES
Received report and assumed care at shift change. A&O x 2, disoriented to time and situation. Continue to be noncompliant with medications and cares.

## 2021-12-22 NOTE — PROGRESS NOTES
Hospital Medicine TWICE WEEKLY Progress Note    Date of Service  12/21/2021    Chief Complaint  Zully Lin is a 71 y.o. female admitted 6/11/2021 with delusions    Hospital Course  This is a 71 year old female with PMHx of bipolar disorder, schizoaffective disorder, hypothyroidism, and dementia who was transferred from [facility] on 06/11/2021 due to worsening agitation and delusions.    Patient was evaluated by speech for cognitive evaluation, she did rather well in all domains, It is unclear if she has underlying dementia or if her presentation is psychiatric. Patient was evaluated by psychiatry, and her medications were adjusted. Patient refusing to take risperidone 0.5 every morning and risperidone 1 mg every night, as recommended by psychiatry.    Patient was deemed incapacitated to make medical decisions. She was cleared for discharge 6/2021.  Patient currently has guardian, and pursuing placement in group home in [place] ([facility]). Arranging for care flight to [place].    Pt has received 2 doses of COVID vaccine 1and is now fully vaccinated. Quant negative 12/9/2021.     Interval Problem Update    12/21/21    I evaluated and examined her at the bedside.  She denies any acute complaints.  She expressed frustration that she is still in the hospital.  Cussed plan of care with her.  I discussed plan of care with bedside RN.  Pending discharge        I have personally seen and examined the patient at bedside. I discussed the plan of care with patient and bedside RN.    Consultants/Specialty  psychiatry    Code Status  Full Code    Disposition  Patient is medically cleared.   Anticipate discharge to [facility]/ .  I have placed the appropriate orders for post-discharge needs.    Review of Systems  Review of Systems   Constitutional: Negative for chills, fever and weight loss.   HENT: Negative for hearing loss and tinnitus.    Eyes: Negative for blurred vision,  double vision, photophobia and pain.   Respiratory: Negative for cough, sputum production and shortness of breath.    Cardiovascular: Negative for chest pain, palpitations, orthopnea and leg swelling.   Gastrointestinal: Negative for abdominal pain, constipation, diarrhea, nausea and vomiting.   Genitourinary: Negative for dysuria, frequency and urgency.   Musculoskeletal: Negative for back pain, joint pain, myalgias and neck pain.   Skin: Negative for rash.   Neurological: Negative for dizziness, tingling, tremors, sensory change, speech change, focal weakness and headaches.   Psychiatric/Behavioral: Negative for hallucinations and substance abuse.   All other systems reviewed and are negative.     She denies any acute complaints.      Physical Exam  Temp:  [36.2 °C (97.1 °F)-36.3 °C (97.4 °F)] 36.3 °C (97.4 °F)  Pulse:  [68-77] 77  Resp:  [14-18] 18  BP: (118-140)/(65-82) 140/82  SpO2:  [96 %-98 %] 96 %    Physical Exam  Vitals and nursing note reviewed.   Constitutional:       General: She is not in acute distress.     Appearance: Normal appearance.   HENT:      Head: Normocephalic and atraumatic.      Mouth/Throat:      Mouth: Mucous membranes are moist.      Pharynx: No oropharyngeal exudate.   Eyes:      Extraocular Movements: Extraocular movements intact.      Pupils: Pupils are equal, round, and reactive to light.   Cardiovascular:      Rate and Rhythm: Normal rate and regular rhythm.      Pulses: Normal pulses.      Heart sounds: No murmur heard.  No friction rub. No gallop.    Pulmonary:      Effort: Pulmonary effort is normal. No respiratory distress.      Breath sounds: No wheezing, rhonchi or rales.   Abdominal:      General: Bowel sounds are normal. There is no distension.      Palpations: Abdomen is soft. There is no mass.      Tenderness: There is no abdominal tenderness.   Musculoskeletal:         General: No swelling or tenderness. Normal range of motion.      Cervical back: Normal range of motion.  No rigidity. No muscular tenderness.      Right lower leg: No edema.      Left lower leg: No edema.   Skin:     General: Skin is warm and dry.      Capillary Refill: Capillary refill takes less than 2 seconds.      Findings: No erythema or rash.   Neurological:      General: No focal deficit present.      Mental Status: She is alert and oriented to person, place, and time.   Psychiatric:      Comments: Continue to have delusion                Fluids    Intake/Output Summary (Last 24 hours) at 12/21/2021 1612  Last data filed at 12/21/2021 1230  Gross per 24 hour   Intake 1200 ml   Output --   Net 1200 ml       Laboratory                        Imaging  DX-CHEST-LIMITED (1 VIEW)   Final Result      Left basilar atelectasis. No focal consolidation. No effusions.           Assessment/Plan  * Delusional disorder- (present on admission)  Assessment & Plan  -Patient was evaluated by psychiatry, her medications were adjusted, patient was deemed incapacitated to make medical decisions. Patient cleared for discharge 6/2021.    -Patient currently has guardian, attempting to get patient placed in group home.  -Patient refusing to take risperidone 0.5 every morning and risperidone 1 mg every night, as recommended by psychiatry.    History of dementia- (present on admission)  Assessment & Plan  -At baseline  -[facility] [place] likely to accept. [facility] will pay for care flight to .   -Now fully vaccinated.   -Has guardian.    Schizoaffective disorder, bipolar type (HCC)- (present on admission)  Assessment & Plan  -Patient has been refusing medications and blood draw due to paranoia.    -Actively delusional  -Patient was evaluated by psychiatry, her medications were adjusted, patient was deemed incapacitated to make medical decisions. Patient cleared for discharge 6/2021.  Patient currently has guardian, attempting to get patient placed in group home.  -Patient refusing to take risperidone 0.5 every  morning and risperidone 1 mg every night, as recommended by psychiatry.    Hypothyroidism- (present on admission)  Assessment & Plan  -Patient refuses to take her medications.  She refused blood draw on multiple tries     Noncompliance with treatment plan- (present on admission)  Assessment & Plan  -Patient refused antipsychotic medications and all other medications as well as any lab work       I reviewed the above assessment plan no acute changes.    VTE prophylaxis: SCDs/TEDs    She is mobilizin not on pharmacological DVT prophylaxis.

## 2021-12-22 NOTE — PROGRESS NOTES
Received report from NOC RN and assumed care of pt. Pt is A&Ox2, oriented to self and place. Pt is resting in bedside chair. Pt reports no pain. Pt asking about discharging today, pt educated that she would not be discharging yet. Pt requesting to speak with MD and SW about discharge. No other needs at this time. Bed locked in lowest position, call light within reach.

## 2021-12-23 PROCEDURE — G0378 HOSPITAL OBSERVATION PER HR: HCPCS

## 2021-12-23 NOTE — PROGRESS NOTES
Pt A&Ox2 on RA, denies pain/discomfort. Refuses most assessments. No signs of acute distress observed at this time, pt currently resting in bed. Hourly rounding in place.

## 2021-12-23 NOTE — PROGRESS NOTES
Received report from NOC RN and assumed care of pt. Pt is A&Ox2, disoriented to situation and time. Pt is sitting up in the chair on room air. Pt refuses assessment. Bed locked in lowest position, call light within reach.

## 2021-12-24 PROCEDURE — G0378 HOSPITAL OBSERVATION PER HR: HCPCS

## 2021-12-24 ASSESSMENT — PAIN DESCRIPTION - PAIN TYPE: TYPE: ACUTE PAIN

## 2021-12-24 NOTE — PROGRESS NOTES
Pt sitting in room with all belongings packed. Pt refuses to answer any orientation questions but will state her name. Pt does deny any pain at this time.

## 2021-12-24 NOTE — PROGRESS NOTES
Pt alert to self and place. VSS. RA. Up self in room. Pt with delusions and grandiose ideas. Refuses all assessments at this time. Bed low/locked. Pt safe. No s/sx of acute distress.

## 2021-12-25 PROCEDURE — G0378 HOSPITAL OBSERVATION PER HR: HCPCS

## 2021-12-25 PROCEDURE — 99224 PR SUBSEQUENT OBSERVATION CARE,LEVEL I: CPT | Performed by: INTERNAL MEDICINE

## 2021-12-25 ASSESSMENT — ENCOUNTER SYMPTOMS
ORTHOPNEA: 0
FEVER: 0
DIZZINESS: 0
HALLUCINATIONS: 0
PALPITATIONS: 0
DOUBLE VISION: 0
ABDOMINAL PAIN: 0
COUGH: 0
CHILLS: 0
NECK PAIN: 0
EYE PAIN: 0
FOCAL WEAKNESS: 0
SPUTUM PRODUCTION: 0
TREMORS: 0
NAUSEA: 0
SPEECH CHANGE: 0
CONSTIPATION: 0
TINGLING: 0
BLURRED VISION: 0
BACK PAIN: 0
WEIGHT LOSS: 0
SHORTNESS OF BREATH: 0
MYALGIAS: 0
DIARRHEA: 0
PHOTOPHOBIA: 0
HEADACHES: 0
VOMITING: 0
SENSORY CHANGE: 0

## 2021-12-25 ASSESSMENT — LIFESTYLE VARIABLES: SUBSTANCE_ABUSE: 0

## 2021-12-25 NOTE — PROGRESS NOTES
Pt sitting in room with her bags packed.  Said she is waiting to leave.  Pt is safe and says she has no needs.

## 2021-12-25 NOTE — PROGRESS NOTES
Hospital Medicine TWICE WEEKLY Progress Note    Date of Service  12/25/2021    Chief Complaint  Zully Lin is a 71 y.o. female admitted 6/11/2021 with delusions    Hospital Course  This is a 71 year old female with PMHx of bipolar disorder, schizoaffective disorder, hypothyroidism, and dementia who was transferred from [facility] on 06/11/2021 due to worsening agitation and delusions.    Patient was evaluated by speech for cognitive evaluation, she did rather well in all domains, It is unclear if she has underlying dementia or if her presentation is psychiatric. Patient was evaluated by psychiatry, and her medications were adjusted. Patient refusing to take risperidone 0.5 every morning and risperidone 1 mg every night, as recommended by psychiatry.    Patient was deemed incapacitated to make medical decisions. She was cleared for discharge 6/2021.  Patient currently has guardian, and pursuing placement in group home in [place] ([facility]). Arranging for care flight to [place].    Pt has received 2 doses of COVID vaccine 1and is now fully vaccinated. Quant negative 12/9/2021.     Interval Problem Update    12/25/21      I evaluated and examined her at the bedside.  She denies any acute complaints.  She expressed that her brother is here to pick her up and he is in .  Discussed plan of care with bedside RN.  Pending discharge.    I have personally seen and examined the patient at bedside. I discussed the plan of care with patient and bedside RN.    Consultants/Specialty  psychiatry    Code Status  Full Code    Disposition  Patient is medically cleared.   Anticipate discharge to [facility]/ .  I have placed the appropriate orders for post-discharge needs.    Review of Systems  Review of Systems   Constitutional: Negative for chills, fever and weight loss.   HENT: Negative for hearing loss and tinnitus.    Eyes: Negative for blurred vision, double vision,  photophobia and pain.   Respiratory: Negative for cough, sputum production and shortness of breath.    Cardiovascular: Negative for chest pain, palpitations, orthopnea and leg swelling.   Gastrointestinal: Negative for abdominal pain, constipation, diarrhea, nausea and vomiting.   Genitourinary: Negative for dysuria, frequency and urgency.   Musculoskeletal: Negative for back pain, joint pain, myalgias and neck pain.   Skin: Negative for rash.   Neurological: Negative for dizziness, tingling, tremors, sensory change, speech change, focal weakness and headaches.   Psychiatric/Behavioral: Negative for hallucinations and substance abuse.        Delusion   All other systems reviewed and are negative.     She denies any acute complaints.      Physical Exam  Temp:  [36.1 °C (97 °F)-36.4 °C (97.5 °F)] 36.4 °C (97.5 °F)  Pulse:  [77-79] 77  Resp:  [16-19] 19  BP: (114-141)/(77-83) 141/83  SpO2:  [96 %-98 %] 98 %    Physical Exam  Vitals and nursing note reviewed.   Constitutional:       General: She is not in acute distress.     Appearance: Normal appearance.   HENT:      Head: Normocephalic and atraumatic.      Mouth/Throat:      Mouth: Mucous membranes are moist.      Pharynx: No oropharyngeal exudate.   Eyes:      Extraocular Movements: Extraocular movements intact.      Pupils: Pupils are equal, round, and reactive to light.   Cardiovascular:      Rate and Rhythm: Normal rate and regular rhythm.      Pulses: Normal pulses.      Heart sounds: No murmur heard.  No friction rub. No gallop.    Pulmonary:      Effort: Pulmonary effort is normal. No respiratory distress.      Breath sounds: No wheezing, rhonchi or rales.   Abdominal:      General: Bowel sounds are normal. There is no distension.      Palpations: Abdomen is soft. There is no mass.      Tenderness: There is no abdominal tenderness.   Musculoskeletal:         General: No swelling or tenderness. Normal range of motion.      Cervical back: Normal range of motion.  No rigidity. No muscular tenderness.      Right lower leg: No edema.      Left lower leg: No edema.   Skin:     General: Skin is warm and dry.      Capillary Refill: Capillary refill takes less than 2 seconds.      Findings: No erythema or rash.   Neurological:      General: No focal deficit present.      Mental Status: She is alert and oriented to person, place, and time.   Psychiatric:         Thought Content: Thought content is delusional.      Comments: Continue to have delusion                Fluids    Intake/Output Summary (Last 24 hours) at 12/25/2021 1420  Last data filed at 12/25/2021 0800  Gross per 24 hour   Intake 960 ml   Output --   Net 960 ml       Laboratory                        Imaging  DX-CHEST-LIMITED (1 VIEW)   Final Result      Left basilar atelectasis. No focal consolidation. No effusions.           Assessment/Plan  * Delusional disorder- (present on admission)  Assessment & Plan  -Patient was evaluated by psychiatry, her medications were adjusted, patient was deemed incapacitated to make medical decisions. Patient cleared for discharge 6/2021.    -Patient currently has guardian, attempting to get patient placed in group home.  -Patient refusing to take risperidone 0.5 every morning and risperidone 1 mg every night, as recommended by psychiatry.    History of dementia- (present on admission)  Assessment & Plan  -At baseline  -[facility] is [place] likely to accept. [facility] will pay for care flight to .   -Now fully vaccinated.   -Has guardian.    Schizoaffective disorder, bipolar type (HCC)- (present on admission)  Assessment & Plan  -Patient has been refusing medications and blood draw due to paranoia.    -Actively delusional  -Patient was evaluated by psychiatry, her medications were adjusted, patient was deemed incapacitated to make medical decisions. Patient cleared for discharge 6/2021.  Patient currently has guardian, attempting to get patient placed in group  home.  -Patient refusing to take risperidone 0.5 every morning and risperidone 1 mg every night, as recommended by psychiatry.    Hypothyroidism- (present on admission)  Assessment & Plan  -Patient refuses to take her medications.  She refused blood draw on multiple tries     Noncompliance with treatment plan- (present on admission)  Assessment & Plan  -Patient refused antipsychotic medications and all other medications as well as any lab work       I have seen and examined patient on 12/25/2021. I have reviewed vitals, new labs and imaging. I have discussed POC with RN.  I reviewed above assessment plan.  No acute changes.    VTE prophylaxis: SCDs/TEDs    She is mobilizin not on pharmacological DVT prophylaxis.

## 2021-12-26 PROCEDURE — G0378 HOSPITAL OBSERVATION PER HR: HCPCS

## 2021-12-26 ASSESSMENT — PAIN DESCRIPTION - PAIN TYPE: TYPE: ACUTE PAIN

## 2021-12-26 NOTE — PROGRESS NOTES
In her room without any present needs. Continues to refuse assessment. Cont plan of care, call light within reach

## 2021-12-26 NOTE — PROGRESS NOTES
Pharmacy Pharmacotherapy Consult for LOS >30 days    Admit Date: 6/11/2021      Medications were reviewed for appropriateness and ongoing need.     Current Facility-Administered Medications   Medication Dose Route Frequency Provider Last Rate Last Admin   • pneumococcal vaccine (PNEUMOVAX-23) injection 25 mcg  0.5 mL Intramuscular Once PRN Bharath Rincon M.D.       • influenza vaccine quad (FLUZONE/FLUARIX) injection 0.5 mL  0.5 mL Intramuscular Once PRN Bharath Rincon M.D.       • haloperidol lactate (HALDOL) injection 4 mg  4 mg Intramuscular 1X PRN Bharath Rincon M.D.       • prochlorperazine (COMPAZINE) tablet 5 mg  5 mg Oral Q6HRS PRN Bharath Rincon M.D.       • acetaminophen (Tylenol) tablet 650 mg  650 mg Oral Q4HRS PRN Ivelissejuancho Willams, D.O.       • senna-docusate (PERICOLACE or SENOKOT S) 8.6-50 MG per tablet 2 tablet  2 Tablet Oral BID PRN Ivelisse A Imer, D.O.        And   • polyethylene glycol/lytes (MIRALAX) PACKET 1 Packet  1 Packet Oral QDAY PRN Ivelisse A Imer, D.O.        And   • magnesium hydroxide (MILK OF MAGNESIA) suspension 30 mL  30 mL Oral QDAY PRN Ivelisse A Imer, D.O.        And   • bisacodyl (DULCOLAX) suppository 10 mg  10 mg Rectal QDAY PRN Ivelisse MIKE Imer, D.O.           Recommendations:  1. Discontinue PRNs that patient has not been receiving; Cepacol lozenges, Robitussin.  Keep PRN acetaminophen, bowel regimen, haloperidol, and prochlorperazine per MD request.  2. Patient is a candidate for Pneumovax 23 and influenza vaccine.    Patricia Melton, Pharm.D., BCPS

## 2021-12-27 PROCEDURE — G0378 HOSPITAL OBSERVATION PER HR: HCPCS

## 2021-12-28 PROCEDURE — G0378 HOSPITAL OBSERVATION PER HR: HCPCS

## 2021-12-28 PROCEDURE — 99224 PR SUBSEQUENT OBSERVATION CARE,LEVEL I: CPT | Performed by: INTERNAL MEDICINE

## 2021-12-28 ASSESSMENT — PAIN DESCRIPTION - PAIN TYPE: TYPE: ACUTE PAIN

## 2021-12-28 NOTE — PROGRESS NOTES
"Hospital Medicine TWICE WEEKLY Progress Note    Date of Service  12/28/2021    Chief Complaint  Zully Lin is a 71 y.o. female admitted 6/11/2021 with delusions    Hospital Course  This is a 71 year old female with PMHx of bipolar disorder, schizoaffective disorder, hypothyroidism, and dementia who was transferred from [facility] on 06/11/2021 due to worsening agitation and delusions.    Patient was evaluated by speech for cognitive evaluation, she did rather well in all domains, It is unclear if she has underlying dementia or if her presentation is psychiatric. Patient was evaluated by psychiatry, and her medications were adjusted. Patient refusing to take risperidone 0.5 every morning and risperidone 1 mg every night, as recommended by psychiatry.    Patient was deemed incapacitated to make medical decisions. She was cleared for discharge 6/2021.  Patient currently has guardian, and pursuing placement in group home in [place] ([facility]). Arranging for care flight to [place].    Pt has received 2 doses of COVID vaccine 1and is now fully vaccinated. Quant negative 12/9/2021.     Interval Problem Update  12/28/2021 - I reviewed the patient's chart. There were no significant overnight events. Remains hemodynamically stable and afebrile. Stable on RA.        > I have personally seen and examined the patient today.  She remains in her room, states she does not go out of the hallway as \"they will take my stuff\".  She has no complaints, but remains very paranoid and delusional.  She continues to state that \"I am the governor, why am I still here, this is kidnapping, I have lots of things to do\"    I have personally seen and examined the patient at bedside. I discussed the plan of care with patient and bedside RN.    Consultants/Specialty  psychiatry    Code Status  Full Code    Disposition  Patient is medically cleared.   Anticipate discharge to [facility]/ .  I have " placed the appropriate orders for post-discharge needs.    Review of Systems  Review of Systems      Pertinent positives/negatives as mentioned above.     A complete review of systems was personally done by me. All other systems were negative.         Physical Exam  Temp:  [36.1 °C (97 °F)-36.7 °C (98 °F)] 36.1 °C (97 °F)  Pulse:  [69-73] 73  Resp:  [18] 18  BP: (125-126)/(80-84) 126/80  SpO2:  [90 %-93 %] 93 %    Physical Exam  Vitals and nursing note reviewed.   Constitutional:       General: She is not in acute distress.     Appearance: Normal appearance.   HENT:      Head: Normocephalic and atraumatic.      Mouth/Throat:      Mouth: Mucous membranes are moist.      Pharynx: No oropharyngeal exudate.   Eyes:      Extraocular Movements: Extraocular movements intact.      Pupils: Pupils are equal, round, and reactive to light.   Cardiovascular:      Rate and Rhythm: Normal rate and regular rhythm.      Pulses: Normal pulses.      Heart sounds: No murmur heard.  No friction rub. No gallop.    Pulmonary:      Effort: Pulmonary effort is normal. No respiratory distress.      Breath sounds: No wheezing, rhonchi or rales.   Abdominal:      General: Bowel sounds are normal. There is no distension.      Palpations: Abdomen is soft. There is no mass.      Tenderness: There is no abdominal tenderness.   Musculoskeletal:         General: No swelling or tenderness. Normal range of motion.      Cervical back: Normal range of motion. No rigidity. No muscular tenderness.      Right lower leg: No edema.      Left lower leg: No edema.   Skin:     General: Skin is warm and dry.      Capillary Refill: Capillary refill takes less than 2 seconds.      Findings: No erythema or rash.   Neurological:      General: No focal deficit present.      Mental Status: She is alert and oriented to person, place, and time.   Psychiatric:         Thought Content: Thought content is delusional.      Comments: Continues to have paranoia and  delusions  Cooperative to me               Fluids    Intake/Output Summary (Last 24 hours) at 12/28/2021 1030  Last data filed at 12/28/2021 0900  Gross per 24 hour   Intake 480 ml   Output --   Net 480 ml       Laboratory                        Imaging  DX-CHEST-LIMITED (1 VIEW)   Final Result      Left basilar atelectasis. No focal consolidation. No effusions.           Assessment/Plan  * Delusional disorder- (present on admission)  Assessment & Plan  -Patient was evaluated by psychiatry, her medications were adjusted, patient was deemed incapacitated to make medical decisions. Patient cleared for discharge 6/2021.    -Patient currently has guardian, attempting to get patient placed in group home.  -Patient refusing to take risperidone 0.5 every morning and risperidone 1 mg every night, as recommended by psychiatry.    History of dementia- (present on admission)  Assessment & Plan  -At baseline  -placement at [facility]  [facility] fell through. CM/SW looking for alternative GH.  -Now fully vaccinated.   -Has guardian.    Schizoaffective disorder, bipolar type (HCC)- (present on admission)  Assessment & Plan  -Patient has been refusing medications and blood draw due to paranoia.    -Actively delusional  -Patient was evaluated by psychiatry, her medications were adjusted, patient was deemed incapacitated to make medical decisions. Patient cleared for discharge 6/2021.  Patient currently has guardian, attempting to get patient placed in group home.  -Patient refusing to take risperidone 0.5 every morning and risperidone 1 mg every night, as recommended by psychiatry.    Hypothyroidism- (present on admission)  Assessment & Plan  -Patient refuses to take her medications.  She refused blood draw on multiple tries     Noncompliance with treatment plan- (present on admission)  Assessment & Plan  -Patient refused antipsychotic medications and all other medications as well as any lab work         VTE  prophylaxis: SCDs/TEDs    I have performed the physical examination, and reviewed updated ROS and plan today 12/28/2021.  In review of last note, there are no new changes except as documented above.

## 2021-12-28 NOTE — PROGRESS NOTES
Assumed care of pt at shift change, report received. Pt sitting up in chair. Pleasant at this time, but delusional. Reports no pain or discomfort. Refuses assessment. Declines further needs. Safety precautions in place.

## 2021-12-29 PROCEDURE — G0378 HOSPITAL OBSERVATION PER HR: HCPCS

## 2021-12-29 ASSESSMENT — PAIN DESCRIPTION - PAIN TYPE: TYPE: ACUTE PAIN

## 2021-12-29 NOTE — DISCHARGE PLANNING
Medical Social Work  Email from Denia Price  Requested SW  fax HNP to [phone] [facility]    GOMEZ faxed requested information

## 2021-12-29 NOTE — DISCHARGE PLANNING
"Anticipated Discharge Disposition: Secure facility, skilled or group home    Action: Patient is orientated to self/location.  Continues to decline medical assessments, will state that she is fine or \"she's fine.\"  Patient is dillusional, as she continues to make statements she is the Governor of the [employer] [facility], has an adult daughter whose father is Nitesh Triana.  Patient stays in her room, rarely coming out.  Patient's makes her bed daily, and has her bags packed ready to go.  When asked where she will go, patient will stated she has housing and needs to go home as she has 10 minor children at home that need her.    Barriers to Discharge: Placement, no local skilled/group home will accept her flight risk and no long term insurance.      Plan: Guardian Denia Saavedra [facility] is actively attempting to find placement in [place].    "

## 2021-12-29 NOTE — PROGRESS NOTES
Pt alert, but refused assessment, answered oriented questions, sitting up in chair comfortably. Will cont to monitor

## 2021-12-29 NOTE — PROGRESS NOTES
"Pt sitting on her chair watching the window.Pt refused assessment and verbalized \"she's fine\". Updated current plan of care of pt, to remain safe in the hospital. Able to ambulate with full weight bearing.  "

## 2021-12-29 NOTE — DISCHARGE PLANNING
Medical Social Work  Email from guardian Denia Price requesting SW email H/P to following address for group home in [place], secure facility    [email or web address]    GOMEZ emailed H/P as requested

## 2021-12-30 PROCEDURE — G0378 HOSPITAL OBSERVATION PER HR: HCPCS

## 2021-12-30 NOTE — PROGRESS NOTES
Received report from KARRI Fermin. Pt is A&O x1, oriented to self only and aggitated. Pt on RA, no signs of acute distress. Pt was no complaints of pain. Pt refused skin assessment. Pt educated on the purpose of skin assessments. POC discussed with patient. Safety precautions in place, bed in lowest position, and call light and personal belongings within reach. Hourly rounding in place.

## 2021-12-30 NOTE — PROGRESS NOTES
Received report from NOC RN and assumed care of pt. Unable to assess orientation as pt refuses assessment and orientation questions. Pt is resting in bed on room air. No other needs at this time. Bed locked in lowest position, call light within reach.

## 2021-12-30 NOTE — DISCHARGE PLANNING
Medical Social Work  Email form [email or web address] requesting more information on patient, current attending notes, psych notes, MAR.  Also requested attending complete physician's assessment and level of care.        Dr. Frias reviewed/completed/signed requested information.  GOMEZ emailed updated information to [email or web address]

## 2021-12-31 PROCEDURE — 99224 PR SUBSEQUENT OBSERVATION CARE,LEVEL I: CPT | Performed by: INTERNAL MEDICINE

## 2021-12-31 PROCEDURE — G0378 HOSPITAL OBSERVATION PER HR: HCPCS

## 2021-12-31 ASSESSMENT — PAIN DESCRIPTION - PAIN TYPE: TYPE: ACUTE PAIN;CHRONIC PAIN

## 2021-12-31 NOTE — DISCHARGE PLANNING
Received Choice form at 1258  [facility]Fac[facility] Name[facility]  Referral sent per Choice form @ 1400 to both provided fax number and email address

## 2021-12-31 NOTE — DISCHARGE PLANNING
Medical Social Work  Email from guardian requesting referrals be sent to the following facilities:    [facility] [place]  Fax [phone]    [facility]  [facility] [place]  email to Rasheeda at  [email or web address]      faxed choice to FELIX

## 2021-12-31 NOTE — PROGRESS NOTES
"Hospital Medicine TWICE WEEKLY Progress Note    Date of Service  12/31/2021    Chief Complaint  Zully Lin is a 71 y.o. female admitted 6/11/2021 with delusions    Hospital Course  This is a 71 year old female with PMHx of bipolar disorder, schizoaffective disorder, hypothyroidism, and dementia who was transferred from [facility] on 06/11/2021 due to worsening agitation and delusions.    Patient was evaluated by speech for cognitive evaluation, she did rather well in all domains, It is unclear if she has underlying dementia or if her presentation is psychiatric. Patient was evaluated by psychiatry, and her medications were adjusted. Patient refusing to take risperidone 0.5 every morning and risperidone 1 mg every night, as recommended by psychiatry.    Patient was deemed incapacitated to make medical decisions. She was cleared for discharge 6/2021.  Patient currently has guardian, and pursuing placement in group home in [place] ([facility]). Arranging for care flight to [place].    Pt has received 2 doses of COVID vaccine 1and is now fully vaccinated. Quant negative 12/9/2021.     Interval Problem Update  12/31/2021 - I reviewed the patient's chart today. Uneventful night. VSS. Afebrile. Saturating well on RA.  No new labs.  SW working on secure facility/group home in [place], paperworks sent.    > I have personally seen and examined the patient today.  She is comfortable, and pleasant today.  She is looking out the window.  No complaints.  She states she ate well without problems.  She states \"I am just wondering when I am leaving\".        I have personally seen and examined the patient at bedside. I discussed the plan of care with patient and bedside RN.    Consultants/Specialty  psychiatry    Code Status  Full Code    Disposition  Patient is medically cleared.   Anticipate discharge to [facility]/ .  I have placed the appropriate orders for post-discharge " needs.    Review of Systems  ROS   Pertinent positives/negatives as mentioned above.     A complete review of systems was personally done by me. All other systems were negative.         Physical Exam  Temp:  [36 °C (96.8 °F)-36.3 °C (97.4 °F)] 36 °C (96.8 °F)  Pulse:  [68-80] 70  Resp:  [17-18] 18  BP: (126-129)/(76-87) 129/77  SpO2:  [95 %-97 %] 97 %    Physical Exam  Vitals and nursing note reviewed.   Constitutional:       General: She is not in acute distress.     Appearance: Normal appearance.   HENT:      Head: Normocephalic and atraumatic.      Mouth/Throat:      Mouth: Mucous membranes are moist.      Pharynx: No oropharyngeal exudate.   Eyes:      Extraocular Movements: Extraocular movements intact.      Pupils: Pupils are equal, round, and reactive to light.   Cardiovascular:      Rate and Rhythm: Normal rate and regular rhythm.      Pulses: Normal pulses.      Heart sounds: No murmur heard.  No friction rub. No gallop.    Pulmonary:      Effort: Pulmonary effort is normal. No respiratory distress.      Breath sounds: No wheezing, rhonchi or rales.   Abdominal:      General: Bowel sounds are normal. There is no distension.      Palpations: Abdomen is soft. There is no mass.      Tenderness: There is no abdominal tenderness.   Musculoskeletal:         General: No swelling or tenderness. Normal range of motion.      Cervical back: Normal range of motion. No rigidity. No muscular tenderness.      Right lower leg: No edema.      Left lower leg: No edema.   Skin:     General: Skin is warm and dry.      Capillary Refill: Capillary refill takes less than 2 seconds.      Findings: No erythema or rash.   Neurological:      General: No focal deficit present.      Mental Status: She is alert and oriented to person, place, and time.   Psychiatric:         Thought Content: Thought content is delusional.      Comments: Continues to have paranoia and delusions  Cooperative to me       I have performed the physical  examination today 12/31/2021.  In review of last note, there are no new changes except as documented above.          Fluids    Intake/Output Summary (Last 24 hours) at 12/31/2021 0718  Last data filed at 12/31/2021 0500  Gross per 24 hour   Intake 776 ml   Output --   Net 776 ml       Laboratory                        Imaging  DX-CHEST-LIMITED (1 VIEW)   Final Result      Left basilar atelectasis. No focal consolidation. No effusions.           Assessment/Plan  * Delusional disorder- (present on admission)  Assessment & Plan  -Patient was evaluated by psychiatry, her medications were adjusted, patient was deemed incapacitated to make medical decisions. Patient cleared for discharge 6/2021.    -Patient currently has guardian, attempting to get patient placed in group home.  -Patient refusing to take risperidone 0.5 every morning and risperidone 1 mg every night, as recommended by psychiatry.    History of dementia- (present on admission)  Assessment & Plan  -At baseline  -placement at [facility] [place] [facility] [place] fell through. CM/SW looking for alternative GH.  -Now fully vaccinated.   -Has guardian.    Schizoaffective disorder, bipolar type (HCC)- (present on admission)  Assessment & Plan  -Patient has been refusing medications and blood draw due to paranoia.    -Actively delusional  -Patient was evaluated by psychiatry, her medications were adjusted, patient was deemed incapacitated to make medical decisions. Patient cleared for discharge 6/2021.  Patient currently has guardian, attempting to get patient placed in group home.  -Patient refusing to take risperidone 0.5 every morning and risperidone 1 mg every night, as recommended by psychiatry.    Hypothyroidism- (present on admission)  Assessment & Plan  -Patient refuses to take her medications.  She refused blood draw on multiple tries     Noncompliance with treatment plan- (present on admission)  Assessment & Plan  -Patient refused antipsychotic  medications and all other medications as well as any lab work         VTE prophylaxis: SCDs/TEDs    I have performed the physical examination, and reviewed updated ROS and plan today 12/31/2021.  In review of last note, there are no new changes except as documented above.

## 2021-12-31 NOTE — PROGRESS NOTES
Received report from NOC RN and assumed care of pt. Unable to assess orientation as pt refuses questions and assessment. Pt resting in room on room air. Pt reports no pain. Call light within reach.

## 2021-12-31 NOTE — PROGRESS NOTES
Received report from KARRI Wynn. Pt is A&O x1, oriented to self only and aggitated. Pt on RA, no signs of acute distress. Pt was no complaints of pain. Pt refused sacrum assessment. Pt educated on the purpose of skin assessments. POC discussed with patient. Safety precautions in place, bed in lowest position, and call light and personal belongings within reach. Hourly rounding in place.

## 2022-01-01 PROCEDURE — G0378 HOSPITAL OBSERVATION PER HR: HCPCS

## 2022-01-01 ASSESSMENT — PAIN DESCRIPTION - PAIN TYPE: TYPE: ACUTE PAIN;CHRONIC PAIN

## 2022-01-01 ASSESSMENT — FIBROSIS 4 INDEX: FIB4 SCORE: 1.53

## 2022-01-01 NOTE — PROGRESS NOTES
Received report from NOC RN and assumed care of pt. Pt refusing assessment at this time. Pt is A&Ox2, oriented to self and place. Pt is resting in chair on room air. Pt up self in room with steady gait. No other needs at this time. Bed locked in lowest position, call light within reach, pt educated to use call light for assistance.

## 2022-01-02 PROCEDURE — G0378 HOSPITAL OBSERVATION PER HR: HCPCS

## 2022-01-02 ASSESSMENT — PAIN DESCRIPTION - PAIN TYPE: TYPE: ACUTE PAIN

## 2022-01-03 PROCEDURE — 99224 PR SUBSEQUENT OBSERVATION CARE,LEVEL I: CPT | Performed by: INTERNAL MEDICINE

## 2022-01-03 PROCEDURE — G0378 HOSPITAL OBSERVATION PER HR: HCPCS

## 2022-01-03 ASSESSMENT — PAIN DESCRIPTION - PAIN TYPE: TYPE: ACUTE PAIN

## 2022-01-03 ASSESSMENT — ENCOUNTER SYMPTOMS
SENSORY CHANGE: 0
HALLUCINATIONS: 0
SPUTUM PRODUCTION: 0
DIARRHEA: 0
COUGH: 0
FEVER: 0
FOCAL WEAKNESS: 0
DOUBLE VISION: 0
BACK PAIN: 0
NECK PAIN: 0
MYALGIAS: 0
SHORTNESS OF BREATH: 0
TREMORS: 0
CHILLS: 0
DIZZINESS: 0
VOMITING: 0
NAUSEA: 0
ABDOMINAL PAIN: 0
ORTHOPNEA: 0
SPEECH CHANGE: 0
TINGLING: 0
WEIGHT LOSS: 0
CONSTIPATION: 0
PALPITATIONS: 0
EYE PAIN: 0
HEADACHES: 0
PHOTOPHOBIA: 0
BLURRED VISION: 0

## 2022-01-03 ASSESSMENT — LIFESTYLE VARIABLES: SUBSTANCE_ABUSE: 0

## 2022-01-03 NOTE — PROGRESS NOTES
"Received report from NOC RN, pt care assumed. VS stable on RA. No signs of acute distress. Call light within reach, hourly rounding initiated. Pt refused assessment questions. Pt stated \"I'm fine just busy working.\" bed-alarm off.   "

## 2022-01-03 NOTE — PROGRESS NOTES
Hospital Medicine TWICE WEEKLY Progress Note    Date of Service  1/3/2022    Chief Complaint  Zully Lin is a 71 y.o. female admitted 6/11/2021 with delusions    Hospital Course  This is a 71 year old female with PMHx of bipolar disorder, schizoaffective disorder, hypothyroidism, and dementia who was transferred from [facility] on 06/11/2021 due to worsening agitation and delusions.    Patient was evaluated by speech for cognitive evaluation, she did rather well in all domains, It is unclear if she has underlying dementia or if her presentation is psychiatric. Patient was evaluated by psychiatry, and her medications were adjusted. Patient refusing to take risperidone 0.5 every morning and risperidone 1 mg every night, as recommended by psychiatry.    Patient was deemed incapacitated to make medical decisions. She was cleared for discharge 6/2021.  Patient currently has guardian, and pursuing placement in group home in [place] ([facility]). Arranging for care flight to [place].    Pt has received 2 doses of COVID vaccine 1and is now fully vaccinated. Quant negative 12/9/2021.     Interval Problem Update    I evaluated and examined her at the bedside.  She denies any acute complaints.  I discussed plan of care with bedside RN and .  She is medically cleared for discharge to secure group home or skilled nursing facility.    I have personally seen and examined the patient at bedside. I discussed the plan of care with patient, bedside RN and .    Consultants/Specialty  psychiatry    Code Status  Full Code    Disposition  Patient is medically cleared.   Anticipate discharge to [facility]/ .  Secure group home/skilled nursing facility.  I have placed the appropriate orders for post-discharge needs.    Review of Systems  Review of Systems   Constitutional: Negative for chills, fever and weight loss.   HENT: Negative for hearing loss and tinnitus.     Eyes: Negative for blurred vision, double vision, photophobia and pain.   Respiratory: Negative for cough, sputum production and shortness of breath.    Cardiovascular: Negative for chest pain, palpitations, orthopnea and leg swelling.   Gastrointestinal: Negative for abdominal pain, constipation, diarrhea, nausea and vomiting.   Genitourinary: Negative for dysuria, frequency and urgency.   Musculoskeletal: Negative for back pain, joint pain, myalgias and neck pain.   Skin: Negative for rash.   Neurological: Negative for dizziness, tingling, tremors, sensory change, speech change, focal weakness and headaches.   Psychiatric/Behavioral: Negative for hallucinations and substance abuse.        Delusion   All other systems reviewed and are negative.     Today she denies any acute complaints      Physical Exam  Temp:  [35.8 °C (96.5 °F)-36.4 °C (97.5 °F)] 35.8 °C (96.5 °F)  Pulse:  [75-79] 75  Resp:  [16-17] 17  BP: (121-139)/(69-77) 139/77  SpO2:  [96 %-97 %] 97 %    Physical Exam  Vitals and nursing note reviewed.   Constitutional:       General: She is not in acute distress.     Appearance: Normal appearance.   HENT:      Head: Normocephalic and atraumatic.      Mouth/Throat:      Mouth: Mucous membranes are moist.      Pharynx: No oropharyngeal exudate.   Eyes:      Extraocular Movements: Extraocular movements intact.      Pupils: Pupils are equal, round, and reactive to light.   Cardiovascular:      Rate and Rhythm: Normal rate and regular rhythm.      Pulses: Normal pulses.      Heart sounds: No murmur heard.  No friction rub. No gallop.    Pulmonary:      Effort: Pulmonary effort is normal. No respiratory distress.      Breath sounds: No wheezing, rhonchi or rales.   Abdominal:      General: Bowel sounds are normal. There is no distension.      Palpations: Abdomen is soft. There is no mass.      Tenderness: There is no abdominal tenderness.   Musculoskeletal:         General: No swelling or tenderness. Normal  range of motion.      Cervical back: Normal range of motion. No rigidity. No muscular tenderness.      Right lower leg: No edema.      Left lower leg: No edema.   Skin:     General: Skin is warm and dry.      Capillary Refill: Capillary refill takes less than 2 seconds.      Findings: No erythema or rash.   Neurological:      General: No focal deficit present.      Mental Status: She is alert and oriented to person, place, and time.   Psychiatric:         Thought Content: Thought content is delusional.      Comments: Continue to have delusion          I performed physical exam on January 3, 2022 remain similar without any acute changes.      Fluids    Intake/Output Summary (Last 24 hours) at 1/3/2022 1400  Last data filed at 1/2/2022 1700  Gross per 24 hour   Intake 805 ml   Output --   Net 805 ml       Laboratory                        Imaging  DX-CHEST-LIMITED (1 VIEW)   Final Result      Left basilar atelectasis. No focal consolidation. No effusions.           Assessment/Plan  * Delusional disorder- (present on admission)  Assessment & Plan  -Patient was evaluated by psychiatry, her medications were adjusted, patient was deemed incapacitated to make medical decisions. Patient cleared for discharge 6/2021.    -Patient currently has guardian, attempting to get patient placed in group home.  -Patient refusing to take risperidone 0.5 every morning and risperidone 1 mg every night, as recommended by psychiatry.    History of dementia- (present on admission)  Assessment & Plan  -At baseline  -placement at [facility]  [place] fell through. CM/SW looking for alternative GH.  -Now fully vaccinated.   -Has guardian.    Schizoaffective disorder, bipolar type (HCC)- (present on admission)  Assessment & Plan  -Patient has been refusing medications and blood draw due to paranoia.    -Actively delusional  -Patient was evaluated by psychiatry, her medications were adjusted, patient was deemed incapacitated to  make medical decisions. Patient cleared for discharge 6/2021.  Patient currently has guardian, attempting to get patient placed in group home.  -Patient refusing to take risperidone 0.5 every morning and risperidone 1 mg every night, as recommended by psychiatry.    Hypothyroidism- (present on admission)  Assessment & Plan  -Patient refuses to take her medications.  She refused blood draw on multiple tries     Noncompliance with treatment plan- (present on admission)  Assessment & Plan  -Patient refused antipsychotic medications and all other medications as well as any lab work       I reviewed above assessment and plan today on January 3, 2022.  No acute changes noted.    I discussed plan of care with bedside RN and .    Pending discharge.      VTE prophylaxis: SCDs/TEDs    She is mobilizin not on pharmacological DVT prophylaxis.

## 2022-01-04 PROCEDURE — G0378 HOSPITAL OBSERVATION PER HR: HCPCS

## 2022-01-04 NOTE — PROGRESS NOTES
Received report from NOC RN, pt care assumed. VS stable on RA. No signs of acute distress. Call light within reach, hourly rounding initiated. Pt is AAOx3.

## 2022-01-04 NOTE — DISCHARGE PLANNING
Agency/Facility Name: [facility]   Phone:  [phone]  Outcome: Left a message for admissions.  Awaiting a call back.    Agency/Facility Name: [facility]  Phone:  [phone]  Fax:  [phone]  Spoke To: Lisa  Outcome: Declined due to behaviors.  Not a behavior care unit.

## 2022-01-04 NOTE — DISCHARGE PLANNING
"Anticipated Discharge Disposition: Secure facilty    Action: Patient has moved her bed, stating it is easier for staff to clean her room this way.  Patient told the patient she is ready to go home anytime, as they will be tearing down this building soon.  When SW asked who would be doing this, patient stated she doesn't know who only that [employer] had condemned the building.  Patient then told the SW that she owns over [address] [place] the [place]. SW asked patient where she will be going when the hospital is torn down.  Patient stated to her home silly.  \"I own a ranch, my brother is waiting for me along with my adult daughter. Nitesh is her father.\" Patient told SW that she has know the Nitesh Triana family for 100 years.     Barriers to Discharge: acceptance    Plan: continue attempts to locate an accepting facility.     "

## 2022-01-04 NOTE — PROGRESS NOTES
Received report and assumed care at shift change. A&O x 4,  Noncompliant with medications and cares. Bed locked and in lowest position. Needs attended to. No complains of pain or distress.

## 2022-01-05 PROCEDURE — G0378 HOSPITAL OBSERVATION PER HR: HCPCS

## 2022-01-05 NOTE — DISCHARGE PLANNING
Agency/Facility Name: [facility]   Phone:  [phone]  Outcome: Left a message for Vilma Johnson.  Awaiting a call back.

## 2022-01-05 NOTE — PROGRESS NOTES
Received report from NOC RN, pt care assumed. VS stable on RA. No signs of acute distress. Call light within reach, hourly rounding initiated. Pt refused assessment questions. Bed-alarm off.

## 2022-01-06 PROCEDURE — G0378 HOSPITAL OBSERVATION PER HR: HCPCS

## 2022-01-06 NOTE — PROGRESS NOTES
"Assumed care of patient this shift. Unable to assess orientation as patient refusing to answer any questions. Patient stated \"Leave my room, I'm busy working\"     "

## 2022-01-07 PROCEDURE — 99224 PR SUBSEQUENT OBSERVATION CARE,LEVEL I: CPT | Performed by: INTERNAL MEDICINE

## 2022-01-07 PROCEDURE — G0378 HOSPITAL OBSERVATION PER HR: HCPCS

## 2022-01-07 ASSESSMENT — ENCOUNTER SYMPTOMS
ABDOMINAL PAIN: 0
WEIGHT LOSS: 0
HALLUCINATIONS: 0
CHILLS: 0
FOCAL WEAKNESS: 0
BLURRED VISION: 0
MYALGIAS: 0
HEADACHES: 0
EYE PAIN: 0
SHORTNESS OF BREATH: 0
VOMITING: 0
ORTHOPNEA: 0
SPUTUM PRODUCTION: 0
COUGH: 0
TREMORS: 0
SENSORY CHANGE: 0
BACK PAIN: 0
DIZZINESS: 0
FEVER: 0
PHOTOPHOBIA: 0
NECK PAIN: 0
SPEECH CHANGE: 0
CONSTIPATION: 0
PALPITATIONS: 0
TINGLING: 0
NAUSEA: 0
DOUBLE VISION: 0
DIARRHEA: 0

## 2022-01-07 ASSESSMENT — LIFESTYLE VARIABLES: SUBSTANCE_ABUSE: 0

## 2022-01-07 NOTE — PROGRESS NOTES
Hospital Medicine TWICE WEEKLY Progress Note    Date of Service  1/7/2022    Chief Complaint  Zully Lin is a 71 y.o. female admitted 6/11/2021 with delusions    Hospital Course  This is a 71 year old female with PMHx of bipolar disorder, schizoaffective disorder, hypothyroidism, and dementia who was transferred from [facility] [place] on 06/11/2021 due to worsening agitation and delusions.    Patient was evaluated by speech for cognitive evaluation, she did rather well in all domains, It is unclear if she has underlying dementia or if her presentation is psychiatric. Patient was evaluated by psychiatry, and her medications were adjusted. Patient refusing to take risperidone 0.5 every morning and risperidone 1 mg every night, as recommended by psychiatry.    Patient was deemed incapacitated to make medical decisions. She was cleared for discharge 6/2021.  Patient currently has guardian, and pursuing placement in group home in [place] ([facility]). Arranging for care flight to [place].    Pt has received 2 doses of COVID vaccine 1and is now fully vaccinated. Quant negative 12/9/2021.     Interval Problem Update    I evaluated and examined her at the bedside.  She was sitting in chair and denies any acute complaints per  I discussed plan of care with bedside RN and .    I have personally seen and examined the patient at bedside. I discussed the plan of care with patient, bedside RN and .    Consultants/Specialty  psychiatry    Code Status  Full Code    Disposition  Patient is medically cleared.   Anticipate discharge to [facility]/ .  Secure group home/skilled nursing facility.  I have placed the appropriate orders for post-discharge needs.    Review of Systems  Review of Systems   Constitutional: Negative for chills, fever and weight loss.   HENT: Negative for hearing loss and tinnitus.    Eyes: Negative for blurred vision, double vision, photophobia  and pain.   Respiratory: Negative for cough, sputum production and shortness of breath.    Cardiovascular: Negative for chest pain, palpitations, orthopnea and leg swelling.   Gastrointestinal: Negative for abdominal pain, constipation, diarrhea, nausea and vomiting.   Genitourinary: Negative for dysuria, frequency and urgency.   Musculoskeletal: Negative for back pain, joint pain, myalgias and neck pain.   Skin: Negative for rash.   Neurological: Negative for dizziness, tingling, tremors, sensory change, speech change, focal weakness and headaches.   Psychiatric/Behavioral: Negative for hallucinations and substance abuse.        Delusion   All other systems reviewed and are negative.     Today she denies any acute complaints      Physical Exam  Temp:  [36.2 °C (97.2 °F)-36.8 °C (98.3 °F)] 36.8 °C (98.3 °F)  Pulse:  [78-90] 78  Resp:  [17-18] 17  BP: (113-132)/(65-84) 113/65  SpO2:  [97 %-98 %] 98 %    Physical Exam  Vitals and nursing note reviewed.   Constitutional:       General: She is not in acute distress.     Appearance: Normal appearance.   HENT:      Head: Normocephalic and atraumatic.      Mouth/Throat:      Mouth: Mucous membranes are moist.      Pharynx: No oropharyngeal exudate.   Eyes:      Extraocular Movements: Extraocular movements intact.      Pupils: Pupils are equal, round, and reactive to light.   Cardiovascular:      Rate and Rhythm: Normal rate and regular rhythm.      Pulses: Normal pulses.      Heart sounds: No murmur heard.  No friction rub. No gallop.    Pulmonary:      Effort: Pulmonary effort is normal. No respiratory distress.      Breath sounds: No wheezing, rhonchi or rales.   Abdominal:      General: Bowel sounds are normal. There is no distension.      Palpations: Abdomen is soft. There is no mass.      Tenderness: There is no abdominal tenderness.   Musculoskeletal:         General: No swelling or tenderness. Normal range of motion.      Cervical back: Normal range of motion. No  rigidity. No muscular tenderness.      Right lower leg: No edema.      Left lower leg: No edema.   Skin:     General: Skin is warm and dry.      Capillary Refill: Capillary refill takes less than 2 seconds.      Findings: No erythema or rash.   Neurological:      General: No focal deficit present.      Mental Status: She is alert and oriented to person, place, and time.   Psychiatric:         Thought Content: Thought content is delusional.      Comments: Continue to have delusion          I performed physical exam on January 07, 2022 remain similar without any acute changes.      Fluids    Intake/Output Summary (Last 24 hours) at 1/7/2022 1559  Last data filed at 1/7/2022 1300  Gross per 24 hour   Intake 1496 ml   Output --   Net 1496 ml       Laboratory                        Imaging  DX-CHEST-LIMITED (1 VIEW)   Final Result      Left basilar atelectasis. No focal consolidation. No effusions.           Assessment/Plan  * Delusional disorder- (present on admission)  Assessment & Plan  -Patient was evaluated by psychiatry, her medications were adjusted, patient was deemed incapacitated to make medical decisions. Patient cleared for discharge 6/2021.    -Patient currently has guardian, attempting to get patient placed in group home.  -Patient refusing to take risperidone 0.5 every morning and risperidone 1 mg every night, as recommended by psychiatry.    History of dementia- (present on admission)  Assessment & Plan  -At baseline  -placement at [facility] [place] [facility] fell through. CM/SW looking for alternative GH.  -Now fully vaccinated.   -Has guardian.    Schizoaffective disorder, bipolar type (HCC)- (present on admission)  Assessment & Plan  -Patient has been refusing medications and blood draw due to paranoia.    -Actively delusional  -Patient was evaluated by psychiatry, her medications were adjusted, patient was deemed incapacitated to make medical decisions. Patient cleared for discharge 6/2021.   Patient currently has guardian, attempting to get patient placed in group home.  -Patient refusing to take risperidone 0.5 every morning and risperidone 1 mg every night, as recommended by psychiatry.    Hypothyroidism- (present on admission)  Assessment & Plan  -Patient refuses to take her medications.  She refused blood draw on multiple tries     Noncompliance with treatment plan- (present on admission)  Assessment & Plan  -Patient refused antipsychotic medications and all other medications as well as any lab work       I reviewed above assessment and plan today on January 07, 2022.  No acute changes noted.    I discussed plan of care with bedside RN and .    Pending discharge.      VTE prophylaxis: SCDs/TEDs    She is mobilizin not on pharmacological DVT prophylaxis.

## 2022-01-07 NOTE — PROGRESS NOTES
Assumed care of patient this shift. Unable to assess orientation as patient continues to refuse to answer any questions.  Patient is sitting up in room and able to make her needs known.

## 2022-01-08 PROCEDURE — G0378 HOSPITAL OBSERVATION PER HR: HCPCS

## 2022-01-08 ASSESSMENT — FIBROSIS 4 INDEX: FIB4 SCORE: 1.53

## 2022-01-08 NOTE — PROGRESS NOTES
Assumed care of patient at bedside report from NOC RN. Updated on POC. Patient currently A & O x 2, reacting to internal stimuli, conversing with self ; on room air; up self; without complaints of acute pain. Call light within reach. Whiteboard updated. Fall precautions in place. Bed locked and in lowest position. All questions answered. No other needs indicated at this time.

## 2022-01-08 NOTE — PROGRESS NOTES
Was sitting in chair earlier.  Agreed on assessment except skin assessment.  Pt denied any pain or needs at that time.

## 2022-01-09 PROCEDURE — G0378 HOSPITAL OBSERVATION PER HR: HCPCS

## 2022-01-09 ASSESSMENT — PAIN DESCRIPTION - PAIN TYPE: TYPE: ACUTE PAIN

## 2022-01-09 NOTE — PROGRESS NOTES
"Assumed care of patient at bedside report from NOC RN. Updated on POC. Patient currently A & O x 2, disoriented to time and situation, reacting to internal stimuli, conversing with self, requested RN to \"get the people in the floor to stop hitting me\", provided emotional support; on room air; up self; without complaints of acute pain. Call light within reach. Fall precautions in place. Bed locked and in lowest position. All questions answered. No other needs indicated at this time.    "

## 2022-01-10 PROCEDURE — G0378 HOSPITAL OBSERVATION PER HR: HCPCS

## 2022-01-10 NOTE — PROGRESS NOTES
"Assumed care of pt at shift change, report received. Pt sitting in her room, talking to herself. When asked how is she today, pt yelled \"take these people away and all the recording devices. I want to be removed from this place with all my things. I own this place\" Redirected. Reports no pain. Declines any further needs at this time.   "

## 2022-01-10 NOTE — PROGRESS NOTES
Assumed care of patient this shift. Unable to assess orientation as patient refuses to answer any questions. Patient is sitting up in room and able to make her needs known.

## 2022-01-10 NOTE — DISCHARGE PLANNING
Agency/Facility Name: [facility]   Phone:  [phone]  Spoke To:  Deb  Outcome: Will give a message to admissions to call this DPA back.

## 2022-01-11 PROCEDURE — G0378 HOSPITAL OBSERVATION PER HR: HCPCS

## 2022-01-11 PROCEDURE — 99224 PR SUBSEQUENT OBSERVATION CARE,LEVEL I: CPT | Performed by: INTERNAL MEDICINE

## 2022-01-11 NOTE — PROGRESS NOTES
Assumed care of patient this shift. Unable to assess orientation as patient continues to refuse to answer any questions. No behavioral signs of pain noted. Patient is sitting up in room and able to make her needs known.

## 2022-01-11 NOTE — PROGRESS NOTES
Received report from KARRI Mane. Pt is A&O x2, oriented to self and time. Pt seems to be agitated. Therapeutic communication was used. Pt on RA, no signs of acute distress. Pt was no complaints of pain. Pt refused sacrum assessment. Pt educated on the purpose of skin assessments. POC discussed with patient. Safety precautions in place, bed in lowest position, and call light and personal belongings within reach. Hourly rounding in place.

## 2022-01-11 NOTE — DISCHARGE PLANNING
Agency/Facility Name: [facility]  Spoke To: Ade  Outcome: This is an assisted living facility.  Not a SNF    LSW notified via Teams

## 2022-01-11 NOTE — PROGRESS NOTES
"Hospital Medicine TWICE WEEKLY Progress Note    Date of Service  1/11/2022    Chief Complaint  Zully Lin is a 71 y.o. female admitted 6/11/2021 with delusions    Hospital Course  This is a 71 year old female with PMHx of bipolar disorder, schizoaffective disorder, hypothyroidism, and dementia who was transferred from [facility] on 06/11/2021 due to worsening agitation and delusions.    Patient was evaluated by speech for cognitive evaluation, she did rather well in all domains, It is unclear if she has underlying dementia or if her presentation is psychiatric. Patient was evaluated by psychiatry, and her medications were adjusted. Patient refusing to take risperidone 0.5 every morning and risperidone 1 mg every night, as recommended by psychiatry.    Patient was deemed incapacitated to make medical decisions. She was cleared for discharge 6/2021.  Patient currently has guardian, and pursuing placement in group home in [place] ([facility]). Arranging for care flight to [place].    Pt has received 2 doses of COVID vaccine 1and is now fully vaccinated. Quant negative 12/9/2021.     Interval Problem Update  1/11/2022 - I reviewed the patient's chart. There were no significant overnight events. Remains hemodynamically stable and afebrile. Stable on RA.  No recent labs.  Awaiting group home placement.    > I have personally seen and examined the patient today.  She is pleasant and cooperative today.  No complaints.  She states her mood is \"okay\".  Denies any pain.  No nausea or vomiting.  Having regular bowel movements.      I have personally seen and examined the patient at bedside. I discussed the plan of care with patient, bedside RN and .    Consultants/Specialty  psychiatry    Code Status  Full Code    Disposition  Patient is medically cleared.   Anticipate discharge to Memory Care/ .  Secure group home/skilled nursing facility.  I have placed the " appropriate orders for post-discharge needs.    Review of Systems  Review of Systems      Pertinent positives/negatives as mentioned above.     A complete review of systems was personally done by me limited by delusions. All other systems were negative.             Physical Exam  Temp:  [36.1 °C (96.9 °F)-36.4 °C (97.6 °F)] 36.1 °C (97 °F)  Pulse:  [71-88] 88  Resp:  [15-18] 16  BP: (122-145)/(70-77) 130/77  SpO2:  [97 %-98 %] 97 %    Physical Exam  Vitals and nursing note reviewed.   Constitutional:       General: She is not in acute distress.     Appearance: Normal appearance.   HENT:      Head: Normocephalic and atraumatic.      Mouth/Throat:      Mouth: Mucous membranes are moist.      Pharynx: No oropharyngeal exudate.   Eyes:      Extraocular Movements: Extraocular movements intact.      Pupils: Pupils are equal, round, and reactive to light.   Cardiovascular:      Rate and Rhythm: Normal rate and regular rhythm.      Pulses: Normal pulses.      Heart sounds: No murmur heard.  No friction rub. No gallop.    Pulmonary:      Effort: Pulmonary effort is normal. No respiratory distress.      Breath sounds: No wheezing, rhonchi or rales.   Abdominal:      General: Bowel sounds are normal. There is no distension.      Palpations: Abdomen is soft. There is no mass.      Tenderness: There is no abdominal tenderness.   Musculoskeletal:         General: No swelling or tenderness. Normal range of motion.      Cervical back: Normal range of motion. No rigidity. No muscular tenderness.      Right lower leg: No edema.      Left lower leg: No edema.   Skin:     General: Skin is warm and dry.      Capillary Refill: Capillary refill takes less than 2 seconds.      Findings: No erythema or rash.   Neurological:      General: No focal deficit present.      Mental Status: She is alert and oriented to person, place, and time.   Psychiatric:         Thought Content: Thought content is delusional.      Comments: Continue to have  delusion   Pleasant and cooperative to me today.  Flat affect             Fluids  No intake or output data in the 24 hours ending 01/11/22 0853    Laboratory                        Imaging  DX-CHEST-LIMITED (1 VIEW)   Final Result      Left basilar atelectasis. No focal consolidation. No effusions.           Assessment/Plan  * Delusional disorder- (present on admission)  Assessment & Plan  -Patient was evaluated by psychiatry, her medications were adjusted, patient was deemed incapacitated to make medical decisions. Patient cleared for discharge 6/2021.    -Patient currently has guardian, attempting to get patient placed in group home.  -Patient refusing to take risperidone 0.5 every morning and risperidone 1 mg every night, as recommended by psychiatry.    History of dementia- (present on admission)  Assessment & Plan  -At baseline  -placement at [facility]  [facility] [place] fell through. CM/SW looking for alternative GH.  -Now fully vaccinated.   -Has guardian.    Schizoaffective disorder, bipolar type (HCC)- (present on admission)  Assessment & Plan  -Patient has been refusing medications and blood draw due to paranoia.    -Actively delusional  -Patient was evaluated by psychiatry, her medications were adjusted, patient was deemed incapacitated to make medical decisions. Patient cleared for discharge 6/2021.  Patient currently has guardian, attempting to get patient placed in group home.  -Patient refusing to take risperidone 0.5 every morning and risperidone 1 mg every night, as recommended by psychiatry.    Hypothyroidism- (present on admission)  Assessment & Plan  -Patient refuses to take her medications.  She refused blood draw on multiple tries     Noncompliance with treatment plan- (present on admission)  Assessment & Plan  -Patient refused antipsychotic medications and all other medications as well as any lab work       I have performed the physical examination, and reviewed updated ROS and plan  today 1/11/2022.  In review of last note, there are no new changes except as documented above.        VTE prophylaxis: SCDs/TEDs    She is mobilizin not on pharmacological DVT prophylaxis.

## 2022-01-12 PROCEDURE — G0378 HOSPITAL OBSERVATION PER HR: HCPCS

## 2022-01-12 NOTE — PROGRESS NOTES
Assumed care of pt after receiving report from sky RN. Pt A&Ox2 on RA. Pt refusing physical assessment, however states no pain at this time. Educated pt on use of call light. Bed is locked and in lowest position, call light within reach, fall precautions in place. All needs met at this time.

## 2022-01-12 NOTE — PROGRESS NOTES
Assumed care of pt at 0700. Report received and bedside rounding completed with night RN. Pt is calm no SOB, no acute distress noted.  Call light and pt belongings within reach - hourly rounding in place

## 2022-01-13 PROCEDURE — G0378 HOSPITAL OBSERVATION PER HR: HCPCS

## 2022-01-13 NOTE — PROGRESS NOTES
Assumed care of pt after receiving report from dayshift RN. Pt agitated and refusing assessment. Bed is locked and in lowest position, call light within reach, fall precautions in place. All needs met at this time.

## 2022-01-14 PROCEDURE — G0378 HOSPITAL OBSERVATION PER HR: HCPCS

## 2022-01-14 PROCEDURE — 99224 PR SUBSEQUENT OBSERVATION CARE,LEVEL I: CPT | Performed by: INTERNAL MEDICINE

## 2022-01-14 NOTE — PROGRESS NOTES
"Pt irritable and angry, refused assessments. As this RN entered pt room the pt yelled \"Why do you always have to wake me up?!\" Explained to pt the importance to assess during shift, pt shouted \"I'm fine! Now leave me alone!\" Safety precautions and hourly rounding in place.  "

## 2022-01-14 NOTE — PROGRESS NOTES
Hospital Medicine TWICE WEEKLY Progress Note    Date of Service  1/14/2022    Chief Complaint  Zully Lin is a 71 y.o. female admitted 6/11/2021 with delusions    Hospital Course  This is a 71 year old female with PMHx of bipolar disorder, schizoaffective disorder, hypothyroidism, and dementia who was transferred from [facility] on 06/11/2021 due to worsening agitation and delusions.    Patient was evaluated by speech for cognitive evaluation, she did rather well in all domains, It is unclear if she has underlying dementia or if her presentation is psychiatric. Patient was evaluated by psychiatry, and her medications were adjusted. Patient refusing to take risperidone 0.5 every morning and risperidone 1 mg every night, as recommended by psychiatry.    Patient was deemed incapacitated to make medical decisions. She was cleared for discharge 6/2021.  Patient currently has guardian, and pursuing placement in group home in [place] ([facility]). Arranging for care flight to [place].    Pt has received 2 doses of COVID vaccine 1and is now fully vaccinated. Quant negative 12/9/2021.     Interval Problem Update  1/14/2022 - I reviewed the patient's chart. There were no significant overnight events. Remains hemodynamically stable and afebrile. Stable on RA.      > I have personally seen and examined the patient today. She is comfortable, sitting in a chair. No complaints. No nausea, vomiting, bowel movement changes. No leg swelling. She is actually pleasant today.      I have personally seen and examined the patient at bedside. I discussed the plan of care with patient, bedside RN and .    Consultants/Specialty  psychiatry    Code Status  Full Code    Disposition  Patient is medically cleared.   Anticipate discharge to Memory Care/ .  Secure group home/skilled nursing facility.  I have placed the appropriate orders for post-discharge needs.    Review of Systems  ROS    Pertinent positives/negatives as mentioned above.     A complete review of systems was personally done by me limited by delusions. All other systems were negative.             Physical Exam  Temp:  [36.4 °C (97.6 °F)-36.8 °C (98.3 °F)] 36.6 °C (97.9 °F)  Pulse:  [74-85] 74  Resp:  [17-18] 17  BP: (125-144)/(70-99) 144/70  SpO2:  [95 %-100 %] 100 %    Physical Exam  Vitals and nursing note reviewed.   Constitutional:       General: She is not in acute distress.     Appearance: Normal appearance.   HENT:      Head: Normocephalic and atraumatic.      Mouth/Throat:      Mouth: Mucous membranes are moist.      Pharynx: No oropharyngeal exudate.   Eyes:      Extraocular Movements: Extraocular movements intact.      Pupils: Pupils are equal, round, and reactive to light.   Cardiovascular:      Rate and Rhythm: Normal rate and regular rhythm.      Pulses: Normal pulses.      Heart sounds: No murmur heard.  No friction rub. No gallop.    Pulmonary:      Effort: Pulmonary effort is normal. No respiratory distress.      Breath sounds: No wheezing, rhonchi or rales.   Abdominal:      General: Bowel sounds are normal. There is no distension.      Palpations: Abdomen is soft. There is no mass.      Tenderness: There is no abdominal tenderness.   Musculoskeletal:         General: No swelling or tenderness. Normal range of motion.      Cervical back: Normal range of motion. No rigidity. No muscular tenderness.      Right lower leg: No edema.      Left lower leg: No edema.   Skin:     General: Skin is warm and dry.      Capillary Refill: Capillary refill takes less than 2 seconds.      Findings: No erythema or rash.   Neurological:      General: No focal deficit present.      Mental Status: She is alert and oriented to person, place, and time.   Psychiatric:         Thought Content: Thought content is delusional.      Comments: Continue to have delusion   Pleasant and cooperative to me today.  Flat affect       I have performed  the physical examination today 1/14/2022.  In review of last note, there are no new changes except as documented above.          Fluids    Intake/Output Summary (Last 24 hours) at 1/14/2022 0846  Last data filed at 1/13/2022 1800  Gross per 24 hour   Intake 1200 ml   Output --   Net 1200 ml       Laboratory                        Imaging  DX-CHEST-LIMITED (1 VIEW)   Final Result      Left basilar atelectasis. No focal consolidation. No effusions.           Assessment/Plan  * Delusional disorder- (present on admission)  Assessment & Plan  -Patient was evaluated by psychiatry, her medications were adjusted, patient was deemed incapacitated to make medical decisions. Patient cleared for discharge 6/2021.    -Patient currently has guardian, attempting to get patient placed in group home.  -Patient refusing to take risperidone 0.5 every morning and risperidone 1 mg every night, as recommended by psychiatry.    History of dementia- (present on admission)  Assessment & Plan  -At baseline  -placement at [facility]  [facility] fell through. CM/SW looking for alternative GH.  -Now fully vaccinated.   -Has guardian.    Schizoaffective disorder, bipolar type (HCC)- (present on admission)  Assessment & Plan  -Patient has been refusing medications and blood draw due to paranoia.    -Actively delusional  -Patient was evaluated by psychiatry, her medications were adjusted, patient was deemed incapacitated to make medical decisions. Patient cleared for discharge 6/2021.  Patient currently has guardian, attempting to get patient placed in group home.  -Patient refusing to take risperidone 0.5 every morning and risperidone 1 mg every night, as recommended by psychiatry.    Hypothyroidism- (present on admission)  Assessment & Plan  -Patient refuses to take her medications.  She refused blood draw on multiple tries     Noncompliance with treatment plan- (present on admission)  Assessment & Plan  -Patient refused  antipsychotic medications and all other medications as well as any lab work       I have performed the physical examination, and reviewed updated ROS and plan today 1/14/2022.  In review of last note, there are no new changes except as documented above.        VTE prophylaxis: SCDs/TEDs    She is mobilizin not on pharmacological DVT prophylaxis.

## 2022-01-15 PROCEDURE — G0378 HOSPITAL OBSERVATION PER HR: HCPCS

## 2022-01-15 ASSESSMENT — FIBROSIS 4 INDEX: FIB4 SCORE: 1.53

## 2022-01-15 NOTE — PROGRESS NOTES
Pt irritable, continues to refuse assessments. No signs of acute distress observed at this time, currently resting bed. Safety precautions and hourly rounding in place.

## 2022-01-16 PROCEDURE — G0378 HOSPITAL OBSERVATION PER HR: HCPCS
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
## 2022-01-16 NOTE — PROGRESS NOTES
Pt continues to refuse assessments. No signs of acute distress observed at this time, currently sleeping. Safety precautions and hourly rounding in place.

## 2022-01-17 PROCEDURE — G0378 HOSPITAL OBSERVATION PER HR: HCPCS

## 2022-01-17 NOTE — PROGRESS NOTES
"Pt. Received in the chair sitting, denies any complaint of pain at the time of assessment. Pt. Educated about fall risks and prec. Pt. Refused most assessment and just stated \"I am ok, I don't need anything\". Pt. Has no WG at this time, per previous encounter pt. Refuses to have it placed. No due meds. Educated about use of call light for assistance. Needs attended.   "

## 2022-01-17 NOTE — PROGRESS NOTES
Received report from NOC RN and assumed care of pt. Pt refusing assessment this morning. Pt is oriented to self. Pt reports no pain.  No other needs at this time. Bed locked in lowest position, call light within reach.

## 2022-01-18 PROCEDURE — 99224 PR SUBSEQUENT OBSERVATION CARE,LEVEL I: CPT | Performed by: INTERNAL MEDICINE

## 2022-01-18 PROCEDURE — G0378 HOSPITAL OBSERVATION PER HR: HCPCS

## 2022-01-18 ASSESSMENT — ENCOUNTER SYMPTOMS
PHOTOPHOBIA: 0
HEADACHES: 0
ABDOMINAL PAIN: 0
TREMORS: 0
DOUBLE VISION: 0
COUGH: 0
BACK PAIN: 0
EYE PAIN: 0
CHILLS: 0
HALLUCINATIONS: 0
SENSORY CHANGE: 0
PALPITATIONS: 0
FOCAL WEAKNESS: 0
NAUSEA: 0
ORTHOPNEA: 0
WEIGHT LOSS: 0
DIZZINESS: 0
SPUTUM PRODUCTION: 0
FEVER: 0
MYALGIAS: 0
SHORTNESS OF BREATH: 0
SPEECH CHANGE: 0
CONSTIPATION: 0
TINGLING: 0
DIARRHEA: 0
VOMITING: 0
BLURRED VISION: 0
NECK PAIN: 0

## 2022-01-18 ASSESSMENT — LIFESTYLE VARIABLES: SUBSTANCE_ABUSE: 0

## 2022-01-18 ASSESSMENT — PAIN DESCRIPTION - PAIN TYPE
TYPE: ACUTE PAIN
TYPE: ACUTE PAIN
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
## 2022-01-18 NOTE — PROGRESS NOTES
Hospital Medicine TWICE WEEKLY Progress Note    Date of Service  1/18/2022    Chief Complaint  Zully Lin is a 71 y.o. female admitted 6/11/2021 with delusions    Hospital Course  This is a 71 year old female with PMHx of bipolar disorder, schizoaffective disorder, hypothyroidism, and dementia who was transferred from [facility] on 06/11/2021 due to worsening agitation and delusions.    Patient was evaluated by speech for cognitive evaluation, she did rather well in all domains, It is unclear if she has underlying dementia or if her presentation is psychiatric. Patient was evaluated by psychiatry, and her medications were adjusted. Patient refusing to take risperidone 0.5 every morning and risperidone 1 mg every night, as recommended by psychiatry.    Patient was deemed incapacitated to make medical decisions. She was cleared for discharge 6/2021.  Patient currently has guardian, and pursuing placement in group home in [place] ([facility]). Arranging for care flight to [place].    Pt has received 2 doses of COVID vaccine 1and is now fully vaccinated. Quant negative 12/9/2021.     Interval Problem Update    I evaluated and examined her at the bedside.  She was sitting in chair and denies any acute complaints.  Pending discharge.  I discussed with bedside RN.    I have personally seen and examined the patient at bedside. I discussed the plan of care with patient, bedside RN and .    Consultants/Specialty  psychiatry    Code Status  Full Code    Disposition  Patient is medically cleared.   Anticipate discharge to [facility] Care/ .  Secure group home/skilled nursing facility.  I have placed the appropriate orders for post-discharge needs.    Review of Systems  Review of Systems   Constitutional: Negative for chills, fever and weight loss.   HENT: Negative for hearing loss and tinnitus.    Eyes: Negative for blurred vision, double vision, photophobia and pain.    Respiratory: Negative for cough, sputum production and shortness of breath.    Cardiovascular: Negative for chest pain, palpitations, orthopnea and leg swelling.   Gastrointestinal: Negative for abdominal pain, constipation, diarrhea, nausea and vomiting.   Genitourinary: Negative for dysuria, frequency and urgency.   Musculoskeletal: Negative for back pain, joint pain, myalgias and neck pain.   Skin: Negative for rash.   Neurological: Negative for dizziness, tingling, tremors, sensory change, speech change, focal weakness and headaches.   Psychiatric/Behavioral: Negative for hallucinations and substance abuse.        Delusion   All other systems reviewed and are negative.     Today she denies any acute complaints      Physical Exam  Temp:  [35.9 °C (96.7 °F)-36.2 °C (97.2 °F)] 35.9 °C (96.7 °F)  Pulse:  [74-75] 74  Resp:  [16-18] 18  BP: (118-127)/(65-72) 118/72  SpO2:  [96 %-99 %] 99 %    Physical Exam  Vitals and nursing note reviewed.   Constitutional:       General: She is not in acute distress.     Appearance: Normal appearance.   HENT:      Head: Normocephalic and atraumatic.      Mouth/Throat:      Mouth: Mucous membranes are moist.      Pharynx: No oropharyngeal exudate.   Eyes:      Extraocular Movements: Extraocular movements intact.      Pupils: Pupils are equal, round, and reactive to light.   Cardiovascular:      Rate and Rhythm: Normal rate and regular rhythm.      Pulses: Normal pulses.      Heart sounds: No murmur heard.  No friction rub. No gallop.    Pulmonary:      Effort: Pulmonary effort is normal. No respiratory distress.      Breath sounds: No wheezing, rhonchi or rales.   Abdominal:      General: Bowel sounds are normal. There is no distension.      Palpations: Abdomen is soft. There is no mass.      Tenderness: There is no abdominal tenderness.   Musculoskeletal:         General: No swelling or tenderness. Normal range of motion.      Cervical back: Normal range of motion. No rigidity. No  muscular tenderness.      Right lower leg: No edema.      Left lower leg: No edema.   Skin:     General: Skin is warm and dry.      Capillary Refill: Capillary refill takes less than 2 seconds.      Findings: No erythema or rash.   Neurological:      General: No focal deficit present.      Mental Status: She is alert and oriented to person, place, and time.   Psychiatric:         Thought Content: Thought content is delusional.      Comments: Continue to have delusion          I performed physical exam on January 18, 2022 remain similar without any acute changes.      Fluids    Intake/Output Summary (Last 24 hours) at 1/18/2022 1538  Last data filed at 1/18/2022 1259  Gross per 24 hour   Intake 980 ml   Output --   Net 980 ml       Laboratory                        Imaging  DX-CHEST-LIMITED (1 VIEW)   Final Result      Left basilar atelectasis. No focal consolidation. No effusions.           Assessment/Plan  * Delusional disorder- (present on admission)  Assessment & Plan  -Patient was evaluated by psychiatry, her medications were adjusted, patient was deemed incapacitated to make medical decisions. Patient cleared for discharge 6/2021.    -Patient currently has guardian, attempting to get patient placed in group home.  -Patient refusing to take risperidone 0.5 every morning and risperidone 1 mg every night, as recommended by psychiatry.    History of dementia- (present on admission)  Assessment & Plan  -At baseline  -placement at [facility]  [place] fell through. CM/SW looking for alternative GH.  -Now fully vaccinated.   -Has guardian.    Schizoaffective disorder, bipolar type (HCC)- (present on admission)  Assessment & Plan  -Patient has been refusing medications and blood draw due to paranoia.    -Actively delusional  -Patient was evaluated by psychiatry, her medications were adjusted, patient was deemed incapacitated to make medical decisions. Patient cleared for discharge 6/2021.  Patient  currently has guardian, attempting to get patient placed in group home.  -Patient refusing to take risperidone 0.5 every morning and risperidone 1 mg every night, as recommended by psychiatry.    Hypothyroidism- (present on admission)  Assessment & Plan  -Patient refuses to take her medications.  She refused blood draw on multiple tries     Noncompliance with treatment plan- (present on admission)  Assessment & Plan  -Patient refused antipsychotic medications and all other medications as well as any lab work       I reviewed above assessment and plan today on January 18, 2022.  No acute changes noted.    I discussed plan of care with bedside RN and .    Pending discharge.      VTE prophylaxis: SCDs/TEDs    She is mobilizin not on pharmacological DVT prophylaxis.

## 2022-01-19 PROCEDURE — G0378 HOSPITAL OBSERVATION PER HR: HCPCS

## 2022-01-19 ASSESSMENT — PAIN DESCRIPTION - PAIN TYPE: TYPE: ACUTE PAIN

## 2022-01-19 NOTE — PROGRESS NOTES
Report received at change of shift. Pt is A&Ox3, disoriented to event. Reports no pain at this time. Has been ambulating around her room this AM. Safety precautions in place. All pt needs met at this time.

## 2022-01-19 NOTE — PROGRESS NOTES
Received report and assumed care at shift change. Continue to refuse cares. Patient stayed in room, shouting at times. No complain of pain or distress.

## 2022-01-20 PROCEDURE — G0378 HOSPITAL OBSERVATION PER HR: HCPCS

## 2022-01-20 ASSESSMENT — PAIN DESCRIPTION - PAIN TYPE: TYPE: CHRONIC PAIN

## 2022-01-20 NOTE — PROGRESS NOTES
Received report and assumed care at shift change. Patient in room, alert, oriented to self and place. No complain of pain or distress. Continue to refuse cares and assessment.

## 2022-01-20 NOTE — PROGRESS NOTES
Report received at change of shift. Pt is A&Ox3, disoriented to situation. Reports no pain. Safety precautions in place. All pt needs met at this time.

## 2022-01-21 PROCEDURE — 99224 PR SUBSEQUENT OBSERVATION CARE,LEVEL I: CPT | Performed by: INTERNAL MEDICINE

## 2022-01-21 PROCEDURE — G0378 HOSPITAL OBSERVATION PER HR: HCPCS

## 2022-01-21 ASSESSMENT — ENCOUNTER SYMPTOMS
BACK PAIN: 0
CONSTIPATION: 0
VOMITING: 0
ORTHOPNEA: 0
HEADACHES: 0
DIZZINESS: 0
DOUBLE VISION: 0
ABDOMINAL PAIN: 0
DIARRHEA: 0
SPUTUM PRODUCTION: 0
FEVER: 0
SPEECH CHANGE: 0
PHOTOPHOBIA: 0
PALPITATIONS: 0
NAUSEA: 0
NECK PAIN: 0
HALLUCINATIONS: 0
BLURRED VISION: 0
COUGH: 0
CHILLS: 0
WEIGHT LOSS: 0
SENSORY CHANGE: 0
MYALGIAS: 0
TINGLING: 0
SHORTNESS OF BREATH: 0
EYE PAIN: 0
FOCAL WEAKNESS: 0
TREMORS: 0

## 2022-01-21 ASSESSMENT — LIFESTYLE VARIABLES: SUBSTANCE_ABUSE: 0

## 2022-01-21 NOTE — PROGRESS NOTES
Report received at change of shift. Pt is A&Ox3, disoriented to situation. Safety precautions in place. Pt reports all her needs are met.

## 2022-01-21 NOTE — PROGRESS NOTES
Received report and assumed care at shift change. Alert oriented to self and place. Continue to refuse cares. No complain of pain or distress.

## 2022-01-22 PROCEDURE — G0378 HOSPITAL OBSERVATION PER HR: HCPCS

## 2022-01-22 ASSESSMENT — PAIN DESCRIPTION - PAIN TYPE: TYPE: ACUTE PAIN

## 2022-01-22 NOTE — PROGRESS NOTES
Hospital Medicine TWICE WEEKLY Progress Note    Date of Service  1/21/2022    Chief Complaint  Zully Lin is a 71 y.o. female admitted 6/11/2021 with delusions    Hospital Course  This is a 71 year old female with PMHx of bipolar disorder, schizoaffective disorder, hypothyroidism, and dementia who was transferred from [facility] on 06/11/2021 due to worsening agitation and delusions.    Patient was evaluated by speech for cognitive evaluation, she did rather well in all domains, It is unclear if she has underlying dementia or if her presentation is psychiatric. Patient was evaluated by psychiatry, and her medications were adjusted. Patient refusing to take risperidone 0.5 every morning and risperidone 1 mg every night, as recommended by psychiatry.    Patient was deemed incapacitated to make medical decisions. She was cleared for discharge 6/2021.  Patient currently has guardian, and pursuing placement in group home in [place] ([facility]). Arranging for care flight to [place].    Pt has received 2 doses of COVID vaccine 1and is now fully vaccinated. Quant negative 12/9/2021.     Interval Problem Update    I evaluated and examined her at the bedside.  No change from prior evaluation.  Pending discharge.    I have personally seen and examined the patient at bedside. I discussed the plan of care with patient, bedside RN and .    Consultants/Specialty  psychiatry    Code Status  Full Code    Disposition  Patient is medically cleared.   Anticipate discharge to [facility]/ .  Secure group home/skilled nursing facility.  I have placed the appropriate orders for post-discharge needs.    Review of Systems  Review of Systems   Constitutional: Negative for chills, fever and weight loss.   HENT: Negative for hearing loss and tinnitus.    Eyes: Negative for blurred vision, double vision, photophobia and pain.   Respiratory: Negative for cough, sputum production and  shortness of breath.    Cardiovascular: Negative for chest pain, palpitations, orthopnea and leg swelling.   Gastrointestinal: Negative for abdominal pain, constipation, diarrhea, nausea and vomiting.   Genitourinary: Negative for dysuria, frequency and urgency.   Musculoskeletal: Negative for back pain, joint pain, myalgias and neck pain.   Skin: Negative for rash.   Neurological: Negative for dizziness, tingling, tremors, sensory change, speech change, focal weakness and headaches.   Psychiatric/Behavioral: Negative for hallucinations and substance abuse.        Delusion   All other systems reviewed and are negative.     Today she denies any acute complaints      Physical Exam  Temp:  [36 °C (96.8 °F)-36.8 °C (98.2 °F)] 36.8 °C (98.2 °F)  Pulse:  [78-98] 78  Resp:  [18] 18  BP: (117-133)/(62-86) 133/62  SpO2:  [95 %-98 %] 96 %    Physical Exam  Vitals and nursing note reviewed.   Constitutional:       General: She is not in acute distress.     Appearance: Normal appearance.   HENT:      Head: Normocephalic and atraumatic.      Mouth/Throat:      Mouth: Mucous membranes are moist.      Pharynx: No oropharyngeal exudate.   Eyes:      Extraocular Movements: Extraocular movements intact.      Pupils: Pupils are equal, round, and reactive to light.   Cardiovascular:      Rate and Rhythm: Normal rate and regular rhythm.      Pulses: Normal pulses.      Heart sounds: No murmur heard.  No friction rub. No gallop.    Pulmonary:      Effort: Pulmonary effort is normal. No respiratory distress.      Breath sounds: No wheezing, rhonchi or rales.   Abdominal:      General: Bowel sounds are normal. There is no distension.      Palpations: Abdomen is soft. There is no mass.      Tenderness: There is no abdominal tenderness.   Musculoskeletal:         General: No swelling or tenderness. Normal range of motion.      Cervical back: Normal range of motion. No rigidity. No muscular tenderness.      Right lower leg: No edema.       Left lower leg: No edema.   Skin:     General: Skin is warm and dry.      Capillary Refill: Capillary refill takes less than 2 seconds.      Findings: No erythema or rash.   Neurological:      General: No focal deficit present.      Mental Status: She is alert and oriented to person, place, and time.   Psychiatric:         Thought Content: Thought content is delusional.      Comments: Continue to have delusion          I performed physical exam on January 21, 2022 remain similar without any acute changes.      Fluids    Intake/Output Summary (Last 24 hours) at 1/21/2022 2306  Last data filed at 1/21/2022 1949  Gross per 24 hour   Intake 250 ml   Output --   Net 250 ml       Laboratory                        Imaging  DX-CHEST-LIMITED (1 VIEW)   Final Result      Left basilar atelectasis. No focal consolidation. No effusions.           Assessment/Plan  * Delusional disorder- (present on admission)  Assessment & Plan  -Patient was evaluated by psychiatry, her medications were adjusted, patient was deemed incapacitated to make medical decisions. Patient cleared for discharge 6/2021.    -Patient currently has guardian, attempting to get patient placed in group home.  -Patient refusing to take risperidone 0.5 every morning and risperidone 1 mg every night, as recommended by psychiatry.    History of dementia- (present on admission)  Assessment & Plan  -At baseline  -placement at [facility] fell through. CM/SW looking for alternative GH.  -Now fully vaccinated.   -Has guardian.    Schizoaffective disorder, bipolar type (HCC)- (present on admission)  Assessment & Plan  -Patient has been refusing medications and blood draw due to paranoia.    -Actively delusional  -Patient was evaluated by psychiatry, her medications were adjusted, patient was deemed incapacitated to make medical decisions. Patient cleared for discharge 6/2021.  Patient currently has guardian, attempting to get patient placed in  group home.  -Patient refusing to take risperidone 0.5 every morning and risperidone 1 mg every night, as recommended by psychiatry.    Hypothyroidism- (present on admission)  Assessment & Plan  -Patient refuses to take her medications.  She refused blood draw on multiple tries     Noncompliance with treatment plan- (present on admission)  Assessment & Plan  -Patient refused antipsychotic medications and all other medications as well as any lab work       I reviewed above assessment and plan today on January 21, 2022.  No acute changes noted.    I discussed plan of care with bedside RN and .    Pending discharge.      VTE prophylaxis: SCDs/TEDs    She is mobilizin not on pharmacological DVT prophylaxis.

## 2022-01-22 NOTE — PROGRESS NOTES
Alert and oriented x3, disoriented with event. Refused to have assessment. Offered fluids. Safety precautions in placed. Bed in lowest position. Upper side rails up. Treaded socks on. Reinforced the use of call light when needing assistance.

## 2022-01-23 PROCEDURE — G0378 HOSPITAL OBSERVATION PER HR: HCPCS

## 2022-01-23 NOTE — PROGRESS NOTES
Pt seen sitting in the chair  Alert and oriented x2, disoriented with event and place. Offered fluids and snacks. Safety precautions in placed. Bed in lowest position. Upper side rails up. Treaded socks on. Reinforced the use of call light when needing assistance.

## 2022-01-23 NOTE — PROGRESS NOTES
Assumed care of patient at bedside report from NOC RN. Updated on POC. Patient currently A & O x 1, disoriented to situation, time, and place; agitated; delusional; on room air; up self; without complaints of acute pain. Call light within reach. Fall precautions in place. Bed locked and in lowest position. No other needs indicated at this time.

## 2022-01-24 PROCEDURE — G0378 HOSPITAL OBSERVATION PER HR: HCPCS

## 2022-01-24 ASSESSMENT — PAIN DESCRIPTION - PAIN TYPE: TYPE: ACUTE PAIN

## 2022-01-24 NOTE — PROGRESS NOTES
Alert and oriented x2, disoriented with time and event. Offered fluids. Safety precautions in placed. Bed in lowest position. Upper side rails up. Treaded socks on. Reinforced the use of call light when needing assistance.

## 2022-01-25 PROCEDURE — G0378 HOSPITAL OBSERVATION PER HR: HCPCS

## 2022-01-25 PROCEDURE — 99224 PR SUBSEQUENT OBSERVATION CARE,LEVEL I: CPT | Performed by: INTERNAL MEDICINE

## 2022-01-25 NOTE — PROGRESS NOTES
Pharmacy Pharmacotherapy Consult for LOS >30 days    Admit Date: 6/11/2021      Medications were reviewed for appropriateness and ongoing need.     Current Facility-Administered Medications   Medication Dose Route Frequency Provider Last Rate Last Admin   • pneumococcal vaccine (PNEUMOVAX-23) injection 25 mcg  0.5 mL Intramuscular Once PRN Bharath Rincon M.D.       • influenza vaccine quad (FLUZONE/FLUARIX) injection 0.5 mL  0.5 mL Intramuscular Once PRN Bharath Rincon M.D.       • haloperidol lactate (HALDOL) injection 4 mg  4 mg Intramuscular 1X PRN Bharath Rincon M.D.       • prochlorperazine (COMPAZINE) tablet 5 mg  5 mg Oral Q6HRS PRN Bharath Rincon M.D.       • acetaminophen (Tylenol) tablet 650 mg  650 mg Oral Q4HRS PRN Ivelisse Willams, D.O.       • senna-docusate (PERICOLACE or SENOKOT S) 8.6-50 MG per tablet 2 tablet  2 Tablet Oral BID PRN Ivelisse Willams, D.O.        And   • polyethylene glycol/lytes (MIRALAX) PACKET 1 Packet  1 Packet Oral QDAY PRN Ivelisse Willams, D.O.        And   • magnesium hydroxide (MILK OF MAGNESIA) suspension 30 mL  30 mL Oral QDAY PRN Ivelisse Willams, D.O.        And   • bisacodyl (DULCOLAX) suppository 10 mg  10 mg Rectal QDAY PRN Ivelissejuancho Willams, D.O.           Recommendations:  1. D/C Milk of Mag and Biscodyl - pt never used.   2. D/C haloperidol 4 mg IM x 1 PRN agitation, ordered 12/17/21, never used.   3. All other medications reviewed. No additional changes recommended at this time.     Discussed with mary Chairez to make above changes.     Narcisa Johnson, PharmD, BCOP

## 2022-01-25 NOTE — PROGRESS NOTES
Patient denies any needs at this time. Denies pain or SOB. No apparent distress noted on patient. Offered patient snacks and water which she declined. Patient informed to use call light if she needed anything

## 2022-01-25 NOTE — PROGRESS NOTES
"Hospital Medicine TWICE WEEKLY Progress Note    Date of Service  1/25/2022    Chief Complaint  Zully Lin is a 71 y.o. female admitted 6/11/2021 with delusions    Hospital Course  This is a 71 year old female with PMHx of bipolar disorder, schizoaffective disorder, hypothyroidism, and dementia who was transferred from [facility] [place] on 06/11/2021 due to worsening agitation and delusions.    Patient was evaluated by speech for cognitive evaluation, she did rather well in all domains, It is unclear if she has underlying dementia or if her presentation is psychiatric. Patient was evaluated by psychiatry, and her medications were adjusted. Patient refusing to take risperidone 0.5 every morning and risperidone 1 mg every night, as recommended by psychiatry.    Patient was deemed incapacitated to make medical decisions. She was cleared for discharge 6/2021.  Patient currently has guardian, and pursuing placement in group home in [place] ([facility]). Arranging for care flight to [place].    Pt has received 2 doses of COVID vaccine 1and is now fully vaccinated. Quant negative 12/9/2021.     Interval Problem Update  1/25/2022 - I reviewed the patient's chart. There were no significant overnight events. Remains hemodynamically stable and afebrile. Stable on RA.  No new labs.    > I have personally seen and examined the patient today.  She continues to have delusions.  He states I am only eating my food. \"  Luis Eduardo has not done anything right\".  Otherwise, she denies any complaints.  Denies any pain.  No nausea, vomiting, abdominal pain.  Not short of breath.      I have personally seen and examined the patient at bedside. I discussed the plan of care with patient, bedside RN and .    Consultants/Specialty  psychiatry    Code Status  Full Code    Disposition  Patient is medically cleared.   Anticipate discharge to [facility]/ .  Secure group home/skilled nursing " facility.  I have placed the appropriate orders for post-discharge needs.    Review of Systems  Review of Systems      Pertinent positives/negatives as mentioned above.     A complete review of systems was personally done by me. All other systems were negative.         Physical Exam  Temp:  [36.3 °C (97.4 °F)-36.7 °C (98 °F)] 36.3 °C (97.4 °F)  Pulse:  [67-68] 68  Resp:  [17-18] 18  BP: (129-152)/(63-81) 129/63  SpO2:  [95 %-99 %] 97 %    Physical Exam  Vitals and nursing note reviewed.   Constitutional:       General: She is not in acute distress.     Appearance: Normal appearance.   HENT:      Head: Normocephalic and atraumatic.      Mouth/Throat:      Mouth: Mucous membranes are moist.      Pharynx: No oropharyngeal exudate.   Eyes:      Extraocular Movements: Extraocular movements intact.      Pupils: Pupils are equal, round, and reactive to light.   Cardiovascular:      Rate and Rhythm: Normal rate and regular rhythm.      Pulses: Normal pulses.      Heart sounds: No murmur heard.  No friction rub. No gallop.    Pulmonary:      Effort: Pulmonary effort is normal. No respiratory distress.      Breath sounds: No wheezing, rhonchi or rales.   Abdominal:      General: Bowel sounds are normal. There is no distension.      Palpations: Abdomen is soft. There is no mass.      Tenderness: There is no abdominal tenderness.   Musculoskeletal:         General: No swelling or tenderness. Normal range of motion.      Cervical back: Normal range of motion. No rigidity. No muscular tenderness.      Right lower leg: No edema.      Left lower leg: No edema.   Skin:     General: Skin is warm and dry.      Capillary Refill: Capillary refill takes less than 2 seconds.      Findings: No erythema or rash.   Neurological:      General: No focal deficit present.      Mental Status: She is alert and oriented to person, place, and time.   Psychiatric:         Thought Content: Thought content is delusional.      Comments: Continue to have  delusion        I have performed the physical examination today 1/25/2022.  In review of last note, there are no new changes except as documented above.        Fluids    Intake/Output Summary (Last 24 hours) at 1/25/2022 0716  Last data filed at 1/24/2022 0800  Gross per 24 hour   Intake 480 ml   Output --   Net 480 ml       Laboratory                        Imaging  DX-CHEST-LIMITED (1 VIEW)   Final Result      Left basilar atelectasis. No focal consolidation. No effusions.           Assessment/Plan  * Delusional disorder- (present on admission)  Assessment & Plan  -Patient was evaluated by psychiatry, her medications were adjusted, patient was deemed incapacitated to make medical decisions. Patient cleared for discharge 6/2021.    -Patient currently has guardian, attempting to get patient placed in group home.  -Patient refusing to take risperidone 0.5 every morning and risperidone 1 mg every night, as recommended by psychiatry.    History of dementia- (present on admission)  Assessment & Plan  -At baseline  -placement at [facility] fell through. CM/SW looking for alternative GH.  -Now fully vaccinated.   -Has guardian.    Schizoaffective disorder, bipolar type (HCC)- (present on admission)  Assessment & Plan  -Patient has been refusing medications and blood draw due to paranoia.    -Actively delusional  -Patient was evaluated by psychiatry, her medications were adjusted, patient was deemed incapacitated to make medical decisions. Patient cleared for discharge 6/2021.  Patient currently has guardian, attempting to get patient placed in group home.  -Patient refusing to take risperidone 0.5 every morning and risperidone 1 mg every night, as recommended by psychiatry.    Hypothyroidism- (present on admission)  Assessment & Plan  -Patient refuses to take her medications.  She refused blood draw on multiple tries     Noncompliance with treatment plan- (present on admission)  Assessment &  Plan  -Patient refused antipsychotic medications and all other medications as well as any lab work           VTE prophylaxis: SCDs/TEDs    I have performed the physical examination, and reviewed updated ROS and plan today 1/25/2022.  In review of last note, there are no new changes except as documented above.

## 2022-01-26 PROCEDURE — G0378 HOSPITAL OBSERVATION PER HR: HCPCS

## 2022-01-26 NOTE — PROGRESS NOTES
Yelling in room because someone is spinning the hospital around and she needs the feds notified immediately; reoriented to situation. Refused assessment. Later heard moving her furniture in her room around

## 2022-01-27 PROCEDURE — G0378 HOSPITAL OBSERVATION PER HR: HCPCS

## 2022-01-27 NOTE — PROGRESS NOTES
In her room with no current needs . Up self with a steady gait noted, no bed alarm needed. Cont plan of care, call light within reach

## 2022-01-28 LAB
SARS-COV-2 RNA RESP QL NAA+PROBE: NOTDETECTED
SPECIMEN SOURCE: NORMAL

## 2022-01-28 PROCEDURE — U0003 INFECTIOUS AGENT DETECTION BY NUCLEIC ACID (DNA OR RNA); SEVERE ACUTE RESPIRATORY SYNDROME CORONAVIRUS 2 (SARS-COV-2) (CORONAVIRUS DISEASE [COVID-19]), AMPLIFIED PROBE TECHNIQUE, MAKING USE OF HIGH THROUGHPUT TECHNOLOGIES AS DESCRIBED BY CMS-2020-01-R: HCPCS

## 2022-01-28 PROCEDURE — G0378 HOSPITAL OBSERVATION PER HR: HCPCS

## 2022-01-28 PROCEDURE — U0005 INFEC AGEN DETEC AMPLI PROBE: HCPCS

## 2022-01-28 PROCEDURE — 99224 PR SUBSEQUENT OBSERVATION CARE,LEVEL I: CPT | Performed by: INTERNAL MEDICINE

## 2022-01-28 NOTE — DISCHARGE PLANNING
Medical Social Work  Email from guardian , Denia Price. Working on getting patient back on Medicare Part B, last had it in 3/21.  Will be paying what patient owes to get it started again.  Also will be applying for a Part D program.  Expects to have both for patient within two weeks.

## 2022-01-28 NOTE — PROGRESS NOTES
Received report and assumed care of pt at change of shift. Pt sitting in room with no complaints of pain. Oriented to place and self only. States no further needs at this time.

## 2022-01-28 NOTE — PROGRESS NOTES
Pt awake and oriented x 4. No complaints of pain or discomfort at this time. Patient up self with steady gait. Call light within reach. All needs met at this time.

## 2022-01-28 NOTE — PROGRESS NOTES
Hospital Medicine TWICE WEEKLY Progress Note    Date of Service  1/28/2022    Chief Complaint  Zully Lin is a 71 y.o. female admitted 6/11/2021 with delusions    Hospital Course  This is a 71 year old female with PMHx of bipolar disorder, schizoaffective disorder, hypothyroidism, and dementia who was transferred from [facility] on 06/11/2021 due to worsening agitation and delusions.    Patient was evaluated by speech for cognitive evaluation, she did rather well in all domains, It is unclear if she has underlying dementia or if her presentation is psychiatric. Patient was evaluated by psychiatry, and her medications were adjusted. Patient refusing to take risperidone 0.5 every morning and risperidone 1 mg every night, as recommended by psychiatry.    Patient was deemed incapacitated to make medical decisions. She was cleared for discharge 6/2021.  Patient currently has guardian, and pursuing placement in group home in [place] ([facility] Arranging for care flight to [place].    Pt has received 2 doses of COVID vaccine 1and is now fully vaccinated. Quant negative 12/9/2021.     Interval Problem Update  1/28/2022 - I reviewed the patient's chart. There were no significant overnight events. Remains hemodynamically stable and afebrile. Stable on RA.  Guardian wondering about IM depot meds, but that will still need consent from the patient or a court order to administer. Will also need close psychiatric monitoring.    > I have personally seen and examined the patient today. She remains very calm, pleasant, and cooperative to me.  She has no complaints.  She states she ate breakfast well today and it has been good.  Denies any pain.  No abdominal complaints.    I have personally seen and examined the patient at bedside. I discussed the plan of care with patient, bedside RN and .    Consultants/Specialty  psychiatry    Code Status  Full  Code    Disposition  Patient is medically cleared.   Anticipate discharge to Memory Care/ .  Secure group home/skilled nursing facility.  I have placed the appropriate orders for post-discharge needs.    Review of Systems  ROS   Pertinent positives/negatives as mentioned above.     A complete review of systems was personally done by me. All other systems were negative.         Physical Exam  Temp:  [36.2 °C (97.1 °F)-36.7 °C (98 °F)] 36.6 °C (97.8 °F)  Pulse:  [68-74] 71  Resp:  [15-17] 15  BP: (125-136)/(60-89) 125/60  SpO2:  [96 %-100 %] 96 %    Physical Exam  Vitals and nursing note reviewed.   Constitutional:       General: She is not in acute distress.     Appearance: Normal appearance.   HENT:      Head: Normocephalic and atraumatic.      Mouth/Throat:      Mouth: Mucous membranes are moist.      Pharynx: No oropharyngeal exudate.   Eyes:      Extraocular Movements: Extraocular movements intact.      Pupils: Pupils are equal, round, and reactive to light.   Cardiovascular:      Rate and Rhythm: Normal rate and regular rhythm.      Pulses: Normal pulses.      Heart sounds: No murmur heard.  No friction rub. No gallop.    Pulmonary:      Effort: Pulmonary effort is normal. No respiratory distress.      Breath sounds: No wheezing, rhonchi or rales.   Abdominal:      General: Bowel sounds are normal. There is no distension.      Palpations: Abdomen is soft. There is no mass.      Tenderness: There is no abdominal tenderness.   Musculoskeletal:         General: No swelling or tenderness. Normal range of motion.      Cervical back: Normal range of motion. No rigidity. No muscular tenderness.      Right lower leg: No edema.      Left lower leg: No edema.   Skin:     General: Skin is warm and dry.      Capillary Refill: Capillary refill takes less than 2 seconds.      Findings: No erythema or rash.   Neurological:      General: No focal deficit present.      Mental Status: She is alert and oriented to person, place,  and time.   Psychiatric:         Thought Content: Thought content is delusional.      Comments: Continue to have delusion        I have performed the physical examination today 1/28/2022.  In review of last note, there are no new changes except as documented above.        Fluids    Intake/Output Summary (Last 24 hours) at 1/28/2022 0814  Last data filed at 1/28/2022 0600  Gross per 24 hour   Intake 2969 ml   Output --   Net 2969 ml       Laboratory                        Imaging  DX-CHEST-LIMITED (1 VIEW)   Final Result      Left basilar atelectasis. No focal consolidation. No effusions.           Assessment/Plan  * Delusional disorder- (present on admission)  Assessment & Plan  -Patient was evaluated by psychiatry, her medications were adjusted, patient was deemed incapacitated to make medical decisions. Patient cleared for discharge 6/2021.    -Patient currently has guardian, attempting to get patient placed in group home.  -Patient refusing to take risperidone 0.5 every morning and risperidone 1 mg every night, as recommended by psychiatry. Guardian wondering about IM depot meds, but that will still need consent from the patient or a court order to administer. Will also need close psychiatric monitoring.    History of dementia- (present on admission)  Assessment & Plan  -At baseline  -placement at [facility] fell through. CM/SW looking for alternative GH.  -Now fully vaccinated.   -Has guardian.    Schizoaffective disorder, bipolar type (HCC)- (present on admission)  Assessment & Plan  -Patient has been refusing medications and blood draw due to paranoia.    -Actively delusional  -Patient was evaluated by psychiatry, her medications were adjusted, patient was deemed incapacitated to make medical decisions. Patient cleared for discharge 6/2021.  Patient currently has guardian, attempting to get patient placed in group home.  -Patient refusing to take risperidone 0.5 every morning and  risperidone 1 mg every night, as recommended by psychiatry. Guardian wondering about IM depot meds, but that will still need consent from the patient or a court order to administer. Will also need close psychiatric monitoring.    Hypothyroidism- (present on admission)  Assessment & Plan  -Patient refuses to take her medications.  She refused blood draw on multiple tries     Noncompliance with treatment plan- (present on admission)  Assessment & Plan  -Patient refused antipsychotic medications and all other medications as well as any lab work           VTE prophylaxis: SCDs/TEDs    I have performed the physical examination, and reviewed updated ROS and plan today 1/28/2022.  In review of last note, there are no new changes except as documented above.

## 2022-01-29 PROCEDURE — G0378 HOSPITAL OBSERVATION PER HR: HCPCS

## 2022-01-29 ASSESSMENT — FIBROSIS 4 INDEX: FIB4 SCORE: 1.53

## 2022-01-29 NOTE — PROGRESS NOTES
Pt. Received in bed awake, sitting on edge of bed. Denies any pain. Pt. Said she had a BM today. VS noted. Educated about falls, pt. Wearing non skid shoes on. upself with steady gait. No additional needs at this time.

## 2022-01-29 NOTE — PROGRESS NOTES
Assumed care of pt. Bedside report received from KARRI Santacruz. Pt was updated on plan of care. Call light, phone and personal belongings in reach. bed in lowest position, and locked.

## 2022-01-30 PROCEDURE — G0378 HOSPITAL OBSERVATION PER HR: HCPCS

## 2022-01-31 PROCEDURE — 99212 OFFICE O/P EST SF 10 MIN: CPT | Performed by: PSYCHIATRY & NEUROLOGY

## 2022-01-31 PROCEDURE — G0378 HOSPITAL OBSERVATION PER HR: HCPCS

## 2022-01-31 ASSESSMENT — ENCOUNTER SYMPTOMS
CARDIOVASCULAR NEGATIVE: 1
RESPIRATORY NEGATIVE: 1
MUSCULOSKELETAL NEGATIVE: 1
EYES NEGATIVE: 1
GASTROINTESTINAL NEGATIVE: 1
NEUROLOGICAL NEGATIVE: 1
CONSTITUTIONAL NEGATIVE: 1

## 2022-01-31 NOTE — DISCHARGE PLANNING
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  Patient informed SW that when she went to college she received a degree in Interior Design and that is why her room looks so good.  \"I like it to have a fresh look.\"  As with before, when SW begins discussing discharging, patient will say she has a home.  That she doesn't understand SW worrying about her safety as the [employer] is watching her every move.     Patient will not take any medications, stating she is perfectly healthy.  \"There is nothing wrong me with me medically.\"     Barriers to Discharge: placement, due to refusal to take medications.  Potential placement has been found in [place].  They are requesting an order to force the patient to take psych medications.     Plan: continue to monitor for discharge barriers    "

## 2022-01-31 NOTE — PROGRESS NOTES
Bedside report received from KARRI Buenrostro. Patient is alert and oriented to self and place. No c/o pain at this time. Call light within reach. Patient is up self in room and steady on feet.

## 2022-01-31 NOTE — CONSULTS
PSYCHIATRIC FOLLOW-UP:(established)  *Reason for admission:  agitation    Reason for re-consult: start NIEVES  Requesting provider: Bharath Rincon M.D.   *Legal Hold Status:  Not on hold          Chart reviewed.         *HPI:   Patient was sitting on the chair on approach.  She was calm and pleasant.  Stated that she was waiting for her discharge today.  When asked where she was going, she told me to one of her properties around the corner.  Patient continues to present with fixed grandiose delusions of being the governor, owning multiple properties and castles, being  to Nitesh Triana, and working for the [employer] and the [email or web address].  Patient states that she receives messages from the [employer] and [email or web address] through the TV and wires in her room.  She denies having auditory visual hallucinations, but states hearing her kids calling for her at times.  She reports having 10 children, however states that her family has not been allowed to visit her in the hospital.  patient denies feeling overwhelmed with her thoughts.  States that she overall feels fine, and reports good sleep and appetite and energy.  She denies any SI/HI.         Medical ROS (as pertinent):     Review of Systems   Constitutional: Negative.    Eyes: Negative.    Respiratory: Negative.    Cardiovascular: Negative.    Gastrointestinal: Negative.    Genitourinary: Negative.    Musculoskeletal: Negative.    Skin: Negative.    Neurological: Negative.    Psychiatric/Behavioral:        See HPI           *Psychiatric Examination:  Vitals:   Vitals:    01/31/22 0705   BP: 119/79   Pulse: 72   Resp: 17   Temp: 36.4 °C (97.6 °F)   SpO2: 95%       Appearance: appears stated age, fair grooming and hygiene, calm, cooperative, good eye contact  Abnormal movements: none  Gait/posture: normal  Speech: normal volume, tone and rhythm  Though process: linear and goal oriented  Associations: no loose associations  Thought content: grandiose delusions, AH?, does not appear  "to be responding to internal stimuli, neither is internally preoccupied.   Judgement and Insight: poor/poor  Orientation:oriented to person, place, time  Recent and Remote Memory: intact  Attention Span and Concentration: intact  Language: fluid   Fund of Knowledge: not tested   Mood and Affect:\"fine\", euthymic, appropriate   SI/HI:denies any active or passive SI/HI      *EKG: qtc 464 on 6/14/21  *Imaging: head ct wnl 10/31/20   EEG: n/a      *Labs personally reviewed: tsh 15.6, free thyroxine 0.93 on 6/14/21  BMP and CBC wnl on June 2021    Assessment: Patient has fixed grandiose delusions, however she has been calm and pleasant and has not required as needed or restraints for agitation or aggressive behavior.  She denies SI/HI.  She does not meet criteria for legal hold.  I discussed the option of antipsychotics, however patient adamantly refused starting any medications at this time.    Dx:  History of schizophrenia  History of dementia    Plan:  1- Legal hold:n/a  2- Psychotropic medications: If patient agrees, she could benefit from being started on Abilify 5 mg p.o. daily to target psychosis or psychosis.  To transition to NIEVES, patient has to be established on oral medication for 2 weeks.  She then can be transitioned to Abilify Extended Release  mg p.o. q. monthly.    3- Discussed the case with: Dr Wetzel  4- Psychiatry signing off.  However, if patient agrees with starting medication, I will continue to follow-up.     Thank you for the consult.       This note was created using voice recognition software (Dragon). The accuracy of the dictation is limited by the abilities of the software. I have reviewed the note prior to signing. However, error related to voice recognition software and /or scribes may still exist and should be interpreted within the appropriate context.     "

## 2022-02-01 PROCEDURE — 99224 PR SUBSEQUENT OBSERVATION CARE,LEVEL I: CPT | Performed by: INTERNAL MEDICINE

## 2022-02-01 PROCEDURE — G0378 HOSPITAL OBSERVATION PER HR: HCPCS

## 2022-02-01 RX ORDER — ARIPIPRAZOLE 2 MG/1
2 TABLET ORAL DAILY
Status: DISCONTINUED | OUTPATIENT
Start: 2022-02-01 | End: 2022-02-24

## 2022-02-01 ASSESSMENT — ENCOUNTER SYMPTOMS
HEADACHES: 0
MYALGIAS: 0
VOMITING: 0
SPUTUM PRODUCTION: 0
TINGLING: 0
COUGH: 0
NAUSEA: 0
SHORTNESS OF BREATH: 0
TREMORS: 0
PHOTOPHOBIA: 0
DIZZINESS: 0
SPEECH CHANGE: 0
WEIGHT LOSS: 0
BACK PAIN: 0
FOCAL WEAKNESS: 0
ABDOMINAL PAIN: 0
ORTHOPNEA: 0
SENSORY CHANGE: 0
NECK PAIN: 0
HALLUCINATIONS: 0
EYE PAIN: 0
BLURRED VISION: 0
DOUBLE VISION: 0
CONSTIPATION: 0
PALPITATIONS: 0
FEVER: 0
CHILLS: 0
DIARRHEA: 0

## 2022-02-01 ASSESSMENT — LIFESTYLE VARIABLES: SUBSTANCE_ABUSE: 0

## 2022-02-01 NOTE — PROGRESS NOTES
Assumed care of pt at 0700. Report received and bedside rounding completed with night RN. Pt is calm no SOB, no acute distress noted.  Call light and pt belongings within reach - hourly rounding in place.

## 2022-02-01 NOTE — PROGRESS NOTES
"When this RN went to go do head to toe assessment, pt stated \" its illegal for you to be in my room this late.\" Pt refused assessment of any kind this evening.     Pt is on RA. Resting in bed. No needs at this time.   "

## 2022-02-02 PROCEDURE — 700102 HCHG RX REV CODE 250 W/ 637 OVERRIDE(OP): Performed by: INTERNAL MEDICINE

## 2022-02-02 PROCEDURE — G0378 HOSPITAL OBSERVATION PER HR: HCPCS

## 2022-02-02 PROCEDURE — A9270 NON-COVERED ITEM OR SERVICE: HCPCS | Performed by: INTERNAL MEDICINE

## 2022-02-02 RX ADMIN — ARIPIPRAZOLE 2 MG: 2 TABLET ORAL at 10:04

## 2022-02-02 ASSESSMENT — PAIN DESCRIPTION - PAIN TYPE: TYPE: ACUTE PAIN

## 2022-02-02 NOTE — PROGRESS NOTES
Pt sitting in room with all belongings packed. Pt refuses to answer any orientation questions but will state her name. Pt denies any pain at this time. Pt took scheduled Abilify crushed in coffee.

## 2022-02-02 NOTE — DISCHARGE PLANNING
Medical Social Work  SW informed by nursing patient refused oral Abilify yesterday.  Patient became upset at nurse, telling her she does not take medications and how dare you to ask me.  Order for oral does allow for Abilify being crushed and placed in her food.  Barrier to this is patient will not accept or eat any food that is not wrapped.

## 2022-02-02 NOTE — PROGRESS NOTES
Hospital Medicine TWICE WEEKLY Progress Note    Date of Service  2/1/2022    Chief Complaint  Zully Lin is a 71 y.o. female admitted 6/11/2021 with delusions    Hospital Course  This is a 71 year old female with PMHx of bipolar disorder, schizoaffective disorder, hypothyroidism, and dementia who was transferred from [facility] on 06/11/2021 due to worsening agitation and delusions.    Patient was evaluated by speech for cognitive evaluation, she did rather well in all domains, It is unclear if she has underlying dementia or if her presentation is psychiatric. Patient was evaluated by psychiatry, and her medications were adjusted. Patient refusing to take risperidone 0.5 every morning and risperidone 1 mg every night, as recommended by psychiatry.    Patient was deemed incapacitated to make medical decisions. She was cleared for discharge 6/2021.  Patient currently has guardian, and pursuing placement in group home in [place] ([facility] Arranging for care flight to [place].    Pt has received 2 doses of COVID vaccine 1and is now fully vaccinated. Quant negative 12/9/2021.     Interval Problem Update    I evaluated and examined her at the bedside.  She was upset and delusional that someone is torturing her.  I discussed with her regarding use of medication and she denied.  I have personally seen and examined the patient at bedside. I discussed the plan of care with patient, bedside RN and .    Consultants/Specialty  psychiatry    Code Status  Full Code    Disposition  Patient is medically cleared.   Anticipate discharge to [facility]/ .  Secure group home/skilled nursing facility.  I have placed the appropriate orders for post-discharge needs.    Review of Systems  Review of Systems   Constitutional: Negative for chills, fever and weight loss.   HENT: Negative for hearing loss and tinnitus.    Eyes: Negative for blurred vision, double vision, photophobia  and pain.   Respiratory: Negative for cough, sputum production and shortness of breath.    Cardiovascular: Negative for chest pain, palpitations, orthopnea and leg swelling.   Gastrointestinal: Negative for abdominal pain, constipation, diarrhea, nausea and vomiting.   Genitourinary: Negative for dysuria, frequency and urgency.   Musculoskeletal: Negative for back pain, joint pain, myalgias and neck pain.   Skin: Negative for rash.   Neurological: Negative for dizziness, tingling, tremors, sensory change, speech change, focal weakness and headaches.   Psychiatric/Behavioral: Negative for hallucinations and substance abuse.        Delusion   All other systems reviewed and are negative.     Today she denies any acute complaints      Physical Exam  Temp:  [35.7 °C (96.2 °F)-36.6 °C (97.8 °F)] 35.8 °C (96.4 °F)  Pulse:  [69-82] 82  Resp:  [16-17] 16  BP: (102-149)/(70-79) 102/70  SpO2:  [93 %-98 %] 97 %    Physical Exam  Vitals and nursing note reviewed.   Constitutional:       General: She is not in acute distress.     Appearance: Normal appearance.   HENT:      Head: Normocephalic and atraumatic.      Mouth/Throat:      Mouth: Mucous membranes are moist.      Pharynx: No oropharyngeal exudate.   Eyes:      Extraocular Movements: Extraocular movements intact.      Pupils: Pupils are equal, round, and reactive to light.   Cardiovascular:      Rate and Rhythm: Normal rate and regular rhythm.      Pulses: Normal pulses.      Heart sounds: No murmur heard.  No friction rub. No gallop.    Pulmonary:      Effort: Pulmonary effort is normal. No respiratory distress.      Breath sounds: No wheezing, rhonchi or rales.   Abdominal:      General: Bowel sounds are normal. There is no distension.      Palpations: Abdomen is soft. There is no mass.      Tenderness: There is no abdominal tenderness.   Musculoskeletal:         General: No swelling or tenderness. Normal range of motion.      Cervical back: Normal range of motion. No  rigidity. No muscular tenderness.      Right lower leg: No edema.      Left lower leg: No edema.   Skin:     General: Skin is warm and dry.      Capillary Refill: Capillary refill takes less than 2 seconds.      Findings: No erythema or rash.   Neurological:      General: No focal deficit present.      Mental Status: She is alert and oriented to person, place, and time.   Psychiatric:         Thought Content: Thought content is delusional.      Comments: Continue to have delusion          I performed physical exam on 02/01/22 remain similar without any acute changes.      Fluids  No intake or output data in the 24 hours ending 02/01/22 1635    Laboratory                        Imaging  DX-CHEST-LIMITED (1 VIEW)   Final Result      Left basilar atelectasis. No focal consolidation. No effusions.           Assessment/Plan  * Delusional disorder- (present on admission)  Assessment & Plan  -Patient was evaluated by psychiatry, her medications were adjusted, patient was deemed incapacitated to make medical decisions. Patient cleared for discharge 6/2021.    -Patient currently has guardian, attempting to get patient placed in group home.  -Patient refusing to take risperidone 0.5 every morning and risperidone 1 mg every night, as recommended by psychiatry. Guardian wondering about IM depot meds, but that will still need consent from the patient or a court order to administer. Will also need close psychiatric monitoring.    History of dementia- (present on admission)  Assessment & Plan  -At baseline  -placement at [facility] fell through. CM/SW looking for alternative GH.  -Now fully vaccinated.   -Has guardian.    Schizoaffective disorder, bipolar type (HCC)- (present on admission)  Assessment & Plan  -Patient has been refusing medications and blood draw due to paranoia.    -Actively delusional  -Patient was evaluated by psychiatry, her medications were adjusted, patient was deemed incapacitated  to make medical decisions. Patient cleared for discharge 6/2021.  Patient currently has guardian, attempting to get patient placed in group home.  -Patient refusing to take risperidone 0.5 every morning and risperidone 1 mg every night, as recommended by psychiatry. Guardian wondering about IM depot meds, but that will still need consent from the patient or a court order to administer. Will also need close psychiatric monitoring.    Hypothyroidism- (present on admission)  Assessment & Plan  -Patient refuses to take her medications.  She refused blood draw on multiple tries     Noncompliance with treatment plan- (present on admission)  Assessment & Plan  -Patient refused antipsychotic medications and all other medications as well as any lab work       I reviewed above assessment and plan today on 02/01/22.  No acute changes noted.    I discussed plan of care with bedside RN and .    Pending discharge.      VTE prophylaxis: SCDs/TEDs    She is mobilizin not on pharmacological DVT prophylaxis.

## 2022-02-02 NOTE — PROGRESS NOTES
This RN attempted to assess and interact with the pt. Pt was laying in bed in the dark with packed bags surrounding her. This RN asked to assess and talk to the pt and she said she was already going to sleep for the night.    Pt is resting on RA, and did state she was not in any pain.  Bed in low locked position, call light within reach.      Orders to give meds in food/drink.

## 2022-02-03 PROCEDURE — A9270 NON-COVERED ITEM OR SERVICE: HCPCS | Performed by: INTERNAL MEDICINE

## 2022-02-03 PROCEDURE — G0378 HOSPITAL OBSERVATION PER HR: HCPCS

## 2022-02-03 PROCEDURE — 700102 HCHG RX REV CODE 250 W/ 637 OVERRIDE(OP): Performed by: INTERNAL MEDICINE

## 2022-02-03 RX ADMIN — ARIPIPRAZOLE 2 MG: 2 TABLET ORAL at 12:15

## 2022-02-03 ASSESSMENT — PAIN DESCRIPTION - PAIN TYPE: TYPE: ACUTE PAIN

## 2022-02-03 NOTE — PROGRESS NOTES
Pt sitting in room with all belongings packed. Pt refuses to answer any orientation questions but will state her name. Pt denies any pain at this time. Pt took scheduled Abilify crushed in coffee again today.

## 2022-02-04 PROCEDURE — 99224 PR SUBSEQUENT OBSERVATION CARE,LEVEL I: CPT | Performed by: INTERNAL MEDICINE

## 2022-02-04 PROCEDURE — G0378 HOSPITAL OBSERVATION PER HR: HCPCS

## 2022-02-04 PROCEDURE — 700102 HCHG RX REV CODE 250 W/ 637 OVERRIDE(OP): Performed by: INTERNAL MEDICINE

## 2022-02-04 PROCEDURE — A9270 NON-COVERED ITEM OR SERVICE: HCPCS | Performed by: INTERNAL MEDICINE

## 2022-02-04 RX ADMIN — ARIPIPRAZOLE 2 MG: 2 TABLET ORAL at 09:39

## 2022-02-04 ASSESSMENT — ENCOUNTER SYMPTOMS
HALLUCINATIONS: 0
SENSORY CHANGE: 0
EYE PAIN: 0
CONSTIPATION: 0
ABDOMINAL PAIN: 0
PHOTOPHOBIA: 0
FOCAL WEAKNESS: 0
CHILLS: 0
COUGH: 0
DIARRHEA: 0
NECK PAIN: 0
SHORTNESS OF BREATH: 0
DOUBLE VISION: 0
NAUSEA: 0
PALPITATIONS: 0
SPUTUM PRODUCTION: 0
FEVER: 0
VOMITING: 0
TINGLING: 0
MYALGIAS: 0
TREMORS: 0
HEADACHES: 0
ORTHOPNEA: 0
BLURRED VISION: 0
WEIGHT LOSS: 0
SPEECH CHANGE: 0
DIZZINESS: 0
BACK PAIN: 0

## 2022-02-04 ASSESSMENT — LIFESTYLE VARIABLES: SUBSTANCE_ABUSE: 0

## 2022-02-04 NOTE — PROGRESS NOTES
Pt is A&Ox3, on RA. Sitting up in chair. Denies any pain. All safety precautions in place. Bed in lowest position. Hourly rounding in place.

## 2022-02-04 NOTE — PROGRESS NOTES
Received report from KARRI Mo. Pt is A&O x3, disoriented to situation. Pt on RA, no signs of acute distress. Pt has no complaints of pain. Pt refused skin assessments. Pt educated on the purpose of skin assessments. POC discussed with patient. Safety precautions in place, bed in lowest position, and call light and personal belongings within reach. Hourly rounding in place.

## 2022-02-04 NOTE — PROGRESS NOTES
Hospital Medicine TWICE WEEKLY Progress Note    Date of Service  2/4/2022    Chief Complaint  Zully Lin is a 71 y.o. female admitted 6/11/2021 with delusions    Hospital Course  This is a 71 year old female with PMHx of bipolar disorder, schizoaffective disorder, hypothyroidism, and dementia who was transferred from [facility] on 06/11/2021 due to worsening agitation and delusions.    Patient was evaluated by speech for cognitive evaluation, she did rather well in all domains, It is unclear if she has underlying dementia or if her presentation is psychiatric. Patient was evaluated by psychiatry, and her medications were adjusted. Patient refusing to take risperidone 0.5 every morning and risperidone 1 mg every night, as recommended by psychiatry.    Patient was deemed incapacitated to make medical decisions. She was cleared for discharge 6/2021.  Patient currently has guardian, and pursuing placement in group home in [place] ([facility]). Arranging for care flight to [place].    Pt has received 2 doses of COVID vaccine 1and is now fully vaccinated. Quant negative 12/9/2021.     Interval Problem Update    I evaluated and examined her at the bedside.  She denies any acute complaints per  She has been taking Abilify.  It is very important for her to take medications as her symptoms of severe delusion can be harmful for her.  I discussed plan of care with bedside RN.    patient, bedside RN and .    Consultants/Specialty  psychiatry    Code Status  Full Code    Disposition  Patient is medically cleared.   Anticipate discharge to [facility]/ .  Secure group home/skilled nursing facility.  I have placed the appropriate orders for post-discharge needs.    Review of Systems  Review of Systems   Constitutional: Negative for chills, fever and weight loss.   HENT: Negative for hearing loss and tinnitus.    Eyes: Negative for blurred vision, double vision, photophobia  and pain.   Respiratory: Negative for cough, sputum production and shortness of breath.    Cardiovascular: Negative for chest pain, palpitations, orthopnea and leg swelling.   Gastrointestinal: Negative for abdominal pain, constipation, diarrhea, nausea and vomiting.   Genitourinary: Negative for dysuria, frequency and urgency.   Musculoskeletal: Negative for back pain, joint pain, myalgias and neck pain.   Skin: Negative for rash.   Neurological: Negative for dizziness, tingling, tremors, sensory change, speech change, focal weakness and headaches.   Psychiatric/Behavioral: Negative for hallucinations and substance abuse.        Delusion   All other systems reviewed and are negative.     Today she denies any acute complaints      Physical Exam  Temp:  [36.1 °C (97 °F)-36.5 °C (97.7 °F)] 36.1 °C (97 °F)  Pulse:  [71-82] 71  Resp:  [17-18] 17  BP: (121-152)/(75-84) 121/82  SpO2:  [95 %-99 %] 99 %    Physical Exam  Vitals and nursing note reviewed.   Constitutional:       General: She is not in acute distress.     Appearance: Normal appearance.   HENT:      Head: Normocephalic and atraumatic.      Mouth/Throat:      Mouth: Mucous membranes are moist.      Pharynx: No oropharyngeal exudate.   Eyes:      Extraocular Movements: Extraocular movements intact.      Pupils: Pupils are equal, round, and reactive to light.   Cardiovascular:      Rate and Rhythm: Normal rate and regular rhythm.      Pulses: Normal pulses.      Heart sounds: No murmur heard.  No friction rub. No gallop.    Pulmonary:      Effort: Pulmonary effort is normal. No respiratory distress.      Breath sounds: No wheezing, rhonchi or rales.   Abdominal:      General: Bowel sounds are normal. There is no distension.      Palpations: Abdomen is soft. There is no mass.      Tenderness: There is no abdominal tenderness.   Musculoskeletal:         General: No swelling or tenderness. Normal range of motion.      Cervical back: Normal range of motion. No  rigidity. No muscular tenderness.      Right lower leg: No edema.      Left lower leg: No edema.   Skin:     General: Skin is warm and dry.      Capillary Refill: Capillary refill takes less than 2 seconds.      Findings: No erythema or rash.   Neurological:      General: No focal deficit present.      Mental Status: She is alert and oriented to person, place, and time.   Psychiatric:         Thought Content: Thought content is delusional.      Comments: Continue to have delusion          I performed physical exam on 02/04/22 remain similar without any acute changes.      Fluids    Intake/Output Summary (Last 24 hours) at 2/4/2022 1349  Last data filed at 2/4/2022 1000  Gross per 24 hour   Intake 840 ml   Output --   Net 840 ml       Laboratory                        Imaging  DX-CHEST-LIMITED (1 VIEW)   Final Result      Left basilar atelectasis. No focal consolidation. No effusions.           Assessment/Plan  * Delusional disorder- (present on admission)  Assessment & Plan  -Patient was evaluated by psychiatry, her medications were adjusted, patient was deemed incapacitated to make medical decisions. Patient cleared for discharge 6/2021.    -Patient currently has guardian, attempting to get patient placed in group home.  -Patient refusing to take risperidone 0.5 every morning and risperidone 1 mg every night, as recommended by psychiatry. Guardian wondering about IM depot meds, but that will still need consent from the patient or a court order to administer. Will also need close psychiatric monitoring.    History of dementia- (present on admission)  Assessment & Plan  -At baseline  -placement at [facility]  [facility] fell through. CM/SW looking for alternative GH.  -Now fully vaccinated.   -Has guardian.    Schizoaffective disorder, bipolar type (HCC)- (present on admission)  Assessment & Plan  -Patient has been refusing medications and blood draw due to paranoia.    -Actively delusional  -Patient  was evaluated by psychiatry, her medications were adjusted, patient was deemed incapacitated to make medical decisions. Patient cleared for discharge 6/2021.  Patient currently has guardian, attempting to get patient placed in group home.  -Patient refusing to take risperidone 0.5 every morning and risperidone 1 mg every night, as recommended by psychiatry. Guardian wondering about IM depot meds, but that will still need consent from the patient or a court order to administer. Will also need close psychiatric monitoring.    Hypothyroidism- (present on admission)  Assessment & Plan  -Patient refuses to take her medications.  She refused blood draw on multiple tries     Noncompliance with treatment plan- (present on admission)  Assessment & Plan  -Patient refused antipsychotic medications and all other medications as well as any lab work       I reviewed above assessment and plan today on 02/04/22.  No acute changes noted.    I discussed plan of care with bedside RN and .    Pending discharge.      VTE prophylaxis: SCDs/TEDs    She is mobilizin not on pharmacological DVT prophylaxis.

## 2022-02-05 PROCEDURE — G0378 HOSPITAL OBSERVATION PER HR: HCPCS

## 2022-02-05 ASSESSMENT — PAIN DESCRIPTION - PAIN TYPE: TYPE: ACUTE PAIN

## 2022-02-05 NOTE — PROGRESS NOTES
Received report from NOC RN and assumed care of pt. Pt refusing assessment at this time. Pt is alert to self. Pt is resting in bed on room air. POC discussed with pt; all questions answered. Pt ambulates with steady gait, up self. No other needs at this time. Bed locked in lowest position, call light within reach, pt educated to call for assistance.

## 2022-02-05 NOTE — PROGRESS NOTES
Received report from KARRI Agrawal. Pt is A&O x3, disoriented to situation.  Pt on RA, no signs of acute distress. Pt has no complaints of pain. Pt refused all assessments. This nurse educated the pt the importance of assessments. Pt verbalized understanding and continued to refused. POC discussed with patient. Safety precautions in place, bed in lowest position, and call light and personal belongings within reach. Hourly rounding in place.

## 2022-02-06 PROCEDURE — G0378 HOSPITAL OBSERVATION PER HR: HCPCS

## 2022-02-06 NOTE — PROGRESS NOTES
Assumed care of pt at shift change, report received. Pt ambulating in her room with steady gait, talking to herself. Pleasant at this time but very delusional. Does not answer orientation questions appropriately. Refuses assessment. No further needs at this time.

## 2022-02-06 NOTE — PROGRESS NOTES
Received report from NOC RN and assumed care of pt. Pt refusing nursing assessment. Pt is alert to self, resting in bed on room air. No other needs at this time. Bed locked in lowest position, call light within reach, pt ambulates up self with steady gait.

## 2022-02-07 PROCEDURE — G0378 HOSPITAL OBSERVATION PER HR: HCPCS

## 2022-02-07 PROCEDURE — 700102 HCHG RX REV CODE 250 W/ 637 OVERRIDE(OP): Performed by: INTERNAL MEDICINE

## 2022-02-07 PROCEDURE — A9270 NON-COVERED ITEM OR SERVICE: HCPCS | Performed by: INTERNAL MEDICINE

## 2022-02-07 RX ADMIN — ARIPIPRAZOLE 2 MG: 2 TABLET ORAL at 04:21

## 2022-02-07 ASSESSMENT — PAIN SCALES - PAIN ASSESSMENT IN ADVANCED DEMENTIA (PAINAD)
TOTALSCORE: 0
CONSOLABILITY: NO NEED TO CONSOLE
BODYLANGUAGE: RELAXED
FACIALEXPRESSION: SMILING OR INEXPRESSIVE
BREATHING: NORMAL

## 2022-02-07 ASSESSMENT — PAIN DESCRIPTION - PAIN TYPE: TYPE: ACUTE PAIN

## 2022-02-07 NOTE — PROGRESS NOTES
Received report from NOC RN and assumed care of pt. Pt is sitting in bedside chair, resting on room air. Pt is alert to self only. Pt states she needs to leave here today and we are keeping her here illegally. Pt is up self with steady gait. No other needs at this time. Bed locked in lowest position, call light within reach.

## 2022-02-08 PROCEDURE — 700102 HCHG RX REV CODE 250 W/ 637 OVERRIDE(OP): Performed by: INTERNAL MEDICINE

## 2022-02-08 PROCEDURE — A9270 NON-COVERED ITEM OR SERVICE: HCPCS | Performed by: INTERNAL MEDICINE

## 2022-02-08 PROCEDURE — 99224 PR SUBSEQUENT OBSERVATION CARE,LEVEL I: CPT | Performed by: INTERNAL MEDICINE

## 2022-02-08 PROCEDURE — G0378 HOSPITAL OBSERVATION PER HR: HCPCS

## 2022-02-08 RX ADMIN — ARIPIPRAZOLE 2 MG: 2 TABLET ORAL at 04:42

## 2022-02-08 ASSESSMENT — PAIN SCALES - PAIN ASSESSMENT IN ADVANCED DEMENTIA (PAINAD)
FACIALEXPRESSION: SMILING OR INEXPRESSIVE
BREATHING: NORMAL
CONSOLABILITY: NO NEED TO CONSOLE
BREATHING: NORMAL
TOTALSCORE: 0
CONSOLABILITY: NO NEED TO CONSOLE
BODYLANGUAGE: RELAXED
TOTALSCORE: 0
BODYLANGUAGE: RELAXED
FACIALEXPRESSION: SMILING OR INEXPRESSIVE

## 2022-02-08 ASSESSMENT — PAIN DESCRIPTION - PAIN TYPE: TYPE: ACUTE PAIN

## 2022-02-08 NOTE — PROGRESS NOTES
Pt resting in bed, refusing assessment, not answering orientation questions. No s/sx pain/discomfort.Call light within reach, personal belongings available, bed in lowest position, treaded socks on, and hourly rounding in place.

## 2022-02-08 NOTE — PROGRESS NOTES
"Hospital Medicine TWICE WEEKLY Progress Note    Date of Service  2/8/2022    Chief Complaint  Zully Lin is a 71 y.o. female admitted 6/11/2021 with delusions    Hospital Course  This is a 71 year old female with PMHx of bipolar disorder, schizoaffective disorder, hypothyroidism, and dementia who was transferred from [facility] on 06/11/2021 due to worsening agitation and delusions.    Patient was evaluated by speech for cognitive evaluation, she did rather well in all domains, It is unclear if she has underlying dementia or if her presentation is psychiatric. Patient was evaluated by psychiatry, and her medications were adjusted. Patient refusing to take risperidone as initially recommended by psychiatry.  She is then started by psychiatry on Abilify.  She started taking her meds when mixed with food/drinks.  Psychiatry gave the opinion that she can be transitioned to Abilify extended release  mg monthly once she is established on oral medications for 2 weeks.    Patient was deemed incapacitated to make medical decisions. She was cleared for discharge 6/2021.  Patient currently has guardian, and pursuing placement in group home in [place] ([facility]). Arranging for care flight to [place].    Pt has received 2 doses of COVID vaccine and is now fully vaccinated. Quant negative 12/9/2021.     Interval Problem Update  2/8/2022 - I reviewed the patient's chart. There were no significant overnight events. Remains hemodynamically stable and afebrile. Stable on RA.      > I have personally seen and examined the patient today.  She continues to have delusions.  She states \"I owned the building, but nobody has paid a dime to me\".  She keeps talking to a nonexistent person.  No other complaints.      I discussed plan of care with patient, bedside RN and .    Consultants/Specialty  psychiatry    Code Status  Full Code    Disposition  Patient is medically cleared. "   Anticipate discharge to [facility] Care/ .  Secure group home/skilled nursing facility.  I have placed the appropriate orders for post-discharge needs.    Review of Systems  Review of Systems      Pertinent positives/negatives as mentioned above.     A complete review of systems was personally done by me by delusions and hallucinations. All other systems were negative.         Physical Exam  Temp:  [36.1 °C (97 °F)-36.3 °C (97.3 °F)] 36.1 °C (97 °F)  Pulse:  [71-77] 71  Resp:  [16-18] 16  BP: (128-147)/(69-85) 137/85  SpO2:  [97 %-100 %] 97 %    Physical Exam  Vitals and nursing note reviewed.   Constitutional:       General: She is not in acute distress.     Appearance: Normal appearance.   HENT:      Head: Normocephalic and atraumatic.      Mouth/Throat:      Mouth: Mucous membranes are moist.      Pharynx: No oropharyngeal exudate.   Eyes:      Extraocular Movements: Extraocular movements intact.      Pupils: Pupils are equal, round, and reactive to light.   Cardiovascular:      Rate and Rhythm: Normal rate and regular rhythm.      Pulses: Normal pulses.      Heart sounds: No murmur heard.  No friction rub. No gallop.    Pulmonary:      Effort: Pulmonary effort is normal. No respiratory distress.      Breath sounds: No wheezing, rhonchi or rales.   Abdominal:      General: Bowel sounds are normal. There is no distension.      Palpations: Abdomen is soft. There is no mass.      Tenderness: There is no abdominal tenderness.   Musculoskeletal:         General: No swelling or tenderness. Normal range of motion.      Cervical back: Normal range of motion. No rigidity. No muscular tenderness.      Right lower leg: No edema.      Left lower leg: No edema.   Skin:     General: Skin is warm and dry.      Capillary Refill: Capillary refill takes less than 2 seconds.      Findings: No erythema or rash.   Neurological:      General: No focal deficit present.      Mental Status: She is alert and oriented to person, place, and  time.   Psychiatric:         Thought Content: Thought content is delusional.      Comments: Continue to have delusion          I have performed the physical examination today 2/8/2022.  In review of last note, there are no new changes except as documented above.        Fluids    Intake/Output Summary (Last 24 hours) at 2/8/2022 0846  Last data filed at 2/8/2022 0430  Gross per 24 hour   Intake 600 ml   Output --   Net 600 ml       Laboratory                        Imaging  DX-CHEST-LIMITED (1 VIEW)   Final Result      Left basilar atelectasis. No focal consolidation. No effusions.           Assessment/Plan  * Delusional disorder- (present on admission)  Assessment & Plan  -Patient was evaluated by psychiatry, her medications were adjusted, patient was deemed incapacitated to make medical decisions. Patient cleared for discharge 6/2021.    -Patient currently has guardian, attempting to get patient placed in group home.  -Patient still Abilify, and now taking medications with mixed with food/drinks.  Can transition to Abilify ER IM once stable on oral medications for 2 weeks.     History of dementia- (present on admission)  Assessment & Plan  -At baseline  -placement at [facility]  [place] fell through. CM/SW looking for alternative GH.  -Now fully vaccinated.   -Has guardian.    Schizoaffective disorder, bipolar type (HCC)- (present on admission)  Assessment & Plan  -Patient has been refusing medications and blood draw due to paranoia.    -Actively delusional  -Patient was evaluated by psychiatry, her medications were adjusted, patient was deemed incapacitated to make medical decisions. Patient cleared for discharge 6/2021.  Patient currently has guardian, attempting to get patient placed in group home.  -Patient taking Abilify.  Plan to transition to Abilify ER IM once stable and oral medications for 2 weeks.     Hypothyroidism- (present on admission)  Assessment & Plan  -Patient refuses to take  her medications.  She refused blood draw on multiple tries     Noncompliance with treatment plan- (present on admission)  Assessment & Plan  -Patient is now taking abilify with foods/drinks.  Transition to long-acting medications once stable on orals for 2 weeks.               VTE prophylaxis: SCDs/TEDs

## 2022-02-08 NOTE — PROGRESS NOTES
Assumed care of patient this shift. Patient is alert and oriented to self only, having delusions, unable to completely assess orientation as patient refuses to answer any questions and refuses all assessments. Able to make her needs known. Up independently in room and to bathroom.

## 2022-02-08 NOTE — PROGRESS NOTES
"Patient called this nurse into her room to ask about meals that are sealed. Offered to order patient something to eat from the kitchen, which patient refused stating \"only foods that are sealed due to the  finding blood and body parts in the food you serve\"   Patient continued to speak of delusions also asking when she will \"move into the  room, since I own the place!\" and saying that she has \"a castle right around the corner\"    Patient asked this RN to leave the room. Offered patient to call if she has any needs. Call light is within reach.       "

## 2022-02-09 PROCEDURE — A9270 NON-COVERED ITEM OR SERVICE: HCPCS | Performed by: INTERNAL MEDICINE

## 2022-02-09 PROCEDURE — G0378 HOSPITAL OBSERVATION PER HR: HCPCS

## 2022-02-09 PROCEDURE — 700102 HCHG RX REV CODE 250 W/ 637 OVERRIDE(OP): Performed by: INTERNAL MEDICINE

## 2022-02-09 RX ADMIN — ARIPIPRAZOLE 2 MG: 2 TABLET ORAL at 04:00

## 2022-02-09 ASSESSMENT — PAIN DESCRIPTION - PAIN TYPE: TYPE: ACUTE PAIN

## 2022-02-09 ASSESSMENT — PAIN SCALES - PAIN ASSESSMENT IN ADVANCED DEMENTIA (PAINAD)
BODYLANGUAGE: RELAXED
FACIALEXPRESSION: SMILING OR INEXPRESSIVE
BREATHING: NORMAL
TOTALSCORE: 0
CONSOLABILITY: NO NEED TO CONSOLE

## 2022-02-10 PROCEDURE — G0378 HOSPITAL OBSERVATION PER HR: HCPCS

## 2022-02-10 PROCEDURE — A9270 NON-COVERED ITEM OR SERVICE: HCPCS | Performed by: INTERNAL MEDICINE

## 2022-02-10 PROCEDURE — 700102 HCHG RX REV CODE 250 W/ 637 OVERRIDE(OP): Performed by: INTERNAL MEDICINE

## 2022-02-10 RX ADMIN — ARIPIPRAZOLE 2 MG: 2 TABLET ORAL at 07:27

## 2022-02-10 NOTE — PROGRESS NOTES
"Assumed care of patient this shift. Patient is alert, unable to completely assess orientation as patient refuses to answer any questions and refuses all assessments. Sitting up in chair, patient stated to nurse \"you're committing a felony by keeping me here\"  Able to make her needs known. Up independently in room and to bathroom.  "

## 2022-02-11 PROCEDURE — 99224 PR SUBSEQUENT OBSERVATION CARE,LEVEL I: CPT | Performed by: INTERNAL MEDICINE

## 2022-02-11 PROCEDURE — G0378 HOSPITAL OBSERVATION PER HR: HCPCS

## 2022-02-11 ASSESSMENT — PAIN DESCRIPTION - PAIN TYPE: TYPE: ACUTE PAIN

## 2022-02-11 NOTE — PROGRESS NOTES
"Hospital Medicine TWICE WEEKLY Progress Note    Date of Service  2/11/2022    Chief Complaint  Zully Lin is a 71 y.o. female admitted 6/11/2021 with delusions    Hospital Course  This is a 71 year old female with PMHx of bipolar disorder, schizoaffective disorder, hypothyroidism, and dementia who was transferred from [facility] on 06/11/2021 due to worsening agitation and delusions.    Patient was evaluated by speech for cognitive evaluation, she did rather well in all domains, It is unclear if she has underlying dementia or if her presentation is psychiatric. Patient was evaluated by psychiatry, and her medications were adjusted. Patient refusing to take risperidone as initially recommended by psychiatry.  She is then started by psychiatry on Abilify.  She started taking her meds when mixed with food/drinks.  Psychiatry gave the opinion that she can be transitioned to Abilify extended release  mg monthly once she is established on oral medications for 2 weeks.    Patient was deemed incapacitated to make medical decisions. She was cleared for discharge 6/2021.  Patient currently has guardian, and pursuing placement in group home in [place] ([facility]). Arranging for care flight to [place].    Pt has received 2 doses of COVID vaccine and is now fully vaccinated. Quant negative 12/9/2021.     Interval Problem Update  2/11/2022 - I reviewed the patient's chart today. Uneventful night. VSS. Afebrile. Saturating well on RA.      > I have personally seen and examined the patient today.  He continues to have active delusions, saying that \"I am the governor\", and complaining about stained walls and shower.  \"I should get better treatment since I am the governor\".  Otherwise no other complaints.  She is taking her Abilify.        I discussed plan of care with patient, bedside RN and .    Consultants/Specialty  psychiatry    Code Status  Full " Code    Disposition  Patient is medically cleared.   Anticipate discharge to Memory Care/ .  Secure group home/skilled nursing facility.  I have placed the appropriate orders for post-discharge needs.    Review of Systems  ROS   Pertinent positives/negatives as mentioned above.     A complete review of systems was personally done by me by delusions and hallucinations. All other systems were negative.         Physical Exam  Temp:  [36 °C (96.8 °F)-36.3 °C (97.3 °F)] 36.3 °C (97.3 °F)  Pulse:  [81-94] 81  Resp:  [16-18] 16  BP: (128-146)/(52-92) 133/92  SpO2:  [95 %-98 %] 98 %    Physical Exam  Vitals and nursing note reviewed.   Constitutional:       General: She is not in acute distress.     Appearance: Normal appearance.   HENT:      Head: Normocephalic and atraumatic.      Mouth/Throat:      Mouth: Mucous membranes are moist.      Pharynx: No oropharyngeal exudate.   Eyes:      Extraocular Movements: Extraocular movements intact.      Pupils: Pupils are equal, round, and reactive to light.   Cardiovascular:      Rate and Rhythm: Normal rate and regular rhythm.      Pulses: Normal pulses.      Heart sounds: No murmur heard.  No friction rub. No gallop.    Pulmonary:      Effort: Pulmonary effort is normal. No respiratory distress.      Breath sounds: No wheezing, rhonchi or rales.   Abdominal:      General: Bowel sounds are normal. There is no distension.      Palpations: Abdomen is soft. There is no mass.      Tenderness: There is no abdominal tenderness.   Musculoskeletal:         General: No swelling or tenderness. Normal range of motion.      Cervical back: Normal range of motion. No rigidity. No muscular tenderness.      Right lower leg: No edema.      Left lower leg: No edema.   Skin:     General: Skin is warm and dry.      Capillary Refill: Capillary refill takes less than 2 seconds.      Findings: No erythema or rash.   Neurological:      General: No focal deficit present.      Mental Status: She is alert  and oriented to person, place, and time.   Psychiatric:         Thought Content: Thought content is delusional.      Comments: Continue to have delusion          I have performed the physical examination today 2/11/2022.  In review of last note, there are no new changes except as documented above.        Fluids    Intake/Output Summary (Last 24 hours) at 2/11/2022 0706  Last data filed at 2/11/2022 0600  Gross per 24 hour   Intake 860 ml   Output --   Net 860 ml       Laboratory                        Imaging  DX-CHEST-LIMITED (1 VIEW)   Final Result      Left basilar atelectasis. No focal consolidation. No effusions.           Assessment/Plan  * Delusional disorder- (present on admission)  Assessment & Plan  -Patient was evaluated by psychiatry, her medications were adjusted, patient was deemed incapacitated to make medical decisions. Patient cleared for discharge 6/2021.    -Patient currently has guardian, attempting to get patient placed in group home.  -Patient still Abilify, and now taking medications with mixed with food/drinks.  Can transition to Abilify ER IM once stable on oral medications for 2 weeks.     History of dementia- (present on admission)  Assessment & Plan  -At baseline  -placement at [facility]  [facility] fell through. CM/SW looking for alternative GH.  -Now fully vaccinated.   -Has guardian.    Schizoaffective disorder, bipolar type (HCC)- (present on admission)  Assessment & Plan  -Patient has been refusing medications and blood draw due to paranoia.    -Actively delusional  -Patient was evaluated by psychiatry, her medications were adjusted, patient was deemed incapacitated to make medical decisions. Patient cleared for discharge 6/2021.  Patient currently has guardian, attempting to get patient placed in group home.  -Patient taking Abilify.  Plan to transition to Abilify ER IM once stable and oral medications for 2 weeks.     Hypothyroidism- (present on  admission)  Assessment & Plan  -Patient refuses to take her medications.  She refused blood draw on multiple tries     Noncompliance with treatment plan- (present on admission)  Assessment & Plan  -Patient is now taking abilify with foods/drinks.  Transition to long-acting medications once stable on orals for 2 weeks.               VTE prophylaxis: SCDs/TEDs      I have performed the physical examination, and reviewed updated ROS and plan today 2/11/2022.  In review of last note, there are no new changes except as documented above.

## 2022-02-11 NOTE — PROGRESS NOTES
Report received at change of shift. Pt is A*&Ox3, disoriented to situation. Pt reports no pain. 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 Pt is up-self, ambulates with steady gait. States that all her personal needs are met at this time.

## 2022-02-12 PROCEDURE — 700102 HCHG RX REV CODE 250 W/ 637 OVERRIDE(OP): Performed by: INTERNAL MEDICINE

## 2022-02-12 PROCEDURE — A9270 NON-COVERED ITEM OR SERVICE: HCPCS | Performed by: INTERNAL MEDICINE

## 2022-02-12 PROCEDURE — G0378 HOSPITAL OBSERVATION PER HR: HCPCS

## 2022-02-12 RX ADMIN — ARIPIPRAZOLE 2 MG: 2 TABLET ORAL at 07:18

## 2022-02-12 ASSESSMENT — FIBROSIS 4 INDEX: FIB4 SCORE: 1.53

## 2022-02-12 NOTE — PROGRESS NOTES
Received report and assumed care at shift change. A&O x 3, disoriented to situation , non compliant with cares.bed siva and in lowest position. Needs attended to.

## 2022-02-12 NOTE — PROGRESS NOTES
Assumed care of pt at shift change. Pt is on RA with no signs of acute distress. Denies any pain. Sitting up in chair, snack offered. Safety [precautions in place. Hourly rounding in place.

## 2022-02-13 PROCEDURE — G0378 HOSPITAL OBSERVATION PER HR: HCPCS

## 2022-02-13 ASSESSMENT — PAIN DESCRIPTION - PAIN TYPE: TYPE: ACUTE PAIN

## 2022-02-13 NOTE — PROGRESS NOTES
Assumed care of pt at shift change. Pt is on RA with no signs of acute distress. Denies any pain. Sitting up in chair, snack offered. Safety precautions in place. Hourly rounding in place

## 2022-02-13 NOTE — PROGRESS NOTES
Unable to assess orientation due to refuse assessment. Offered fluids and snacks. Safety precautions in placed. Bed in lowest position. Upper side rails up. Treaded socks on. Reinforced the use of call light when needing assistance.

## 2022-02-14 PROCEDURE — 700102 HCHG RX REV CODE 250 W/ 637 OVERRIDE(OP): Performed by: INTERNAL MEDICINE

## 2022-02-14 PROCEDURE — 99224 PR SUBSEQUENT OBSERVATION CARE,LEVEL I: CPT | Performed by: INTERNAL MEDICINE

## 2022-02-14 PROCEDURE — G0378 HOSPITAL OBSERVATION PER HR: HCPCS

## 2022-02-14 PROCEDURE — A9270 NON-COVERED ITEM OR SERVICE: HCPCS | Performed by: INTERNAL MEDICINE

## 2022-02-14 ASSESSMENT — ENCOUNTER SYMPTOMS
MYALGIAS: 0
SPEECH CHANGE: 0
HEADACHES: 0
NECK PAIN: 0
TREMORS: 0
SPUTUM PRODUCTION: 0
DIARRHEA: 0
SHORTNESS OF BREATH: 0
CONSTIPATION: 0
CHILLS: 0
FEVER: 0
SENSORY CHANGE: 0
DIZZINESS: 0
FOCAL WEAKNESS: 0
WEIGHT LOSS: 0
ABDOMINAL PAIN: 0
HALLUCINATIONS: 0
COUGH: 0
NAUSEA: 0
EYE PAIN: 0
DOUBLE VISION: 0
BLURRED VISION: 0
PALPITATIONS: 0
BACK PAIN: 0
PHOTOPHOBIA: 0
VOMITING: 0
ORTHOPNEA: 0
TINGLING: 0

## 2022-02-14 ASSESSMENT — PAIN DESCRIPTION - PAIN TYPE: TYPE: ACUTE PAIN

## 2022-02-14 ASSESSMENT — LIFESTYLE VARIABLES: SUBSTANCE_ABUSE: 0

## 2022-02-14 NOTE — PROGRESS NOTES
"Assumed care of pt at shift change, report received. Pt sleeping, easy to arouse. Reports no pain or discomfort. Dismissive, states \"I'm ok\" and turns around giving back to this RN. Refuses assessment. Declines further needs.   "

## 2022-02-14 NOTE — PROGRESS NOTES
Hospital Medicine TWICE WEEKLY Progress Note    Date of Service  2/14/2022    Chief Complaint  Zully Lin is a 71 y.o. female admitted 6/11/2021 with delusions    Hospital Course  This is a 71 year old female with PMHx of bipolar disorder, schizoaffective disorder, hypothyroidism, and dementia who was transferred from [facility] on 06/11/2021 due to worsening agitation and delusions.    Patient was evaluated by speech for cognitive evaluation, she did rather well in all domains, It is unclear if she has underlying dementia or if her presentation is psychiatric. Patient was evaluated by psychiatry, and her medications were adjusted. Patient refusing to take risperidone 0.5 every morning and risperidone 1 mg every night, as recommended by psychiatry.    Patient was deemed incapacitated to make medical decisions. She was cleared for discharge 6/2021.  Patient currently has guardian, and pursuing placement in group home in [place] ([facility]). Arranging for care flight to [place].    Pt has received 2 doses of COVID vaccine 1and is now fully vaccinated. Quant negative 12/9/2021.     Interval Problem Update    I evaluated and examined her at the bedside.  She denies any acute complaints.  Continue to have delusions.  Group home is planning to evaluate her.  I discussed plan of care with .    patient, bedside RN and .    Consultants/Specialty  psychiatry    Code Status  Full Code    Disposition  Patient is medically cleared.   Anticipate discharge to [facility]/ .  Secure group home/skilled nursing facility.  I have placed the appropriate orders for post-discharge needs.    Review of Systems  Review of Systems   Constitutional: Negative for chills, fever and weight loss.   HENT: Negative for hearing loss and tinnitus.    Eyes: Negative for blurred vision, double vision, photophobia and pain.   Respiratory: Negative for cough, sputum production and  shortness of breath.    Cardiovascular: Negative for chest pain, palpitations, orthopnea and leg swelling.   Gastrointestinal: Negative for abdominal pain, constipation, diarrhea, nausea and vomiting.   Genitourinary: Negative for dysuria, frequency and urgency.   Musculoskeletal: Negative for back pain, joint pain, myalgias and neck pain.   Skin: Negative for rash.   Neurological: Negative for dizziness, tingling, tremors, sensory change, speech change, focal weakness and headaches.   Psychiatric/Behavioral: Negative for hallucinations and substance abuse.        Delusion   All other systems reviewed and are negative.     No acute complaints.      Physical Exam  Temp:  [36.2 °C (97.1 °F)-36.8 °C (98.3 °F)] 36.2 °C (97.1 °F)  Pulse:  [78-80] 80  Resp:  [17-18] 17  BP: (143-147)/(72-86) 147/86  SpO2:  [94 %-99 %] 99 %    Physical Exam  Vitals and nursing note reviewed.   Constitutional:       General: She is not in acute distress.     Appearance: Normal appearance.   HENT:      Head: Normocephalic and atraumatic.      Mouth/Throat:      Mouth: Mucous membranes are moist.      Pharynx: No oropharyngeal exudate.   Eyes:      Extraocular Movements: Extraocular movements intact.      Pupils: Pupils are equal, round, and reactive to light.   Cardiovascular:      Rate and Rhythm: Normal rate and regular rhythm.      Pulses: Normal pulses.      Heart sounds: No murmur heard.  No friction rub. No gallop.    Pulmonary:      Effort: Pulmonary effort is normal. No respiratory distress.      Breath sounds: No wheezing, rhonchi or rales.   Abdominal:      General: Bowel sounds are normal. There is no distension.      Palpations: Abdomen is soft. There is no mass.      Tenderness: There is no abdominal tenderness.   Musculoskeletal:         General: No swelling or tenderness. Normal range of motion.      Cervical back: Normal range of motion. No rigidity. No muscular tenderness.      Right lower leg: No edema.      Left lower leg:  No edema.   Skin:     General: Skin is warm and dry.      Capillary Refill: Capillary refill takes less than 2 seconds.      Findings: No erythema or rash.   Neurological:      General: No focal deficit present.      Mental Status: She is alert and oriented to person, place, and time.   Psychiatric:         Thought Content: Thought content is delusional.      Comments: Continue to have delusion          I performed physical exam on 02/14/22 remain similar without any acute changes.      Fluids    Intake/Output Summary (Last 24 hours) at 2/14/2022 1558  Last data filed at 2/14/2022 0801  Gross per 24 hour   Intake 1060 ml   Output --   Net 1060 ml       Laboratory                        Imaging  DX-CHEST-LIMITED (1 VIEW)   Final Result      Left basilar atelectasis. No focal consolidation. No effusions.           Assessment/Plan  * Delusional disorder- (present on admission)  Assessment & Plan  -Patient was evaluated by psychiatry, her medications were adjusted, patient was deemed incapacitated to make medical decisions. Patient cleared for discharge 6/2021.    -Patient currently has guardian, attempting to get patient placed in group home.  -Patient still Abilify, and now taking medications with mixed with food/drinks.  Can transition to Abilify ER IM once stable on oral medications for 2 weeks.     History of dementia- (present on admission)  Assessment & Plan  -At baseline  -placement at [facility]  [place] fell through. CM/SW looking for alternative GH.  -Now fully vaccinated.   -Has guardian.    Schizoaffective disorder, bipolar type (HCC)- (present on admission)  Assessment & Plan  -Patient has been refusing medications and blood draw due to paranoia.    -Actively delusional  -Patient was evaluated by psychiatry, her medications were adjusted, patient was deemed incapacitated to make medical decisions. Patient cleared for discharge 6/2021.  Patient currently has guardian, attempting to get  patient placed in group home.  -Patient taking Abilify.  Plan to transition to Abilify ER IM once stable and oral medications for 2 weeks.     Hypothyroidism- (present on admission)  Assessment & Plan  -Patient refuses to take her medications.  She refused blood draw on multiple tries     Noncompliance with treatment plan- (present on admission)  Assessment & Plan  -Patient is now taking abilify with foods/drinks.  Transition to long-acting medications once stable on orals for 2 weeks.         No acute complaints.  Group home is planning to evaluate her.    I discussed plan of care with bedside RN and .    Pending discharge.      VTE prophylaxis: SCDs/TEDs    She is mobilizin not on pharmacological DVT prophylaxis.

## 2022-02-14 NOTE — DISCHARGE PLANNING
Medical Social Work  PC to Denia Price [phone], received copy  of email.  Denia stated that the group home has requested that they are able to have a Zoom meeting with the patient.  Denia stated she will try to arrange that his week,  SW stated he will follow up with the financial end, and keep Denia informed.

## 2022-02-14 NOTE — DISCHARGE PLANNING
Medical Social Work  Email from [facility]  On Fri, Feb 11, 2022 at 8:47 AM Helena Walton <[email or web address]> wrote:  Regarding Zully,  We will need the following:    Medication list- current one  TB test ( 2 step)  Dementia letter (see attached)  Covid vaccine record  Covid test & result prior to transport    Prescription Must be in liquid form and faxed to [facility]- fax # [phone]  Will need all medications prior to discharge faxed to pharmacy        Thanks,    Helena Walton      PC to Denia Price, [phone]; SW verified this was the same group home [facility] attempted to discharge patient to in 12/21.  Also verified due to when they would get paid, end of month versus first of the month.  Denia requested SW send an email to make sure this is not an issue.    Email to Helena Walton asking for clarification on acceptance.    Email from Helena  Good morning,    We were working with Zoey on her discharge to our facility, but due to her non compliance with medications, that's the reason why we did not accept her.    I believe Zoey reached out to other facilities in the [place] area, and due to [facility] unable to pay for the cost of care until the end of the month, they declined.    But I spoke with Zoey, I am ok with that arrangement, for us to be paid at the end of the month.    Please confirm  what [facility] will be paying, and for how many months?  And what is the amount that is from Zoey sharma payee.    Please send us a Letter of Agreement.    Thank you,  Jaren

## 2022-02-14 NOTE — PROGRESS NOTES
0700-1930    Assumed care of patient at shift change. VSS on room air. Denies pain. Refused daily dose of Abilify. Resting comfortably. Safety maintained.

## 2022-02-15 PROCEDURE — A9270 NON-COVERED ITEM OR SERVICE: HCPCS | Performed by: INTERNAL MEDICINE

## 2022-02-15 PROCEDURE — 700102 HCHG RX REV CODE 250 W/ 637 OVERRIDE(OP): Performed by: INTERNAL MEDICINE

## 2022-02-15 PROCEDURE — G0378 HOSPITAL OBSERVATION PER HR: HCPCS

## 2022-02-15 ASSESSMENT — PAIN DESCRIPTION - PAIN TYPE
TYPE: ACUTE PAIN

## 2022-02-16 PROCEDURE — 700102 HCHG RX REV CODE 250 W/ 637 OVERRIDE(OP): Performed by: INTERNAL MEDICINE

## 2022-02-16 PROCEDURE — A9270 NON-COVERED ITEM OR SERVICE: HCPCS | Performed by: INTERNAL MEDICINE

## 2022-02-16 PROCEDURE — G0378 HOSPITAL OBSERVATION PER HR: HCPCS

## 2022-02-16 RX ADMIN — ARIPIPRAZOLE 2 MG: 2 TABLET ORAL at 06:01

## 2022-02-16 ASSESSMENT — PAIN DESCRIPTION - PAIN TYPE
TYPE: ACUTE PAIN
TYPE: ACUTE PAIN

## 2022-02-16 NOTE — PROGRESS NOTES
Pt resting in bed. Pt moved to S603-2 from S605 for cleaning of room; belongings brought over; security called to assist. Reoriented pt to room and surroundings. Pt able to make needs known; denied further needs.

## 2022-02-17 PROCEDURE — A9270 NON-COVERED ITEM OR SERVICE: HCPCS | Performed by: INTERNAL MEDICINE

## 2022-02-17 PROCEDURE — 700102 HCHG RX REV CODE 250 W/ 637 OVERRIDE(OP): Performed by: INTERNAL MEDICINE

## 2022-02-17 PROCEDURE — 99224 PR SUBSEQUENT OBSERVATION CARE,LEVEL I: CPT | Performed by: INTERNAL MEDICINE

## 2022-02-17 PROCEDURE — G0378 HOSPITAL OBSERVATION PER HR: HCPCS

## 2022-02-17 RX ADMIN — ARIPIPRAZOLE 2 MG: 2 TABLET ORAL at 06:12

## 2022-02-17 ASSESSMENT — ENCOUNTER SYMPTOMS
DOUBLE VISION: 0
WEIGHT LOSS: 0
ABDOMINAL PAIN: 0
PHOTOPHOBIA: 0
TREMORS: 0
HEADACHES: 0
TINGLING: 0
NAUSEA: 0
SENSORY CHANGE: 0
DIZZINESS: 0
ORTHOPNEA: 0
NECK PAIN: 0
SHORTNESS OF BREATH: 0
BACK PAIN: 0
MYALGIAS: 0
CHILLS: 0
FEVER: 0
EYE PAIN: 0
HALLUCINATIONS: 0
CONSTIPATION: 0
SPUTUM PRODUCTION: 0
FOCAL WEAKNESS: 0
VOMITING: 0
BLURRED VISION: 0
COUGH: 0
SPEECH CHANGE: 0
PALPITATIONS: 0
DIARRHEA: 0

## 2022-02-17 ASSESSMENT — LIFESTYLE VARIABLES: SUBSTANCE_ABUSE: 0

## 2022-02-17 ASSESSMENT — PAIN DESCRIPTION - PAIN TYPE: TYPE: ACUTE PAIN

## 2022-02-17 NOTE — DISCHARGE PLANNING
Medical Social Work  Email from guardian , patient's Part D will be active as of 4/1  [employer] Silver Script     SW forwarded information to [facility]

## 2022-02-17 NOTE — PROGRESS NOTES
Pt is resting in room, occasionally heard loudly speaking. Denied further needs, pt able to make needs known.

## 2022-02-17 NOTE — PROGRESS NOTES
Hospital Medicine TWICE WEEKLY Progress Note    Date of Service  2/17/2022    Chief Complaint  Zully Lin is a 71 y.o. female admitted 6/11/2021 with delusions    Hospital Course  This is a 71 year old female with PMHx of bipolar disorder, schizoaffective disorder, hypothyroidism, and dementia who was transferred from [facility] on 06/11/2021 due to worsening agitation and delusions.    Patient was evaluated by speech for cognitive evaluation, she did rather well in all domains, It is unclear if she has underlying dementia or if her presentation is psychiatric. Patient was evaluated by psychiatry, and her medications were adjusted. Patient refusing to take risperidone 0.5 every morning and risperidone 1 mg every night, as recommended by psychiatry.    Patient was deemed incapacitated to make medical decisions. She was cleared for discharge 6/2021.  Patient currently has guardian, and pursuing placement in group home in [place] ([facility]). Arranging for care flight to [place].    Pt has received 2 doses of COVID vaccine 1and is now fully vaccinated. Quant negative 12/9/2021.     Interval Problem Update    I evaluated and examined her at the bedside.  She continued to have delusion and she was talking to someone when I walked in her room and no one was there.  I discussed plan of care with bedside RN  Discussed plan of care with .  patient, bedside RN and .    Consultants/Specialty  psychiatry    Code Status  Full Code    Disposition  Patient is medically cleared.   Anticipate discharge to [facility] Care/ .  Secure group home/skilled nursing facility.  I have placed the appropriate orders for post-discharge needs.    Review of Systems  Review of Systems   Constitutional: Negative for chills, fever and weight loss.   HENT: Negative for hearing loss and tinnitus.    Eyes: Negative for blurred vision, double vision, photophobia and pain.    Respiratory: Negative for cough, sputum production and shortness of breath.    Cardiovascular: Negative for chest pain, palpitations, orthopnea and leg swelling.   Gastrointestinal: Negative for abdominal pain, constipation, diarrhea, nausea and vomiting.   Genitourinary: Negative for dysuria, frequency and urgency.   Musculoskeletal: Negative for back pain, joint pain, myalgias and neck pain.   Skin: Negative for rash.   Neurological: Negative for dizziness, tingling, tremors, sensory change, speech change, focal weakness and headaches.   Psychiatric/Behavioral: Negative for hallucinations and substance abuse.        Delusion   All other systems reviewed and are negative.     No acute complaints.      Physical Exam  Temp:  [36.1 °C (97 °F)-36.8 °C (98.3 °F)] 36.8 °C (98.3 °F)  Pulse:  [72-82] 72  Resp:  [17-18] 17  BP: (129)/(68-83) 129/83  SpO2:  [95 %-96 %] 95 %    Physical Exam  Vitals and nursing note reviewed.   Constitutional:       General: She is not in acute distress.     Appearance: Normal appearance.   HENT:      Head: Normocephalic and atraumatic.      Mouth/Throat:      Mouth: Mucous membranes are moist.      Pharynx: No oropharyngeal exudate.   Eyes:      Extraocular Movements: Extraocular movements intact.      Pupils: Pupils are equal, round, and reactive to light.   Cardiovascular:      Rate and Rhythm: Normal rate and regular rhythm.      Pulses: Normal pulses.      Heart sounds: No murmur heard.    No friction rub. No gallop.   Pulmonary:      Effort: Pulmonary effort is normal. No respiratory distress.      Breath sounds: No wheezing, rhonchi or rales.   Abdominal:      General: Bowel sounds are normal. There is no distension.      Palpations: Abdomen is soft. There is no mass.      Tenderness: There is no abdominal tenderness.   Musculoskeletal:         General: No swelling or tenderness. Normal range of motion.      Cervical back: Normal range of motion. No rigidity. No muscular tenderness.       Right lower leg: No edema.      Left lower leg: No edema.   Skin:     General: Skin is warm and dry.      Capillary Refill: Capillary refill takes less than 2 seconds.      Findings: No erythema or rash.   Neurological:      General: No focal deficit present.      Mental Status: She is alert and oriented to person, place, and time.   Psychiatric:         Thought Content: Thought content is delusional.      Comments: Continue to have delusion          I performed physical exam on 02/17/22 remain similar without any acute changes.      Fluids    Intake/Output Summary (Last 24 hours) at 2/17/2022 1453  Last data filed at 2/17/2022 1200  Gross per 24 hour   Intake 840 ml   Output --   Net 840 ml       Laboratory                        Imaging  DX-CHEST-LIMITED (1 VIEW)   Final Result      Left basilar atelectasis. No focal consolidation. No effusions.           Assessment/Plan  * Delusional disorder- (present on admission)  Assessment & Plan  -Patient was evaluated by psychiatry, her medications were adjusted, patient was deemed incapacitated to make medical decisions. Patient cleared for discharge 6/2021.    -Patient currently has guardian, attempting to get patient placed in group home.  -Patient still Abilify, and now taking medications with mixed with food/drinks.  Can transition to Abili ER IM once stable on oral medications for 2 weeks.     History of dementia- (present on admission)  Assessment & Plan  -At baseline  -placement at [facility]  [place] fell through. CM/SW looking for alternative GH.  -Now fully vaccinated.   -Has guardian.    Schizoaffective disorder, bipolar type (HCC)- (present on admission)  Assessment & Plan  -Patient has been refusing medications and blood draw due to paranoia.    -Actively delusional  -Patient was evaluated by psychiatry, her medications were adjusted, patient was deemed incapacitated to make medical decisions. Patient cleared for discharge 6/2021.   Patient currently has guardian, attempting to get patient placed in group home.  -Patient taking Abilify.  Plan to transition to Abilify ER IM once stable and oral medications for 2 weeks.     Hypothyroidism- (present on admission)  Assessment & Plan  -Patient refuses to take her medications.  She refused blood draw on multiple tries     Noncompliance with treatment plan- (present on admission)  Assessment & Plan  -Patient is now taking abilify with foods/drinks.  Transition to long-acting medications once stable on orals for 2 weeks.         I reviewed above assessment and plan today on February 17, 2022.  No acute changes.  Pending discharge.    VTE prophylaxis: SCDs/TEDs    She is mobilizin not on pharmacological DVT prophylaxis.

## 2022-02-17 NOTE — DISCHARGE PLANNING
Anticipated Discharge Disposition: Group Home    Action: patient is pending acceptance to      [facility]  [address] [place]  [address]  [phone]    SW has forward TODD to patient's guardian who in turn has forward to group home.  If patient is accepted, discharge early next week.     Barriers to Discharge: acceptance    Plan: follow up with guardian, if accepted will need COVID test three days prior to acceptance

## 2022-02-17 NOTE — PROGRESS NOTES
[id number]     Assumed care of patient at shift change. VSS on room air. Denies pain. Resting comfortably. Safety maintained.

## 2022-02-18 PROCEDURE — 700102 HCHG RX REV CODE 250 W/ 637 OVERRIDE(OP): Performed by: INTERNAL MEDICINE

## 2022-02-18 PROCEDURE — G0378 HOSPITAL OBSERVATION PER HR: HCPCS

## 2022-02-18 PROCEDURE — A9270 NON-COVERED ITEM OR SERVICE: HCPCS | Performed by: INTERNAL MEDICINE

## 2022-02-18 RX ADMIN — ARIPIPRAZOLE 2 MG: 2 TABLET ORAL at 04:00

## 2022-02-18 NOTE — PROGRESS NOTES
[id number]     Assumed care of patient at shift change. VSS on room air. Denies pain. Resting comfortably. Safety maintained.

## 2022-02-18 NOTE — PROGRESS NOTES
Assumed care of pt after receiving report from sky RN. Pt denies pain. Pt refusing all other assessments at this time and refusing education. SHILA orientation. Pt sitting on chair in room. VSS on RA. Bed is locked and in lowest position, call light within reach, fall precautions in place. All needs met at this time.

## 2022-02-19 PROCEDURE — A9270 NON-COVERED ITEM OR SERVICE: HCPCS | Performed by: INTERNAL MEDICINE

## 2022-02-19 PROCEDURE — 700102 HCHG RX REV CODE 250 W/ 637 OVERRIDE(OP): Performed by: INTERNAL MEDICINE

## 2022-02-19 PROCEDURE — G0378 HOSPITAL OBSERVATION PER HR: HCPCS

## 2022-02-19 RX ADMIN — ARIPIPRAZOLE 2 MG: 2 TABLET ORAL at 04:10

## 2022-02-19 ASSESSMENT — PAIN DESCRIPTION - PAIN TYPE: TYPE: ACUTE PAIN

## 2022-02-19 NOTE — PROGRESS NOTES
Pt is A&Ox2, disoriented to situation and time. Pt denies any pain at this time. Pt states no further needs at this time.

## 2022-02-19 NOTE — PROGRESS NOTES
Assumed care of pt at 0700. Report received and bedside rounding completed with day shift RN. Pt is calm, no SOB or acute distress noted. Pt sitting up in chair in room. Denies pain at this time. Pt is A&Ox3 - disoriented to situation. Pt becoming irritable with attempts to reorient. VSS on RA. Call light and pt belongings within reach. Fall/safety precautions and hourly rounding in place.

## 2022-02-20 PROCEDURE — A9270 NON-COVERED ITEM OR SERVICE: HCPCS | Performed by: INTERNAL MEDICINE

## 2022-02-20 PROCEDURE — G0378 HOSPITAL OBSERVATION PER HR: HCPCS

## 2022-02-20 PROCEDURE — 700102 HCHG RX REV CODE 250 W/ 637 OVERRIDE(OP): Performed by: INTERNAL MEDICINE

## 2022-02-20 RX ORDER — HALOPERIDOL 5 MG/ML
5 INJECTION INTRAMUSCULAR EVERY 4 HOURS PRN
Status: COMPLETED | OUTPATIENT
Start: 2022-02-20 | End: 2022-03-03

## 2022-02-20 RX ORDER — HALOPERIDOL 5 MG/1
5 TABLET ORAL EVERY 6 HOURS PRN
Status: COMPLETED | OUTPATIENT
Start: 2022-02-20 | End: 2022-03-03

## 2022-02-20 RX ORDER — HALOPERIDOL 2 MG/ML
5 SOLUTION ORAL EVERY 6 HOURS PRN
Status: DISCONTINUED | OUTPATIENT
Start: 2022-02-20 | End: 2022-02-20

## 2022-02-20 RX ORDER — HALOPERIDOL 5 MG/ML
5 INJECTION INTRAMUSCULAR EVERY 4 HOURS PRN
Status: DISCONTINUED | OUTPATIENT
Start: 2022-02-20 | End: 2022-02-20

## 2022-02-20 RX ADMIN — ARIPIPRAZOLE 2 MG: 2 TABLET ORAL at 04:57

## 2022-02-20 ASSESSMENT — PAIN DESCRIPTION - PAIN TYPE: TYPE: ACUTE PAIN

## 2022-02-20 NOTE — PROGRESS NOTES
Received report from NOC RN and assumed care of pt. Pt is A&Ox2, disoriented to situation and time. Pt is resting in bedside chair on room air. Pt reports no pain. POC discussed with pt; all questions answered. No other needs at this time. Bed locked in lowest position, call light within reach.

## 2022-02-20 NOTE — PROGRESS NOTES
"Assumed care of pt at 1900. Report received and bedside rounding completed with day shift RN. Pt refusing assessment. Pt is irritated with \"all the noise outside\", reassured pt that door may remained closed if requested. No SOB or acute distress noted. Pt refusing to answer further questions stating \"im fine, im trying to seep\" Pt resting in bed, Call light and pt belongings within reach. Fall/safety precautions and hourly rounding in place.     "

## 2022-02-21 PROCEDURE — 99224 PR SUBSEQUENT OBSERVATION CARE,LEVEL I: CPT | Performed by: INTERNAL MEDICINE

## 2022-02-21 PROCEDURE — 700102 HCHG RX REV CODE 250 W/ 637 OVERRIDE(OP): Performed by: INTERNAL MEDICINE

## 2022-02-21 PROCEDURE — A9270 NON-COVERED ITEM OR SERVICE: HCPCS | Performed by: INTERNAL MEDICINE

## 2022-02-21 PROCEDURE — G0378 HOSPITAL OBSERVATION PER HR: HCPCS

## 2022-02-21 RX ADMIN — ARIPIPRAZOLE 2 MG: 2 TABLET ORAL at 05:06

## 2022-02-21 NOTE — PROGRESS NOTES
Received bedside report from night shift RN.   Assumed care of patient at change of shift.   Patient sitting up in chair looking out window.  Denies pain, no apparent signs of distress or discomfort.   Bed is in lowest/locked position.   Call light and belongings are within reach.   No further needs at this time.

## 2022-02-21 NOTE — PROGRESS NOTES
Pt is resting in bed. No apparent distress. Refused full assessment. Needs currently met, able to make needs known. Call light in place.

## 2022-02-21 NOTE — PROGRESS NOTES
"Hospital Medicine TWICE WEEKLY Progress Note    Date of Service  2/21/2022    Chief Complaint  Zully Lin is a 71 y.o. female admitted 6/11/2021 with delusions    Hospital Course  This is a 71 year old female with PMHx of bipolar disorder, schizoaffective disorder, hypothyroidism, and dementia who was transferred from [facility] on 06/11/2021 due to worsening agitation and delusions.    Patient was evaluated by speech for cognitive evaluation, she did rather well in all domains, It is unclear if she has underlying dementia or if her presentation is psychiatric. Patient was evaluated by psychiatry, and her medications were adjusted. Patient refusing to take risperidone 0.5 every morning and risperidone 1 mg every night, as recommended by psychiatry.    Patient was deemed incapacitated to make medical decisions. She was cleared for discharge 6/2021.  Patient currently has guardian, and pursuing placement in group home in [place] ([facility] Arranging for care flight to [place].    Pt has received 2 doses of COVID vaccine 1and is now fully vaccinated. Quant negative 12/9/2021.     Interval Problem Update  2/21/2022 - I reviewed the patient's chart. There were no significant overnight events. Remains hemodynamically stable and afebrile. Stable on RA.  She has been taking her Abilify.    > I have personally seen and examined the patient today.  She continues to have significant delusions, saying that \"I am the governor, I am able to get information from the TV with the home and then come from (pointing to the brand of the TV).  No other complaints.      Discussed plan of care with , patient, bedside RN and .    Consultants/Specialty  psychiatry    Code Status  Full Code    Disposition  Patient is medically cleared.   Anticipate discharge to Memory Care/ .  Secure group home/skilled nursing facility.  I have placed the appropriate orders for " post-discharge needs.    Review of Systems  Review of Systems      Pertinent positives/negatives as mentioned above.     A complete review of systems was personally done by me, limited by delusions. All other systems were negative.         Physical Exam  Temp:  [36.2 °C (97.1 °F)-36.4 °C (97.6 °F)] 36.2 °C (97.1 °F)  Pulse:  [83-93] 83  Resp:  [18-20] 18  BP: (103-134)/(78-90) 134/90  SpO2:  [96 %] 96 %    Physical Exam  Vitals and nursing note reviewed.   Constitutional:       General: She is not in acute distress.     Appearance: Normal appearance.   HENT:      Head: Normocephalic and atraumatic.      Mouth/Throat:      Mouth: Mucous membranes are moist.      Pharynx: No oropharyngeal exudate.   Eyes:      Extraocular Movements: Extraocular movements intact.      Pupils: Pupils are equal, round, and reactive to light.   Cardiovascular:      Rate and Rhythm: Normal rate and regular rhythm.      Pulses: Normal pulses.      Heart sounds: No murmur heard.    No friction rub. No gallop.   Pulmonary:      Effort: Pulmonary effort is normal. No respiratory distress.      Breath sounds: No wheezing, rhonchi or rales.   Abdominal:      General: Bowel sounds are normal. There is no distension.      Palpations: Abdomen is soft. There is no mass.      Tenderness: There is no abdominal tenderness.   Musculoskeletal:         General: No swelling or tenderness. Normal range of motion.      Cervical back: Normal range of motion. No rigidity. No muscular tenderness.      Right lower leg: No edema.      Left lower leg: No edema.   Skin:     General: Skin is warm and dry.      Capillary Refill: Capillary refill takes less than 2 seconds.      Findings: No erythema or rash.   Neurological:      General: No focal deficit present.      Mental Status: She is alert and oriented to person, place, and time.   Psychiatric:         Thought Content: Thought content is delusional.      Comments: Continue to have delusion          I have  performed the physical examination today 2/21/2022.  In review of last note, there are no new changes except as documented above.        Fluids    Intake/Output Summary (Last 24 hours) at 2/21/2022 0740  Last data filed at 2/20/2022 1915  Gross per 24 hour   Intake 240 ml   Output --   Net 240 ml       Laboratory                        Imaging  DX-CHEST-LIMITED (1 VIEW)   Final Result      Left basilar atelectasis. No focal consolidation. No effusions.           Assessment/Plan  * Delusional disorder- (present on admission)  Assessment & Plan  -Patient was evaluated by psychiatry, her medications were adjusted, patient was deemed incapacitated to make medical decisions. Patient cleared for discharge 6/2021.    -Patient currently has guardian, attempting to get patient placed in group home.  -Patient compliant with PO Abilify, and now taking medications with mixed with food/drinks.  Can transition to Abilify ER IM once stable on oral medications for 2 weeks.     History of dementia- (present on admission)  Assessment & Plan  -At baseline  -[facility]. CM/SW looking for alternative GH.  -Now fully vaccinated.   -Has guardian.    Schizoaffective disorder, bipolar type (HCC)- (present on admission)  Assessment & Plan  -Patient has been refusing medications and blood draw due to paranoia.    -Actively delusional  -Patient was evaluated by psychiatry, her medications were adjusted, patient was deemed incapacitated to make medical decisions. Patient cleared for discharge 6/2021.  Patient currently has guardian, attempting to get patient placed in group home.  -Patient taking Abilify.  Plan to transition to Abilify ER IM once stable and oral medications for 2 weeks.     Hypothyroidism- (present on admission)  Assessment & Plan  -Patient refuses to take her medications.  She refused blood draw on multiple tries     Noncompliance with treatment plan- (present on  admission)  Assessment & Plan  -Patient is now taking abilify with foods/drinks.  Transition to long-acting medications once stable on orals for 2 weeks.           VTE prophylaxis: SCDs/TEDs    I have performed the physical examination, and reviewed updated ROS and plan today 2/21/2022.  In review of last note, there are no new changes except as documented above.

## 2022-02-22 PROCEDURE — 700102 HCHG RX REV CODE 250 W/ 637 OVERRIDE(OP): Performed by: INTERNAL MEDICINE

## 2022-02-22 PROCEDURE — G0378 HOSPITAL OBSERVATION PER HR: HCPCS

## 2022-02-22 PROCEDURE — A9270 NON-COVERED ITEM OR SERVICE: HCPCS | Performed by: INTERNAL MEDICINE

## 2022-02-22 RX ADMIN — ARIPIPRAZOLE 2 MG: 2 TABLET ORAL at 04:56

## 2022-02-22 ASSESSMENT — PAIN DESCRIPTION - PAIN TYPE: TYPE: ACUTE PAIN

## 2022-02-22 NOTE — PROGRESS NOTES
Pt. Received sitting in the chair denies any complaint at the time of assessment. Pt. Calm did not need anything when RN assessed her. She refused most of the assessment. Informed to call RN when needing assistance.

## 2022-02-23 PROCEDURE — G0378 HOSPITAL OBSERVATION PER HR: HCPCS

## 2022-02-23 PROCEDURE — A9270 NON-COVERED ITEM OR SERVICE: HCPCS | Performed by: INTERNAL MEDICINE

## 2022-02-23 PROCEDURE — 700102 HCHG RX REV CODE 250 W/ 637 OVERRIDE(OP): Performed by: INTERNAL MEDICINE

## 2022-02-23 ASSESSMENT — PAIN DESCRIPTION - PAIN TYPE: TYPE: ACUTE PAIN

## 2022-02-23 NOTE — DISCHARGE PLANNING
Medical Social Work  PC to Denia Price, [phone], message left to call SW.  Would like to discuss with her and group home patient being discharged to them as soon as Monday.

## 2022-02-23 NOTE — PROGRESS NOTES
Received report from KARRI Lama. Pt is A&O x3, disoriented to situation. Pt on RA, no signs of acute distress. Pt has no complaints of pain. POC discussed with patient. Safety precautions in place, bed in lowest position, and call light and personal belongings within reach. Hourly rounding in place.

## 2022-02-24 PROCEDURE — 99224 PR SUBSEQUENT OBSERVATION CARE,LEVEL I: CPT | Performed by: INTERNAL MEDICINE

## 2022-02-24 PROCEDURE — G0378 HOSPITAL OBSERVATION PER HR: HCPCS

## 2022-02-24 PROCEDURE — A9270 NON-COVERED ITEM OR SERVICE: HCPCS | Performed by: INTERNAL MEDICINE

## 2022-02-24 PROCEDURE — 700102 HCHG RX REV CODE 250 W/ 637 OVERRIDE(OP): Performed by: INTERNAL MEDICINE

## 2022-02-24 RX ORDER — ARIPIPRAZOLE 2 MG/1
5 TABLET ORAL DAILY
Status: DISCONTINUED | OUTPATIENT
Start: 2022-02-25 | End: 2022-03-15 | Stop reason: HOSPADM

## 2022-02-24 RX ADMIN — ARIPIPRAZOLE 2 MG: 2 TABLET ORAL at 04:47

## 2022-02-24 RX ADMIN — HALOPERIDOL 5 MG: 5 TABLET ORAL at 15:42

## 2022-02-24 NOTE — PROGRESS NOTES
Pharmacy Pharmacotherapy Consult for LOS >30 days    Admit Date: 6/11/2021      Medications were reviewed for appropriateness and ongoing need.     Current Facility-Administered Medications   Medication Dose Route Frequency Provider Last Rate Last Admin   • [START ON 2/25/2022] ARIPiprazole (Abilify) tablet 5 mg  5 mg Oral DAILY Vincenzo Frias M.D.       • haloperidol lactate (HALDOL) injection 5 mg  5 mg Intramuscular Q4HRS PRN Bharath Rincon M.D.        Or   • haloperidol (HALDOL) tablet 5 mg  5 mg Oral Q6HRS PRN Bharath Rincon M.D.       • pneumococcal vaccine (PNEUMOVAX-23) injection 25 mcg  0.5 mL Intramuscular Once PRN Vincenzo Frias M.D.       • influenza vaccine quad (FLUZONE/FLUARIX) injection 0.5 mL  0.5 mL Intramuscular Once PRN Vincenzo Frias M.D.       • prochlorperazine (COMPAZINE) tablet 5 mg  5 mg Oral Q6HRS PRN Vincenzo Frias M.D.       • acetaminophen (Tylenol) tablet 650 mg  650 mg Oral Q4HRS PRN Vincenzo Frias M.D.       • senna-docusate (PERICOLACE or SENOKOT S) 8.6-50 MG per tablet 2 tablet  2 Tablet Oral BID PRN Vincenzo Frias M.D.        And   • polyethylene glycol/lytes (MIRALAX) PACKET 1 Packet  1 Packet Oral QDAY PRN Vincenzo Frias M.D.           Recommendations:    1. Recommend discontinuing PRN prochlorperazine since it has not been needed. When reviewing previous 30 day notes, MD wanted to keep medication on MAR. Revisiting topic with continued stay.     Lindsay Davenport, Pharmacy Intern   *discussed with Mary Jane Dais*

## 2022-02-24 NOTE — PROGRESS NOTES
"Assumed care of pt at shift change, report received. Pt resting in bed comfortably, without visible signs of distress. Refuses all care at this time, yelled at this RN \"leave me alone\". Safety precautions in place.   "

## 2022-02-24 NOTE — PROGRESS NOTES
Hospital Medicine TWICE WEEKLY Progress Note    Date of Service  2/24/2022    Chief Complaint  Zully Lin is a 71 y.o. female admitted 6/11/2021 with delusions    Hospital Course  This is a 71 year old female with PMHx of bipolar disorder, schizoaffective disorder, hypothyroidism, and dementia who was transferred from [facility] on 06/11/2021 due to worsening agitation and delusions.    Patient was evaluated by speech for cognitive evaluation, she did rather well in all domains, It is unclear if she has underlying dementia or if her presentation is psychiatric. Patient was evaluated by psychiatry, and her medications were adjusted. Patient refusing to take risperidone 0.5 every morning and risperidone 1 mg every night, as recommended by psychiatry.    Patient was deemed incapacitated to make medical decisions. She was cleared for discharge 6/2021.  Patient currently has guardian, and pursuing placement in group home in [place] ([facility] Arranging for care flight to [place].    Pt has received 2 doses of COVID vaccine 1and is now fully vaccinated. Quant negative 12/9/2021.     Interval Problem Update  2/24/2022 - I reviewed the patient's chart today. Uneventful night. VSS. Afebrile. Saturating well on RA.      > I have personally seen and examined the patient today. She continues to have delusions, and paranoia. No other complaints. She has persistent delusions that she is the governor, and that she needs to be treated well.      Discussed plan of care with , patient, bedside RN and .    Consultants/Specialty  psychiatry    Code Status  Full Code    Disposition  Patient is medically cleared.   Anticipate discharge to Memory Care/ .  Secure group home/skilled nursing facility.  I have placed the appropriate orders for post-discharge needs.    Review of Systems  ROS   Pertinent positives/negatives as mentioned above.     A complete review of  systems was personally done by me, limited by delusions. All other systems were negative.         Physical Exam  Temp:  [36.2 °C (97.2 °F)-36.4 °C (97.6 °F)] 36.4 °C (97.6 °F)  Pulse:  [77-82] 77  Resp:  [16-18] 18  BP: (125-141)/(75-84) 141/82  SpO2:  [97 %-98 %] 97 %    Physical Exam  Vitals and nursing note reviewed.   Constitutional:       General: She is not in acute distress.     Appearance: Normal appearance.   HENT:      Head: Normocephalic and atraumatic.      Mouth/Throat:      Mouth: Mucous membranes are moist.      Pharynx: No oropharyngeal exudate.   Eyes:      Extraocular Movements: Extraocular movements intact.      Pupils: Pupils are equal, round, and reactive to light.   Cardiovascular:      Rate and Rhythm: Normal rate and regular rhythm.      Pulses: Normal pulses.      Heart sounds: No murmur heard.    No friction rub. No gallop.   Pulmonary:      Effort: Pulmonary effort is normal. No respiratory distress.      Breath sounds: No wheezing, rhonchi or rales.   Abdominal:      General: Bowel sounds are normal. There is no distension.      Palpations: Abdomen is soft. There is no mass.      Tenderness: There is no abdominal tenderness.   Musculoskeletal:         General: No swelling or tenderness. Normal range of motion.      Cervical back: Normal range of motion. No rigidity. No muscular tenderness.      Right lower leg: No edema.      Left lower leg: No edema.   Skin:     General: Skin is warm and dry.      Capillary Refill: Capillary refill takes less than 2 seconds.      Findings: No erythema or rash.   Neurological:      General: No focal deficit present.      Mental Status: She is alert and oriented to person, place, and time.   Psychiatric:         Thought Content: Thought content is delusional.      Comments: Continue to have delusion          I have performed the physical examination today 2/24/2022.  In review of last note, there are no new changes except as documented  above.        Fluids    Intake/Output Summary (Last 24 hours) at 2/24/2022 0802  Last data filed at 2/23/2022 1800  Gross per 24 hour   Intake 600 ml   Output --   Net 600 ml       Laboratory                        Imaging  DX-CHEST-LIMITED (1 VIEW)   Final Result      Left basilar atelectasis. No focal consolidation. No effusions.           Assessment/Plan  * Delusional disorder- (present on admission)  Assessment & Plan  -Patient was evaluated by psychiatry, her medications were adjusted, patient was deemed incapacitated to make medical decisions. Patient cleared for discharge 6/2021.    -Patient currently has guardian, attempting to get patient placed in group home.  -Patient compliant with PO Abilify, and now taking medications with mixed with food/drinks. Increase abilify to 5mg daily given increased and persistent delusions. Can transition to Abilify ER IM once stable on oral medications for 2 weeks.     History of dementia- (present on admission)  Assessment & Plan  -At baseline  -placement at [facility] fell through. CM/SW looking for alternative GH.  -Now fully vaccinated.   -Has guardian.    Schizoaffective disorder, bipolar type (HCC)- (present on admission)  Assessment & Plan  -Patient has been refusing medications and blood draw due to paranoia.    -Actively delusional  -Patient was evaluated by psychiatry, her medications were adjusted, patient was deemed incapacitated to make medical decisions. Patient cleared for discharge 6/2021.  Patient currently has guardian, attempting to get patient placed in group home.  -Patient taking Abilify. With increased/persistent delusions will increase abilify to 5mg daily. Plan to transition to Abilify ER IM once stable and oral medications for 2 weeks.     Hypothyroidism- (present on admission)  Assessment & Plan  -Patient refuses to take her medications.  She refused blood draw on multiple tries     Noncompliance with treatment plan-  (present on admission)  Assessment & Plan  -Patient is now taking abilify with foods/drinks.  Transition to long-acting medications once stable on orals for 2 weeks.           VTE prophylaxis: SCDs/TEDs

## 2022-02-24 NOTE — PROGRESS NOTES
Received report and assumed care of pt at change of shift.   Pt is resting in chair states no pain at this time.   Pt is A&O place and self, VSS stable on RA   Discussed POC/shift goals with pt   Pt states no further needs at this time.  Bed is locked and in lowest position, bed alarm is off, pt up self   Hourly rounding in place.

## 2022-02-25 PROCEDURE — A9270 NON-COVERED ITEM OR SERVICE: HCPCS | Performed by: INTERNAL MEDICINE

## 2022-02-25 PROCEDURE — G0378 HOSPITAL OBSERVATION PER HR: HCPCS

## 2022-02-25 PROCEDURE — 700102 HCHG RX REV CODE 250 W/ 637 OVERRIDE(OP): Performed by: INTERNAL MEDICINE

## 2022-02-25 RX ADMIN — ARIPIPRAZOLE 5 MG: 2 TABLET ORAL at 06:14

## 2022-02-25 NOTE — PROGRESS NOTES
Received bedside report from night shift RN.   Assumed care of patient at change of shift.   Vital signs are stable, patient is on RA.   Denies pain, no apparent signs of distress or discomfort.   Patient is sitting up in chair in room for breakfast.   Bed is in lowest/locked position.   Call light and belongings are within reach.   No further needs at this time.

## 2022-02-26 PROCEDURE — G0378 HOSPITAL OBSERVATION PER HR: HCPCS

## 2022-02-26 PROCEDURE — 700102 HCHG RX REV CODE 250 W/ 637 OVERRIDE(OP): Performed by: INTERNAL MEDICINE

## 2022-02-26 PROCEDURE — A9270 NON-COVERED ITEM OR SERVICE: HCPCS | Performed by: INTERNAL MEDICINE

## 2022-02-26 RX ADMIN — ARIPIPRAZOLE 5 MG: 2 TABLET ORAL at 05:05

## 2022-02-26 ASSESSMENT — FIBROSIS 4 INDEX: FIB4 SCORE: 1.53

## 2022-02-26 ASSESSMENT — PAIN DESCRIPTION - PAIN TYPE: TYPE: ACUTE PAIN

## 2022-02-27 PROCEDURE — A9270 NON-COVERED ITEM OR SERVICE: HCPCS | Performed by: INTERNAL MEDICINE

## 2022-02-27 PROCEDURE — 700102 HCHG RX REV CODE 250 W/ 637 OVERRIDE(OP): Performed by: INTERNAL MEDICINE

## 2022-02-27 PROCEDURE — G0378 HOSPITAL OBSERVATION PER HR: HCPCS

## 2022-02-27 RX ADMIN — ARIPIPRAZOLE 5 MG: 2 TABLET ORAL at 05:09

## 2022-02-27 ASSESSMENT — PAIN SCALES - PAIN ASSESSMENT IN ADVANCED DEMENTIA (PAINAD)
FACIALEXPRESSION: SMILING OR INEXPRESSIVE
CONSOLABILITY: NO NEED TO CONSOLE
BREATHING: NORMAL
TOTALSCORE: 0
BODYLANGUAGE: RELAXED

## 2022-02-27 ASSESSMENT — PAIN DESCRIPTION - PAIN TYPE: TYPE: ACUTE PAIN

## 2022-02-27 NOTE — PROGRESS NOTES
"Received bedside report from night shift RN.   Assumed care of patient at change of shift.   Patient refused assessment, told this RN \"there's no need to assess me, I'm fine.\"  Vital signs are stable, patient is on RA.  Denies pain, no apparent signs of distress or discomfort.   Patient is sitting up in chair for breakfast.   Bed is in lowest/locked position.   Call light and belongings are within reach.   No further needs at this time.    Patient at nurses station, states \"I have [phone],000 orphans that are waiting for me, some of them being infants. So I need to get back there to take care of them.\"    Patient calling multiple times throughout shift to ask when her \"ambassador room will be available.\" Patient states \"there's black mold in the bathroom, the cleaning lady won't touch it. Do you know what black mold is? It can cause black lungs, look it up. If you don't get me my room, I'm calling the . This is called being held hostage. If you can't get me my room, you better get someone that can.\"  "

## 2022-02-27 NOTE — PROGRESS NOTES
"Pt awake, resting in bed. Irritated about this RN entering the room, yelled \"get the hell out\". Asked pt about needs, pt stated she is ok and  yelled \"leave me alone\". Safety precautions in place.   "

## 2022-02-28 PROCEDURE — G0378 HOSPITAL OBSERVATION PER HR: HCPCS

## 2022-02-28 PROCEDURE — 99224 PR SUBSEQUENT OBSERVATION CARE,LEVEL I: CPT | Performed by: INTERNAL MEDICINE

## 2022-02-28 PROCEDURE — 700102 HCHG RX REV CODE 250 W/ 637 OVERRIDE(OP): Performed by: INTERNAL MEDICINE

## 2022-02-28 PROCEDURE — A9270 NON-COVERED ITEM OR SERVICE: HCPCS | Performed by: INTERNAL MEDICINE

## 2022-02-28 RX ADMIN — ARIPIPRAZOLE 5 MG: 2 TABLET ORAL at 05:06

## 2022-02-28 ASSESSMENT — ENCOUNTER SYMPTOMS
HALLUCINATIONS: 0
SPEECH CHANGE: 0
FEVER: 0
MYALGIAS: 0
FOCAL WEAKNESS: 0
CONSTIPATION: 0
DOUBLE VISION: 0
NAUSEA: 0
PALPITATIONS: 0
NECK PAIN: 0
EYE PAIN: 0
BACK PAIN: 0
SHORTNESS OF BREATH: 0
DIZZINESS: 0
HEADACHES: 0
WEIGHT LOSS: 0
ORTHOPNEA: 0
BLURRED VISION: 0
CHILLS: 0
SENSORY CHANGE: 0
COUGH: 0
SPUTUM PRODUCTION: 0
TREMORS: 0
PHOTOPHOBIA: 0
ABDOMINAL PAIN: 0
VOMITING: 0
DIARRHEA: 0
TINGLING: 0

## 2022-02-28 ASSESSMENT — PAIN DESCRIPTION - PAIN TYPE
TYPE: ACUTE PAIN
TYPE: ACUTE PAIN

## 2022-02-28 ASSESSMENT — LIFESTYLE VARIABLES: SUBSTANCE_ABUSE: 0

## 2022-02-28 NOTE — PROGRESS NOTES
Hospital Medicine TWICE WEEKLY Progress Note    Date of Service  2/28/2022    Chief Complaint  Zully Lin is a 71 y.o. female admitted 6/11/2021 with delusions    Hospital Course  This is a 71 year old female with PMHx of bipolar disorder, schizoaffective disorder, hypothyroidism, and dementia who was transferred from [facility] on 06/11/2021 due to worsening agitation and delusions.    Patient was evaluated by speech for cognitive evaluation, she did rather well in all domains, It is unclear if she has underlying dementia or if her presentation is psychiatric. Patient was evaluated by psychiatry, and her medications were adjusted. Patient refusing to take risperidone 0.5 every morning and risperidone 1 mg every night, as recommended by psychiatry.    Patient was deemed incapacitated to make medical decisions. She was cleared for discharge 6/2021.  Patient currently has guardian, and pursuing placement in group home in [place] ([facility] Arranging for care flight to [place].    Pt has received 2 doses of COVID vaccine 1and is now fully vaccinated. Quant negative 12/9/2021.     Interval Problem Update    I evaluated and examined her at the bedside.  Sitting in chair and denies any acute complaints.  Discussed plan of care with bedside RN and .  patient, bedside RN and .    Consultants/Specialty  psychiatry    Code Status  Full Code    Disposition  Patient is medically cleared.   Anticipate discharge to [facility]/ .  Secure group home/skilled nursing facility.  I have placed the appropriate orders for post-discharge needs.    Review of Systems  Review of Systems   Constitutional: Negative for chills, fever and weight loss.   HENT: Negative for hearing loss and tinnitus.    Eyes: Negative for blurred vision, double vision, photophobia and pain.   Respiratory: Negative for cough, sputum production and shortness of breath.    Cardiovascular:  Negative for chest pain, palpitations, orthopnea and leg swelling.   Gastrointestinal: Negative for abdominal pain, constipation, diarrhea, nausea and vomiting.   Genitourinary: Negative for dysuria, frequency and urgency.   Musculoskeletal: Negative for back pain, joint pain, myalgias and neck pain.   Skin: Negative for rash.   Neurological: Negative for dizziness, tingling, tremors, sensory change, speech change, focal weakness and headaches.   Psychiatric/Behavioral: Negative for hallucinations and substance abuse.        Delusion   All other systems reviewed and are negative.     No acute complaints.      Physical Exam  Temp:  [36.4 °C (97.5 °F)-36.6 °C (97.8 °F)] 36.6 °C (97.8 °F)  Pulse:  [68-78] 68  Resp:  [16-18] 16  BP: (132-150)/(70-76) 143/70  SpO2:  [96 %-98 %] 97 %    Physical Exam  Vitals and nursing note reviewed.   Constitutional:       General: She is not in acute distress.     Appearance: Normal appearance.   HENT:      Head: Normocephalic and atraumatic.      Mouth/Throat:      Mouth: Mucous membranes are moist.      Pharynx: No oropharyngeal exudate.   Eyes:      Extraocular Movements: Extraocular movements intact.      Pupils: Pupils are equal, round, and reactive to light.   Cardiovascular:      Rate and Rhythm: Normal rate and regular rhythm.      Pulses: Normal pulses.      Heart sounds: No murmur heard.    No friction rub. No gallop.   Pulmonary:      Effort: Pulmonary effort is normal. No respiratory distress.      Breath sounds: No wheezing, rhonchi or rales.   Abdominal:      General: Bowel sounds are normal. There is no distension.      Palpations: Abdomen is soft. There is no mass.      Tenderness: There is no abdominal tenderness.   Musculoskeletal:         General: No swelling or tenderness. Normal range of motion.      Cervical back: Normal range of motion. No rigidity. No muscular tenderness.      Right lower leg: No edema.      Left lower leg: No edema.   Skin:     General: Skin is  warm and dry.      Capillary Refill: Capillary refill takes less than 2 seconds.      Findings: No erythema or rash.   Neurological:      General: No focal deficit present.      Mental Status: She is alert and oriented to person, place, and time.   Psychiatric:         Thought Content: Thought content is delusional.      Comments: Continue to have delusion          I performed physical exam on 02/28/22 remain similar without any acute changes.      Fluids    Intake/Output Summary (Last 24 hours) at 2/28/2022 1354  Last data filed at 2/28/2022 0743  Gross per 24 hour   Intake 600 ml   Output --   Net 600 ml       Laboratory                        Imaging  DX-CHEST-LIMITED (1 VIEW)   Final Result      Left basilar atelectasis. No focal consolidation. No effusions.           Assessment/Plan  * Delusional disorder- (present on admission)  Assessment & Plan  -Patient was evaluated by psychiatry, her medications were adjusted, patient was deemed incapacitated to make medical decisions. Patient cleared for discharge 6/2021.    -Patient currently has guardian, attempting to get patient placed in group home.  -Patient compliant with PO Abilify, and now taking medications with mixed with food/drinks. Increase abilify to 5mg daily given increased and persistent delusions. Can transition to Abilify ER IM once stable on oral medications for 2 weeks.     History of dementia- (present on admission)  Assessment & Plan  -At baseline  -placement at [facility] fell through. CM/SW looking for alternative GH.  -Now fully vaccinated.   -Has guardian.    Schizoaffective disorder, bipolar type (HCC)- (present on admission)  Assessment & Plan  -Patient has been refusing medications and blood draw due to paranoia.    -Actively delusional  -Patient was evaluated by psychiatry, her medications were adjusted, patient was deemed incapacitated to make medical decisions. Patient cleared for discharge 6/2021.  Patient  currently has guardian, attempting to get patient placed in group home.  -Patient taking Abilify. With increased/persistent delusions will increase abilify to 5mg daily. Plan to transition to Abilify ER IM once stable and oral medications for 2 weeks.     Hypothyroidism- (present on admission)  Assessment & Plan  -Patient refuses to take her medications.  She refused blood draw on multiple tries     Noncompliance with treatment plan- (present on admission)  Assessment & Plan  -Patient is now taking abilify with foods/drinks.  Transition to long-acting medications once stable on orals for 2 weeks.         I reviewed above assessment and plan today on February 28, 2022.  No acute changes.  Pending discharge.    VTE prophylaxis: SCDs/TEDs    She is mobilizin not on pharmacological DVT prophylaxis.

## 2022-02-28 NOTE — PROGRESS NOTES
0700- 1930    Assumed care of patient from night shift RN. VSS on room air. Denies pain. Tolerating diet without nausea. Adequately voiding. Up in chair resting comfortably. Safety maintained.

## 2022-03-01 PROCEDURE — 700102 HCHG RX REV CODE 250 W/ 637 OVERRIDE(OP): Performed by: INTERNAL MEDICINE

## 2022-03-01 PROCEDURE — A9270 NON-COVERED ITEM OR SERVICE: HCPCS | Performed by: INTERNAL MEDICINE

## 2022-03-01 PROCEDURE — G0378 HOSPITAL OBSERVATION PER HR: HCPCS

## 2022-03-01 RX ADMIN — ARIPIPRAZOLE 5 MG: 2 TABLET ORAL at 05:05

## 2022-03-01 ASSESSMENT — PAIN DESCRIPTION - PAIN TYPE: TYPE: ACUTE PAIN

## 2022-03-01 NOTE — PROGRESS NOTES
Received report and assumed care at shift change. A&O x3, disoriented to situation, continue to have hallucinations. Non compliant with cares. No complain of pain or distress. Bed locked and in lowest position. Needs attended to. No complains of pain or distress.

## 2022-03-01 NOTE — DISCHARGE PLANNING
Anticipated Discharge Disposition: Group Home    Action: patient continues to take her ability daily.  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  Patient is requesting a new room, stating that there is black mold in her shower.  Requesting the presidential sweet as the owner should have the best room.     PC to Denia Price at [facility], [phone].  Sw spoke to Denia and her supervisor Cindi.  They are concerned about placing patient in [facility] without first trying to see if [facility] will take the patient back.  Cnidi stated she will be talking to them about what she owes, paying them back.  And, to see if they will take her with [facility] paying for a year of care.   Sw asked how this will take, Cindi stated she isn't sure maybe a week at the most.  Further they are concerned that if patient is placed in [place] and there is a placement disruption patient will be back at the hospital.  They will not have [facility] to assist in paying for cost of care.  GOMEZ stated [facility] is willing to pay for a year of assistance, so if placement is disrupted prior to that [facility] should continue to cover cost.  Sw stated he would verify.      Barriers to Discharge: placement    Plan: follow up with [facility]

## 2022-03-02 PROCEDURE — A9270 NON-COVERED ITEM OR SERVICE: HCPCS | Performed by: INTERNAL MEDICINE

## 2022-03-02 PROCEDURE — G0378 HOSPITAL OBSERVATION PER HR: HCPCS

## 2022-03-02 PROCEDURE — 700102 HCHG RX REV CODE 250 W/ 637 OVERRIDE(OP): Performed by: INTERNAL MEDICINE

## 2022-03-02 RX ADMIN — ARIPIPRAZOLE 5 MG: 2 TABLET ORAL at 04:14

## 2022-03-02 ASSESSMENT — PAIN DESCRIPTION - PAIN TYPE: TYPE: ACUTE PAIN

## 2022-03-02 NOTE — PROGRESS NOTES
Assumed care of pt at shift change, report received. Pt sleeping, easy to arouse. States repeatedly she wants this RN to leave her room. No visible signs or symptoms of distress. Refuses all care at this time. Safety precautions in place.

## 2022-03-02 NOTE — PROGRESS NOTES
Assumed care of patient this shift. Patient is alert, sitting up in chair, refuses to answer any orientation questions.  Able to make her needs known. Denied any needs at this time.

## 2022-03-03 PROCEDURE — 99224 PR SUBSEQUENT OBSERVATION CARE,LEVEL I: CPT | Performed by: INTERNAL MEDICINE

## 2022-03-03 PROCEDURE — 96372 THER/PROPH/DIAG INJ SC/IM: CPT

## 2022-03-03 PROCEDURE — 700102 HCHG RX REV CODE 250 W/ 637 OVERRIDE(OP): Performed by: INTERNAL MEDICINE

## 2022-03-03 PROCEDURE — G0378 HOSPITAL OBSERVATION PER HR: HCPCS

## 2022-03-03 PROCEDURE — A9270 NON-COVERED ITEM OR SERVICE: HCPCS | Performed by: INTERNAL MEDICINE

## 2022-03-03 PROCEDURE — 700111 HCHG RX REV CODE 636 W/ 250 OVERRIDE (IP): Performed by: INTERNAL MEDICINE

## 2022-03-03 RX ADMIN — ARIPIPRAZOLE 5 MG: 2 TABLET ORAL at 04:14

## 2022-03-03 RX ADMIN — HALOPERIDOL LACTATE 5 MG: 5 INJECTION, SOLUTION INTRAMUSCULAR at 16:43

## 2022-03-03 ASSESSMENT — ENCOUNTER SYMPTOMS
DIARRHEA: 0
TREMORS: 0
MYALGIAS: 0
CONSTIPATION: 0
SPEECH CHANGE: 0
HALLUCINATIONS: 0
ABDOMINAL PAIN: 0
SHORTNESS OF BREATH: 0
PHOTOPHOBIA: 0
SENSORY CHANGE: 0
SPUTUM PRODUCTION: 0
BACK PAIN: 0
DIZZINESS: 0
ORTHOPNEA: 0
EYE PAIN: 0
NECK PAIN: 0
PALPITATIONS: 0
VOMITING: 0
CHILLS: 0
BLURRED VISION: 0
NAUSEA: 0
TINGLING: 0
DOUBLE VISION: 0
HEADACHES: 0
FOCAL WEAKNESS: 0
WEIGHT LOSS: 0
COUGH: 0
FEVER: 0

## 2022-03-03 ASSESSMENT — PAIN DESCRIPTION - PAIN TYPE: TYPE: ACUTE PAIN

## 2022-03-03 ASSESSMENT — LIFESTYLE VARIABLES: SUBSTANCE_ABUSE: 0

## 2022-03-03 NOTE — PROGRESS NOTES
Received pt. In bed awake, no complaints of pain. Does not want to be bothered and refused to have assessment done. Refuses care at this time. Call light placed in reach. Informed pt. If she needed any assistance to call RN. Pt. Stated yes.

## 2022-03-03 NOTE — PROGRESS NOTES
Hospital Medicine TWICE WEEKLY Progress Note    Date of Service  3/3/2022    Chief Complaint  Zulyl Lin is a 71 y.o. female admitted 6/11/2021 with delusions    Hospital Course  This is a 71 year old female with PMHx of bipolar disorder, schizoaffective disorder, hypothyroidism, and dementia who was transferred from [facility] on 06/11/2021 due to worsening agitation and delusions.    Patient was evaluated by speech for cognitive evaluation, she did rather well in all domains, It is unclear if she has underlying dementia or if her presentation is psychiatric. Patient was evaluated by psychiatry, and her medications were adjusted. Patient refusing to take risperidone 0.5 every morning and risperidone 1 mg every night, as recommended by psychiatry.    Patient was deemed incapacitated to make medical decisions. She was cleared for discharge 6/2021.  Patient currently has guardian, and pursuing placement in group home in [place] ([facility]). Arranging for care flight to [place].    Pt has received 2 doses of COVID vaccine 1and is now fully vaccinated. Quant negative 12/9/2021.     Interval Problem Update    I evaluated and examined her at the bedside.  She remained medically stable.  No acute complaints per  Pending discharge.  I discussed plan of care with bedside RN and .    patient, bedside RN and .    Consultants/Specialty  psychiatry    Code Status  Full Code    Disposition  Patient is medically cleared.   Anticipate discharge to Memory Care/ .  Secure group home/skilled nursing facility.  I have placed the appropriate orders for post-discharge needs.    Review of Systems  Review of Systems   Constitutional: Negative for chills, fever and weight loss.   HENT: Negative for hearing loss and tinnitus.    Eyes: Negative for blurred vision, double vision, photophobia and pain.   Respiratory: Negative for cough, sputum production and shortness of  breath.    Cardiovascular: Negative for chest pain, palpitations, orthopnea and leg swelling.   Gastrointestinal: Negative for abdominal pain, constipation, diarrhea, nausea and vomiting.   Genitourinary: Negative for dysuria, frequency and urgency.   Musculoskeletal: Negative for back pain, joint pain, myalgias and neck pain.   Skin: Negative for rash.   Neurological: Negative for dizziness, tingling, tremors, sensory change, speech change, focal weakness and headaches.   Psychiatric/Behavioral: Negative for hallucinations and substance abuse.        Delusion   All other systems reviewed and are negative.     No acute complaints.      Physical Exam  Temp:  [36.3 °C (97.4 °F)-36.9 °C (98.5 °F)] 36.5 °C (97.7 °F)  Pulse:  [75-83] 83  Resp:  [16-17] 16  BP: (111-138)/(70-94) 111/70  SpO2:  [97 %-100 %] 97 %    Physical Exam  Vitals and nursing note reviewed.   Constitutional:       General: She is not in acute distress.     Appearance: Normal appearance.   HENT:      Head: Normocephalic and atraumatic.      Mouth/Throat:      Mouth: Mucous membranes are moist.      Pharynx: No oropharyngeal exudate.   Eyes:      Extraocular Movements: Extraocular movements intact.      Pupils: Pupils are equal, round, and reactive to light.   Cardiovascular:      Rate and Rhythm: Normal rate and regular rhythm.      Pulses: Normal pulses.      Heart sounds: No murmur heard.    No friction rub. No gallop.   Pulmonary:      Effort: Pulmonary effort is normal. No respiratory distress.      Breath sounds: No wheezing, rhonchi or rales.   Abdominal:      General: Bowel sounds are normal. There is no distension.      Palpations: Abdomen is soft. There is no mass.      Tenderness: There is no abdominal tenderness.   Musculoskeletal:         General: No swelling or tenderness. Normal range of motion.      Cervical back: Normal range of motion. No rigidity. No muscular tenderness.      Right lower leg: No edema.      Left lower leg: No edema.    Skin:     General: Skin is warm and dry.      Capillary Refill: Capillary refill takes less than 2 seconds.      Findings: No erythema or rash.   Neurological:      General: No focal deficit present.      Mental Status: She is alert and oriented to person, place, and time.   Psychiatric:         Thought Content: Thought content is delusional.      Comments: Continue to have delusion          I performed physical exam on March 3, 2022 remain similar without any acute changes.      Fluids    Intake/Output Summary (Last 24 hours) at 3/3/2022 1254  Last data filed at 3/3/2022 0825  Gross per 24 hour   Intake 480 ml   Output --   Net 480 ml       Laboratory                        Imaging  DX-CHEST-LIMITED (1 VIEW)   Final Result      Left basilar atelectasis. No focal consolidation. No effusions.           Assessment/Plan  * Delusional disorder- (present on admission)  Assessment & Plan  -Patient was evaluated by psychiatry, her medications were adjusted, patient was deemed incapacitated to make medical decisions. Patient cleared for discharge 6/2021.    -Patient currently has guardian, attempting to get patient placed in group home.  -Patient compliant with PO Abilify, and now taking medications with mixed with food/drinks. Increase abilify to 5mg daily given increased and persistent delusions. Can transition to Abilify ER IM once stable on oral medications for 2 weeks.     History of dementia- (present on admission)  Assessment & Plan  -At baseline  -[facility]  [place] fell through. CM/SW looking for alternative GH.  -Now fully vaccinated.   -Has guardian.    Schizoaffective disorder, bipolar type (HCC)- (present on admission)  Assessment & Plan  -Patient has been refusing medications and blood draw due to paranoia.    -Actively delusional  -Patient was evaluated by psychiatry, her medications were adjusted, patient was deemed incapacitated to make medical decisions. Patient cleared  for discharge 6/2021.  Patient currently has guardian, attempting to get patient placed in group home.  -Patient taking Abilify. With increased/persistent delusions will increase abilify to 5mg daily. Plan to transition to Abilify ER IM once stable and oral medications for 2 weeks.     Hypothyroidism- (present on admission)  Assessment & Plan  -Patient refuses to take her medications.  She refused blood draw on multiple tries     Noncompliance with treatment plan- (present on admission)  Assessment & Plan  -Patient is now taking abilify with foods/drinks.  Transition to long-acting medications once stable on orals for 2 weeks.         I reviewed above assessment and plan today on  March 03, 2022.  No acute changes.  Pending discharge.    VTE prophylaxis: SCDs/TEDs    She is mobilizin not on pharmacological DVT prophylaxis.

## 2022-03-03 NOTE — PROGRESS NOTES
"Assumed care of patient this shift. Patient is alert, sitting up in chair, refuses to answer any questions.  Able to make her needs known, patient having delusions, speaking to walls in her room, patient asked this nurse when her room will be ready to move to as she is \"the owner of this building, an admiral, a [place] and the governor\"   "

## 2022-03-04 PROCEDURE — G0378 HOSPITAL OBSERVATION PER HR: HCPCS

## 2022-03-04 ASSESSMENT — PAIN DESCRIPTION - PAIN TYPE: TYPE: ACUTE PAIN

## 2022-03-04 NOTE — DISCHARGE PLANNING
Medical Social Work  Email from Denia Price, returning SW call.  Stated she is trying to see if either [facility] will take patient back, once patient pays back she owes.  Also following up with [facility] in [place].  Hopes to have more information on Monday.

## 2022-03-04 NOTE — PROGRESS NOTES
Received report and assumed care at shift change. A&O x 3, disoriented to situation. Non compliant with cares. Patient came out of her room once this shift, looking for belongings but went back to room. Continue with tele sitter. No complain of pain or distress. Will continue to monitor.

## 2022-03-04 NOTE — PROGRESS NOTES
"1620:  Patient at risk for elopement has removed wander guard.  With assistance from  standing by, this RN placed TeleSitter in patient room and requested that patient to wear hospital gown. Patient initially refused to wear gown, requesting security officers badge number and then requesting to call 911. Patient stating multiple delusions to this RN that she is \"the owner of this building and not a patient\" and that she is \"the governor of [place] and multiple other states\"   Discussed with patient that she can wear hospital gown over her other clothing and TeleSitter in place for safety. Discussed with Charge RN and TeleSitter monitors.    1645: Patient walked out of room and to charge desk wheeling unplugged TeleSitter and requesting to speak with police as she read the instructions and did not agree to having it in her room. Charge RN called Security again to request assistance and after discussion with Charge RN, decided that patient should wear hospital gown only and have all clothing removed from room.     1650: With Security at bedside, patient becoming increasingly agitated, yelling that she would like the police and FBI called. This RN administered IM Haldol, then asked patient to please change into hospital gown only. Patient stepped into bathroom and removed personal clothing and put on hospital gown. Clothing placed in belongings bag and two other bags (black duffel bag and blue duffel bag with white polka dots) removed from room.   "

## 2022-03-04 NOTE — PROGRESS NOTES
Assumed care of patient this shift. Patient is alert, sitting up in chair, able to make her needs known. Denied any needs at this time. TeleSitter remains in place for safety due to patient being a high elopement risk.

## 2022-03-05 PROCEDURE — 700102 HCHG RX REV CODE 250 W/ 637 OVERRIDE(OP): Performed by: INTERNAL MEDICINE

## 2022-03-05 PROCEDURE — G0378 HOSPITAL OBSERVATION PER HR: HCPCS

## 2022-03-05 PROCEDURE — A9270 NON-COVERED ITEM OR SERVICE: HCPCS | Performed by: INTERNAL MEDICINE

## 2022-03-05 RX ADMIN — ARIPIPRAZOLE 5 MG: 2 TABLET ORAL at 05:38

## 2022-03-05 ASSESSMENT — FIBROSIS 4 INDEX
FIB4 SCORE: 1.53
FIB4 SCORE: 1.53

## 2022-03-05 ASSESSMENT — PAIN DESCRIPTION - PAIN TYPE
TYPE: ACUTE PAIN
TYPE: ACUTE PAIN

## 2022-03-05 NOTE — PROGRESS NOTES
Pt sitting in room, refuses to answer any orientation questions but will state her name. Pt denies any pain at this time. Pt was pleasant and asked for a cup of coffee. Telesitter in place d/t being high elopement risk.

## 2022-03-05 NOTE — PROGRESS NOTES
Received report and assumed care at shift change. A&O x 4 with Delusions , non compliant with cares. Telesitter at bedside. No complain of pain or distress.

## 2022-03-06 PROCEDURE — G0378 HOSPITAL OBSERVATION PER HR: HCPCS

## 2022-03-06 PROCEDURE — 700102 HCHG RX REV CODE 250 W/ 637 OVERRIDE(OP): Performed by: INTERNAL MEDICINE

## 2022-03-06 PROCEDURE — A9270 NON-COVERED ITEM OR SERVICE: HCPCS | Performed by: INTERNAL MEDICINE

## 2022-03-06 RX ADMIN — ARIPIPRAZOLE 5 MG: 2 TABLET ORAL at 06:08

## 2022-03-06 ASSESSMENT — PAIN DESCRIPTION - PAIN TYPE
TYPE: ACUTE PAIN
TYPE: ACUTE PAIN

## 2022-03-06 NOTE — PROGRESS NOTES
Pt sleeping, easy to arouse. Irritable, states she doesn't want to be bother as she is trying to fall asleep. Reports no pain or discomfort. Refuses assessment. Declines any needs at this time. Telesitter in place.

## 2022-03-07 PROCEDURE — A9270 NON-COVERED ITEM OR SERVICE: HCPCS | Performed by: INTERNAL MEDICINE

## 2022-03-07 PROCEDURE — G0378 HOSPITAL OBSERVATION PER HR: HCPCS

## 2022-03-07 PROCEDURE — 99224 PR SUBSEQUENT OBSERVATION CARE,LEVEL I: CPT | Performed by: INTERNAL MEDICINE

## 2022-03-07 PROCEDURE — 700102 HCHG RX REV CODE 250 W/ 637 OVERRIDE(OP): Performed by: INTERNAL MEDICINE

## 2022-03-07 RX ADMIN — ARIPIPRAZOLE 5 MG: 2 TABLET ORAL at 04:18

## 2022-03-07 NOTE — PROGRESS NOTES
Hospital Medicine TWICE WEEKLY Progress Note    Date of Service  3/7/2022    Chief Complaint  Zully Lin is a 71 y.o. female admitted 6/11/2021 with delusions    Hospital Course  This is a 71 year old female with PMHx of bipolar disorder, schizoaffective disorder, hypothyroidism, and dementia who was transferred from [facility] on 06/11/2021 due to worsening agitation and delusions.    Patient was evaluated by speech for cognitive evaluation, she did rather well in all domains, It is unclear if she has underlying dementia or if her presentation is psychiatric. Patient was evaluated by psychiatry, and her medications were adjusted. Patient refusing to take risperidone 0.5 every morning and risperidone 1 mg every night, as recommended by psychiatry.    Patient was deemed incapacitated to make medical decisions. She was cleared for discharge 6/2021.  Patient currently has guardian, and pursuing placement in group home in [place] ([facility]). Arranging for care flight to [place].    Pt has received 2 doses of COVID vaccine 1and is now fully vaccinated. Quant negative 12/9/2021.     Interval Problem Update  3/7/2022 - I reviewed the patient's chart. There were no significant overnight events. Remains hemodynamically stable and afebrile. Stable on RA.  She received IM Haldol for her agitation on 3/3.    > I have personally seen and examined the patient today.  No complaints.  Continues to have delusions.  Not in distress.    I discussed plan of care with patient, bedside RN and .    Consultants/Specialty  psychiatry    Code Status  Full Code    Disposition  Patient is medically cleared.   Anticipate discharge to [facility]/ .  Secure group home/skilled nursing facility.  I have placed the appropriate orders for post-discharge needs.    Review of Systems  Review of Systems      Pertinent positives/negatives as mentioned above.     A complete review of systems was  personally done by me. All other systems were negative.         Physical Exam  Temp:  [36.3 °C (97.4 °F)-36.6 °C (97.9 °F)] 36.3 °C (97.4 °F)  Pulse:  [69-73] 71  Resp:  [16-17] 16  BP: (128-136)/(64-78) 136/64  SpO2:  [96 %-100 %] 100 %    Physical Exam  Vitals and nursing note reviewed.   Constitutional:       General: She is not in acute distress.     Appearance: Normal appearance.   HENT:      Head: Normocephalic and atraumatic.      Mouth/Throat:      Mouth: Mucous membranes are moist.      Pharynx: No oropharyngeal exudate.   Eyes:      Extraocular Movements: Extraocular movements intact.      Pupils: Pupils are equal, round, and reactive to light.   Cardiovascular:      Rate and Rhythm: Normal rate and regular rhythm.      Pulses: Normal pulses.      Heart sounds: No murmur heard.    No friction rub. No gallop.   Pulmonary:      Effort: Pulmonary effort is normal. No respiratory distress.      Breath sounds: No wheezing, rhonchi or rales.   Abdominal:      General: Bowel sounds are normal. There is no distension.      Palpations: Abdomen is soft. There is no mass.      Tenderness: There is no abdominal tenderness.   Musculoskeletal:         General: No swelling or tenderness. Normal range of motion.      Cervical back: Normal range of motion. No rigidity. No muscular tenderness.      Right lower leg: No edema.      Left lower leg: No edema.   Skin:     General: Skin is warm and dry.      Capillary Refill: Capillary refill takes less than 2 seconds.      Findings: No erythema or rash.   Neurological:      General: No focal deficit present.      Mental Status: She is alert and oriented to person, place, and time.   Psychiatric:         Thought Content: Thought content is delusional.      Comments: Continue to have delusion        I have performed the physical examination today 3/7/2022.  In review of last note, there are no new changes except as documented above.      Fluids  No intake or output data in the 24  hours ending 03/07/22 0740    Laboratory                        Imaging  DX-CHEST-LIMITED (1 VIEW)   Final Result      Left basilar atelectasis. No focal consolidation. No effusions.           Assessment/Plan  * Delusional disorder- (present on admission)  Assessment & Plan  -Patient was evaluated by psychiatry, her medications were adjusted, patient was deemed incapacitated to make medical decisions. Patient cleared for discharge 6/2021.    -Patient currently has guardian, attempting to get patient placed in group home.  -Patient compliant with PO Abilify, and now taking medications with mixed with food/drinks. Continue abilify to 5mg daily, may need to further increase depending on persistence of delusions. Can transition to Abilify ER IM once stable on oral medications for 2 weeks.     History of dementia- (present on admission)  Assessment & Plan  -At baseline  -placement [facility] fell through. CM/SW looking for alternative GH.  -Now fully vaccinated.   -Has guardian.    Schizoaffective disorder, bipolar type (HCC)- (present on admission)  Assessment & Plan  -Patient has been refusing medications and blood draw due to paranoia.    -Actively delusional  -Patient was evaluated by psychiatry, her medications were adjusted, patient was deemed incapacitated to make medical decisions. Patient cleared for discharge 6/2021.  Patient currently has guardian, attempting to get patient placed in group home.  -Patient taking Abilify. With increased/persistent delusions increased abilify to 5mg daily, may need to further adjust based on improvement. Plan to transition to Abilify ER IM once stable and oral medications for 2 weeks.     Hypothyroidism- (present on admission)  Assessment & Plan  -Patient refuses to take her medications.  She refused blood draw on multiple tries     Noncompliance with treatment plan- (present on admission)  Assessment & Plan  -Patient is now taking abilify with  foods/drinks.  Transition to long-acting medications once stable on orals for 2 weeks.             VTE prophylaxis: SCDs/TEDs    I have performed the physical examination, and reviewed updated ROS and plan today 3/7/2022.  In review of last note, there are no new changes except as documented above.

## 2022-03-07 NOTE — PROGRESS NOTES
Assumed care of pt at shift change, report received. Pt up to chair watching tv. A&Ox2, disoriented to time and situation. Pleasant and cooperative at this time. Reports no pain or discomfort. Agrees to some aspects of assessment. States she has no needs at this time. Telesitter in place. Safety precautions in place.

## 2022-03-07 NOTE — DISCHARGE PLANNING
Medical Social Work  [facility] in [place] has accepted the patient.  A tentative discharge date is Thursday at 3/10/22 at 1:00.      GOMEZ emailed physicians assessment to [facility]

## 2022-03-08 PROCEDURE — G0378 HOSPITAL OBSERVATION PER HR: HCPCS

## 2022-03-08 PROCEDURE — 700102 HCHG RX REV CODE 250 W/ 637 OVERRIDE(OP): Performed by: INTERNAL MEDICINE

## 2022-03-08 PROCEDURE — A9270 NON-COVERED ITEM OR SERVICE: HCPCS | Performed by: INTERNAL MEDICINE

## 2022-03-08 RX ADMIN — ARIPIPRAZOLE 5 MG: 2 TABLET ORAL at 04:21

## 2022-03-08 ASSESSMENT — PAIN DESCRIPTION - PAIN TYPE: TYPE: ACUTE PAIN

## 2022-03-08 NOTE — PROGRESS NOTES
"Pt refused fall risk arm band and bed alarm. Pt educated on the purpose of the arm band and bed alarm. Pt reported, \"I'm not a fall risk. I don't need that.\" Charge nurse notified.   "

## 2022-03-08 NOTE — PROGRESS NOTES
Received report from KARRI French. Pt is A&O x3, disoriented to situation. Pt on RA, no signs of acute distress. Pt has no complaints of pain. Refuses assessment. Pt reports that she doesn't want to be bothered and wants to go to bed. POC discussed with patient. Safety precautions in place, bed in lowest position, and call light and personal belongings within reach. Hourly rounding in place.

## 2022-03-09 PROCEDURE — 700102 HCHG RX REV CODE 250 W/ 637 OVERRIDE(OP): Performed by: INTERNAL MEDICINE

## 2022-03-09 PROCEDURE — RXMED WILLOW AMBULATORY MEDICATION CHARGE: Performed by: INTERNAL MEDICINE

## 2022-03-09 PROCEDURE — G0378 HOSPITAL OBSERVATION PER HR: HCPCS

## 2022-03-09 PROCEDURE — A9270 NON-COVERED ITEM OR SERVICE: HCPCS | Performed by: INTERNAL MEDICINE

## 2022-03-09 RX ORDER — ARIPIPRAZOLE 5 MG/1
5 TABLET ORAL DAILY
Qty: 30 TABLET | Refills: 1 | Status: SHIPPED | OUTPATIENT
Start: 2022-03-10 | End: 2022-03-15

## 2022-03-09 RX ORDER — LORAZEPAM 2 MG/ML
1 CONCENTRATE ORAL EVERY 8 HOURS PRN
Qty: 30 ML | Refills: 0 | Status: SHIPPED | OUTPATIENT
Start: 2022-03-09 | End: 2022-03-15 | Stop reason: SDUPTHER

## 2022-03-09 RX ADMIN — ARIPIPRAZOLE 5 MG: 2 TABLET ORAL at 04:00

## 2022-03-09 ASSESSMENT — PAIN DESCRIPTION - PAIN TYPE: TYPE: ACUTE PAIN

## 2022-03-09 NOTE — PROGRESS NOTES
Assumed care of pt at shift change, report received. Pt sleeping, easy to arouse. Irritable, requesting to be left alone as she is trying to sleep. Refuses assessment and any needs at this time. Telesitter in place for elopement risk. Safety precautions in place.

## 2022-03-09 NOTE — DISCHARGE PLANNING
DC Transport Scheduled    Received request at: 1255     Transport Company Scheduled:  [employer]      Scheduled Date: 03/10/2022  Scheduled Time: 1300     Destination: [facility]    Notified care team of scheduled transport via [employer].     If there are any changes needed to the DC transportation scheduled, please contact [facility] at ext. [phone] between the hours of 6011-3242 Mon-Fri. If outside those hours, contact the ED Case Manager at ext. [phone].

## 2022-03-10 PROCEDURE — G0378 HOSPITAL OBSERVATION PER HR: HCPCS

## 2022-03-10 PROCEDURE — A9270 NON-COVERED ITEM OR SERVICE: HCPCS | Performed by: INTERNAL MEDICINE

## 2022-03-10 PROCEDURE — 700102 HCHG RX REV CODE 250 W/ 637 OVERRIDE(OP): Performed by: INTERNAL MEDICINE

## 2022-03-10 PROCEDURE — 99224 PR SUBSEQUENT OBSERVATION CARE,LEVEL I: CPT | Performed by: INTERNAL MEDICINE

## 2022-03-10 RX ADMIN — ARIPIPRAZOLE 5 MG: 2 TABLET ORAL at 04:34

## 2022-03-10 ASSESSMENT — PAIN SCALES - PAIN ASSESSMENT IN ADVANCED DEMENTIA (PAINAD)
BODYLANGUAGE: RELAXED
BREATHING: NORMAL
CONSOLABILITY: NO NEED TO CONSOLE
TOTALSCORE: 0
FACIALEXPRESSION: SMILING OR INEXPRESSIVE

## 2022-03-10 NOTE — DISCHARGE SUMMARY
Discharge Summary    CHIEF COMPLAINT ON ADMISSION  Chief Complaint   Patient presents with   • Agitation     pt resident of [facility] for [facility]. pt has hx of demntia. per facility pt was more agitated and had acute behavioral changes. unknown baseline        Reason for Admission  EMS     Admission Date  6/11/2021    CODE STATUS  Full Code    HPI & HOSPITAL COURSE  This is a 71 year old female with PMHx of bipolar disorder, schizoaffective disorder, hypothyroidism, and dementia who was transferred from [facility] on 06/11/2021 due to worsening agitation and delusions.     Patient was evaluated by speech for cognitive evaluation, she did rather well in all domains, It is unclear if she has underlying dementia or if her presentation is psychiatric. Patient was evaluated by psychiatry, and her medications were adjusted. Patient refusing to take risperidone 0.5 every morning and risperidone 1 mg every night, as recommended by psychiatry.  She was transitioned to Abilify, which she was more compliant to.  Her behavior is reasonably controlled, although she continues to have delusions.     Patient was deemed incapacitated to make medical decisions. She was cleared for discharge 6/2021.  Patient currently has guardian, and locked group home placement was pursued.  He was eventually accepted by a group home in [place].      Pt has received 2 doses of COVID vaccine and is now fully vaccinated. Quant was negative on 12/9/2021.     I have personally seen and examined the patient on the day of discharge. She was deemed ready to discharge from the hospital as she did not have any further hospitalization needs. Patient felt comfortable going home. The discharge plan was discussed with the patient's guardian, with which she was agreeable to.     Therefore, she is discharged in fair and stable condition to home with close outpatient follow-up.      Discharge Date  3/10/2022      FOLLOW UP ITEMS POST  DISCHARGE  -Continued on Abilify. PRN Ativan for bouts of agitation will be prescribed.  Recommended close outpatient follow-up with psychiatry.  - counseled to seek immediate medical attention, or return to the ED for recurrent or worsening symptoms.      DISCHARGE DIAGNOSES  Principal Problem:    Delusional disorder (Chronic) POA: Yes  Active Problems:    Noncompliance with treatment plan POA: Yes    Hypothyroidism (Chronic) POA: Yes    Schizoaffective disorder, bipolar type (HCC) POA: Yes    History of dementia POA: Yes  Resolved Problems:    Dementia (HCC) POA: Unknown      FOLLOW UP  No future appointments.  DEMETRI Espinoza  [address] [facility] [id number] [phone]    Schedule an appointment as soon as possible for a visit       [facility] ([facility]) - Primary Care  [address] [place] [id number] [phone]    Please call to schedule and establish with a primary provider. Thank you      MEDICATIONS ON DISCHARGE     Medication List      START taking these medications      Instructions   ARIPiprazole 5 MG tablet  Commonly known as: Abilify   Take 1 Tablet by mouth every day.  Dose: 5 mg     haloperidol 2 MG Tabs  Commonly known as: HALDOL   Take 1 Tablet by mouth 4 times a day as needed (agitation, combativeness, agitation). Can be crushed and added to food/apple sauce.  Dose: 2 mg     LORazepam 2 MG/ML Conc  Commonly known as: ATIVAN   Take 0.5 mL by mouth every 8 hours as needed for up to 30 days.  Dose: 1 mg            Allergies  No Known Allergies    DIET  Orders Placed This Encounter   Procedures   • Diet Order Diet: Regular; Tray Modifications (optional): No Sharps (Paperware)     Standing Status:   Standing     Number of Occurrences:   1     Order Specific Question:   Diet:     Answer:   Regular [1]     Order Specific Question:   Tray Modifications (optional)     Answer:   No Sharps (Paperware)       ACTIVITY  As tolerated.  Weight bearing as  tolerated    CONSULTATIONS  Psychiatry    PROCEDURES  None    LABORATORY  Lab Results   Component Value Date    SODIUM 139 06/15/2021    POTASSIUM 4.9 06/15/2021    CHLORIDE 105 06/15/2021    CO2 26 06/15/2021    GLUCOSE 90 06/15/2021    BUN 15 06/15/2021    CREATININE 0.68 06/15/2021        Lab Results   Component Value Date    WBC 5.4 06/11/2021    HEMOGLOBIN 14.6 06/11/2021    HEMATOCRIT 44.7 06/11/2021    PLATELETCT 205 06/11/2021        Total time of the discharge process = 31 minutes.

## 2022-03-10 NOTE — PROGRESS NOTES
Hospital Medicine TWICE WEEKLY Progress Note    Date of Service  3/10/2022    Chief Complaint  Zully Lin is a 71 y.o. female admitted 6/11/2021 with delusions    Hospital Course  This is a 71 year old female with PMHx of bipolar disorder, schizoaffective disorder, hypothyroidism, and dementia who was transferred from [facility] on 06/11/2021 due to worsening agitation and delusions.    Patient was evaluated by speech for cognitive evaluation, she did rather well in all domains, It is unclear if she has underlying dementia or if her presentation is psychiatric. Patient was evaluated by psychiatry, and her medications were adjusted. Patient refusing to take risperidone 0.5 every morning and risperidone 1 mg every night, as recommended by psychiatry.    Patient was deemed incapacitated to make medical decisions. She was cleared for discharge 6/2021.  Patient currently has guardian, and pursuing placement in group home in [place] ([facility]). Arranging for care flight to [place].    Pt has received 2 doses of COVID vaccine 1and is now fully vaccinated. Quant negative 12/9/2021.     Interval Problem Update  3/10/2022 - I reviewed the patient's chart today. Uneventful night. VSS. Afebrile. Saturating well on RA.  Has been accepted by a group home in [place], and is scheduled to be discharged today.    > I have personally seen and examined the patient today. Calm, cooperative. Still with delusions and paranoia. No other complaints.     I discussed plan of care with patient, bedside RN and .    Consultants/Specialty  psychiatry    Code Status  Full Code    Disposition  Patient is medically cleared.   Anticipate discharge to [facility]/  on Tuesday.  Secure group home.  I have placed the appropriate orders for post-discharge needs.    Review of Systems  ROS   Pertinent positives/negatives as mentioned above.     A complete review of systems was personally done  by me. All other systems were negative.         Physical Exam  Temp:  [36.3 °C (97.3 °F)-36.4 °C (97.5 °F)] 36.4 °C (97.5 °F)  Pulse:  [68-76] 76  Resp:  [16-17] 16  BP: (127-146)/(67-71) 146/69  SpO2:  [96 %-100 %] 99 %    Physical Exam  Vitals and nursing note reviewed.   Constitutional:       General: She is not in acute distress.     Appearance: Normal appearance.   HENT:      Head: Normocephalic and atraumatic.      Mouth/Throat:      Mouth: Mucous membranes are moist.      Pharynx: No oropharyngeal exudate.   Eyes:      Extraocular Movements: Extraocular movements intact.      Pupils: Pupils are equal, round, and reactive to light.   Cardiovascular:      Rate and Rhythm: Normal rate and regular rhythm.      Pulses: Normal pulses.      Heart sounds: No murmur heard.    No friction rub. No gallop.   Pulmonary:      Effort: Pulmonary effort is normal. No respiratory distress.      Breath sounds: No wheezing, rhonchi or rales.   Abdominal:      General: Bowel sounds are normal. There is no distension.      Palpations: Abdomen is soft. There is no mass.      Tenderness: There is no abdominal tenderness.   Musculoskeletal:         General: No swelling or tenderness. Normal range of motion.      Cervical back: Normal range of motion. No rigidity. No muscular tenderness.      Right lower leg: No edema.      Left lower leg: No edema.   Skin:     General: Skin is warm and dry.      Capillary Refill: Capillary refill takes less than 2 seconds.      Findings: No erythema or rash.   Neurological:      General: No focal deficit present.      Mental Status: She is alert and oriented to person, place, and time.   Psychiatric:         Thought Content: Thought content is delusional.      Comments: Continue to have delusion        I have performed the physical examination today 3/10/2022.  In review of last note, there are no new changes except as documented above.      Fluids    Intake/Output Summary (Last 24 hours) at  3/10/2022 0735  Last data filed at 3/10/2022 0400  Gross per 24 hour   Intake 718 ml   Output --   Net 718 ml       Laboratory                        Imaging  DX-CHEST-LIMITED (1 VIEW)   Final Result      Left basilar atelectasis. No focal consolidation. No effusions.           Assessment/Plan  * Delusional disorder- (present on admission)  Assessment & Plan  -Patient was evaluated by psychiatry, her medications were adjusted, patient was deemed incapacitated to make medical decisions. Patient cleared for discharge 6/2021.    -Patient currently has guardian, has an accepting group home. Plan to D/C on Tues 3/15.  -Patient compliant with PO Abilify, and now taking medications with mixed with food/drinks. Continue abilify to 5mg daily, may need to further increase depending on persistence of delusions. Can transition to Abilify ER IM once stable on oral medications for 2 weeks.     History of dementia- (present on admission)  Assessment & Plan  -At baseline  -Patient currently has guardian, has an accepting group home. Plan to D/C on Tues 3/15.  -Now fully vaccinated.   -Has guardian.    Schizoaffective disorder, bipolar type (HCC)- (present on admission)  Assessment & Plan  -Patient has been refusing medications and blood draw due to paranoia.    -Actively delusional  -Patient was evaluated by psychiatry, her medications were adjusted, patient was deemed incapacitated to make medical decisions. Patient cleared for discharge 6/2021.  Patient currently has guardian, has an accepting group home. Plan to D/C on Tues 3/15.  -Patient taking Abilify. With increased/persistent delusions increased abilify to 5mg daily, may need to further adjust based on improvement. Plan to transition to Abilify ER IM once stable and oral medications for 2 weeks.     Hypothyroidism- (present on admission)  Assessment & Plan  -Patient refuses to take her medications.  She refused blood draw on multiple tries     Noncompliance with treatment  plan- (present on admission)  Assessment & Plan  -Patient is now taking abilify with foods/drinks.  Transition to long-acting medications once stable on orals for 2 weeks.             VTE prophylaxis: SCDs/TEDs    I have performed the physical examination, and reviewed updated ROS and plan today 3/10/2022.  In review of last note, there are no new changes except as documented above.

## 2022-03-10 NOTE — PROGRESS NOTES
Received report from NOC RN, pt care assumed. VS stable on RA. No signs of acute distress. Call light within reach, hourly rounding initiated. Pt is aaox3. Pt upset because discharge plan for today was not correct. Pt is discharging on 3/15/22. telesitter in place for safety and risk of elopement.

## 2022-03-10 NOTE — DISCHARGE PLANNING
Late noted from 3/9/22  PC from [facility]. [facility] told that they are looking at next Tuesday, not tomorrow for discharge.  [facility] stated she thought [facility] had received the email to provider.     [facility] requesting transport for today to be canceled.

## 2022-03-11 PROCEDURE — A9270 NON-COVERED ITEM OR SERVICE: HCPCS | Performed by: INTERNAL MEDICINE

## 2022-03-11 PROCEDURE — G0378 HOSPITAL OBSERVATION PER HR: HCPCS

## 2022-03-11 PROCEDURE — 700102 HCHG RX REV CODE 250 W/ 637 OVERRIDE(OP): Performed by: INTERNAL MEDICINE

## 2022-03-11 RX ADMIN — ARIPIPRAZOLE 5 MG: 2 TABLET ORAL at 05:06

## 2022-03-11 ASSESSMENT — PAIN DESCRIPTION - PAIN TYPE: TYPE: ACUTE PAIN

## 2022-03-11 NOTE — PROGRESS NOTES
Received report and assumed care at shift change. A&O x 3, disoriented to situation and continue to have delusions, noncompliant with cares. Patient in hospital gown, with tele sitter at bedside. No complain of pain or distress.

## 2022-03-11 NOTE — PROGRESS NOTES
Received report from NOC RN, pt care assumed. VS stable on RA. No signs of acute distress. Call light within reach, telesitter in place for safety. Pt is aaox3 and denies having pain. Bed-alarm off.

## 2022-03-12 PROCEDURE — A9270 NON-COVERED ITEM OR SERVICE: HCPCS | Performed by: INTERNAL MEDICINE

## 2022-03-12 PROCEDURE — 700102 HCHG RX REV CODE 250 W/ 637 OVERRIDE(OP): Performed by: INTERNAL MEDICINE

## 2022-03-12 PROCEDURE — G0378 HOSPITAL OBSERVATION PER HR: HCPCS

## 2022-03-12 RX ADMIN — ARIPIPRAZOLE 5 MG: 2 TABLET ORAL at 06:16

## 2022-03-12 ASSESSMENT — FIBROSIS 4 INDEX: FIB4 SCORE: 1.53

## 2022-03-12 ASSESSMENT — PAIN DESCRIPTION - PAIN TYPE: TYPE: ACUTE PAIN

## 2022-03-12 NOTE — PROGRESS NOTES
Received report from NOC RN, pt care assumed. VS stable on RA. No signs of acute distress. Call light within reach, hourly rounding initiated. telesitter in place for safety and elopement risk.

## 2022-03-12 NOTE — PROGRESS NOTES
Received report and assumed care at shift change. A&O x 3, disoriented to situation, continue to have delusions.  Continue to be non compliant with cares. No complain of pain or distress. Needs attended to.

## 2022-03-13 PROCEDURE — G0378 HOSPITAL OBSERVATION PER HR: HCPCS

## 2022-03-13 PROCEDURE — A9270 NON-COVERED ITEM OR SERVICE: HCPCS | Performed by: INTERNAL MEDICINE

## 2022-03-13 PROCEDURE — 700102 HCHG RX REV CODE 250 W/ 637 OVERRIDE(OP): Performed by: INTERNAL MEDICINE

## 2022-03-13 RX ADMIN — ARIPIPRAZOLE 5 MG: 2 TABLET ORAL at 05:18

## 2022-03-13 ASSESSMENT — PAIN DESCRIPTION - PAIN TYPE: TYPE: ACUTE PAIN

## 2022-03-13 NOTE — PROGRESS NOTES
Received report and assumed care at shift change. A&O x 3, disoriented to situation and continue to have delusions. Tele sitter in place, no attempts from patient to leave.  Continue to be non compliant with cares. Fall precautions in place, bed locked and in lowest position. Needs attended to.

## 2022-03-13 NOTE — PROGRESS NOTES
Assumed care of patient this shift. Patient is alert, sitting up in chair eating breakfast and able to make her needs known. Denied any needs at this time. Refused complete assessment. TeleSitter remains in place for safety due to patient being a high elopement risk.

## 2022-03-14 PROCEDURE — G0378 HOSPITAL OBSERVATION PER HR: HCPCS

## 2022-03-14 PROCEDURE — A9270 NON-COVERED ITEM OR SERVICE: HCPCS | Performed by: INTERNAL MEDICINE

## 2022-03-14 PROCEDURE — 99225 PR SUBSEQUENT OBSERVATION CARE,LEVEL II: CPT | Performed by: INTERNAL MEDICINE

## 2022-03-14 PROCEDURE — 700102 HCHG RX REV CODE 250 W/ 637 OVERRIDE(OP): Performed by: INTERNAL MEDICINE

## 2022-03-14 RX ADMIN — ARIPIPRAZOLE 5 MG: 2 TABLET ORAL at 04:14

## 2022-03-14 ASSESSMENT — ENCOUNTER SYMPTOMS
NECK PAIN: 0
BACK PAIN: 0
MYALGIAS: 0
NAUSEA: 0
SPEECH CHANGE: 0
PHOTOPHOBIA: 0
FOCAL WEAKNESS: 0
COUGH: 0
SENSORY CHANGE: 0
EYE PAIN: 0
DIZZINESS: 0
TREMORS: 0
DIARRHEA: 0
VOMITING: 0
FEVER: 0
CHILLS: 0
BLURRED VISION: 0
HEADACHES: 0
HALLUCINATIONS: 0
DOUBLE VISION: 0
TINGLING: 0
WEIGHT LOSS: 0
SPUTUM PRODUCTION: 0
SHORTNESS OF BREATH: 0
ABDOMINAL PAIN: 0
CONSTIPATION: 0
ORTHOPNEA: 0
PALPITATIONS: 0

## 2022-03-14 ASSESSMENT — PAIN DESCRIPTION - PAIN TYPE: TYPE: ACUTE PAIN

## 2022-03-14 ASSESSMENT — LIFESTYLE VARIABLES: SUBSTANCE_ABUSE: 0

## 2022-03-14 NOTE — DISCHARGE PLANNING
Transport Scheduled      Requested at:  3/14 0746    [facility]    Scheduled for 3/15/2022 ( Tues)   Scheduled Time:  1300   Destination : [address] [facility]

## 2022-03-14 NOTE — PROGRESS NOTES
Assumed care of pt at shift change, report received. Pt sitting comfortably in recliner. Calm and cooperative. Delusional, states the  are coming tomorrow to escort her to a dentist appointment, reoriented to environment. Reports no pain at this time, no visible signs of distress noted.  Refuses skin assessment, states nothing is wrong with her skin. Telesitter in place for elopement risk. Safety precautions in place.

## 2022-03-14 NOTE — PROGRESS NOTES
Received report from NOC RN and assumed care of pt. Pt sitting in bedside chair, resting on room air. Pt states she is discharging tomorrow and would like her shoes and clothes returned to her. Pt told her belongings would be returned to her tomorrow. POC discussed with pt; all questions answered. No other needs at this time. Bed locked in lowest position, call light within reach, pt educated to call for assistance.

## 2022-03-15 ENCOUNTER — PHARMACY VISIT (OUTPATIENT)
Dept: PHARMACY | Facility: MEDICAL CENTER | Age: 72
End: 2022-03-15
Payer: COMMERCIAL

## 2022-03-15 VITALS
OXYGEN SATURATION: 98 % | HEART RATE: 65 BPM | WEIGHT: 188.93 LBS | TEMPERATURE: 98.8 F | BODY MASS INDEX: 31.48 KG/M2 | SYSTOLIC BLOOD PRESSURE: 130 MMHG | DIASTOLIC BLOOD PRESSURE: 76 MMHG | HEIGHT: 65 IN | RESPIRATION RATE: 16 BRPM

## 2022-03-15 PROCEDURE — A9270 NON-COVERED ITEM OR SERVICE: HCPCS | Performed by: INTERNAL MEDICINE

## 2022-03-15 PROCEDURE — G0378 HOSPITAL OBSERVATION PER HR: HCPCS

## 2022-03-15 PROCEDURE — 700102 HCHG RX REV CODE 250 W/ 637 OVERRIDE(OP): Performed by: INTERNAL MEDICINE

## 2022-03-15 PROCEDURE — 99217 PR OBSERVATION CARE DISCHARGE: CPT | Performed by: INTERNAL MEDICINE

## 2022-03-15 RX ORDER — ARIPIPRAZOLE 5 MG/1
5 TABLET ORAL DAILY
Qty: 90 TABLET | Refills: 0 | Status: SHIPPED | OUTPATIENT
Start: 2022-03-15 | End: 2022-04-20 | Stop reason: SDUPTHER

## 2022-03-15 RX ORDER — HALOPERIDOL 2 MG/1
2 TABLET ORAL 4 TIMES DAILY PRN
Qty: 120 TABLET | Refills: 0 | Status: SHIPPED | OUTPATIENT
Start: 2022-03-15 | End: 2022-03-15

## 2022-03-15 RX ORDER — LORAZEPAM 2 MG/ML
1 CONCENTRATE ORAL EVERY 8 HOURS PRN
Qty: 60 ML | Refills: 0 | Status: SHIPPED | OUTPATIENT
Start: 2022-03-15 | End: 2022-04-20 | Stop reason: SDUPTHER

## 2022-03-15 RX ADMIN — ARIPIPRAZOLE 5 MG: 2 TABLET ORAL at 04:22

## 2022-03-15 NOTE — DISCHARGE SUMMARY
Discharge Summary    CHIEF COMPLAINT ON ADMISSION  Chief Complaint   Patient presents with   • Agitation     pt resident of [facility] for [facility] care. pt has hx of demntia. per facility pt was more agitated and had acute behavioral changes. unknown baseline        Reason for Admission  EMS     Admission Date  6/11/2021    CODE STATUS  Prior    HPI & HOSPITAL COURSE    This is a 71 year old female with PMHx of bipolar disorder, schizoaffective disorder, hypothyroidism, and dementia who was transferred from [facility] on 06/11/2021 due to worsening agitation and delusions.    Patient was evaluated by speech for cognitive evaluation, she did rather well in all domains, It is unclear if she has underlying dementia or if her presentation is psychiatric. Patient was evaluated by psychiatry, and her medications were adjusted.     Patient was deemed incapacitated to make medical decisions. She was cleared for discharge 6/2021.  Patient currently has guardian.    Psychiatry evaluated her and she was started on Abilify.  Initially she declined to take Abilify and now she has been taking her medication.    Today I reviewed and examined her at the bedside.  She denies any acute complaints and she is excited to be discharged from the hospital.      Pt has received 2 doses of COVID vaccine 1and is now fully vaccinated. Quant negative 12/9/2021.       Therefore, she is discharged in fair and stable condition to home with close outpatient follow-up.      Discharge Date  3/15/2022    FOLLOW UP ITEMS POST DISCHARGE  Primary care provider    DISCHARGE DIAGNOSES  Principal Problem:    Delusional disorder (Chronic) POA: Yes  Active Problems:    Noncompliance with treatment plan POA: Yes    Hypothyroidism (Chronic) POA: Yes    Schizoaffective disorder, bipolar type (HCC) POA: Yes    History of dementia POA: Yes  Resolved Problems:    Dementia (HCC) POA: Unknown      FOLLOW UP    Tavares Moss A.P.R.N.  [address] [facility]  [place] [address]  [phone]    Schedule an appointment as soon as possible for a visit       [facility] ([facility]  [address] [place] [id number]  [phone]    Please call to schedule and establish with a primary provider. Thank you      MEDICATIONS ON DISCHARGE     Medication List      START taking these medications      Instructions   ARIPiprazole 5 MG tablet  Commonly known as: Abilify   Take 1 Tablet by mouth every day for 90 days.  Dose: 5 mg     LORazepam 2 MG/ML Conc  Commonly known as: ATIVAN   Take 0.5 mL by mouth every 8 hours as needed for up to 90 days.  Dose: 1 mg            Allergies  No Known Allergies    DIET  No orders of the defined types were placed in this encounter.      ACTIVITY  As tolerated.  Weight bearing as tolerated    CONSULTATIONS  Psychiatry    PROCEDURES  None    LABORATORY  Lab Results   Component Value Date    SODIUM 139 06/15/2021    POTASSIUM 4.9 06/15/2021    CHLORIDE 105 06/15/2021    CO2 26 06/15/2021    GLUCOSE 90 06/15/2021    BUN 15 06/15/2021    CREATININE 0.68 06/15/2021        Lab Results   Component Value Date    WBC 5.4 06/11/2021    HEMOGLOBIN 14.6 06/11/2021    HEMATOCRIT 44.7 06/11/2021    PLATELETCT 205 06/11/2021      DX-CHEST-LIMITED (1 VIEW)   Final Result      Left basilar atelectasis. No focal consolidation. No effusions.

## 2022-03-15 NOTE — PROGRESS NOTES
Patient discharged to group home via [facility] at 1300. Medications and all belongings sent with [facility] transporter. Discharge paperwork signed by this RN and Charge RN.

## 2022-03-15 NOTE — PROGRESS NOTES
"Assumed care of patient this shift. Patient is alert, able to make her needs known. Patient stated that she is excited about being discharged today and stated \"I have a lot of family waiting for me there\" Denied any other needs at this time. Refused complete assessment. TeleSitter remains in place for safety due to patient being a high elopement risk.   "

## 2022-03-15 NOTE — PROGRESS NOTES
Assumed care of pt at shift change, report received. Pt sleeping, easy to arouse. Reports no pain or discomfort. States she is moving to her new place tomorrow and she would like to get some rest. Declines any needs at this time. Telesitter in place. Safety precautions in place.

## 2022-03-23 NOTE — PROGRESS NOTES
Hospital Medicine TWICE WEEKLY Progress Note    Date of Service  12/11/2021    Chief Complaint  Zully Lin is a 71 y.o. female admitted 6/11/2021 with delusions    Hospital Course  This is a 71 year old female with PMHx of bipolar disorder, schizoaffective disorder, hypothyroidism, and dementia who was transferred from [facility] on 06/11/2021 due to worsening agitation and delusions.    Patient was evaluated by psychiatry, her medications were adjusted, patient was deemed incapacitated to make medical decisions. She was cleared for discharge 6/2021.  Patient currently has guardian, attempting to get patient placed in group home.    Patient was evaluated by speech for cognitive evaluation, she did rather well in all domains, It is unclear if she has underlying dementia or if her presentation is psychiatric.     Patient refusing to take risperidone 0.5 every morning and risperidone 1 mg every night, as recommended by psychiatry.    Interval Problem Update  Patient seen, awake and alert, folding her clothes, denies complaints, fixed delusions, remains calm     Patient continues to refuse all PO medications due to paranoia.     Received 2nd COVID vaccine 12/9, now fully vaccinated. Quant negative 12/9/2021    PENDING group home placement, possible placement in [place].     I have personally seen and examined the patient at bedside. I discussed the plan of care with patient and bedside RN.    Consultants/Specialty  psychiatry    Code Status  Full Code    Disposition  Patient is medically cleared.   Anticipate discharge to [facility]/ .  I have placed the appropriate orders for post-discharge needs.    Review of Systems  Review of Systems   Unable to perform ROS: Mental acuity        Physical Exam  Temp:  [36.2 °C (97.2 °F)-36.8 °C (98.2 °F)] 36.6 °C (97.8 °F)  Pulse:  [67-87] 87  Resp:  [17-18] 17  BP: (118-140)/(63-79) 118/66  SpO2:  [96 %-99 %] 99 %    Physical Exam  Vitals and nursing  note reviewed.   Constitutional:       General: She is not in acute distress.     Appearance: Normal appearance.   HENT:      Head: Normocephalic and atraumatic.      Mouth/Throat:      Mouth: Mucous membranes are moist.      Pharynx: No oropharyngeal exudate.   Eyes:      Extraocular Movements: Extraocular movements intact.      Pupils: Pupils are equal, round, and reactive to light.   Cardiovascular:      Rate and Rhythm: Normal rate and regular rhythm.      Pulses: Normal pulses.      Heart sounds: No murmur heard.  No friction rub. No gallop.    Pulmonary:      Effort: Pulmonary effort is normal. No respiratory distress.      Breath sounds: No wheezing, rhonchi or rales.   Abdominal:      General: Bowel sounds are normal. There is no distension.      Palpations: Abdomen is soft. There is no mass.      Tenderness: There is no abdominal tenderness.   Musculoskeletal:         General: No swelling or tenderness. Normal range of motion.      Cervical back: Normal range of motion. No rigidity. No muscular tenderness.      Right lower leg: No edema.      Left lower leg: No edema.   Skin:     General: Skin is warm and dry.      Capillary Refill: Capillary refill takes less than 2 seconds.      Findings: No erythema or rash.   Neurological:      General: No focal deficit present.      Mental Status: She is alert and oriented to person, place, and time.      Motor: No weakness.      Gait: Gait normal.         Fluids    Intake/Output Summary (Last 24 hours) at 12/11/2021 0832  Last data filed at 12/10/2021 1800  Gross per 24 hour   Intake 472 ml   Output --   Net 472 ml       Laboratory                        Imaging  DX-CHEST-LIMITED (1 VIEW)   Final Result      Left basilar atelectasis. No focal consolidation. No effusions.           Assessment/Plan  * Delusional disorder- (present on admission)  Assessment & Plan  Patient was evaluated by psychiatry, her medications were adjusted, patient was deemed incapacitated to  make medical decisions. Patient cleared for discharge 6/2021.  Patient currently has guardian, attempting to get patient placed in group home.    Patient refusing to take risperidone 0.5 every morning and risperidone 1 mg every night, as recommended by psychiatry.    History of dementia- (present on admission)  Assessment & Plan  At baseline  Pending placement.    Schizoaffective disorder, bipolar type (HCC)- (present on admission)  Assessment & Plan  Patient has been refusing medications and blood draw due to paranoia.    Actively delusional  Case management are working on discharge planning and guardianship.    Patient was evaluated by psychiatry, her medications were adjusted, patient was deemed incapacitated to make medical decisions. Patient cleared for discharge 6/2021.  Patient currently has guardian, attempting to get patient placed in group home.    Patient refusing to take risperidone 0.5 every morning and risperidone 1 mg every night, as recommended by psychiatry.    Hypothyroidism- (present on admission)  Assessment & Plan  Levothyroxine has been ordered.  Patient refuses her medications.  She refused blood draw on multiple tries     Noncompliance with treatment plan- (present on admission)  Assessment & Plan  Patient refused antipsychotic medications and all other medications as well as any lab work       VTE prophylaxis: SCDs/TEDs    I have performed a physical exam and reviewed and updated ROS and Plan today (12/11/2021). In review of yesterday's note (12/10/2021), there are no changes except as documented above.       home

## 2022-04-20 DIAGNOSIS — F03.911 AGITATION DUE TO DEMENTIA (HCC): ICD-10-CM

## 2022-04-20 DIAGNOSIS — F03.90 DEMENTIA WITHOUT BEHAVIORAL DISTURBANCE, UNSPECIFIED DEMENTIA TYPE: ICD-10-CM

## 2022-04-20 PROCEDURE — RXMED WILLOW AMBULATORY MEDICATION CHARGE: Performed by: INTERNAL MEDICINE

## 2022-04-20 RX ORDER — ARIPIPRAZOLE 5 MG/1
5 TABLET ORAL DAILY
Qty: 90 TABLET | Refills: 0 | Status: SHIPPED | OUTPATIENT
Start: 2022-04-20 | End: 2022-06-21 | Stop reason: SDUPTHER

## 2022-04-20 RX ORDER — LORAZEPAM 2 MG/ML
1 CONCENTRATE ORAL EVERY 8 HOURS PRN
Qty: 30 ML | Refills: 0 | Status: SHIPPED | OUTPATIENT
Start: 2022-04-20 | End: 2022-05-20

## 2022-04-21 ENCOUNTER — PHARMACY VISIT (OUTPATIENT)
Dept: PHARMACY | Facility: MEDICAL CENTER | Age: 72
End: 2022-04-21
Payer: COMMERCIAL

## 2022-04-21 PROCEDURE — RXMED WILLOW AMBULATORY MEDICATION CHARGE: Performed by: INTERNAL MEDICINE

## 2022-08-29 PROBLEM — G25.0 BENIGN ESSENTIAL TREMOR: Status: ACTIVE | Noted: 2022-08-29

## 2024-01-31 PROBLEM — R00.1 BRADYCARDIA: Status: ACTIVE | Noted: 2024-01-31

## 2024-03-26 ENCOUNTER — HOSPITAL ENCOUNTER (OUTPATIENT)
Facility: MEDICAL CENTER | Age: 74
End: 2024-03-26
Attending: PHYSICIAN ASSISTANT
Payer: MEDICARE

## 2024-03-26 DIAGNOSIS — E03.9 HYPOTHYROIDISM, UNSPECIFIED TYPE: ICD-10-CM

## 2024-03-26 LAB — TSH SERPL DL<=0.005 MIU/L-ACNC: 16.2 UIU/ML (ref 0.38–5.33)

## 2024-03-26 PROCEDURE — 84443 ASSAY THYROID STIM HORMONE: CPT

## 2024-06-25 ENCOUNTER — HOSPITAL ENCOUNTER (OUTPATIENT)
Facility: MEDICAL CENTER | Age: 74
End: 2024-06-25
Attending: NURSE PRACTITIONER
Payer: MEDICARE

## 2024-06-25 PROCEDURE — 84439 ASSAY OF FREE THYROXINE: CPT

## 2024-06-25 PROCEDURE — 80164 ASSAY DIPROPYLACETIC ACD TOT: CPT

## 2024-06-25 PROCEDURE — 80053 COMPREHEN METABOLIC PANEL: CPT

## 2024-06-25 PROCEDURE — 82607 VITAMIN B-12: CPT

## 2024-06-25 PROCEDURE — 80061 LIPID PANEL: CPT

## 2024-06-25 PROCEDURE — 82306 VITAMIN D 25 HYDROXY: CPT

## 2024-06-25 PROCEDURE — 84443 ASSAY THYROID STIM HORMONE: CPT

## 2024-06-25 PROCEDURE — 85025 COMPLETE CBC W/AUTO DIFF WBC: CPT

## 2024-06-26 DIAGNOSIS — Z79.899 ENCOUNTER FOR LONG-TERM (CURRENT) USE OF MEDICATIONS: ICD-10-CM

## 2024-06-26 DIAGNOSIS — E78.5 HYPERLIPIDEMIA, UNSPECIFIED HYPERLIPIDEMIA TYPE: ICD-10-CM

## 2024-06-26 DIAGNOSIS — E03.9 HYPOTHYROIDISM, UNSPECIFIED TYPE: ICD-10-CM

## 2024-06-26 DIAGNOSIS — I10 PRIMARY HYPERTENSION: ICD-10-CM

## 2024-06-27 LAB
25(OH)D3 SERPL-MCNC: 20 NG/ML (ref 30–100)
ALBUMIN SERPL BCP-MCNC: 4.2 G/DL (ref 3.2–4.9)
ALBUMIN/GLOB SERPL: 1.8 G/DL
ALP SERPL-CCNC: 71 U/L (ref 30–99)
ALT SERPL-CCNC: 11 U/L (ref 2–50)
ANION GAP SERPL CALC-SCNC: 14 MMOL/L (ref 7–16)
AST SERPL-CCNC: 21 U/L (ref 12–45)
BASOPHILS # BLD AUTO: 0.6 % (ref 0–1.8)
BASOPHILS # BLD: 0.04 K/UL (ref 0–0.12)
BILIRUB SERPL-MCNC: 0.3 MG/DL (ref 0.1–1.5)
BUN SERPL-MCNC: 12 MG/DL (ref 8–22)
CALCIUM ALBUM COR SERPL-MCNC: 8.7 MG/DL (ref 8.5–10.5)
CALCIUM SERPL-MCNC: 8.9 MG/DL (ref 8.5–10.5)
CHLORIDE SERPL-SCNC: 104 MMOL/L (ref 96–112)
CHOLEST SERPL-MCNC: 267 MG/DL (ref 100–199)
CO2 SERPL-SCNC: 23 MMOL/L (ref 20–33)
CREAT SERPL-MCNC: 0.8 MG/DL (ref 0.5–1.4)
EOSINOPHIL # BLD AUTO: 0.19 K/UL (ref 0–0.51)
EOSINOPHIL NFR BLD: 2.7 % (ref 0–6.9)
ERYTHROCYTE [DISTWIDTH] IN BLOOD BY AUTOMATED COUNT: 47.4 FL (ref 35.9–50)
GFR SERPLBLD CREATININE-BSD FMLA CKD-EPI: 78 ML/MIN/1.73 M 2
GLOBULIN SER CALC-MCNC: 2.4 G/DL (ref 1.9–3.5)
GLUCOSE SERPL-MCNC: 94 MG/DL (ref 65–99)
HCT VFR BLD AUTO: 48.7 % (ref 37–47)
HDLC SERPL-MCNC: 60 MG/DL
HGB BLD-MCNC: 15.6 G/DL (ref 12–16)
IMM GRANULOCYTES # BLD AUTO: 0.02 K/UL (ref 0–0.11)
IMM GRANULOCYTES NFR BLD AUTO: 0.3 % (ref 0–0.9)
LDLC SERPL CALC-MCNC: 156 MG/DL
LYMPHOCYTES # BLD AUTO: 2.22 K/UL (ref 1–4.8)
LYMPHOCYTES NFR BLD: 31.2 % (ref 22–41)
MCH RBC QN AUTO: 31.5 PG (ref 27–33)
MCHC RBC AUTO-ENTMCNC: 32 G/DL (ref 32.2–35.5)
MCV RBC AUTO: 98.4 FL (ref 81.4–97.8)
MONOCYTES # BLD AUTO: 0.59 K/UL (ref 0–0.85)
MONOCYTES NFR BLD AUTO: 8.3 % (ref 0–13.4)
NEUTROPHILS # BLD AUTO: 4.06 K/UL (ref 1.82–7.42)
NEUTROPHILS NFR BLD: 56.9 % (ref 44–72)
NRBC # BLD AUTO: 0 K/UL
NRBC BLD-RTO: 0 /100 WBC (ref 0–0.2)
PLATELET # BLD AUTO: 210 K/UL (ref 164–446)
PMV BLD AUTO: 12.4 FL (ref 9–12.9)
POTASSIUM SERPL-SCNC: 4.1 MMOL/L (ref 3.6–5.5)
PROT SERPL-MCNC: 6.6 G/DL (ref 6–8.2)
RBC # BLD AUTO: 4.95 M/UL (ref 4.2–5.4)
SODIUM SERPL-SCNC: 141 MMOL/L (ref 135–145)
T4 FREE SERPL-MCNC: 1.19 NG/DL (ref 0.93–1.7)
TRIGL SERPL-MCNC: 257 MG/DL (ref 0–149)
TSH SERPL DL<=0.005 MIU/L-ACNC: 9.47 UIU/ML (ref 0.38–5.33)
VALPROATE SERPL-MCNC: <2.8 UG/ML (ref 50–100)
VIT B12 SERPL-MCNC: 365 PG/ML (ref 211–911)
WBC # BLD AUTO: 7.1 K/UL (ref 4.8–10.8)

## 2024-06-28 ENCOUNTER — HOSPITAL ENCOUNTER (OUTPATIENT)
Facility: MEDICAL CENTER | Age: 74
End: 2024-06-28
Attending: NURSE PRACTITIONER
Payer: MEDICARE

## 2024-06-28 DIAGNOSIS — Z20.1 CONTACT WITH AND (SUSPECTED) EXPOSURE TO TUBERCULOSIS: ICD-10-CM

## 2024-06-28 PROCEDURE — 86480 TB TEST CELL IMMUN MEASURE: CPT

## 2024-07-02 LAB
GAMMA INTERFERON BACKGROUND BLD IA-ACNC: 0.04 IU/ML
M TB IFN-G BLD-IMP: NEGATIVE
M TB IFN-G CD4+ BCKGRND COR BLD-ACNC: -0.02 IU/ML
MITOGEN IGNF BCKGRD COR BLD-ACNC: >10 IU/ML
QFT TB2 - NIL TBQ2: -0.01 IU/ML

## 2024-07-12 PROBLEM — G25.0 BENIGN ESSENTIAL TREMOR: Chronic | Status: ACTIVE | Noted: 2022-08-29

## 2024-07-12 PROBLEM — R00.1 BRADYCARDIA: Chronic | Status: ACTIVE | Noted: 2024-01-31

## 2024-07-12 PROBLEM — E78.2 MIXED HYPERLIPIDEMIA: Chronic | Status: ACTIVE | Noted: 2021-06-09

## 2024-07-12 PROBLEM — E55.9 VITAMIN D DEFICIENCY: Chronic | Status: ACTIVE | Noted: 2024-07-05

## 2024-07-12 PROBLEM — E78.2 MIXED HYPERLIPIDEMIA: Status: ACTIVE | Noted: 2021-06-09

## 2024-07-12 PROBLEM — E55.9 VITAMIN D DEFICIENCY: Status: ACTIVE | Noted: 2024-07-05

## 2024-09-11 PROBLEM — G25.2 COARSE TREMOR: Status: ACTIVE | Noted: 2022-08-29

## 2024-09-11 PROBLEM — F25.0 SCHIZOAFFECTIVE DISORDER, BIPOLAR TYPE (HCC): Chronic | Status: ACTIVE | Noted: 2021-07-05

## 2025-05-06 PROBLEM — M25.474 BILATERAL SWELLING OF FEET AND ANKLES: Status: ACTIVE | Noted: 2025-05-06

## 2025-05-06 PROBLEM — M25.475 BILATERAL SWELLING OF FEET AND ANKLES: Status: ACTIVE | Noted: 2025-05-06

## 2025-05-06 PROBLEM — M25.471 BILATERAL SWELLING OF FEET AND ANKLES: Status: ACTIVE | Noted: 2025-05-06

## 2025-05-06 PROBLEM — M25.472 BILATERAL SWELLING OF FEET AND ANKLES: Status: ACTIVE | Noted: 2025-05-06

## 2025-05-06 PROBLEM — Z11.1 SCREENING-PULMONARY TB: Status: ACTIVE | Noted: 2025-05-06

## 2025-05-29 ENCOUNTER — HOSPITAL ENCOUNTER (OUTPATIENT)
Facility: MEDICAL CENTER | Age: 75
End: 2025-05-29
Attending: NURSE PRACTITIONER
Payer: MEDICARE

## 2025-05-29 DIAGNOSIS — M25.474 BILATERAL SWELLING OF FEET AND ANKLES: ICD-10-CM

## 2025-05-29 DIAGNOSIS — E55.9 VITAMIN D DEFICIENCY: Chronic | ICD-10-CM

## 2025-05-29 DIAGNOSIS — M25.472 BILATERAL SWELLING OF FEET AND ANKLES: ICD-10-CM

## 2025-05-29 DIAGNOSIS — M25.471 BILATERAL SWELLING OF FEET AND ANKLES: ICD-10-CM

## 2025-05-29 DIAGNOSIS — M25.475 BILATERAL SWELLING OF FEET AND ANKLES: ICD-10-CM

## 2025-05-29 DIAGNOSIS — R30.0 DYSURIA: ICD-10-CM

## 2025-05-29 DIAGNOSIS — Z79.899 HIGH RISK MEDICATION USE: ICD-10-CM

## 2025-05-29 DIAGNOSIS — I10 ESSENTIAL HYPERTENSION: Chronic | ICD-10-CM

## 2025-05-29 DIAGNOSIS — Z11.1 SCREENING-PULMONARY TB: ICD-10-CM

## 2025-05-29 DIAGNOSIS — E78.2 MIXED HYPERLIPIDEMIA: Chronic | ICD-10-CM

## 2025-05-29 DIAGNOSIS — E03.9 ACQUIRED HYPOTHYROIDISM: ICD-10-CM

## 2025-05-29 DIAGNOSIS — G25.2 COARSE TREMOR: ICD-10-CM

## 2025-05-29 LAB
25(OH)D3 SERPL-MCNC: 52 NG/ML (ref 30–100)
ALBUMIN SERPL BCP-MCNC: 4.3 G/DL (ref 3.2–4.9)
ALBUMIN/GLOB SERPL: 1.7 G/DL
ALP SERPL-CCNC: 62 U/L (ref 30–99)
ALT SERPL-CCNC: 24 U/L (ref 2–50)
ANION GAP SERPL CALC-SCNC: 14 MMOL/L (ref 7–16)
APPEARANCE UR: ABNORMAL
AST SERPL-CCNC: 27 U/L (ref 12–45)
BACTERIA #/AREA URNS HPF: ABNORMAL /HPF
BASOPHILS # BLD AUTO: 0.6 % (ref 0–1.8)
BASOPHILS # BLD: 0.05 K/UL (ref 0–0.12)
BILIRUB SERPL-MCNC: 0.4 MG/DL (ref 0.1–1.5)
BILIRUB UR QL STRIP.AUTO: NEGATIVE
BUN SERPL-MCNC: 15 MG/DL (ref 8–22)
CA OXALATE CRYSTAL  1765: PRESENT /HPF
CALCIUM ALBUM COR SERPL-MCNC: 9.3 MG/DL (ref 8.5–10.5)
CALCIUM SERPL-MCNC: 9.5 MG/DL (ref 8.5–10.5)
CASTS URNS QL MICRO: ABNORMAL /LPF (ref 0–2)
CHLORIDE SERPL-SCNC: 105 MMOL/L (ref 96–112)
CHOLEST SERPL-MCNC: 163 MG/DL (ref 100–199)
CO2 SERPL-SCNC: 24 MMOL/L (ref 20–33)
COLOR UR: ABNORMAL
CREAT SERPL-MCNC: 0.89 MG/DL (ref 0.5–1.4)
EOSINOPHIL # BLD AUTO: 0.07 K/UL (ref 0–0.51)
EOSINOPHIL NFR BLD: 0.9 % (ref 0–6.9)
EPITHELIAL CELLS 1715: ABNORMAL /HPF (ref 0–5)
ERYTHROCYTE [DISTWIDTH] IN BLOOD BY AUTOMATED COUNT: 47.1 FL (ref 35.9–50)
EST. AVERAGE GLUCOSE BLD GHB EST-MCNC: 108 MG/DL
FOLATE SERPL-MCNC: 11.4 NG/ML
GFR SERPLBLD CREATININE-BSD FMLA CKD-EPI: 68 ML/MIN/1.73 M 2
GLOBULIN SER CALC-MCNC: 2.6 G/DL (ref 1.9–3.5)
GLUCOSE SERPL-MCNC: 101 MG/DL (ref 65–99)
GLUCOSE UR STRIP.AUTO-MCNC: NEGATIVE MG/DL
HBA1C MFR BLD: 5.4 % (ref 4–5.6)
HCT VFR BLD AUTO: 46 % (ref 37–47)
HDLC SERPL-MCNC: 71 MG/DL
HGB BLD-MCNC: 14.8 G/DL (ref 12–16)
IMM GRANULOCYTES # BLD AUTO: 0.05 K/UL (ref 0–0.11)
IMM GRANULOCYTES NFR BLD AUTO: 0.6 % (ref 0–0.9)
KETONES UR STRIP.AUTO-MCNC: ABNORMAL MG/DL
LDLC SERPL CALC-MCNC: 59 MG/DL
LEUKOCYTE ESTERASE UR QL STRIP.AUTO: ABNORMAL
LYMPHOCYTES # BLD AUTO: 1.99 K/UL (ref 1–4.8)
LYMPHOCYTES NFR BLD: 24.2 % (ref 22–41)
MCH RBC QN AUTO: 31.8 PG (ref 27–33)
MCHC RBC AUTO-ENTMCNC: 32.2 G/DL (ref 32.2–35.5)
MCV RBC AUTO: 98.9 FL (ref 81.4–97.8)
MICRO URNS: ABNORMAL
MONOCYTES # BLD AUTO: 0.72 K/UL (ref 0–0.85)
MONOCYTES NFR BLD AUTO: 8.8 % (ref 0–13.4)
NEUTROPHILS # BLD AUTO: 5.34 K/UL (ref 1.82–7.42)
NEUTROPHILS NFR BLD: 64.9 % (ref 44–72)
NITRITE UR QL STRIP.AUTO: NEGATIVE
NRBC # BLD AUTO: 0 K/UL
NRBC BLD-RTO: 0 /100 WBC (ref 0–0.2)
PH UR STRIP.AUTO: 5.5 [PH] (ref 5–8)
PLATELET # BLD AUTO: 167 K/UL (ref 164–446)
PMV BLD AUTO: 12.7 FL (ref 9–12.9)
POTASSIUM SERPL-SCNC: 3.9 MMOL/L (ref 3.6–5.5)
PROT SERPL-MCNC: 6.9 G/DL (ref 6–8.2)
PROT UR QL STRIP: 30 MG/DL
RBC # BLD AUTO: 4.65 M/UL (ref 4.2–5.4)
RBC # URNS HPF: ABNORMAL /HPF (ref 0–2)
RBC UR QL AUTO: NEGATIVE
SODIUM SERPL-SCNC: 143 MMOL/L (ref 135–145)
SP GR UR STRIP.AUTO: 1.02
TRIGL SERPL-MCNC: 167 MG/DL (ref 0–149)
TSH SERPL DL<=0.005 MIU/L-ACNC: 0.56 UIU/ML (ref 0.38–5.33)
UROBILINOGEN UR STRIP.AUTO-MCNC: 0.2 EU/DL
VIT B12 SERPL-MCNC: 444 PG/ML (ref 211–911)
WBC # BLD AUTO: 8.2 K/UL (ref 4.8–10.8)
WBC #/AREA URNS HPF: ABNORMAL /HPF

## 2025-05-29 PROCEDURE — 80053 COMPREHEN METABOLIC PANEL: CPT

## 2025-05-29 PROCEDURE — 87086 URINE CULTURE/COLONY COUNT: CPT

## 2025-05-29 PROCEDURE — 82607 VITAMIN B-12: CPT

## 2025-05-29 PROCEDURE — 81001 URINALYSIS AUTO W/SCOPE: CPT

## 2025-05-29 PROCEDURE — 82306 VITAMIN D 25 HYDROXY: CPT

## 2025-05-29 PROCEDURE — 82746 ASSAY OF FOLIC ACID SERUM: CPT

## 2025-05-29 PROCEDURE — 80061 LIPID PANEL: CPT

## 2025-05-29 PROCEDURE — 85025 COMPLETE CBC W/AUTO DIFF WBC: CPT

## 2025-05-29 PROCEDURE — 83036 HEMOGLOBIN GLYCOSYLATED A1C: CPT

## 2025-05-29 PROCEDURE — 86480 TB TEST CELL IMMUN MEASURE: CPT

## 2025-05-29 PROCEDURE — 84443 ASSAY THYROID STIM HORMONE: CPT

## 2025-05-31 LAB
BACTERIA UR CULT: NORMAL
GAMMA INTERFERON BACKGROUND BLD IA-ACNC: 0.02 IU/ML
M TB IFN-G BLD-IMP: NEGATIVE
M TB IFN-G CD4+ BCKGRND COR BLD-ACNC: 0.01 IU/ML
MITOGEN IGNF BCKGRD COR BLD-ACNC: >10 IU/ML
QFT TB2 - NIL TBQ2: 0.01 IU/ML
SIGNIFICANT IND 70042: NORMAL
SITE SITE: NORMAL
SOURCE SOURCE: NORMAL